# Patient Record
Sex: MALE | Race: BLACK OR AFRICAN AMERICAN | NOT HISPANIC OR LATINO
[De-identification: names, ages, dates, MRNs, and addresses within clinical notes are randomized per-mention and may not be internally consistent; named-entity substitution may affect disease eponyms.]

---

## 2018-05-25 ENCOUNTER — TRANSCRIPTION ENCOUNTER (OUTPATIENT)
Age: 68
End: 2018-05-25

## 2018-10-23 ENCOUNTER — INPATIENT (INPATIENT)
Facility: HOSPITAL | Age: 68
LOS: 5 days | Discharge: HOME | DRG: 300 | End: 2018-10-29
Attending: INTERNAL MEDICINE | Admitting: INTERNAL MEDICINE
Payer: MEDICARE

## 2018-10-23 VITALS
OXYGEN SATURATION: 100 % | RESPIRATION RATE: 20 BRPM | TEMPERATURE: 98 F | WEIGHT: 169.98 LBS | HEIGHT: 72 IN | HEART RATE: 79 BPM | DIASTOLIC BLOOD PRESSURE: 80 MMHG | SYSTOLIC BLOOD PRESSURE: 131 MMHG

## 2018-10-23 DIAGNOSIS — L97.509 NON-PRESSURE CHRONIC ULCER OF OTHER PART OF UNSPECIFIED FOOT WITH UNSPECIFIED SEVERITY: ICD-10-CM

## 2018-10-23 LAB
ALBUMIN SERPL ELPH-MCNC: 2.8 G/DL — LOW (ref 3.5–5)
ALP SERPL-CCNC: 86 U/L — SIGNIFICANT CHANGE UP (ref 40–120)
ALT FLD-CCNC: 12 U/L DA — SIGNIFICANT CHANGE UP (ref 10–60)
ANION GAP SERPL CALC-SCNC: 5 MMOL/L — SIGNIFICANT CHANGE UP (ref 5–17)
APTT BLD: 31.8 SEC — SIGNIFICANT CHANGE UP (ref 27.5–37.4)
AST SERPL-CCNC: 14 U/L — SIGNIFICANT CHANGE UP (ref 10–40)
BASOPHILS # BLD AUTO: 0 K/UL — SIGNIFICANT CHANGE UP (ref 0–0.2)
BASOPHILS NFR BLD AUTO: 0.7 % — SIGNIFICANT CHANGE UP (ref 0–2)
BILIRUB SERPL-MCNC: 0.2 MG/DL — SIGNIFICANT CHANGE UP (ref 0.2–1.2)
BUN SERPL-MCNC: 13 MG/DL — SIGNIFICANT CHANGE UP (ref 7–18)
CALCIUM SERPL-MCNC: 8.4 MG/DL — SIGNIFICANT CHANGE UP (ref 8.4–10.5)
CHLORIDE SERPL-SCNC: 106 MMOL/L — SIGNIFICANT CHANGE UP (ref 96–108)
CO2 SERPL-SCNC: 28 MMOL/L — SIGNIFICANT CHANGE UP (ref 22–31)
CREAT SERPL-MCNC: 0.84 MG/DL — SIGNIFICANT CHANGE UP (ref 0.5–1.3)
EOSINOPHIL # BLD AUTO: 0.1 K/UL — SIGNIFICANT CHANGE UP (ref 0–0.5)
EOSINOPHIL NFR BLD AUTO: 1.2 % — SIGNIFICANT CHANGE UP (ref 0–6)
ERYTHROCYTE [SEDIMENTATION RATE] IN BLOOD: 63 MM/HR — HIGH (ref 0–20)
GLUCOSE SERPL-MCNC: 86 MG/DL — SIGNIFICANT CHANGE UP (ref 70–99)
HCT VFR BLD CALC: 30.8 % — LOW (ref 39–50)
HGB BLD-MCNC: 9.8 G/DL — LOW (ref 13–17)
INR BLD: 1.32 RATIO — HIGH (ref 0.88–1.16)
LACTATE SERPL-SCNC: 0.7 MMOL/L — SIGNIFICANT CHANGE UP (ref 0.7–2)
LYMPHOCYTES # BLD AUTO: 1.4 K/UL — SIGNIFICANT CHANGE UP (ref 1–3.3)
LYMPHOCYTES # BLD AUTO: 19.9 % — SIGNIFICANT CHANGE UP (ref 13–44)
MCHC RBC-ENTMCNC: 28.1 PG — SIGNIFICANT CHANGE UP (ref 27–34)
MCHC RBC-ENTMCNC: 31.7 GM/DL — LOW (ref 32–36)
MCV RBC AUTO: 88.6 FL — SIGNIFICANT CHANGE UP (ref 80–100)
MONOCYTES # BLD AUTO: 1.1 K/UL — HIGH (ref 0–0.9)
MONOCYTES NFR BLD AUTO: 14.8 % — HIGH (ref 2–14)
NEUTROPHILS # BLD AUTO: 4.5 K/UL — SIGNIFICANT CHANGE UP (ref 1.8–7.4)
NEUTROPHILS NFR BLD AUTO: 63.5 % — SIGNIFICANT CHANGE UP (ref 43–77)
PLATELET # BLD AUTO: 280 K/UL — SIGNIFICANT CHANGE UP (ref 150–400)
POTASSIUM SERPL-MCNC: 4.1 MMOL/L — SIGNIFICANT CHANGE UP (ref 3.5–5.3)
POTASSIUM SERPL-SCNC: 4.1 MMOL/L — SIGNIFICANT CHANGE UP (ref 3.5–5.3)
PROT SERPL-MCNC: 8.2 G/DL — SIGNIFICANT CHANGE UP (ref 6–8.3)
PROTHROM AB SERPL-ACNC: 14.5 SEC — HIGH (ref 9.8–12.7)
RBC # BLD: 3.48 M/UL — LOW (ref 4.2–5.8)
RBC # FLD: 14 % — SIGNIFICANT CHANGE UP (ref 10.3–14.5)
SODIUM SERPL-SCNC: 139 MMOL/L — SIGNIFICANT CHANGE UP (ref 135–145)
WBC # BLD: 7.1 K/UL — SIGNIFICANT CHANGE UP (ref 3.8–10.5)
WBC # FLD AUTO: 7.1 K/UL — SIGNIFICANT CHANGE UP (ref 3.8–10.5)

## 2018-10-23 PROCEDURE — 73630 X-RAY EXAM OF FOOT: CPT | Mod: 26,50

## 2018-10-23 PROCEDURE — 99285 EMERGENCY DEPT VISIT HI MDM: CPT

## 2018-10-23 RX ORDER — CIPROFLOXACIN LACTATE 400MG/40ML
400 VIAL (ML) INTRAVENOUS ONCE
Qty: 0 | Refills: 0 | Status: COMPLETED | OUTPATIENT
Start: 2018-10-23 | End: 2018-10-23

## 2018-10-23 RX ADMIN — Medication 200 MILLIGRAM(S): at 23:20

## 2018-10-23 RX ADMIN — Medication 100 MILLIGRAM(S): at 21:52

## 2018-10-23 NOTE — ED PROVIDER NOTE - OBJECTIVE STATEMENT
67 y/o M with no significant PMHx or PSHx presents to the ED brought in by EMS with bilateral chronic vein ulcers. Patient states ulcers have been present for 18 years. Patient states he has not seen a doctor for some time; patient states bandages were last changed last night. NKDA. 67 y/o M with no significant PMHx or PSHx presents to the ED brought in by EMS with bilateral chronic vein ulcers. Patient states ulcers have been present for 18 years. Patient states he has not seen a doctor for some time; patient states bandages were last changed last night. Allergies: PCN.

## 2018-10-23 NOTE — ED ADULT TRIAGE NOTE - CHIEF COMPLAINT QUOTE
Bilateral foot ulcers, with odor as per EMS. [employer] of apartment building called [employer] for PT.

## 2018-10-24 DIAGNOSIS — D64.9 ANEMIA, UNSPECIFIED: ICD-10-CM

## 2018-10-24 DIAGNOSIS — L97.509 NON-PRESSURE CHRONIC ULCER OF OTHER PART OF UNSPECIFIED FOOT WITH UNSPECIFIED SEVERITY: ICD-10-CM

## 2018-10-24 DIAGNOSIS — G31.84 MILD COGNITIVE IMPAIRMENT OF UNCERTAIN OR UNKNOWN ETIOLOGY: ICD-10-CM

## 2018-10-24 DIAGNOSIS — L03.119 CELLULITIS OF UNSPECIFIED PART OF LIMB: ICD-10-CM

## 2018-10-24 DIAGNOSIS — Z29.9 ENCOUNTER FOR PROPHYLACTIC MEASURES, UNSPECIFIED: ICD-10-CM

## 2018-10-24 LAB
CHOLEST SERPL-MCNC: 121 MG/DL — SIGNIFICANT CHANGE UP (ref 10–199)
HBA1C BLD-MCNC: 5.8 % — HIGH (ref 4–5.6)
HDLC SERPL-MCNC: 47 MG/DL — SIGNIFICANT CHANGE UP
LIPID PNL WITH DIRECT LDL SERPL: 66 MG/DL — SIGNIFICANT CHANGE UP
NT-PROBNP SERPL-SCNC: 124 PG/ML — SIGNIFICANT CHANGE UP (ref 0–125)
TOTAL CHOLESTEROL/HDL RATIO MEASUREMENT: 2.6 RATIO — LOW (ref 3.4–9.6)
TRIGL SERPL-MCNC: 41 MG/DL — SIGNIFICANT CHANGE UP (ref 10–149)
TSH SERPL-MCNC: 2.26 UU/ML — SIGNIFICANT CHANGE UP (ref 0.34–4.82)

## 2018-10-24 PROCEDURE — 73630 X-RAY EXAM OF FOOT: CPT | Mod: 26,50

## 2018-10-24 PROCEDURE — 71045 X-RAY EXAM CHEST 1 VIEW: CPT | Mod: 26

## 2018-10-24 PROCEDURE — 93970 EXTREMITY STUDY: CPT | Mod: 26

## 2018-10-24 RX ORDER — MEROPENEM 1 G/30ML
1000 INJECTION INTRAVENOUS EVERY 8 HOURS
Qty: 0 | Refills: 0 | Status: DISCONTINUED | OUTPATIENT
Start: 2018-10-24 | End: 2018-10-24

## 2018-10-24 RX ORDER — HEPARIN SODIUM 5000 [USP'U]/ML
5000 INJECTION INTRAVENOUS; SUBCUTANEOUS EVERY 8 HOURS
Qty: 0 | Refills: 0 | Status: DISCONTINUED | OUTPATIENT
Start: 2018-10-24 | End: 2018-10-29

## 2018-10-24 RX ORDER — MEROPENEM 1 G/30ML
1000 INJECTION INTRAVENOUS EVERY 8 HOURS
Qty: 0 | Refills: 0 | Status: DISCONTINUED | OUTPATIENT
Start: 2018-10-24 | End: 2018-10-29

## 2018-10-24 RX ORDER — COLLAGENASE CLOSTRIDIUM HIST. 250 UNIT/G
1 OINTMENT (GRAM) TOPICAL DAILY
Qty: 0 | Refills: 0 | Status: DISCONTINUED | OUTPATIENT
Start: 2018-10-24 | End: 2018-10-29

## 2018-10-24 RX ORDER — VANCOMYCIN HCL 1 G
1000 VIAL (EA) INTRAVENOUS EVERY 12 HOURS
Qty: 0 | Refills: 0 | Status: DISCONTINUED | OUTPATIENT
Start: 2018-10-24 | End: 2018-10-26

## 2018-10-24 RX ORDER — INFLUENZA VIRUS VACCINE 15; 15; 15; 15 UG/.5ML; UG/.5ML; UG/.5ML; UG/.5ML
0.5 SUSPENSION INTRAMUSCULAR ONCE
Qty: 0 | Refills: 0 | Status: COMPLETED | OUTPATIENT
Start: 2018-10-24 | End: 2018-10-29

## 2018-10-24 RX ADMIN — HEPARIN SODIUM 5000 UNIT(S): 5000 INJECTION INTRAVENOUS; SUBCUTANEOUS at 13:17

## 2018-10-24 RX ADMIN — Medication 250 MILLIGRAM(S): at 17:19

## 2018-10-24 RX ADMIN — MEROPENEM 200 MILLIGRAM(S): 1 INJECTION INTRAVENOUS at 22:09

## 2018-10-24 RX ADMIN — Medication 100 MILLIGRAM(S): at 13:17

## 2018-10-24 RX ADMIN — HEPARIN SODIUM 5000 UNIT(S): 5000 INJECTION INTRAVENOUS; SUBCUTANEOUS at 22:09

## 2018-10-24 RX ADMIN — MEROPENEM 100 MILLIGRAM(S): 1 INJECTION INTRAVENOUS at 15:44

## 2018-10-24 NOTE — PROGRESS NOTE ADULT - SUBJECTIVE AND OBJECTIVE BOX
HPI:  Mr. Reynolds is a 69 y/o M with no significant PMHx or PSHx presents to the ED brought in by EMS with bilateral chronic vein ulcers of LE. The ulcers are located medial and lateral sides of the left ankle and medial side of the right ankle. The ulcers produce yellowish discharge that is foul smelling. Patient also reports redness, warmth, and pain of the affected areas. The symptoms have been stable in severity lately but patient decided to present to ED after his friends encouraged the patient to come to ED for treatment. Patient states ulcers have been present for the past 18 years. Patient states he has not seen a doctor for some time. Patient reports bandages around his ulcers changed last night. Patient denies motor or sensory changes, numbness, tingling, or paresthesia of LE's. Patient also denies a cut, abrasion, or break in the skin. Allergies: PCN.    In ED, patient's vitals are /46 HR 66 RR 18 T 36.4 O2 Sat 100. EKG shows sinus bradycardia, possible LAE, and RBBB. Patient is a poor historian and reports taking "a few medications" but does not remember name, dosage, frequency of the medications. Primary team to obtain medication list from pt's pharmacy. (24 Oct 2018 04:48)      PAST MEDICAL & SURGICAL HISTORY:  No pertinent past medical history  No significant past surgical history      penicillin (Other)      clindamycin IVPB 900 milliGRAM(s) IV Intermittent every 8 hours  heparin  Injectable 5000 Unit(s) SubCutaneous every 8 hours  influenza   Vaccine 0.5 milliLiter(s) IntraMuscular once      Social Hx:    FAMILY HISTORY:  No pertinent family history in first degree relatives        ROS  [  ] UNABLE TO ELICIT    General:  [  ] None  [  ] Fever  [  ] Chills  [ x ] Malaise    Skin:  [  ] None [  ] Rash  [  ] Wound  [ x ] Ulcer bot ankles.    HEENT:  [ x ] None  [  ] Sore Throat  [  ] Nasal congestion/ runny nose  [  ] Photophobia [  ] Neck pain      Chest:  [ x ] None   [  ] SOB  [  ] Cough  [  ] None    Cardiovascular:   [ x ] None  [  ] CP  [  ] Palpitation    Gastrointestinal:  [ x ] None  [  ] Abd pain   [  ] Nausea    [  ] Vomiting   [  ] Diarrhea	     Genitourinary:  [ x ] None [  ] Polyuria   [  ] Urgency  [  ] Frequency  [  ] Dysuria    [  ]  Hematuria       Musculoskeletal:  [  ] None [  ] Back Pain	[  ] Body aches  [  ] Joint pain  [ x ] Weakness    Neurological: [  ] None [  ]Dizziness  [  ]Visual Disturbance  [  ]Headaches   [ x ] Weakness      PHYSICAL EXAM:    Vital Signs Last 24 Hrs  T(C): 36.4 (24 Oct 2018 04:17), Max: 36.4 (23 Oct 2018 19:07)  T(F): 97.6 (24 Oct 2018 04:17), Max: 97.6 (23 Oct 2018 19:07)  HR: 66 (24 Oct 2018 04:17) (66 - 79)  BP: 117/46 (24 Oct 2018 04:17) (117/46 - 131/80)  BP(mean): --  RR: 18 (24 Oct 2018 04:17) (18 - 20)  SpO2: 100% (24 Oct 2018 04:17) (100% - 100%)    Constitutional:    HEENT: [ x ] Wnl  [  ] Pharyngeal congestion    Neck:  [ x ] Supple  [  ]Lymphadenopathy  [ x ] No JVD   [  ] JVD  [  ] Masses   [  ] WNL    CHEST/Respiratory:  [ x ]Clear to auscultation  [  ] Rales   [  ] Rhonchi   [  ] Wheezing     [  ] Chest Tenderness      Cardiovascular:  [ x ] Reg S1 S2   [  ] Irreg S1 S2   [ x ]No Murmur  [  ] +ve Murmurs  [  ]Systolic [  ]Diastolic      Abdomen:  [ x ] Soft  [ x ] No tendrerness  [  ] Tenderness  [  ] Organomegaly  [  ] ABD Distention  [  ] Rigidity                       [ x ] No Regidity                       [ x ] No Rebound Tenderness  [  ] No Guarding Rigidity  [  ] Rebound Tenderness[  ] Guarding Rigidity                          [ x ]  +ve Bowel Sounds  [  ] Decreased Bowel Sounds    [  ] Absent Bowel Sounds                            Extremities: [  ] No edema [ x ] Edema L>R  [  ] Clubbing   [  ] Cyanosis                         [ x ] No Tender Calf muscles  [  ] Tender Calf muscles                        [ x ] Palpable peripheral pulses    Neurological: [ x ] Awake  [ x ] Alert  [ x ] Oriented  x  3                           [  ] Confused  [  ] Drowsy  [  ] respond to painful stimuli  [  ] Unresponsive    Skin:  [  ] Intact [  ] Redness [  ] Thrombophlebitis  [  ] Rashes  [  ] Dry  [ x ] Ulcers medial right ankle, and bilateral on left ankle with yellow fowl smelling discharge.    Ortho:  [  ] Joint Swelling  [  ] Joint erythema [  ] Joint tenderness                [  ] Increased temp. to touch  [  ] DJD [ x ] WNL      LABS/DIAGNOSTIC TESTS                          9.8    7.1   )-----------( 280      ( 23 Oct 2018 20:36 )             30.8     WBC Count: 7.1 K/uL (10-23 @ 20:36)      10-23    139  |  106  |  13  ----------------------------<  86  4.1   |  28  |  0.84    Ca    8.4      23 Oct 2018 20:36    TPro  8.2  /  Alb  2.8<L>  /  TBili  0.2  /  DBili  x   /  AST  14  /  ALT  12  /  AlkPhos  86  10-23          LIVER FUNCTIONS - ( 23 Oct 2018 20:36 )  Alb: 2.8 g/dL / Pro: 8.2 g/dL / ALK PHOS: 86 U/L / ALT: 12 U/L DA / AST: 14 U/L / GGT: x             PT/INR - ( 23 Oct 2018 20:36 )   PT: 14.5 sec;   INR: 1.32 ratio         PTT - ( 23 Oct 2018 20:36 )  PTT:31.8 sec    LACTATE:Lactate, Blood: 0.7 mmol/L (10-23 @ 20:36)        CULTURES:   None yet.    RADIOLOGY    None yet

## 2018-10-24 NOTE — H&P ADULT - HISTORY OF PRESENT ILLNESS
Mr. Reynolds is a 67 y/o M with no significant PMHx or PSHx presents to the ED brought in by EMS with bilateral chronic vein ulcers of LE. The ulcers are located medial and lateral sides of the left ankle and medial side of the right ankle. The ulcers produce yellowish discharge that is foul smelling. Patient also reports redness, warmth, and pain of the affected areas. The symptoms have been stable in severity lately but patient decided to present to ED after his friends encouraged the patient to come to ED for treatment. Patient states ulcers have been present for the past 18 years. Patient states he has not seen a doctor for some time. Patient reports bandages around his ulcers changed last night. Patient denies motor or sensory changes, numbness, tingling, or paresthesia of LE's. Patient also denies a cut, abrasion, or break in the skin. Allergies: PCN.    In ED, patient's vitals are /46 HR 66 RR 18 T 36.4 O2 Sat 100. EKG shows sinus bradycardia, possible LAE, and RBBB. Mr. Reynolds is a 69 y/o M with no significant PMHx or PSHx presents to the ED brought in by EMS with bilateral chronic vein ulcers of LE. The ulcers are located medial and lateral sides of the left ankle and medial side of the right ankle. The ulcers produce yellowish discharge that is foul smelling. Patient also reports redness, warmth, and pain of the affected areas. The symptoms have been stable in severity lately but patient decided to present to ED after his friends encouraged the patient to come to ED for treatment. Patient states ulcers have been present for the past 18 years. Patient states he has not seen a doctor for some time. Patient reports bandages around his ulcers changed last night. Patient denies motor or sensory changes, numbness, tingling, or paresthesia of LE's. Patient also denies a cut, abrasion, or break in the skin. Allergies: PCN.    In ED, patient's vitals are /46 HR 66 RR 18 T 36.4 O2 Sat 100. EKG shows sinus bradycardia, possible LAE, and RBBB. Patient is a poor historian and reports taking "a few medications" but does not remember name, dosage, frequency of the medications. Primary team to obtain medication list from pt's pharmacy.

## 2018-10-24 NOTE — PROGRESS NOTE ADULT - ASSESSMENT
Mr. Reynolds is a 67 y/o M with no significant PMHx or PSHx presents to the ED brought in by EMS with bilateral chronic vein ulcers of LE. The ulcers are located medial and lateral sides of the left ankle and medial side of the right ankle. The ulcers produce yellowish discharge that is foul smelling. Patient also reports redness, warmth, and pain of the affected areas. The symptoms have been stable in severity lately but patient decided to present to ED after his friends encouraged the patient to come to ED for treatment. Patient states ulcers have been present for the past 18 years.

## 2018-10-24 NOTE — H&P ADULT - PROBLEM SELECTOR PLAN 2
IMPROVE VTE Individual Risk Assessment    RISK                                                          Points  [] Previous VTE                                           3  [] Thrombophilia                                        2  [] Lower limb paralysis                              2   [] Current Cancer                                       2   [] Immobilization > 24 hrs                        1  [] ICU/CCU stay > 24 hours                       1  [x] Age > 60                                                   1    IMPROVE VTE Score: 1, no chemical DVT prophylaxis needed IMPROVE VTE Individual Risk Assessment    RISK                                                          Points  [] Previous VTE                                           3  [] Thrombophilia                                        2  [] Lower limb paralysis                              2   [] Current Cancer                                       2   [x] Immobilization > 24 hrs                        1  [] ICU/CCU stay > 24 hours                       1  [x] Age > 60                                                   1    IMPROVE VTE Score: 2, will keep on heparin for DVT PPx.

## 2018-10-24 NOTE — H&P ADULT - NSHPPHYSICALEXAM_GEN_ALL_CORE
GENERAL: NAD  HEENT: Normocephalic;  conjunctivae and sclerae clear; moist mucous membranes;   NECK : supple  CHEST/LUNG: Clear to auscultation bilaterally with good air entry   HEART: S1 S2  regular; no murmurs, gallops or rubs  ABDOMEN: Soft, Nontender, Nondistended; Bowel sounds present  EXTREMITIES: bilateral ankle ulcers wrapped in bandages  no cyanosis; no edema; no calf tenderness  SKIN: warm and dry; no rash  NERVOUS SYSTEM:  Awake and alert; no new deficits

## 2018-10-24 NOTE — H&P ADULT - ASSESSMENT
Mr. Reynolds is a 69 y/o M with no significant PMHx or PSHx presents to the ED brought in by EMS with bilateral chronic vein ulcers of LE. The ulcers are located medial and lateral sides of the left ankle and medial side of the right ankle. The ulcers produce yellowish discharge that is foul smelling. Patient also reports redness, warmth, and pain of the affected areas. The symptoms have been stable in severity lately but patient decided to present to ED after his friends encouraged the patient to come to ED for treatment. Patient states ulcers have been present for the past 18 years. Patient states he has not seen a doctor for some time. Patient reports bandages around his ulcers changed last night. Patient denies motor or sensory changes, numbness, tingling, or paresthesia of LE's. Patient also denies a cut, abrasion, or break in the skin. Allergies: PCN.    In ED, patient's vitals are /46 HR 66 RR 18 T 36.4 O2 Sat 100. EKG shows sinus bradycardia, possible LAE, and RBBB.

## 2018-10-24 NOTE — CONSULT NOTE ADULT - ASSESSMENT
Bilateral LE venous stasis ulcers  PVD, xerosis, Onychomycosis    Examined patient, reviewed patient's chart  Discusses treatment plans with patient   Performed excisional debridement of devitalized tissues with scalpel to and including subcutaneous level bilateral LE ulcers  Cleaned wound with normal saline and applied medi-honey to all ulcers with dry compressive bandaging  Continue IV antibiotics as per ID  Continue daily dressing change   Performed aseptic debridement of all toenails without complications  Continue elevation of bilateral LE and off load bilateral heel and ulcer sites

## 2018-10-24 NOTE — BEHAVIORAL HEALTH ASSESSMENT NOTE - NSBHCONSULTFOLLOW_PSY_A_CORE
A 29 year old male pt presents to the ED c/o sore throat. 2 days ago the pt began to develop sore throat, body aches, chills, subjective fever. He has not taken any medication for his symptoms. Pt has not had recent sick contacts. denies HA or neck pain. no chest pain or sob. no abd pain. no n/v/d. no urinary f/u/d. no back pain. no motor or sensory deficits. denies illicit drug use. no recent travel. no rash. no other acute issues symptoms or concerns Signing off - call with questions…

## 2018-10-24 NOTE — CONSULT NOTE ADULT - SUBJECTIVE AND OBJECTIVE BOX
69 y/o Male with no significant PMHx or PSHx presents to the ED brought in by EMS with bilateral chronic vein ulcers of LE. The ulcers are located medial and lateral sides of the left ankle and medial side of the right ankle. The ulcers produce yellowish discharge that is foul smelling. Patient also reports redness, warmth, and pain of the affected areas. The symptoms have been stable in severity lately but patient decided to present to ED after his friends encouraged the patient to come to ED for treatment. Patient states ulcers have been present for the past 18 years. Patient states he has not seen a doctor for some time. Patient reports bandages around his ulcers changed last night. Patient denies motor or sensory changes, numbness, tingling, or paresthesia of LE's. Patient also denies a cut, abrasion, or break in the skin.   Admitted with bilateral ankle ulcers     Review of Systems:  · Additional ROS	CONSTITUTIONAL: No fever,   EYES: no acute visual disturbances  NECK: No pain or stiffness  RESPIRATORY: No cough; No shortness of breath  CARDIOVASCULAR: No chest pain, no palpitations  GASTROINTESTINAL: No pain. No nausea or vomiting; No diarrhea   NEUROLOGICAL: No headache or numbness, no tremors  MUSCULOSKELETAL: No joint pain, no muscle pain  GENITOURINARY: no dysuria, no frequency, no hesitancy      Allergies and Intolerances:        Allergies:  	penicillin: Drug, Other, unknown    Home Medications:   * Outpatient Medication Status not yet specified    Patient History:    Past Medical History:  No pertinent past medical history.     Past Surgical History:  No significant past surgical history.     Family History:  No pertinent family history in first degree relatives.     Social History:  Social History (marital status, living situation, occupation, tobacco use, alcohol and drug use, and sexual history): Patient lives in a co-op, retired. Denies smoking, alcohol use, or illicit drug use.     Tobacco Screening:  · Core Measure Site	Yes  · Has the patient used tobacco in the past 30 days?	No    Risk Assessment:    Present on Admission:  Deep Venous Thrombosis	no  Pulmonary Embolus	no      PHYSICAL EXAMS:  Patient seen at bedside in NAD for bilateral LE venous stasis ulcer, A&Ox3.   Admitted with bilateral LE venous stasis ulcer  Daily wound care WITH SANTYL and IV antibotics    Vital Signs Last 24 Hrs  T(C): 36.4 (24 Oct 2018 04:17), Max: 36.4 (23 Oct 2018 19:07)  T(F): 97.6 (24 Oct 2018 04:17), Max: 97.6 (23 Oct 2018 19:07)  HR: 66 (24 Oct 2018 04:17) (66 - 79)  BP: 117/46 (24 Oct 2018 04:17) (117/46 - 131/80)  BP(mean): --  RR: 18 (24 Oct 2018 04:17) (18 - 20)  SpO2: 100% (24 Oct 2018 04:17) (100% - 100%)      Vascular: Bilateral lower extremities with flaky dry, scaly skin bilaterally with hemosiderin staining, and hyperpigmentation bilateral lower leg   weak palpable DP/Pt pulses , with biphasic doppler sounds.  Toenails are elongated, thickened and dystrophic x 10  Right medial ankle and lower leg measures: 10.1 cm x 8.2 cm and left medial ankle and lower leg measures 5.2 cm x 6.5 cm x 0.1 cm Irregular borders, with fibrogranular base, scattered areas of denuded epidermis with mixed granular  base, minimal drainage, no purulent, no mal-odor.  Neuro: Protective sensation diminished bilateral foot  M/S Decreased medial arch bilateral, dorsally contracted digits bilateral    LABS/DIAGNOSTIC TESTS                       9.8    7.1   )-----------( 280      ( 23 Oct 2018 20:36 )             30.8     WBC Count: 7.1 K/uL (10-23 @ 20:36)      10-23    139  |  106  |  13  ----------------------------<  86  4.1   |  28  |  0.84    Ca    8.4      23 Oct 2018 20:36    TPro  8.2  /  Alb  2.8<L>  /  TBili  0.2  /  DBili  x   /  AST  14  /  ALT  12  /  AlkPhos  86  10-23      LIVER FUNCTIONS - ( 23 Oct 2018 20:36 )  Alb: 2.8 g/dL / Pro: 8.2 g/dL / ALK PHOS: 86 U/L / ALT: 12 U/L DA / AST: 14 U/L / GGT: x

## 2018-10-24 NOTE — BEHAVIORAL HEALTH ASSESSMENT NOTE - SUMMARY
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  Patient was interviewed,chart was reviewed,case was discussed with MD.  Patient stated:" I have ulcers in my legs and my friend made me to come to the hospital and now I am very thankful to him for making me to come here"  Denied psych history.Patients wife and children live in [facility],he lives alone.Has good relationship with family he said.  Denied using alcohol or illegal drugs.  Patient is a/o x3,cooperative verbal,pleasant,behaviorally controlled.  Speech is goal directed.Mood-OK.Denied suicidal thought,plan,ideation.    Denied feeling depressed or anxious.Memory and cognition-mildly impaired.I&J-fair.

## 2018-10-24 NOTE — H&P ADULT - COMMENTS
CONSTITUTIONAL: No fever,   EYES: no acute visual disturbances  NECK: No pain or stiffness  RESPIRATORY: No cough; No shortness of breath  CARDIOVASCULAR: No chest pain, no palpitations  GASTROINTESTINAL: No pain. No nausea or vomiting; No diarrhea   NEUROLOGICAL: No headache or numbness, no tremors  MUSCULOSKELETAL: No joint pain, no muscle pain  GENITOURINARY: no dysuria, no frequency, no hesitancy

## 2018-10-24 NOTE — H&P ADULT - PROBLEM SELECTOR PLAN 1
Patient presents with chronic venous ulcers of b/l ankles with pain, warmth, redness, and discharges from the affected areas.  -F/u Podiatry  -F/u Vascular surgery c/s for evaluation of the non-healing chronic ulcers Patient presents with chronic venous ulcers of b/l ankles with pain, warmth, redness, and discharges from the affected areas.  -does not meet sepsis criteria  -consider MRI if negative XR result for OM as ulcer >18 years, high suspicion   -F/u Podiatry  -F/u Vascular surgery c/s for evaluation of the non-healing chronic ulcers  -will start on vancomycin and zosyn, f/u trough level closely  -ID consult Patient presents with chronic venous ulcers of b/l ankles with pain, warmth, redness, and discharges from the affected areas.  -does not meet sepsis criteria  -consider MRI if negative XR result for OM as ulcer >18 years, high suspicion   -F/u Podiatry  -F/u Vascular surgery c/s for evaluation of the non-healing chronic ulcers  -will start on clindamycin   -ID consult, wound culture by podiatry, blood culture

## 2018-10-25 LAB
ALBUMIN SERPL ELPH-MCNC: 3 G/DL — LOW (ref 3.5–5)
ALP SERPL-CCNC: 87 U/L — SIGNIFICANT CHANGE UP (ref 40–120)
ALT FLD-CCNC: 12 U/L DA — SIGNIFICANT CHANGE UP (ref 10–60)
ANION GAP SERPL CALC-SCNC: 5 MMOL/L — SIGNIFICANT CHANGE UP (ref 5–17)
AST SERPL-CCNC: 16 U/L — SIGNIFICANT CHANGE UP (ref 10–40)
BILIRUB SERPL-MCNC: 0.3 MG/DL — SIGNIFICANT CHANGE UP (ref 0.2–1.2)
BUN SERPL-MCNC: 10 MG/DL — SIGNIFICANT CHANGE UP (ref 7–18)
CALCIUM SERPL-MCNC: 8.8 MG/DL — SIGNIFICANT CHANGE UP (ref 8.4–10.5)
CHLORIDE SERPL-SCNC: 104 MMOL/L — SIGNIFICANT CHANGE UP (ref 96–108)
CO2 SERPL-SCNC: 30 MMOL/L — SIGNIFICANT CHANGE UP (ref 22–31)
CREAT SERPL-MCNC: 0.8 MG/DL — SIGNIFICANT CHANGE UP (ref 0.5–1.3)
GLUCOSE SERPL-MCNC: 88 MG/DL — SIGNIFICANT CHANGE UP (ref 70–99)
HCT VFR BLD CALC: 32.5 % — LOW (ref 39–50)
HGB BLD-MCNC: 10.3 G/DL — LOW (ref 13–17)
MAGNESIUM SERPL-MCNC: 2.1 MG/DL — SIGNIFICANT CHANGE UP (ref 1.6–2.6)
MCHC RBC-ENTMCNC: 28.2 PG — SIGNIFICANT CHANGE UP (ref 27–34)
MCHC RBC-ENTMCNC: 31.6 GM/DL — LOW (ref 32–36)
MCV RBC AUTO: 89.2 FL — SIGNIFICANT CHANGE UP (ref 80–100)
PHOSPHATE SERPL-MCNC: 3.2 MG/DL — SIGNIFICANT CHANGE UP (ref 2.5–4.5)
PLATELET # BLD AUTO: 311 K/UL — SIGNIFICANT CHANGE UP (ref 150–400)
POTASSIUM SERPL-MCNC: 3.9 MMOL/L — SIGNIFICANT CHANGE UP (ref 3.5–5.3)
POTASSIUM SERPL-SCNC: 3.9 MMOL/L — SIGNIFICANT CHANGE UP (ref 3.5–5.3)
PROT SERPL-MCNC: 9 G/DL — HIGH (ref 6–8.3)
RBC # BLD: 3.65 M/UL — LOW (ref 4.2–5.8)
RBC # FLD: 14.5 % — SIGNIFICANT CHANGE UP (ref 10.3–14.5)
SODIUM SERPL-SCNC: 139 MMOL/L — SIGNIFICANT CHANGE UP (ref 135–145)
WBC # BLD: 4.6 K/UL — SIGNIFICANT CHANGE UP (ref 3.8–10.5)
WBC # FLD AUTO: 4.6 K/UL — SIGNIFICANT CHANGE UP (ref 3.8–10.5)

## 2018-10-25 RX ADMIN — HEPARIN SODIUM 5000 UNIT(S): 5000 INJECTION INTRAVENOUS; SUBCUTANEOUS at 21:19

## 2018-10-25 RX ADMIN — MEROPENEM 200 MILLIGRAM(S): 1 INJECTION INTRAVENOUS at 05:33

## 2018-10-25 RX ADMIN — MEROPENEM 200 MILLIGRAM(S): 1 INJECTION INTRAVENOUS at 13:00

## 2018-10-25 RX ADMIN — MEROPENEM 200 MILLIGRAM(S): 1 INJECTION INTRAVENOUS at 21:19

## 2018-10-25 RX ADMIN — Medication 250 MILLIGRAM(S): at 05:33

## 2018-10-25 RX ADMIN — HEPARIN SODIUM 5000 UNIT(S): 5000 INJECTION INTRAVENOUS; SUBCUTANEOUS at 05:32

## 2018-10-25 RX ADMIN — HEPARIN SODIUM 5000 UNIT(S): 5000 INJECTION INTRAVENOUS; SUBCUTANEOUS at 13:00

## 2018-10-25 RX ADMIN — Medication 250 MILLIGRAM(S): at 17:04

## 2018-10-25 NOTE — PROGRESS NOTE ADULT - ASSESSMENT
Mr. Reynolds is a 67 y/o M with no significant PMHx or PSHx presents to the ED brought in by EMS with bilateral chronic vein ulcers of LE.

## 2018-10-25 NOTE — PROGRESS NOTE ADULT - PROBLEM SELECTOR PLAN 2
IMPROVE VTE Individual Risk Assessment    RISK                                                          Points  [] Previous VTE                                           3  [] Thrombophilia                                        2  [] Lower limb paralysis                              2   [] Current Cancer                                       2   [x] Immobilization > 24 hrs                        1  [] ICU/CCU stay > 24 hours                       1  [x] Age > 60                                                   1    IMPROVE VTE Score: 2, will keep on heparin for DVT PPx

## 2018-10-25 NOTE — PROGRESS NOTE ADULT - SUBJECTIVE AND OBJECTIVE BOX
Meds:  meropenem  IVPB 1000 milliGRAM(s) IV Intermittent every 8 hours  vancomycin  IVPB 1000 milliGRAM(s) IV Intermittent every 12 hours    Allergies:  Allergies    penicillin (Other)    Intolerances        ROS  [  ] UNABLE TO ELICIT    General:  [  ] None  [  ] Fever  [  ] Chills  [ x ] Malaise    Skin:  [  ] None [  ] Rash  [  ] Wound  [ x ] Ulcer bot ankles.    HEENT:  [ x ] None  [  ] Sore Throat  [  ] Nasal congestion/ runny nose  [  ] Photophobia [  ] Neck pain      Chest:  [ x ] None   [  ] SOB  [  ] Cough  [  ] None    Cardiovascular:   [ x ] None  [  ] CP  [  ] Palpitation    Gastrointestinal:  [ x ] None  [  ] Abd pain   [  ] Nausea    [  ] Vomiting   [  ] Diarrhea	     Genitourinary:  [ x ] None [  ] Polyuria   [  ] Urgency  [  ] Frequency  [  ] Dysuria    [  ]  Hematuria       Musculoskeletal:  [  ] None [  ] Back Pain	[  ] Body aches  [  ] Joint pain  [ x ] Weakness    Neurological: [  ] None [  ]Dizziness  [  ]Visual Disturbance  [  ]Headaches   [ x ] Weakness      PHYSICAL EXAM:    Vital Signs Last 24 Hrs  T(C): 36.5 (25 Oct 2018 05:48), Max: 36.7 (24 Oct 2018 20:44)  T(F): 97.7 (25 Oct 2018 05:48), Max: 98.1 (24 Oct 2018 20:44)  HR: 56 (25 Oct 2018 05:48) (56 - 62)  BP: 138/60 (25 Oct 2018 05:48) (127/51 - 138/60)  BP(mean): --  RR: 18 (25 Oct 2018 05:48) (18 - 18)  SpO2: 100% (25 Oct 2018 05:48) (100% - 100%)    Constitutional:    HEENT: [ x ] Wnl  [  ] Pharyngeal congestion    Neck:  [ x ] Supple  [  ]Lymphadenopathy  [ x ] No JVD   [  ] JVD  [  ] Masses   [  ] WNL    CHEST/Respiratory:  [ x ]Clear to auscultation  [  ] Rales   [  ] Rhonchi   [  ] Wheezing     [  ] Chest Tenderness      Cardiovascular:  [ x ] Reg S1 S2   [  ] Irreg S1 S2   [ x ]No Murmur  [  ] +ve Murmurs  [  ]Systolic [  ]Diastolic      Abdomen:  [ x ] Soft  [ x ] No tendrerness  [  ] Tenderness  [  ] Organomegaly  [  ] ABD Distention  [  ] Rigidity                       [ x ] No Regidity                       [ x ] No Rebound Tenderness  [  ] No Guarding Rigidity  [  ] Rebound Tenderness[  ] Guarding Rigidity                          [ x ]  +ve Bowel Sounds  [  ] Decreased Bowel Sounds    [  ] Absent Bowel Sounds                            Extremities: [  ] No edema [ x ] Edema L>R  [  ] Clubbing   [  ] Cyanosis                         [ x ] No Tender Calf muscles  [  ] Tender Calf muscles                        [ x ] Palpable peripheral pulses    Neurological: [ x ] Awake  [ x ] Alert  [ x ] Oriented  x  3                           [  ] Confused  [  ] Drowsy  [  ] respond to painful stimuli  [  ] Unresponsive    Skin:  [  ] Intact [  ] Redness [  ] Thrombophlebitis  [  ] Rashes  [  ] Dry  [ x ] Ulcers medial right ankle, and bilateral on left ankle with yellow fowl smelling discharge.    Ortho:  [  ] Joint Swelling  [  ] Joint erythema [  ] Joint tenderness                [  ] Increased temp. to touch  [  ] DJD [ x ] WNL      LABS/DIAGNOSTIC TESTS                          9.8    7.1   )-----------( 280      ( 23 Oct 2018 20:36 )             30.8         10-23    139  |  106  |  13  ----------------------------<  86  4.1   |  28  |  0.84    Ca    8.4      23 Oct 2018 20:36    TPro  8.2  /  Alb  2.8<L>  /  TBili  0.2  /  DBili  x   /  AST  14  /  ALT  12  /  AlkPhos  86  10-23      LIVER FUNCTIONS - ( 23 Oct 2018 20:36 )  Alb: 2.8 g/dL / Pro: 8.2 g/dL / ALK PHOS: 86 U/L / ALT: 12 U/L DA / AST: 14 U/L / GGT: x               CULTURES:   None yet.    RADIOLOGY  CXR:    EXAM:  XR CHEST PORTABLE URGENT 1V                            PROCEDURE DATE:  10/24/2018          INTERPRETATION:  Views:1  Comparison: Unavailable at this time  History: CHF    The lungs are clear of infiltrates and effusions.     The cardiac and  mediastinal contours appear unremarkable.     Impression:  1. No evidence of acute pulmonary disease.          EXAM:  FOOT BILATERAL (MINIMUM 3 V)                            PROCEDURE DATE:  10/24/2018          INTERPRETATION:  CLINICAL INDICATION: Bilateral ankle ulcerations;   evaluate for osteomyelitis.    COMPARISON:  October 23, 2018    TECHNIQUE:  AP,likely lateral radiographs of the bilateral feet   performed.    FINDINGS:  Since prior evaluation no significant interval change is   identified. Hammertoe deformities of the second through fifth digits   identified bilaterally. There is no evidencefor acute fracture. No   regions of osteolysis or osteosclerosis are noted. Degenerative changes   are identified involving the interphalangeal and first MTP joint spaces   of the great toes. No soft tissue swelling is identified. No retained   metallic foreign bodies are evident. Calcifications are identified within   the soft tissues along the posterior aspect of the bilateral lower legs.    There is loss of the normal plantar arch of the right foot. Chronic   appearing changes of the bilateralcalcanei identified, which may be   related to chronic trauma. Loss of height of the bilateral talar bones   may be a sequelae of osteonecrosis. Degenerative changes are noted along   the talonavicular articulations. Bilateral plantar calcaneal spur is   noted.    IMPRESSION:  No evidence for acute fracture. No regions of osteolysis   identified.    If osteomyelitis remains of clinical concern consider MRI evaluation.      EXAM:  US DPLX LWR EXT VEINS COMPL BI                            PROCEDURE DATE:  10/24/2018          INTERPRETATION:  CLINICAL INFORMATION: Leg pain    COMPARISON: None available.    TECHNIQUE: Duplex sonography of the BILATERAL LOWER extremities with   color and spectral Doppler, with and without compression.      FINDINGS:    There is normal compressibility of the bilateral common femoral, femoral   and popliteal veins. No calf vein thrombosis is detected.    Doppler examination shows normalspontaneous and phasic flow.    IMPRESSION:     No evidence of bilateral lower extremity deep venous thrombosis.                  Assessment and Plan:   Mr. Reynolds is a 69 y/o M with no significant PMHx or PSHx presents to the ED brought in by EMS with bilateral chronic vein ulcers of LE. The ulcers are located medial and lateral sides of the left ankle and medial side of the right ankle. The ulcers produce yellowish discharge that is foul smelling. Patient also reports redness, warmth, and pain of the affected areas. The symptoms have been stable in severity lately but patient decided to present to ED after his friends encouraged the patient to come to ED for treatment. Patient states ulcers have been present for the past 18 years.    Problem/Plan - 1:  ·  Problem: Cellulitis of lower extremity, unspecified laterality.    Plan:   1- Follow wounds Cultures.  2- Follow Blood culture to final report.  3- Start and Continue Vancomycin 1 gm IVPB q 12 hours and follow trough closely.  4- Discontinue IV Clindamycin and start Meropenem 1 gm IVPB q 8 hours and observe for any allergy.  5- Fluid and electrolytes management.  6- CBC and BMP follow up.   7- MRI both ankles to rule out osteomyelitis.   8- Podiatry follow up.    Problem/Plan - 2:  ·  Problem: Anemia.    Plan:   1- Anemia profile.  2- Iron supplements.  3- Closely monitor H & H.  4- Observe for bleeding.     Problem/Plan - 3:  ·  Problem: Ulcer of foot, unspecified laterality, unspecified ulcer stage.    Plan:   1- ABX as per problem #1.  2- Podiatry management and follow up.     Discussed with medical resident.

## 2018-10-25 NOTE — PROGRESS NOTE ADULT - PROBLEM SELECTOR PLAN 1
Patient presents with chronic venous ulcers of b/l ankles with pain, warmth, redness, and discharges from the affected areas.  Podiatry following, s/p bedside debridement on 10/24  f/u MRI b/l feet  c/w estevan and yumiko  -ID consult- Dr. Rea  f/u cultures- bcx and wound cx  afebrile, no leukocytosis

## 2018-10-26 LAB
-  AMIKACIN: SIGNIFICANT CHANGE UP
-  AZTREONAM: SIGNIFICANT CHANGE UP
-  CEFEPIME: SIGNIFICANT CHANGE UP
-  CEFTAZIDIME: SIGNIFICANT CHANGE UP
-  CIPROFLOXACIN: SIGNIFICANT CHANGE UP
-  GENTAMICIN: SIGNIFICANT CHANGE UP
-  IMIPENEM: SIGNIFICANT CHANGE UP
-  LEVOFLOXACIN: SIGNIFICANT CHANGE UP
-  MEROPENEM: SIGNIFICANT CHANGE UP
-  PIPERACILLIN/TAZOBACTAM: SIGNIFICANT CHANGE UP
-  TOBRAMYCIN: SIGNIFICANT CHANGE UP
ANION GAP SERPL CALC-SCNC: 5 MMOL/L — SIGNIFICANT CHANGE UP (ref 5–17)
BUN SERPL-MCNC: 11 MG/DL — SIGNIFICANT CHANGE UP (ref 7–18)
CALCIUM SERPL-MCNC: 8.6 MG/DL — SIGNIFICANT CHANGE UP (ref 8.4–10.5)
CHLORIDE SERPL-SCNC: 108 MMOL/L — SIGNIFICANT CHANGE UP (ref 96–108)
CO2 SERPL-SCNC: 28 MMOL/L — SIGNIFICANT CHANGE UP (ref 22–31)
CREAT SERPL-MCNC: 0.69 MG/DL — SIGNIFICANT CHANGE UP (ref 0.5–1.3)
CULTURE RESULTS: SIGNIFICANT CHANGE UP
GLUCOSE SERPL-MCNC: 84 MG/DL — SIGNIFICANT CHANGE UP (ref 70–99)
HCT VFR BLD CALC: 30.3 % — LOW (ref 39–50)
HGB BLD-MCNC: 9.7 G/DL — LOW (ref 13–17)
MAGNESIUM SERPL-MCNC: 2 MG/DL — SIGNIFICANT CHANGE UP (ref 1.6–2.6)
MCHC RBC-ENTMCNC: 28.3 PG — SIGNIFICANT CHANGE UP (ref 27–34)
MCHC RBC-ENTMCNC: 31.9 GM/DL — LOW (ref 32–36)
MCV RBC AUTO: 88.9 FL — SIGNIFICANT CHANGE UP (ref 80–100)
METHOD TYPE: SIGNIFICANT CHANGE UP
ORGANISM # SPEC MICROSCOPIC CNT: SIGNIFICANT CHANGE UP
ORGANISM # SPEC MICROSCOPIC CNT: SIGNIFICANT CHANGE UP
PHOSPHATE SERPL-MCNC: 2.9 MG/DL — SIGNIFICANT CHANGE UP (ref 2.5–4.5)
PLATELET # BLD AUTO: 274 K/UL — SIGNIFICANT CHANGE UP (ref 150–400)
POTASSIUM SERPL-MCNC: 4.3 MMOL/L — SIGNIFICANT CHANGE UP (ref 3.5–5.3)
POTASSIUM SERPL-SCNC: 4.3 MMOL/L — SIGNIFICANT CHANGE UP (ref 3.5–5.3)
RBC # BLD: 3.41 M/UL — LOW (ref 4.2–5.8)
RBC # FLD: 14.3 % — SIGNIFICANT CHANGE UP (ref 10.3–14.5)
SODIUM SERPL-SCNC: 141 MMOL/L — SIGNIFICANT CHANGE UP (ref 135–145)
SPECIMEN SOURCE: SIGNIFICANT CHANGE UP
VANCOMYCIN TROUGH SERPL-MCNC: 8.9 UG/ML — LOW (ref 10–20)
WBC # BLD: 4.1 K/UL — SIGNIFICANT CHANGE UP (ref 3.8–10.5)
WBC # FLD AUTO: 4.1 K/UL — SIGNIFICANT CHANGE UP (ref 3.8–10.5)

## 2018-10-26 PROCEDURE — 73718 MRI LOWER EXTREMITY W/O DYE: CPT | Mod: 26,50

## 2018-10-26 RX ORDER — VANCOMYCIN HCL 1 G
1250 VIAL (EA) INTRAVENOUS EVERY 12 HOURS
Qty: 0 | Refills: 0 | Status: DISCONTINUED | OUTPATIENT
Start: 2018-10-26 | End: 2018-10-29

## 2018-10-26 RX ADMIN — MEROPENEM 200 MILLIGRAM(S): 1 INJECTION INTRAVENOUS at 13:18

## 2018-10-26 RX ADMIN — HEPARIN SODIUM 5000 UNIT(S): 5000 INJECTION INTRAVENOUS; SUBCUTANEOUS at 13:18

## 2018-10-26 RX ADMIN — MEROPENEM 200 MILLIGRAM(S): 1 INJECTION INTRAVENOUS at 21:37

## 2018-10-26 RX ADMIN — Medication 1 APPLICATION(S): at 13:18

## 2018-10-26 RX ADMIN — MEROPENEM 200 MILLIGRAM(S): 1 INJECTION INTRAVENOUS at 05:18

## 2018-10-26 RX ADMIN — Medication 250 MILLIGRAM(S): at 05:28

## 2018-10-26 RX ADMIN — HEPARIN SODIUM 5000 UNIT(S): 5000 INJECTION INTRAVENOUS; SUBCUTANEOUS at 21:38

## 2018-10-26 RX ADMIN — Medication 166.67 MILLIGRAM(S): at 19:08

## 2018-10-26 RX ADMIN — HEPARIN SODIUM 5000 UNIT(S): 5000 INJECTION INTRAVENOUS; SUBCUTANEOUS at 05:18

## 2018-10-26 NOTE — PROGRESS NOTE ADULT - PROBLEM SELECTOR PLAN 1
Patient presents with chronic venous ulcers of b/l ankles with pain, warmth, redness, and discharges from the affected areas.  Podiatry following, s/p bedside debridement on 10/24  f/u MRI b/l feet to r/o osteo  c/w vanc and yumiko  -ID consult- Dr. Rea  bcx neg  wound cx + pseudomonas, pending sensitivity  afebrile, no leukocytosis

## 2018-10-26 NOTE — PROGRESS NOTE ADULT - SUBJECTIVE AND OBJECTIVE BOX
Meds:  meropenem  IVPB 1000 milliGRAM(s) IV Intermittent every 8 hours  vancomycin  IVPB 1000 milliGRAM(s) IV Intermittent every 12 hours    Allergies:  Allergies    penicillin (Other)    Intolerances        ROS  [  ] UNABLE TO ELICIT    General:  [  ] None  [  ] Fever  [  ] Chills  [ x ] Malaise    Skin:  [  ] None [  ] Rash  [  ] Wound  [ x ] Ulcer bot ankles.    HEENT:  [ x ] None  [  ] Sore Throat  [  ] Nasal congestion/ runny nose  [  ] Photophobia [  ] Neck pain      Chest:  [ x ] None   [  ] SOB  [  ] Cough  [  ] None    Cardiovascular:   [ x ] None  [  ] CP  [  ] Palpitation    Gastrointestinal:  [ x ] None  [  ] Abd pain   [  ] Nausea    [  ] Vomiting   [  ] Diarrhea	     Genitourinary:  [ x ] None [  ] Polyuria   [  ] Urgency  [  ] Frequency  [  ] Dysuria    [  ]  Hematuria       Musculoskeletal:  [  ] None [  ] Back Pain	[  ] Body aches  [  ] Joint pain  [ x ] Weakness    Neurological: [  ] None [  ]Dizziness  [  ]Visual Disturbance  [  ]Headaches   [ x ] Weakness      PHYSICAL EXAM:  Vital Signs Last 24 Hrs  T(C): 36.7 (26 Oct 2018 05:18), Max: 36.7 (26 Oct 2018 05:18)  T(F): 98 (26 Oct 2018 05:18), Max: 98 (26 Oct 2018 05:18)  HR: 77 (26 Oct 2018 05:18) (53 - 77)  BP: 141/76 (26 Oct 2018 05:18) (134/66 - 149/75)  BP(mean): --  RR: 17 (26 Oct 2018 05:18) (16 - 18)  SpO2: 100% (26 Oct 2018 05:18) (100% - 100%)    Constitutional:    HEENT: [ x ] Wnl  [  ] Pharyngeal congestion    Neck:  [ x ] Supple  [  ]Lymphadenopathy  [ x ] No JVD   [  ] JVD  [  ] Masses   [  ] WNL    CHEST/Respiratory:  [ x ]Clear to auscultation  [  ] Rales   [  ] Rhonchi   [  ] Wheezing     [  ] Chest Tenderness      Cardiovascular:  [ x ] Reg S1 S2   [  ] Irreg S1 S2   [ x ]No Murmur  [  ] +ve Murmurs  [  ]Systolic [  ]Diastolic      Abdomen:  [ x ] Soft  [ x ] No tendrerness  [  ] Tenderness  [  ] Organomegaly  [  ] ABD Distention  [  ] Rigidity                       [ x ] No Regidity                       [ x ] No Rebound Tenderness  [  ] No Guarding Rigidity  [  ] Rebound Tenderness[  ] Guarding Rigidity                          [ x ]  +ve Bowel Sounds  [  ] Decreased Bowel Sounds    [  ] Absent Bowel Sounds                            Extremities: [  ] No edema [ x ] Edema L>R  [  ] Clubbing   [  ] Cyanosis                         [ x ] No Tender Calf muscles  [  ] Tender Calf muscles                        [ x ] Palpable peripheral pulses    Neurological: [ x ] Awake  [ x ] Alert  [ x ] Oriented  x  3                           [  ] Confused  [  ] Drowsy  [  ] respond to painful stimuli  [  ] Unresponsive    Skin:  [  ] Intact [  ] Redness [  ] Thrombophlebitis  [  ] Rashes  [  ] Dry  [ x ] Ulcers medial right ankle, and bilateral on left ankle with yellow fowl smelling discharge.    Ortho:  [  ] Joint Swelling  [  ] Joint erythema [  ] Joint tenderness                [  ] Increased temp. to touch  [  ] DJD [ x ] WNL      LABS/DIAGNOSTIC TESTS                        9.7    4.1   )-----------( 274      ( 26 Oct 2018 05:46 )             30.3   10-26    141  |  108  |  11  ----------------------------<  84  4.3   |  28  |  0.69    Ca    8.6      26 Oct 2018 05:46  Phos  2.9     10-26  Mg     2.0     10-26    TPro  9.0<H>  /  Alb  3.0<L>  /  TBili  0.3  /  DBili  x   /  AST  16  /  ALT  12  /  AlkPhos  87  10-25    Vancomycin Level, Trough (10.26.18 @ 05:46)    Vancomycin Level, Trough: 8.9: Vancomycin trough levels should be rapidly reached and maintained at  15-20 ug/ml for life threatening MRSA  infections such as sepsis, endocarditis, osteomyelitis and pneumonia. A  first trough level should be drawn  before the 3rd or 4th dose.  Risk of renal toxicity is increased for levels >15 ug/ml, in patients on  other nephrotoxic drugs, who are  hemodynamically unstable, have unstable renal function, or are on  Vancomycin therapy for >14 days. Renal function with  creatinine levels should be monitored for those patients. ug/mL        CULTURES:   Culture - Other (10.25.18 @ 00:30)    Specimen Source: Skin left ankle ulcer    Culture Results:   Few Pseudomonas aeruginosa  Few Enterococcus species "Susceptibilities not performed"    Culture - Blood (10.24.18 @ 09:41)    Specimen Source: .Blood Blood    Culture Results:   No growth to date.    Culture - Blood (10.24.18 @ 09:41)    Specimen Source: .Blood Blood    Culture Results:   No growth to date.        RADIOLOGY  CXR:    EXAM:  XR CHEST PORTABLE URGENT 1V                            PROCEDURE DATE:  10/24/2018          INTERPRETATION:  Views:1  Comparison: Unavailable at this time  History: CHF    The lungs are clear of infiltrates and effusions.     The cardiac and  mediastinal contours appear unremarkable.     Impression:  1. No evidence of acute pulmonary disease.          EXAM:  FOOT BILATERAL (MINIMUM 3 V)                            PROCEDURE DATE:  10/24/2018          INTERPRETATION:  CLINICAL INDICATION: Bilateral ankle ulcerations;   evaluate for osteomyelitis.    COMPARISON:  October 23, 2018    TECHNIQUE:  AP,likely lateral radiographs of the bilateral feet   performed.    FINDINGS:  Since prior evaluation no significant interval change is   identified. Hammertoe deformities of the second through fifth digits   identified bilaterally. There is no evidencefor acute fracture. No   regions of osteolysis or osteosclerosis are noted. Degenerative changes   are identified involving the interphalangeal and first MTP joint spaces   of the great toes. No soft tissue swelling is identified. No retained   metallic foreign bodies are evident. Calcifications are identified within   the soft tissues along the posterior aspect of the bilateral lower legs.    There is loss of the normal plantar arch of the right foot. Chronic   appearing changes of the bilateralcalcanei identified, which may be   related to chronic trauma. Loss of height of the bilateral talar bones   may be a sequelae of osteonecrosis. Degenerative changes are noted along   the talonavicular articulations. Bilateral plantar calcaneal spur is   noted.    IMPRESSION:  No evidence for acute fracture. No regions of osteolysis   identified.    If osteomyelitis remains of clinical concern consider MRI evaluation.      EXAM:  US DPLX LWR EXT VEINS COMPL BI                            PROCEDURE DATE:  10/24/2018          INTERPRETATION:  CLINICAL INFORMATION: Leg pain    COMPARISON: None available.    TECHNIQUE: Duplex sonography of the BILATERAL LOWER extremities with   color and spectral Doppler, with and without compression.      FINDINGS:    There is normal compressibility of the bilateral common femoral, femoral   and popliteal veins. No calf vein thrombosis is detected.    Doppler examination shows normalspontaneous and phasic flow.    IMPRESSION:     No evidence of bilateral lower extremity deep venous thrombosis.                  Assessment and Plan:   Mr. Reynolds is a 69 y/o M with no significant PMHx or PSHx presents to the ED brought in by EMS with bilateral chronic vein ulcers of LE. The ulcers are located medial and lateral sides of the left ankle and medial side of the right ankle. The ulcers produce yellowish discharge that is foul smelling. Patient also reports redness, warmth, and pain of the affected areas. The symptoms have been stable in severity lately but patient decided to present to ED after his friends encouraged the patient to come to ED for treatment. Patient states ulcers have been present for the past 18 years.  Wound culture grew Few Pseudomonas aeruginosa, and Few Enterococcus species.    Problem/Plan - 1:  ·  Problem: Cellulitis of lower extremity, unspecified laterality.    Plan:   1- Follow wounds Cultures to final report.  2- Follow Blood culture to final report.  3- Continue Vancomycin, but increase dose to 1.25 gm IVPB q 12 hours and follow trough closely.  4- Continue Meropenem 1 gm IVPB q 8 hours and observe for any allergy.  5- Fluid and electrolytes management.  6- CBC and BMP follow up.   7- MRI both ankles to rule out osteomyelitis.   8- Podiatry follow up.    Problem/Plan - 2:  ·  Problem: Anemia.    Plan:   1- Closely monitor H & H.  2- Observe for bleeding.     Problem/Plan - 3:  ·  Problem: Ulcer of foot, unspecified laterality, unspecified ulcer stage.    Plan:   1- ABX as per problem #1.  2- Podiatry management and follow up.     Discussed with medical resident.

## 2018-10-27 LAB
HIV 1 & 2 AB SERPL IA.RAPID: SIGNIFICANT CHANGE UP
VANCOMYCIN TROUGH SERPL-MCNC: 13.2 UG/ML — SIGNIFICANT CHANGE UP (ref 10–20)

## 2018-10-27 RX ADMIN — MEROPENEM 200 MILLIGRAM(S): 1 INJECTION INTRAVENOUS at 16:49

## 2018-10-27 RX ADMIN — HEPARIN SODIUM 5000 UNIT(S): 5000 INJECTION INTRAVENOUS; SUBCUTANEOUS at 21:10

## 2018-10-27 RX ADMIN — Medication 166.67 MILLIGRAM(S): at 20:02

## 2018-10-27 RX ADMIN — MEROPENEM 200 MILLIGRAM(S): 1 INJECTION INTRAVENOUS at 21:11

## 2018-10-27 RX ADMIN — Medication 166.67 MILLIGRAM(S): at 05:23

## 2018-10-27 RX ADMIN — MEROPENEM 200 MILLIGRAM(S): 1 INJECTION INTRAVENOUS at 05:23

## 2018-10-27 RX ADMIN — Medication 1 APPLICATION(S): at 04:00

## 2018-10-27 NOTE — PROGRESS NOTE ADULT - ASSESSMENT
Bilateral LE venous stasis ulcers  PVD, xerosis, Onychomycosis    Examined patient, reviewed patient's chart  Discusses treatment plans with patient   Performed excisional debridement of devitalized tissues with scalpel to and including subcutaneous level bilateral LE ulcers  Cleaned wound with normal saline and applied santyl ointment to all ulcers with dry compressive bandaging  Continue IV antibiotics as per ID  Continue daily dressing change   Continue elevation of bilateral LE and off load bilateral heel and ulcer sites Bilateral LE venous stasis ulcers  PVD, xerosis, Onychomycosis    Examined patient, reviewed patient's chart  Discusses treatment plans with patient   Performed excisional debridement of devitalized tissues with scalpel to and including subcutaneous level bilateral LE ulcers  Cleaned wound with normal saline and applied santyl ointment to all ulcers with dry compressive bandaging  Wound culture grew Few Pseudomonas aeruginosa, and Few Enterococcus species.  Continue IV antibiotics(Vanco and Meropenem IV) as per ID  Continue daily dressing change   Continue elevation of bilateral LE and off load bilateral heel and ulcer sites

## 2018-10-27 NOTE — PROGRESS NOTE ADULT - SUBJECTIVE AND OBJECTIVE BOX
Meds:  meropenem  IVPB 1000 milliGRAM(s) IV Intermittent every 8 hours  vancomycin  IVPB 1000 milliGRAM(s) IV Intermittent every 12 hours    Allergies:  Allergies    penicillin (Other)    Intolerances        ROS  [  ] UNABLE TO ELICIT    General:  [  ] None  [  ] Fever  [  ] Chills  [ x ] Malaise    Skin:  [  ] None [  ] Rash  [  ] Wound  [ x ] Ulcer bot ankles.    HEENT:  [ x ] None  [  ] Sore Throat  [  ] Nasal congestion/ runny nose  [  ] Photophobia [  ] Neck pain      Chest:  [ x ] None   [  ] SOB  [  ] Cough  [  ] None    Cardiovascular:   [ x ] None  [  ] CP  [  ] Palpitation    Gastrointestinal:  [ x ] None  [  ] Abd pain   [  ] Nausea    [  ] Vomiting   [  ] Diarrhea	     Genitourinary:  [ x ] None [  ] Polyuria   [  ] Urgency  [  ] Frequency  [  ] Dysuria    [  ]  Hematuria       Musculoskeletal:  [  ] None [  ] Back Pain	[  ] Body aches  [  ] Joint pain  [ x ] Weakness    Neurological: [  ] None [  ]Dizziness  [  ]Visual Disturbance  [  ]Headaches   [ x ] Weakness      PHYSICAL EXAM:  Vital Signs Last 24 Hrs  T(C): 36.5 (27 Oct 2018 05:50), Max: 36.5 (26 Oct 2018 21:06)  T(F): 97.7 (27 Oct 2018 05:50), Max: 97.7 (26 Oct 2018 21:06)  HR: 54 (27 Oct 2018 05:50) (54 - 61)  BP: 129/61 (27 Oct 2018 05:50) (124/66 - 130/64)  BP(mean): --  RR: 18 (27 Oct 2018 05:50) (18 - 18)  SpO2: 100% (27 Oct 2018 05:50) (100% - 100%)    Constitutional:    HEENT: [ x ] Wnl  [  ] Pharyngeal congestion    Neck:  [ x ] Supple  [  ]Lymphadenopathy  [ x ] No JVD   [  ] JVD  [  ] Masses   [  ] WNL    CHEST/Respiratory:  [ x ]Clear to auscultation  [  ] Rales   [  ] Rhonchi   [  ] Wheezing     [  ] Chest Tenderness      Cardiovascular:  [ x ] Reg S1 S2   [  ] Irreg S1 S2   [ x ]No Murmur  [  ] +ve Murmurs  [  ]Systolic [  ]Diastolic      Abdomen:  [ x ] Soft  [ x ] No tendrerness  [  ] Tenderness  [  ] Organomegaly  [  ] ABD Distention  [  ] Rigidity                       [ x ] No Regidity                       [ x ] No Rebound Tenderness  [  ] No Guarding Rigidity  [  ] Rebound Tenderness[  ] Guarding Rigidity                          [ x ]  +ve Bowel Sounds  [  ] Decreased Bowel Sounds    [  ] Absent Bowel Sounds                            Extremities: [  ] No edema [ x ] Edema L>R  [  ] Clubbing   [  ] Cyanosis                         [ x ] No Tender Calf muscles  [  ] Tender Calf muscles                        [ x ] Palpable peripheral pulses    Neurological: [ x ] Awake  [ x ] Alert  [ x ] Oriented  x  3                           [  ] Confused  [  ] Drowsy  [  ] respond to painful stimuli  [  ] Unresponsive    Skin:  [  ] Intact [  ] Redness [  ] Thrombophlebitis  [  ] Rashes  [  ] Dry  [ x ] Ulcers medial right ankle, and bilateral on left ankle with yellow fowl smelling discharge.    Ortho:  [  ] Joint Swelling  [  ] Joint erythema [  ] Joint tenderness                [  ] Increased temp. to touch  [  ] DJD [ x ] WNL      LABS/DIAGNOSTIC TESTS                                   9.7    4.1   )-----------( 274      ( 26 Oct 2018 05:46 )             30.3   10-26    141  |  108  |  11  ----------------------------<  84  4.3   |  28  |  0.69    Ca    8.6      26 Oct 2018 05:46  Phos  2.9     10-26  Mg     2.0     10-26    TPro  9.0<H>  /  Alb  3.0<L>  /  TBili  0.3  /  DBili  x   /  AST  16  /  ALT  12  /  AlkPhos  87  10-25      Vancomycin Level, Trough (10.26.18 @ 05:46)    Vancomycin Level, Trough: 8.9: Vancomycin trough levels should be rapidly reached and maintained at  15-20 ug/ml for life threatening MRSA  infections such as sepsis, endocarditis, osteomyelitis and pneumonia. A  first trough level should be drawn  before the 3rd or 4th dose.  Risk of renal toxicity is increased for levels >15 ug/ml, in patients on  other nephrotoxic drugs, who are  hemodynamically unstable, have unstable renal function, or are on  Vancomycin therapy for >14 days. Renal function with  creatinine levels should be monitored for those patients. ug/mL        CULTURES:   Culture - Other (10.25.18 @ 00:30)    -  Amikacin: S <=8    -  Aztreonam: S 8    -  Cefepime: S <=2    -  Ceftazidime: S 4    -  Ciprofloxacin: S <=0.5    -  Gentamicin: S 4    -  Imipenem: S <=1    -  Levofloxacin: S <=1    -  Meropenem: S <=1    -  Piperacillin/Tazobactam: S <=8    -  Tobramycin: S <=2    Specimen Source: Skin left ankle ulcer    Culture Results:   Few Pseudomonas aeruginosa  Few Enterococcus species "Susceptibilities not performed"  Few Helcococcus species "Susceptibilities not performed"    Organism Identification: Pseudomonas aeruginosa    Organism: Pseudomonas aeruginosa    Method Type: TORIN      Culture - Blood (10.24.18 @ 09:41)    Specimen Source: .Blood Blood    Culture Results:   No growth to date.    Culture - Blood (10.24.18 @ 09:41)    Specimen Source: .Blood Blood    Culture Results:   No growth to date.        RADIOLOGY  CXR:    EXAM:  XR CHEST PORTABLE URGENT 1V                            PROCEDURE DATE:  10/24/2018          INTERPRETATION:  Views:1  Comparison: Unavailable at this time  History: CHF    The lungs are clear of infiltrates and effusions.     The cardiac and  mediastinal contours appear unremarkable.     Impression:  1. No evidence of acute pulmonary disease.          EXAM:  FOOT BILATERAL (MINIMUM 3 V)                            PROCEDURE DATE:  10/24/2018          INTERPRETATION:  CLINICAL INDICATION: Bilateral ankle ulcerations;   evaluate for osteomyelitis.    COMPARISON:  October 23, 2018    TECHNIQUE:  AP,likely lateral radiographs of the bilateral feet   performed.    FINDINGS:  Since prior evaluation no significant interval change is   identified. Hammertoe deformities of the second through fifth digits   identified bilaterally. There is no evidencefor acute fracture. No   regions of osteolysis or osteosclerosis are noted. Degenerative changes   are identified involving the interphalangeal and first MTP joint spaces   of the great toes. No soft tissue swelling is identified. No retained   metallic foreign bodies are evident. Calcifications are identified within   the soft tissues along the posterior aspect of the bilateral lower legs.    There is loss of the normal plantar arch of the right foot. Chronic   appearing changes of the bilateralcalcanei identified, which may be   related to chronic trauma. Loss of height of the bilateral talar bones   may be a sequelae of osteonecrosis. Degenerative changes are noted along   the talonavicular articulations. Bilateral plantar calcaneal spur is   noted.    IMPRESSION:  No evidence for acute fracture. No regions of osteolysis   identified.    If osteomyelitis remains of clinical concern consider MRI evaluation.      EXAM:  US DPLX LWR EXT VEINS COMPL BI                            PROCEDURE DATE:  10/24/2018          INTERPRETATION:  CLINICAL INFORMATION: Leg pain    COMPARISON: None available.    TECHNIQUE: Duplex sonography of the BILATERAL LOWER extremities with   color and spectral Doppler, with and without compression.      FINDINGS:    There is normal compressibility of the bilateral common femoral, femoral   and popliteal veins. No calf vein thrombosis is detected.    Doppler examination shows normalspontaneous and phasic flow.    IMPRESSION:     No evidence of bilateral lower extremity deep venous thrombosis.                  Assessment and Plan:   Mr. Reynolds is a 69 y/o M with no significant PMHx or PSHx presents to the ED brought in by EMS with bilateral chronic vein ulcers of LE. The ulcers are located medial and lateral sides of the left ankle and medial side of the right ankle. The ulcers produce yellowish discharge that is foul smelling. Patient also reports redness, warmth, and pain of the affected areas. The symptoms have been stable in severity lately but patient decided to present to ED after his friends encouraged the patient to come to ED for treatment. Patient states ulcers have been present for the past 18 years.  Wound culture grew Few Pseudomonas aeruginosa, and Few Enterococcus species.    Problem/Plan - 1:  ·  Problem: Cellulitis of lower extremity, unspecified laterality.    Plan:   1- Follow wounds Cultures to final report.  2- Follow Blood culture to final report.  3- Continue Vancomycin 1.25 gm IVPB q 12 hours and follow trough closely.  4- Continue Meropenem 1 gm IVPB q 8 hours and observe for any allergy.  5- Fluid and electrolytes management.  6- CBC and BMP follow up.   7- MRI both ankles to rule out osteomyelitis.   8- Podiatry follow up.    Problem/Plan - 2:  ·  Problem: Anemia.    Plan:   1- Closely monitor H & H.  2- Observe for bleeding.     Problem/Plan - 3:  ·  Problem: Ulcer of foot, unspecified laterality, unspecified ulcer stage.    Plan:   1- ABX as per problem #1.  2- Podiatry management and follow up.     Discussed with medical resident.

## 2018-10-27 NOTE — PROGRESS NOTE ADULT - SUBJECTIVE AND OBJECTIVE BOX
INTERVAL HPI/OVERNIGHT EVENTS:  evenmts for overnight noticed  no acute dystress,afebrile    VITAL SIGNS:  T(F): 97.7 (10-27-18 @ 05:50)  HR: 54 (10-27-18 @ 05:50)  BP: 129/61 (10-27-18 @ 05:50)  RR: 18 (10-27-18 @ 05:50)  SpO2: 100% (10-27-18 @ 05:50)  Wt(kg): --    PHYSICAL EXAM:awake    Constitutional:  Eyes:  ENMT:perrla  Neck:  Respiratory:clear  Cardiovascular:s1 s2,m-none  Gastrointestinal:soft,non-tender  Extremities:both l/extrem with wounds and dressing  Vascular:  Neurological:no focal deficit  Musculoskeletal:    MEDICATIONS  (STANDING):  collagenase Ointment 1 Application(s) Topical daily  heparin  Injectable 5000 Unit(s) SubCutaneous every 8 hours  influenza   Vaccine 0.5 milliLiter(s) IntraMuscular once  meropenem  IVPB 1000 milliGRAM(s) IV Intermittent every 8 hours  vancomycin  IVPB 1250 milliGRAM(s) IV Intermittent every 12 hours    MEDICATIONS  (PRN):      Allergies    penicillin (Other)    Intolerances        LABS:                        9.7    4.1   )-----------( 274      ( 26 Oct 2018 05:46 )             30.3     10-26    141  |  108  |  11  ----------------------------<  84  4.3   |  28  |  0.69    Ca    8.6      26 Oct 2018 05:46  Phos  2.9     10-26  Mg     2.0     10-26    TPro  9.0<H>  /  Alb  3.0<L>  /  TBili  0.3  /  DBili  x   /  AST  16  /  ALT  12  /  AlkPhos  87  10-25      Assessment and Plan:   · Assessment		  Mr. Reynolds is a 67 y/o M with no significant PMHx or PSHx presents to the ED brought in by EMS with bilateral chronic vein ulcers of LE.      Problem/Plan - 1:  ·  Problem: Ulcer of foot, unspecified laterality, unspecified ulcer stage.  Plan: Patient presents with chronic venous ulcers of b/l ankles with pain, warmth, redness, and discharges from the affected areas.  -podyatrist f/up appret  -pendingf for mri to  r/o om  c/w vanc and merem  -ID consult- Dr. Rea  afebrile, no leukocytosis.      Problem/Plan - 2:  ·  Problem: Need for prophylactic measure.  Plan: IMPROVE VTE Individual Risk Assessment    RISK                                                          Points  [] Previous VTE                                           3  [] Thrombophilia                                        2  [] Lower limb paralysis                              2   [] Current Cancer                                       2   [x] Immobilization > 24 hrs                        1  [] ICU/CCU stay > 24 hours                       1  [x] Age > 60                                                   1    IMPROVE VTE Score: 2, will keep on heparin for DVT PPx.

## 2018-10-27 NOTE — PROGRESS NOTE ADULT - SUBJECTIVE AND OBJECTIVE BOX
Patient seen at bedside in [facility] for follow up bilateral LE venous stasis ulcer, A&Ox3.   Admitted with bilateral LE venous stasis ulcer  Daily wound care WITH SANTYL and IV antibiotics    Vital Signs   Temperature  Temp (F): 98.4 Degrees F  Temp (C) Temp (C): 36.9 Degrees C  Temp site Temp Site: oral    Heart Rate  Heart Rate Heart Rate (beats/min): 57 /min  Heart Rate Method: pulse oximetry    Noninvasive Blood Pressure  BP Systolic Systolic: 117 mm Hg  BP Diastolic Diastolic (mm Hg): 57 mm Hg  Blood Pressure - Site Site: right upper arm  Blood Pressure - Method Method: electronic    Respiratory/Pulse Oximetry  Respiration Rate (breaths/min) Respiration Rate (breaths/min): 17 /min  SpO2 (%) SpO2 (%): 100 %  O2 delivery Patient On: room air    Vascular: Bilateral lower extremities with flaky dry, scaly skin bilaterally with hemosiderin staining, and hyperpigmentation bilateral lower leg   weak palpable DP/Pt pulses , with biphasic doppler sounds.  Right medial ankle and lower leg measures: 10.1 cm x 8.2 cm and left medial ankle and lower leg measures 5.2 cm x 6.5 cm x 0.1 cm Irregular borders, with fibro-granular base, scattered areas of denuded epidermis with mixed granular  base, minimal drainage, no purulent, no mal-odor.  Neuro: Protective sensation diminished bilateral foot  M/S Decreased medial arch bilateral, dorsally contracted digits bilateral    LABS/DIAGNOSTIC TESTS               9.7    4.1   )-----------( 274      ( 26 Oct 2018 05:46 )             30.3     10-26    141  |  108  |  11  ----------------------------<  84  4.3   |  28  |  0.69    Ca    8.6      26 Oct 2018 05:46  Phos  2.9     10-26  Mg     2.0     10-26 Patient seen at bedside in [facility] for follow up bilateral LE venous stasis ulcer, A&Ox3.   Admitted with bilateral LE venous stasis ulcer  Daily wound care WITH SANTYL and IV antibiotics    Vital Signs   Temperature  Temp (F): 98.4 Degrees F  Temp (C) Temp (C): 36.9 Degrees C  Temp site Temp Site: oral    Heart Rate  Heart Rate Heart Rate (beats/min): 57 /min  Heart Rate Method: pulse oximetry    Noninvasive Blood Pressure  BP Systolic Systolic: 117 mm Hg  BP Diastolic Diastolic (mm Hg): 57 mm Hg  Blood Pressure - Site Site: right upper arm  Blood Pressure - Method Method: electronic    Respiratory/Pulse Oximetry  Respiration Rate (breaths/min) Respiration Rate (breaths/min): 17 /min  SpO2 (%) SpO2 (%): 100 %  O2 delivery Patient On: room air    Vascular: Bilateral lower extremities with flaky dry, scaly skin bilaterally with hemosiderin staining, and hyperpigmentation bilateral lower leg   weak palpable DP/Pt pulses , with biphasic doppler sounds.  Right medial ankle and lower leg measures: 10.1 cm x 8.2 cm and left medial ankle and lower leg measures 5.2 cm x 6.5 cm x 0.1 cm Irregular borders, with fibro-granular base, scattered areas of denuded epidermis with mixed granular  base, minimal drainage, no purulent, no mal-odor.  Neuro: Protective sensation diminished bilateral foot  M/S Decreased medial arch bilateral, dorsally contracted digits bilateral    LABS/DIAGNOSTIC TESTS               9.7    4.1   )-----------( 274      ( 26 Oct 2018 05:46 )             30.3     10-26    141  |  108  |  11  ----------------------------<  84  4.3   |  28  |  0.69    Ca    8.6      26 Oct 2018 05:46  Phos  2.9     10-26  Mg     2.0     10-26    Wound culture grew Few Pseudomonas aeruginosa, and Few Enterococcus species.

## 2018-10-28 RX ADMIN — MEROPENEM 200 MILLIGRAM(S): 1 INJECTION INTRAVENOUS at 13:54

## 2018-10-28 RX ADMIN — Medication 166.67 MILLIGRAM(S): at 18:37

## 2018-10-28 RX ADMIN — HEPARIN SODIUM 5000 UNIT(S): 5000 INJECTION INTRAVENOUS; SUBCUTANEOUS at 21:25

## 2018-10-28 RX ADMIN — HEPARIN SODIUM 5000 UNIT(S): 5000 INJECTION INTRAVENOUS; SUBCUTANEOUS at 13:54

## 2018-10-28 RX ADMIN — HEPARIN SODIUM 5000 UNIT(S): 5000 INJECTION INTRAVENOUS; SUBCUTANEOUS at 05:26

## 2018-10-28 RX ADMIN — Medication 166.67 MILLIGRAM(S): at 05:26

## 2018-10-28 RX ADMIN — MEROPENEM 200 MILLIGRAM(S): 1 INJECTION INTRAVENOUS at 21:25

## 2018-10-28 RX ADMIN — MEROPENEM 200 MILLIGRAM(S): 1 INJECTION INTRAVENOUS at 05:26

## 2018-10-28 RX ADMIN — Medication 1 APPLICATION(S): at 12:37

## 2018-10-28 NOTE — PROGRESS NOTE ADULT - SUBJECTIVE AND OBJECTIVE BOX
Meds:  meropenem  IVPB 1000 milliGRAM(s) IV Intermittent every 8 hours  vancomycin  IVPB 1250 milliGRAM(s) IV Intermittent every 12 hours    Allergies:  Allergies    penicillin (Other)    Intolerances        ROS  [  ] UNABLE TO ELICIT    General:  [  ] None  [  ] Fever  [  ] Chills  [ x ] Malaise    Skin:  [  ] None [  ] Rash  [  ] Wound  [ x ] Ulcer bot ankles.    HEENT:  [ x ] None  [  ] Sore Throat  [  ] Nasal congestion/ runny nose  [  ] Photophobia [  ] Neck pain      Chest:  [ x ] None   [  ] SOB  [  ] Cough  [  ] None    Cardiovascular:   [ x ] None  [  ] CP  [  ] Palpitation    Gastrointestinal:  [ x ] None  [  ] Abd pain   [  ] Nausea    [  ] Vomiting   [  ] Diarrhea	     Genitourinary:  [ x ] None [  ] Polyuria   [  ] Urgency  [  ] Frequency  [  ] Dysuria    [  ]  Hematuria       Musculoskeletal:  [  ] None [  ] Back Pain	[  ] Body aches  [  ] Joint pain  [ x ] Weakness    Neurological: [  ] None [  ]Dizziness  [  ]Visual Disturbance  [  ]Headaches   [ x ] Weakness      PHYSICAL EXAM:  Vital Signs Last 24 Hrs  T(C): 36.8 (28 Oct 2018 05:13), Max: 37.1 (27 Oct 2018 20:48)  T(F): 98.3 (28 Oct 2018 05:13), Max: 98.7 (27 Oct 2018 20:48)  HR: 88 (28 Oct 2018 05:13) (57 - 88)  BP: 122/72 (28 Oct 2018 05:13) (117/57 - 124/66)  BP(mean): --  RR: 17 (28 Oct 2018 05:13) (17 - 17)  SpO2: 100% (28 Oct 2018 05:13) (100% - 100%)    Constitutional:    HEENT: [ x ] Wnl  [  ] Pharyngeal congestion    Neck:  [ x ] Supple  [  ]Lymphadenopathy  [ x ] No JVD   [  ] JVD  [  ] Masses   [  ] WNL    CHEST/Respiratory:  [ x ]Clear to auscultation  [  ] Rales   [  ] Rhonchi   [  ] Wheezing     [  ] Chest Tenderness      Cardiovascular:  [ x ] Reg S1 S2   [  ] Irreg S1 S2   [ x ]No Murmur  [  ] +ve Murmurs  [  ]Systolic [  ]Diastolic      Abdomen:  [ x ] Soft  [ x ] No tendrerness  [  ] Tenderness  [  ] Organomegaly  [  ] ABD Distention  [  ] Rigidity                       [ x ] No Regidity                       [ x ] No Rebound Tenderness  [  ] No Guarding Rigidity  [  ] Rebound Tenderness[  ] Guarding Rigidity                          [ x ]  +ve Bowel Sounds  [  ] Decreased Bowel Sounds    [  ] Absent Bowel Sounds                            Extremities: [  ] No edema [ x ] Edema L>R  [  ] Clubbing   [  ] Cyanosis                         [ x ] No Tender Calf muscles  [  ] Tender Calf muscles                        [ x ] Palpable peripheral pulses    Neurological: [ x ] Awake  [ x ] Alert  [ x ] Oriented  x  3                           [  ] Confused  [  ] Drowsy  [  ] respond to painful stimuli  [  ] Unresponsive    Skin:  [  ] Intact [  ] Redness [  ] Thrombophlebitis  [  ] Rashes  [  ] Dry  [ x ] Ulcers medial right ankle, and bilateral on left ankle with yellow fowl smelling discharge.    Ortho:  [  ] Joint Swelling  [  ] Joint erythema [  ] Joint tenderness                [  ] Increased temp. to touch  [  ] DJD [ x ] WNL      LABS/DIAGNOSTIC TESTS                                   9.7    4.1   )-----------( 274      ( 26 Oct 2018 05:46 )             30.3   10-26    141  |  108  |  11  ----------------------------<  84  4.3   |  28  |  0.69    Ca    8.6      26 Oct 2018 05:46  Phos  2.9     10-26  Mg     2.0     10-26    TPro  9.0<H>  /  Alb  3.0<L>  /  TBili  0.3  /  DBili  x   /  AST  16  /  ALT  12  /  AlkPhos  87  10-25      Vancomycin Level, Trough (10.27.18 @ 19:04)    Vancomycin Level, Trough: 13.2: Vancomycin trough levels should be rapidly reached and maintained at  15-20 ug/ml for life threatening MRSA  infections such as sepsis, endocarditis, osteomyelitis and pneumonia. A  first trough level should be drawn  before the 3rd or 4th dose.  Risk of renal toxicity is increased for levels >15 ug/ml, in patients on  other nephrotoxic drugs, who are  hemodynamically unstable, have unstable renal function, or are on  Vancomycin therapy for >14 days. Renal function with  creatinine levels should be monitored for those patients. ug/mL          CULTURES:   Culture - Other (10.25.18 @ 00:30)    -  Amikacin: S <=8    -  Aztreonam: S 8    -  Cefepime: S <=2    -  Ceftazidime: S 4    -  Ciprofloxacin: S <=0.5    -  Gentamicin: S 4    -  Imipenem: S <=1    -  Levofloxacin: S <=1    -  Meropenem: S <=1    -  Piperacillin/Tazobactam: S <=8    -  Tobramycin: S <=2    Specimen Source: Skin left ankle ulcer    Culture Results:   Few Pseudomonas aeruginosa  Few Enterococcus species "Susceptibilities not performed"  Few Helcococcus species "Susceptibilities not performed"    Organism Identification: Pseudomonas aeruginosa    Organism: Pseudomonas aeruginosa    Method Type: TORIN      Culture - Blood (10.24.18 @ 09:41)    Specimen Source: .Blood Blood    Culture Results:   No growth to date.    Culture - Blood (10.24.18 @ 09:41)    Specimen Source: .Blood Blood    Culture Results:   No growth to date.        RADIOLOGY:    EXAM:  MR FOOT BI                            PROCEDURE DATE:  10/26/2018          INTERPRETATION:  BILATERAL FOOT MRI    CLINICAL INFORMATION: Bilateral foot pain and swelling. Chronic   ulcerations. Question osteomyelitis.  TECHNIQUE: Multiplanar,multisequence MRI was obtained of the bilateral   feet.  COMPARISON: Bilateral foot radiographs 24 October 2018 and 23 October 2018.    FINDINGS:    RIGHT:  PERIPHERAL SOFT TISSUES: No focal soft tissue ulceration is identified.   There is focal edema in the subcutaneous fat overlying the second through   fourth toes. No focal, drainable fluid collection or abscess is   identified.  BONE MARROW: No focal increased STIR signal or loss of T1 hyperintense   signal to suggest osteomyelitis. No fracture or osteonecrosis.  MUSCLES AND TENDONS: Edema and atrophy of the intrinsic musculature of   the foot. Tendons are intact.  LIGAMENTS AND CAPSULAR STRUCTURES: The Lisfranc ligament is intact.  CARTILAGE AND SUBCHONDRAL BONE: Chondral loss in the first MTP, first IP   and scattered interphalangeal joints of the toes. Hammertoe deformity of   the second and third toes.  SYNOVIUM/JOINT FLUID: No large joint effusion.      LEFT:  PERIPHERAL SOFT TISSUES: No focal soft tissue ulceration is identified.   There is skin thickening and subcutaneous edema along the dorsum of the   foot. No focal, drainable fluid collection is identified.  BONE MARROW: No increased STIR signal or loss of T1 hyperintense signal   to suggest osteomyelitis. No fracture.No osteonecrosis.  MUSCLES AND TENDONS: Mild muscle edema and atrophy. Tendons are intact.  LIGAMENTS AND CAPSULAR STRUCTURES: Lisfranc ligament is intact.  CARTILAGE AND SUBCHONDRAL BONE: Chondral loss involving the first MTP and   first IP joints. Hammertoe deformities of the second through fifth toes.  SYNOVIUM/JOINT FLUID: No large joint effusion.    IMPRESSION:  1.  In the right foot, there is no evidence of osteomyelitis.  2.  In the left foot, there is no evidence of osteomyelitis.  3.  Phlegmonous change in the dorsal soft tissues overlying the MTP   joints of the bilateral feet.          CXR:    EXAM:  XR CHEST PORTABLE URGENT 1V                            PROCEDURE DATE:  10/24/2018          INTERPRETATION:  Views:1  Comparison: Unavailable at this time  History: CHF    The lungs are clear of infiltrates and effusions.     The cardiac and  mediastinal contours appear unremarkable.     Impression:  1. No evidence of acute pulmonary disease.          EXAM:  FOOT BILATERAL (MINIMUM 3 V)                            PROCEDURE DATE:  10/24/2018          INTERPRETATION:  CLINICAL INDICATION: Bilateral ankle ulcerations;   evaluate for osteomyelitis.    COMPARISON:  October 23, 2018    TECHNIQUE:  AP,likely lateral radiographs of the bilateral feet   performed.    FINDINGS:  Since prior evaluation no significant interval change is   identified. Hammertoe deformities of the second through fifth digits   identified bilaterally. There is no evidencefor acute fracture. No   regions of osteolysis or osteosclerosis are noted. Degenerative changes   are identified involving the interphalangeal and first MTP joint spaces   of the great toes. No soft tissue swelling is identified. No retained   metallic foreign bodies are evident. Calcifications are identified within   the soft tissues along the posterior aspect of the bilateral lower legs.    There is loss of the normal plantar arch of the right foot. Chronic   appearing changes of the bilateralcalcanei identified, which may be   related to chronic trauma. Loss of height of the bilateral talar bones   may be a sequelae of osteonecrosis. Degenerative changes are noted along   the talonavicular articulations. Bilateral plantar calcaneal spur is   noted.    IMPRESSION:  No evidence for acute fracture. No regions of osteolysis   identified.    If osteomyelitis remains of clinical concern consider MRI evaluation.      EXAM:  US DPLX LWR EXT VEINS COMPL BI                            PROCEDURE DATE:  10/24/2018          INTERPRETATION:  CLINICAL INFORMATION: Leg pain    COMPARISON: None available.    TECHNIQUE: Duplex sonography of the BILATERAL LOWER extremities with   color and spectral Doppler, with and without compression.      FINDINGS:    There is normal compressibility of the bilateral common femoral, femoral   and popliteal veins. No calf vein thrombosis is detected.    Doppler examination shows normalspontaneous and phasic flow.    IMPRESSION:     No evidence of bilateral lower extremity deep venous thrombosis.                  Assessment and Plan:   Mr. Reynolds is a 69 y/o M with no significant PMHx or PSHx presents to the ED brought in by EMS with bilateral chronic vein ulcers of LE. The ulcers are located medial and lateral sides of the left ankle and medial side of the right ankle. The ulcers produce yellowish discharge that is foul smelling. Patient also reports redness, warmth, and pain of the affected areas. The symptoms have been stable in severity lately but patient decided to present to ED after his friends encouraged the patient to come to ED for treatment. Patient states ulcers have been present for the past 18 years.  Wound culture grew Few Pseudomonas aeruginosa, and Few Enterococcus species.  MRI was done and showed Phlegmonous change in the dorsal soft tissues overlying the MTP joints of the bilateral feet.      Problem/Plan - 1:  ·  Problem: Cellulitis of lower extremity, unspecified laterality.    Plan:   1- Follow wounds Cultures to final report.  2- Follow Blood culture to final report are still -VE.  3- Continue Vancomycin 1.25 gm IVPB q 12 hours and follow trough closely to change upon discharge to po Doxycycline 100 mg po q 12 hours till 11/13/18.  4- Continue Meropenem 1 gm IVPB q 8 hours and observe for any allergy to change upon discharge to po Cipro 500 mg po q 12 hours till 11/13/18.  5- Fluid and electrolytes management.  6- CBC and BMP follow up.   7- Podiatry follow up.  8- Wounds care.    Problem/Plan - 2:  ·  Problem: Anemia.    Plan:   1- Closely monitor H & H.  2- Observe for bleeding.     Problem/Plan - 3:  ·  Problem: Ulcer of foot, unspecified laterality, unspecified ulcer stage.    Plan:   1- ABX as per problem #1.  2- Podiatry management and follow up.     Discussed with medical resident.

## 2018-10-28 NOTE — PROGRESS NOTE ADULT - SUBJECTIVE AND OBJECTIVE BOX
· Subjective and Objective: 	  INTERVAL HPI/OVERNIGHT EVENTS:  no acute dystress,afebrile,events noticed    VITAL SIGNS:stable ,reviewed    PHYSICAL EXAM:awake  Constitutional:  ENMT:perrla  Respiratory:clear  Cardiovascular:s1 s2,m-none  Gastrointestinal:soft,non-tender  Extremities:both l/extrem with wounds and dressing  Neurological:no focal deficit      MEDICATIONS  (STANDING):  collagenase Ointment 1 Application(s) Topical daily  heparin  Injectable 5000 Unit(s) SubCutaneous every 8 hours  influenza   Vaccine 0.5 milliLiter(s) IntraMuscular once  meropenem  IVPB 1000 milliGRAM(s) IV Intermittent every 8 hours  vancomycin  IVPB 1250 milliGRAM(s) IV Intermittent every 12 hours    MEDICATIONS  (PRN):      Allergies    penicillin (Other)    Intolerances    Assessment and Plan:   · Assessment		  Mr. Reynolds is a 67 y/o M with no significant PMHx or PSHx presents to the ED brought in by EMS with bilateral chronic vein ulcers of LE.      Problem/Plan - 1:  ·  Problem: Ulcer of foot, unspecified laterality, unspecified ulcer stage.  Plan: Patient presents with chronic venous ulcers of b/l ankles with pain, warmth, redness, and discharges from the affected areas.  -podyatrist f/up appret  -mri done-neg for om  c/w vanc and merem  -ID consult- Dr. Rea  -[facility]  -will finalise abx prior to d/c      Problem/Plan - 2:  ·  Problem: Need for prophylactic measure.  Plan: IMPROVE VTE Individual Risk Assessment    RISK                                                          Points  [] Previous VTE                                           3  [] Thrombophilia                                        2  [] Lower limb paralysis                              2   [] Current Cancer                                       2   [x] Immobilization > 24 hrs                        1  [] ICU/CCU stay > 24 hours                       1  [x] Age > 60                                                   1    IMPROVE VTE Score: 2, will keep on heparin for DVT PPx.

## 2018-10-29 ENCOUNTER — TRANSCRIPTION ENCOUNTER (OUTPATIENT)
Age: 68
End: 2018-10-29

## 2018-10-29 VITALS
HEART RATE: 62 BPM | RESPIRATION RATE: 16 BRPM | OXYGEN SATURATION: 100 % | SYSTOLIC BLOOD PRESSURE: 144 MMHG | DIASTOLIC BLOOD PRESSURE: 63 MMHG | TEMPERATURE: 97 F

## 2018-10-29 LAB
CULTURE RESULTS: SIGNIFICANT CHANGE UP
CULTURE RESULTS: SIGNIFICANT CHANGE UP
SPECIMEN SOURCE: SIGNIFICANT CHANGE UP
SPECIMEN SOURCE: SIGNIFICANT CHANGE UP
VANCOMYCIN TROUGH SERPL-MCNC: 16.9 UG/ML — SIGNIFICANT CHANGE UP (ref 10–20)

## 2018-10-29 PROCEDURE — 87070 CULTURE OTHR SPECIMN AEROBIC: CPT

## 2018-10-29 PROCEDURE — 85610 PROTHROMBIN TIME: CPT

## 2018-10-29 PROCEDURE — 87040 BLOOD CULTURE FOR BACTERIA: CPT

## 2018-10-29 PROCEDURE — 83880 ASSAY OF NATRIURETIC PEPTIDE: CPT

## 2018-10-29 PROCEDURE — 80202 ASSAY OF VANCOMYCIN: CPT

## 2018-10-29 PROCEDURE — 84100 ASSAY OF PHOSPHORUS: CPT

## 2018-10-29 PROCEDURE — 73718 MRI LOWER EXTREMITY W/O DYE: CPT

## 2018-10-29 PROCEDURE — 85027 COMPLETE CBC AUTOMATED: CPT

## 2018-10-29 PROCEDURE — 80048 BASIC METABOLIC PNL TOTAL CA: CPT

## 2018-10-29 PROCEDURE — 85730 THROMBOPLASTIN TIME PARTIAL: CPT

## 2018-10-29 PROCEDURE — 71045 X-RAY EXAM CHEST 1 VIEW: CPT

## 2018-10-29 PROCEDURE — 84443 ASSAY THYROID STIM HORMONE: CPT

## 2018-10-29 PROCEDURE — 83605 ASSAY OF LACTIC ACID: CPT

## 2018-10-29 PROCEDURE — 86703 HIV-1/HIV-2 1 RESULT ANTBDY: CPT

## 2018-10-29 PROCEDURE — 93970 EXTREMITY STUDY: CPT

## 2018-10-29 PROCEDURE — 80053 COMPREHEN METABOLIC PANEL: CPT

## 2018-10-29 PROCEDURE — 96365 THER/PROPH/DIAG IV INF INIT: CPT

## 2018-10-29 PROCEDURE — 73630 X-RAY EXAM OF FOOT: CPT

## 2018-10-29 PROCEDURE — 99285 EMERGENCY DEPT VISIT HI MDM: CPT | Mod: 25

## 2018-10-29 PROCEDURE — 85652 RBC SED RATE AUTOMATED: CPT

## 2018-10-29 PROCEDURE — 83735 ASSAY OF MAGNESIUM: CPT

## 2018-10-29 PROCEDURE — 80061 LIPID PANEL: CPT

## 2018-10-29 PROCEDURE — 83036 HEMOGLOBIN GLYCOSYLATED A1C: CPT

## 2018-10-29 PROCEDURE — 87186 SC STD MICRODIL/AGAR DIL: CPT

## 2018-10-29 PROCEDURE — 93005 ELECTROCARDIOGRAM TRACING: CPT

## 2018-10-29 PROCEDURE — 90686 IIV4 VACC NO PRSV 0.5 ML IM: CPT

## 2018-10-29 RX ORDER — MOXIFLOXACIN HYDROCHLORIDE TABLETS, 400 MG 400 MG/1
1 TABLET, FILM COATED ORAL
Qty: 30 | Refills: 0
Start: 2018-10-29 | End: 2018-11-12

## 2018-10-29 RX ORDER — COLLAGENASE CLOSTRIDIUM HIST. 250 UNIT/G
1 OINTMENT (GRAM) TOPICAL
Qty: 1 | Refills: 0
Start: 2018-10-29 | End: 2018-11-04

## 2018-10-29 RX ADMIN — Medication 166.67 MILLIGRAM(S): at 05:03

## 2018-10-29 RX ADMIN — INFLUENZA VIRUS VACCINE 0.5 MILLILITER(S): 15; 15; 15; 15 SUSPENSION INTRAMUSCULAR at 12:19

## 2018-10-29 RX ADMIN — MEROPENEM 200 MILLIGRAM(S): 1 INJECTION INTRAVENOUS at 05:03

## 2018-10-29 RX ADMIN — HEPARIN SODIUM 5000 UNIT(S): 5000 INJECTION INTRAVENOUS; SUBCUTANEOUS at 05:03

## 2018-10-29 NOTE — DISCHARGE NOTE ADULT - MEDICATION SUMMARY - MEDICATIONS TO TAKE
I will START or STAY ON the medications listed below when I get home from the hospital:    doxycycline hyclate 100 mg oral capsule  -- 1 cap(s) by mouth 2 times a day   -- Avoid prolonged or excessive exposure to direct and/or artificial sunlight while taking this medication.  Do not take this drug if you are pregnant.  Finish all this medication unless otherwise directed by prescriber.  Medication should be taken with plenty of water.    -- Indication: For Cellulitis of lower extremity, unspecified laterality    collagenase 250 units/g topical ointment  -- 1 application on skin once a day  -- Indication: For Cellulitis of lower extremity, unspecified laterality    Cipro 500 mg oral tablet  -- 1 tab(s) by mouth 2 times a day   -- Avoid prolonged or excessive exposure to direct and/or artificial sunlight while taking this medication.  Check with your doctor before becoming pregnant.  Do not take dairy products, antacids, or iron preparations within one hour of this medication.  Finish all this medication unless otherwise directed by prescriber.  Medication should be taken with plenty of water.    -- Indication: For Cellulitis of lower extremity, unspecified laterality

## 2018-10-29 NOTE — DISCHARGE NOTE ADULT - PATIENT PORTAL LINK FT
You can access the [facility] Portal, offered by [facility], by registering with the following website: [email or web address]

## 2018-10-29 NOTE — DISCHARGE NOTE ADULT - CARE PROVIDER_API CALL
Ba Walters (DPM; MD), Surgery  [address] [place] [address]  Phone: [phone]  Fax: [phone]

## 2018-10-29 NOTE — PROGRESS NOTE ADULT - SUBJECTIVE AND OBJECTIVE BOX
Meds:  meropenem  IVPB 1000 milliGRAM(s) IV Intermittent every 8 hours  vancomycin  IVPB 1250 milliGRAM(s) IV Intermittent every 12 hours    Allergies:  Allergies    penicillin (Other)    Intolerances        ROS  [  ] UNABLE TO ELICIT    General:  [  ] None  [  ] Fever  [  ] Chills  [ x ] Malaise    Skin:  [  ] None [  ] Rash  [  ] Wound  [ x ] Ulcer bot ankles.    HEENT:  [ x ] None  [  ] Sore Throat  [  ] Nasal congestion/ runny nose  [  ] Photophobia [  ] Neck pain      Chest:  [ x ] None   [  ] SOB  [  ] Cough  [  ] None    Cardiovascular:   [ x ] None  [  ] CP  [  ] Palpitation    Gastrointestinal:  [ x ] None  [  ] Abd pain   [  ] Nausea    [  ] Vomiting   [  ] Diarrhea	     Genitourinary:  [ x ] None [  ] Polyuria   [  ] Urgency  [  ] Frequency  [  ] Dysuria    [  ]  Hematuria       Musculoskeletal:  [  ] None [  ] Back Pain	[  ] Body aches  [  ] Joint pain  [ x ] Weakness    Neurological: [  ] None [  ]Dizziness  [  ]Visual Disturbance  [  ]Headaches   [ x ] Weakness      PHYSICAL EXAM:  Vital Signs Last 24 Hrs  T(C): 36.2 (28 Oct 2018 20:49), Max: 36.8 (28 Oct 2018 05:13)  T(F): 97.2 (28 Oct 2018 20:49), Max: 98.3 (28 Oct 2018 05:13)  HR: 62 (28 Oct 2018 20:49) (62 - 88)  BP: 111/47 (28 Oct 2018 20:49) (111/47 - 122/72)  BP(mean): --  RR: 17 (28 Oct 2018 20:49) (17 - 18)  SpO2: 100% (28 Oct 2018 20:49) (100% - 100%)    Constitutional:    HEENT: [ x ] Wnl  [  ] Pharyngeal congestion    Neck:  [ x ] Supple  [  ]Lymphadenopathy  [ x ] No JVD   [  ] JVD  [  ] Masses   [  ] WNL    CHEST/Respiratory:  [ x ]Clear to auscultation  [  ] Rales   [  ] Rhonchi   [  ] Wheezing     [  ] Chest Tenderness      Cardiovascular:  [ x ] Reg S1 S2   [  ] Irreg S1 S2   [ x ]No Murmur  [  ] +ve Murmurs  [  ]Systolic [  ]Diastolic      Abdomen:  [ x ] Soft  [ x ] No tendrerness  [  ] Tenderness  [  ] Organomegaly  [  ] ABD Distention  [  ] Rigidity                       [ x ] No Regidity                       [ x ] No Rebound Tenderness  [  ] No Guarding Rigidity  [  ] Rebound Tenderness[  ] Guarding Rigidity                          [ x ]  +ve Bowel Sounds  [  ] Decreased Bowel Sounds    [  ] Absent Bowel Sounds                            Extremities: [  ] No edema [ x ] Edema L>R  [  ] Clubbing   [  ] Cyanosis                         [ x ] No Tender Calf muscles  [  ] Tender Calf muscles                        [ x ] Palpable peripheral pulses    Neurological: [ x ] Awake  [ x ] Alert  [ x ] Oriented  x  3                           [  ] Confused  [  ] Drowsy  [  ] respond to painful stimuli  [  ] Unresponsive    Skin:  [  ] Intact [  ] Redness [  ] Thrombophlebitis  [  ] Rashes  [  ] Dry  [ x ] Ulcers medial right ankle, and bilateral on left ankle with yellow fowl smelling discharge.    Ortho:  [  ] Joint Swelling  [  ] Joint erythema [  ] Joint tenderness                [  ] Increased temp. to touch  [  ] DJD [ x ] WNL      LABS/DIAGNOSTIC TESTS                                   9.7    4.1   )-----------( 274      ( 26 Oct 2018 05:46 )             30.3   10-26    141  |  108  |  11  ----------------------------<  84  4.3   |  28  |  0.69    Ca    8.6      26 Oct 2018 05:46  Phos  2.9     10-26  Mg     2.0     10-26    TPro  9.0<H>  /  Alb  3.0<L>  /  TBili  0.3  /  DBili  x   /  AST  16  /  ALT  12  /  AlkPhos  87  10-25      Vancomycin Level, Trough (10.27.18 @ 19:04)    Vancomycin Level, Trough: 13.2: Vancomycin trough levels should be rapidly reached and maintained at  15-20 ug/ml for life threatening MRSA  infections such as sepsis, endocarditis, osteomyelitis and pneumonia. A  first trough level should be drawn  before the 3rd or 4th dose.  Risk of renal toxicity is increased for levels >15 ug/ml, in patients on  other nephrotoxic drugs, who are  hemodynamically unstable, have unstable renal function, or are on  Vancomycin therapy for >14 days. Renal function with  creatinine levels should be monitored for those patients. ug/mL          CULTURES:   Culture - Other (10.25.18 @ 00:30)    -  Amikacin: S <=8    -  Aztreonam: S 8    -  Cefepime: S <=2    -  Ceftazidime: S 4    -  Ciprofloxacin: S <=0.5    -  Gentamicin: S 4    -  Imipenem: S <=1    -  Levofloxacin: S <=1    -  Meropenem: S <=1    -  Piperacillin/Tazobactam: S <=8    -  Tobramycin: S <=2    Specimen Source: Skin left ankle ulcer    Culture Results:   Few Pseudomonas aeruginosa  Few Enterococcus species "Susceptibilities not performed"  Few Helcococcus species "Susceptibilities not performed"    Organism Identification: Pseudomonas aeruginosa    Organism: Pseudomonas aeruginosa    Method Type: TORIN      Culture - Blood (10.24.18 @ 09:41)    Specimen Source: .Blood Blood    Culture Results:   No growth to date.    Culture - Blood (10.24.18 @ 09:41)    Specimen Source: .Blood Blood    Culture Results:   No growth to date.        RADIOLOGY:    EXAM:  MR FOOT BI                            PROCEDURE DATE:  10/26/2018          INTERPRETATION:  BILATERAL FOOT MRI    CLINICAL INFORMATION: Bilateral foot pain and swelling. Chronic   ulcerations. Question osteomyelitis.  TECHNIQUE: Multiplanar,multisequence MRI was obtained of the bilateral   feet.  COMPARISON: Bilateral foot radiographs 24 October 2018 and 23 October 2018.    FINDINGS:    RIGHT:  PERIPHERAL SOFT TISSUES: No focal soft tissue ulceration is identified.   There is focal edema in the subcutaneous fat overlying the second through   fourth toes. No focal, drainable fluid collection or abscess is   identified.  BONE MARROW: No focal increased STIR signal or loss of T1 hyperintense   signal to suggest osteomyelitis. No fracture or osteonecrosis.  MUSCLES AND TENDONS: Edema and atrophy of the intrinsic musculature of   the foot. Tendons are intact.  LIGAMENTS AND CAPSULAR STRUCTURES: The Lisfranc ligament is intact.  CARTILAGE AND SUBCHONDRAL BONE: Chondral loss in the first MTP, first IP   and scattered interphalangeal joints of the toes. Hammertoe deformity of   the second and third toes.  SYNOVIUM/JOINT FLUID: No large joint effusion.      LEFT:  PERIPHERAL SOFT TISSUES: No focal soft tissue ulceration is identified.   There is skin thickening and subcutaneous edema along the dorsum of the   foot. No focal, drainable fluid collection is identified.  BONE MARROW: No increased STIR signal or loss of T1 hyperintense signal   to suggest osteomyelitis. No fracture.No osteonecrosis.  MUSCLES AND TENDONS: Mild muscle edema and atrophy. Tendons are intact.  LIGAMENTS AND CAPSULAR STRUCTURES: Lisfranc ligament is intact.  CARTILAGE AND SUBCHONDRAL BONE: Chondral loss involving the first MTP and   first IP joints. Hammertoe deformities of the second through fifth toes.  SYNOVIUM/JOINT FLUID: No large joint effusion.    IMPRESSION:  1.  In the right foot, there is no evidence of osteomyelitis.  2.  In the left foot, there is no evidence of osteomyelitis.  3.  Phlegmonous change in the dorsal soft tissues overlying the MTP   joints of the bilateral feet.          CXR:    EXAM:  XR CHEST PORTABLE URGENT 1V                            PROCEDURE DATE:  10/24/2018          INTERPRETATION:  Views:1  Comparison: Unavailable at this time  History: CHF    The lungs are clear of infiltrates and effusions.     The cardiac and  mediastinal contours appear unremarkable.     Impression:  1. No evidence of acute pulmonary disease.          EXAM:  FOOT BILATERAL (MINIMUM 3 V)                            PROCEDURE DATE:  10/24/2018          INTERPRETATION:  CLINICAL INDICATION: Bilateral ankle ulcerations;   evaluate for osteomyelitis.    COMPARISON:  October 23, 2018    TECHNIQUE:  AP,likely lateral radiographs of the bilateral feet   performed.    FINDINGS:  Since prior evaluation no significant interval change is   identified. Hammertoe deformities of the second through fifth digits   identified bilaterally. There is no evidencefor acute fracture. No   regions of osteolysis or osteosclerosis are noted. Degenerative changes   are identified involving the interphalangeal and first MTP joint spaces   of the great toes. No soft tissue swelling is identified. No retained   metallic foreign bodies are evident. Calcifications are identified within   the soft tissues along the posterior aspect of the bilateral lower legs.    There is loss of the normal plantar arch of the right foot. Chronic   appearing changes of the bilateralcalcanei identified, which may be   related to chronic trauma. Loss of height of the bilateral talar bones   may be a sequelae of osteonecrosis. Degenerative changes are noted along   the talonavicular articulations. Bilateral plantar calcaneal spur is   noted.    IMPRESSION:  No evidence for acute fracture. No regions of osteolysis   identified.    If osteomyelitis remains of clinical concern consider MRI evaluation.      EXAM:  US DPLX LWR EXT VEINS COMPL BI                            PROCEDURE DATE:  10/24/2018          INTERPRETATION:  CLINICAL INFORMATION: Leg pain    COMPARISON: None available.    TECHNIQUE: Duplex sonography of the BILATERAL LOWER extremities with   color and spectral Doppler, with and without compression.      FINDINGS:    There is normal compressibility of the bilateral common femoral, femoral   and popliteal veins. No calf vein thrombosis is detected.    Doppler examination shows normalspontaneous and phasic flow.    IMPRESSION:     No evidence of bilateral lower extremity deep venous thrombosis.                  Assessment and Plan:   Mr. Reynolds is a 69 y/o M with no significant PMHx or PSHx presents to the ED brought in by EMS with bilateral chronic vein ulcers of LE. The ulcers are located medial and lateral sides of the left ankle and medial side of the right ankle. The ulcers produce yellowish discharge that is foul smelling. Patient also reports redness, warmth, and pain of the affected areas. The symptoms have been stable in severity lately but patient decided to present to ED after his friends encouraged the patient to come to ED for treatment. Patient states ulcers have been present for the past 18 years.  Wound culture grew Few Pseudomonas aeruginosa, and Few Enterococcus species.  MRI was done and showed Phlegmonous change in the dorsal soft tissues overlying the MTP joints of the bilateral feet.  10/29/18 Patient feels better and wants to go home.    Problem/Plan - 1:  ·  Problem: Cellulitis of lower extremity, unspecified laterality.    Plan:   1- Follow wounds Cultures to final report.  2- Follow Blood culture to final report are still -VE.  3- Continue Vancomycin 1.25 gm IVPB q 12 hours and follow trough closely to change upon discharge to po Doxycycline 100 mg po q 12 hours till 11/13/18.  4- Continue Meropenem 1 gm IVPB q 8 hours and observe for any allergy to change upon discharge to po Cipro 500 mg po q 12 hours till 11/13/18.  5- Fluid and electrolytes management.  6- CBC and BMP follow up.   7- Podiatry follow up.  8- Wounds care as per podiatry and wound care team.    Problem/Plan - 2:  ·  Problem: Anemia.    Plan:   1- Closely monitor H & H.  2- Observe for bleeding.     Problem/Plan - 3:  ·  Problem: Ulcer of foot, unspecified laterality, unspecified ulcer stage.    Plan:   1- ABX as per problem #1.  2- Podiatry management and follow up.     Discussed with medical resident.

## 2018-10-29 NOTE — PROGRESS NOTE ADULT - REASON FOR ADMISSION
bilateral Venous ulcers in ankle

## 2018-10-29 NOTE — DISCHARGE NOTE ADULT - PLAN OF CARE
to have treatment of cellulitis you have bilateral lower extremity cellulitis and had an MRI negative for osteomyelitis; use topical cream as directed, take doxycycline 100mg twice a day and ciprofloxacin 500mg twice a day until Nov. 13, 2018; follow up with podiatry and PCP within 1 week as outpatient plan as above, take antibiotics as directed you have a baseline Hb level of 9, no signs of acute bleeding, follow up with PCP within 1 week

## 2018-10-29 NOTE — DISCHARGE NOTE ADULT - HOSPITAL COURSE
Mr. Reynolds is a 69 y/o M with no significant PMHx or PSHx presents to the ED brought in by EMS with bilateral chronic vein ulcers of LE. The ulcers are located medial and lateral sides of the left ankle and medial side of the right ankle. The ulcers produce yellowish discharge that is foul smelling. Patient also reports redness, warmth, and pain of the affected areas. The symptoms have been stable in severity lately but patient decided to present to ED after his friends encouraged the patient to come to ED for treatment. Patient states ulcers have been present for the past 18 years. Patient states he has not seen a doctor for some time. Patient reports bandages around his ulcers changed last night. Patient denies motor or sensory changes, numbness, tingling, or paresthesia of LE's. Patient also denies a cut, abrasion, or break in the skin. Allergies: PCN.    In ED, patient's vitals are /46 HR 66 RR 18 T 36.4 O2 Sat 100. EKG shows sinus bradycardia, possible LAE, and RBBB. Patient is a poor historian and reports taking "a few medications" but does not remember name, dosage, frequency of the medications. Primary team to obtain medication list from pt's pharmacy. (24 Oct 2018 04:48)    For b/l LE cellulitis and foot ulcer, presented with chronic venous ulcers of b/l ankles with pain, warmth, redness, and discharges from the affected areas. MRI negative for osteomyelitis. s/p bedside debridement on 10/24. Continued vanc and merem as inpatient. ID consult- Dr. Rea. Bcx neg. Wound cx + pseudomonas. Afebrile, no leukocytosis. Cipro and doxy on discharge.

## 2018-10-29 NOTE — DISCHARGE NOTE ADULT - CARE PLAN
Principal Discharge DX:	Cellulitis of lower extremity, unspecified laterality  Goal:	to have treatment of cellulitis  Assessment and plan of treatment:	you have bilateral lower extremity cellulitis and had an MRI negative for osteomyelitis; use topical cream as directed, take doxycycline 100mg twice a day and ciprofloxacin 500mg twice a day until Nov. 13, 2018; follow up with podiatry and PCP within 1 week as outpatient  Secondary Diagnosis:	Ulcer of foot, unspecified laterality, unspecified ulcer stage  Assessment and plan of treatment:	plan as above, take antibiotics as directed  Secondary Diagnosis:	Anemia  Assessment and plan of treatment:	you have a baseline Hb level of 9, no signs of acute bleeding, follow up with PCP within 1 week

## 2019-03-12 PROBLEM — Z00.00 ENCOUNTER FOR PREVENTIVE HEALTH EXAMINATION: Status: ACTIVE | Noted: 2019-03-12

## 2019-03-13 ENCOUNTER — APPOINTMENT (OUTPATIENT)
Dept: WOUND CARE | Facility: CLINIC | Age: 69
End: 2019-03-13

## 2019-09-04 ENCOUNTER — EMERGENCY (EMERGENCY)
Facility: HOSPITAL | Age: 69
LOS: 1 days | Discharge: HOME | End: 2019-09-04
Attending: STUDENT IN AN ORGANIZED HEALTH CARE EDUCATION/TRAINING PROGRAM
Payer: MEDICARE

## 2019-09-04 VITALS
OXYGEN SATURATION: 100 % | RESPIRATION RATE: 20 BRPM | WEIGHT: 160.06 LBS | DIASTOLIC BLOOD PRESSURE: 72 MMHG | SYSTOLIC BLOOD PRESSURE: 125 MMHG | TEMPERATURE: 98 F | HEIGHT: 72 IN | HEART RATE: 73 BPM

## 2019-09-04 VITALS
OXYGEN SATURATION: 98 % | HEART RATE: 76 BPM | SYSTOLIC BLOOD PRESSURE: 128 MMHG | RESPIRATION RATE: 18 BRPM | DIASTOLIC BLOOD PRESSURE: 76 MMHG | TEMPERATURE: 98 F

## 2019-09-04 LAB
ALBUMIN SERPL ELPH-MCNC: 3.7 G/DL — SIGNIFICANT CHANGE UP (ref 3.5–5)
ALP SERPL-CCNC: 102 U/L — SIGNIFICANT CHANGE UP (ref 40–120)
ALT FLD-CCNC: 14 U/L DA — SIGNIFICANT CHANGE UP (ref 10–60)
ANION GAP SERPL CALC-SCNC: 3 MMOL/L — LOW (ref 5–17)
AST SERPL-CCNC: 14 U/L — SIGNIFICANT CHANGE UP (ref 10–40)
BASOPHILS # BLD AUTO: 0.02 K/UL — SIGNIFICANT CHANGE UP (ref 0–0.2)
BASOPHILS NFR BLD AUTO: 0.4 % — SIGNIFICANT CHANGE UP (ref 0–2)
BILIRUB SERPL-MCNC: 0.3 MG/DL — SIGNIFICANT CHANGE UP (ref 0.2–1.2)
BUN SERPL-MCNC: 16 MG/DL — SIGNIFICANT CHANGE UP (ref 7–18)
CALCIUM SERPL-MCNC: 9.2 MG/DL — SIGNIFICANT CHANGE UP (ref 8.4–10.5)
CHLORIDE SERPL-SCNC: 104 MMOL/L — SIGNIFICANT CHANGE UP (ref 96–108)
CO2 SERPL-SCNC: 32 MMOL/L — HIGH (ref 22–31)
CREAT SERPL-MCNC: 1.07 MG/DL — SIGNIFICANT CHANGE UP (ref 0.5–1.3)
EOSINOPHIL # BLD AUTO: 0.07 K/UL — SIGNIFICANT CHANGE UP (ref 0–0.5)
EOSINOPHIL NFR BLD AUTO: 1.4 % — SIGNIFICANT CHANGE UP (ref 0–6)
ERYTHROCYTE [SEDIMENTATION RATE] IN BLOOD: 36 MM/HR — HIGH (ref 0–20)
GLUCOSE SERPL-MCNC: 84 MG/DL — SIGNIFICANT CHANGE UP (ref 70–99)
HCT VFR BLD CALC: 35.7 % — LOW (ref 39–50)
HGB BLD-MCNC: 11.4 G/DL — LOW (ref 13–17)
IMM GRANULOCYTES NFR BLD AUTO: 0.2 % — SIGNIFICANT CHANGE UP (ref 0–1.5)
LACTATE SERPL-SCNC: 0.8 MMOL/L — SIGNIFICANT CHANGE UP (ref 0.7–2)
LYMPHOCYTES # BLD AUTO: 1.28 K/UL — SIGNIFICANT CHANGE UP (ref 1–3.3)
LYMPHOCYTES # BLD AUTO: 24.9 % — SIGNIFICANT CHANGE UP (ref 13–44)
MCHC RBC-ENTMCNC: 30.4 PG — SIGNIFICANT CHANGE UP (ref 27–34)
MCHC RBC-ENTMCNC: 31.9 GM/DL — LOW (ref 32–36)
MCV RBC AUTO: 95.2 FL — SIGNIFICANT CHANGE UP (ref 80–100)
MONOCYTES # BLD AUTO: 0.58 K/UL — SIGNIFICANT CHANGE UP (ref 0–0.9)
MONOCYTES NFR BLD AUTO: 11.3 % — SIGNIFICANT CHANGE UP (ref 2–14)
NEUTROPHILS # BLD AUTO: 3.18 K/UL — SIGNIFICANT CHANGE UP (ref 1.8–7.4)
NEUTROPHILS NFR BLD AUTO: 61.8 % — SIGNIFICANT CHANGE UP (ref 43–77)
NRBC # BLD: 0 /100 WBCS — SIGNIFICANT CHANGE UP (ref 0–0)
PLATELET # BLD AUTO: 215 K/UL — SIGNIFICANT CHANGE UP (ref 150–400)
POTASSIUM SERPL-MCNC: 3.9 MMOL/L — SIGNIFICANT CHANGE UP (ref 3.5–5.3)
POTASSIUM SERPL-SCNC: 3.9 MMOL/L — SIGNIFICANT CHANGE UP (ref 3.5–5.3)
PROT SERPL-MCNC: 8.7 G/DL — HIGH (ref 6–8.3)
RBC # BLD: 3.75 M/UL — LOW (ref 4.2–5.8)
RBC # FLD: 14.4 % — SIGNIFICANT CHANGE UP (ref 10.3–14.5)
SODIUM SERPL-SCNC: 139 MMOL/L — SIGNIFICANT CHANGE UP (ref 135–145)
WBC # BLD: 5.14 K/UL — SIGNIFICANT CHANGE UP (ref 3.8–10.5)
WBC # FLD AUTO: 5.14 K/UL — SIGNIFICANT CHANGE UP (ref 3.8–10.5)

## 2019-09-04 PROCEDURE — 36415 COLL VENOUS BLD VENIPUNCTURE: CPT

## 2019-09-04 PROCEDURE — 96366 THER/PROPH/DIAG IV INF ADDON: CPT

## 2019-09-04 PROCEDURE — 99284 EMERGENCY DEPT VISIT MOD MDM: CPT

## 2019-09-04 PROCEDURE — 87070 CULTURE OTHR SPECIMN AEROBIC: CPT

## 2019-09-04 PROCEDURE — 73610 X-RAY EXAM OF ANKLE: CPT

## 2019-09-04 PROCEDURE — 99284 EMERGENCY DEPT VISIT MOD MDM: CPT | Mod: 25

## 2019-09-04 PROCEDURE — 80053 COMPREHEN METABOLIC PANEL: CPT

## 2019-09-04 PROCEDURE — 85027 COMPLETE CBC AUTOMATED: CPT

## 2019-09-04 PROCEDURE — 87075 CULTR BACTERIA EXCEPT BLOOD: CPT

## 2019-09-04 PROCEDURE — 87040 BLOOD CULTURE FOR BACTERIA: CPT

## 2019-09-04 PROCEDURE — 83605 ASSAY OF LACTIC ACID: CPT

## 2019-09-04 PROCEDURE — 73610 X-RAY EXAM OF ANKLE: CPT | Mod: 26,LT

## 2019-09-04 PROCEDURE — 96365 THER/PROPH/DIAG IV INF INIT: CPT

## 2019-09-04 PROCEDURE — 86140 C-REACTIVE PROTEIN: CPT

## 2019-09-04 PROCEDURE — 87186 SC STD MICRODIL/AGAR DIL: CPT

## 2019-09-04 PROCEDURE — 85652 RBC SED RATE AUTOMATED: CPT

## 2019-09-04 RX ORDER — COLLAGENASE CLOSTRIDIUM HIST. 250 UNIT/G
1 OINTMENT (GRAM) TOPICAL ONCE
Refills: 0 | Status: COMPLETED | OUTPATIENT
Start: 2019-09-04 | End: 2019-09-04

## 2019-09-04 RX ADMIN — Medication 100 MILLIGRAM(S): at 18:18

## 2019-09-04 RX ADMIN — Medication 1 APPLICATION(S): at 20:17

## 2019-09-04 RX ADMIN — Medication 600 MILLIGRAM(S): at 20:15

## 2019-09-04 NOTE — ED PROVIDER NOTE - PROGRESS NOTE DETAILS
patient seen by podiatry resident, cleaned and dressed wound, wound does not appear infected after being cleansed. lab wnl. vital stable. given wound care f.u info.

## 2019-09-04 NOTE — ED ADULT NURSE NOTE - OBJECTIVE STATEMENT
pt is here for wound.  pt stated that "I have bad veins", wound at left heel, denied fever or chills, denied pain, didn't know when pt got wound, pt calm at this time.

## 2019-09-04 NOTE — ED PROVIDER NOTE - OBJECTIVE STATEMENT
69 y.o presenting for wound, check, endorses noting a white plague to left medial ankle over wound 2 days ago. denies fever, n, v, abd pain. patient appeared to be difficult history, states he's not sure who changed his dressing. was here in oct 2018 for ankle ulcers.

## 2019-09-04 NOTE — CONSULT NOTE ADULT - SUBJECTIVE AND OBJECTIVE BOX
Patient is a 69y old  Male who presents with a chief complaint of left leg ulcerations    HPI: This 69 year old diabetic male presents to the ED for concern with left leg ulceration.  Patient states he has had leg ulcerations to both legs for greater than 18 years. He denies pain to the ulcers.  He states he sits in a chair at home but usually has his legs moderately elevated. He states he had been seen by a podiatrist from irregularly and also sought care in emergency departments.  He states he had home nursing that changed his dressing approximately 4 months ago, but he is unsure of why it was stopped.  He does not see a wound care specialist or any provider for his wounds currently.  He has aquacell in his bag, but asks how to use it.  He is not able to say who gave it to him. He denies n/v/d/f/sob.     PMH:No pertinent past medical history  No pertinent past medical history    Allergies: penicillin (Other)    Medications:   FH:No pertinent family history in first degree relatives    PSX: No significant past surgical history  No significant past surgical history    SH:     Vital Signs Last 24 Hrs  T(C): 36.5 (04 Sep 2019 17:15), Max: 36.5 (04 Sep 2019 17:15)  T(F): 97.7 (04 Sep 2019 17:15), Max: 97.7 (04 Sep 2019 17:15)  HR: 73 (04 Sep 2019 17:15) (73 - 73)  BP: 125/72 (04 Sep 2019 17:15) (125/72 - 125/72)  BP(mean): --  RR: 20 (04 Sep 2019 17:15) (20 - 20)  SpO2: 100% (04 Sep 2019 17:15) (100% - 100%)    LABS                        11.4   5.14  )-----------( 215      ( 04 Sep 2019 18:13 )             35.7               09-04    139  |  104  |  16  ----------------------------<  84  3.9   |  32<H>  |  1.07    Ca    9.2      04 Sep 2019 18:13    TPro  8.7<H>  /  Alb  3.7  /  TBili  0.3  /  DBili  x   /  AST  14  /  ALT  14  /  AlkPhos  102  09-04        PHYSICAL EXAM  GEN: EKWUNIFE, CHRISTOPHER is a pleasant well-nourished, well developed 69y Male in no acute distress, alert awake, and oriented to person, place and time.   LE Focused:    Vasc:  DP pulses 1/4 b/l.  PT pulses unable to assess due to wounds.  CFT brisk to all digits.  No hairgrowth noted on toes.    Derm: Chronic venous stasis hyperpigmentation to b/l lower legs.  Wound #1: Left medial ankle measuring approximately 5.5cm x 4.5cm x 0.3cm with a fibrogranular base and surrounding hyperkeratosis with dry escar over the distal portion. Surround hypopigmentation. no purulence, no periwound erythema, no warmth, no pain to palpation, no probe to bone, malodor prior to debridement.  Wound #2: Left lateral ankle measuring approximately 1.0cm x 0.8cm x 0.4cm with fibrogranular base and surrounding hyperkeratosis.   No purulence, no periwound erythema, no warmth, no pain to palption, no probe to bone, no malodor.  Neuro: Decreased protective sensation b/l  MSK: Limited ankle joint range of motion.      Imaging:   xray-pending  Cultures:   Taken- pending    A: Venous Stasis ulceration, left leg  Chronic venous stasis b/l    P:  Patient evaluated, chart reviewed  Xrays- pending  Culture taken-pending  Verbal consent was obtained, excisional debridement of left leg wounds with a sterile #15 blade with removal of all non-viable skin and soft tissue down to and including the subcutaneous layer.  Procedure well tolerated by the patient.  Dressed with Adaptic, 4x4 gauze, Binu, ACE  Recommend daily outpatient dressing changes with santyl, adaptic, 4x4 gauze, Kerlix, ACE wrap  Please follow up at wound care/podiatry clinic [id number] [facility] with Dr. Lui or Dr. Jimenez.  ([phone]) or with a podiatrist he prefers  Recommend patient follow up tomorrow 9/5 in clinic to get appropriate home nursing care orders placed and initiation of outpatient care.  Return to ED if any signs of infection, fever, nausea, or any other concerns   Discussed with Attending Dr. An

## 2019-09-04 NOTE — ED PROVIDER NOTE - CLINICAL SUMMARY MEDICAL DECISION MAKING FREE TEXT BOX
patient persenting with large ankle wound, vital stable. mal odor on exam. will obtain xray, lab work, r.o infection. ed obs and reassess

## 2019-09-04 NOTE — ED PROVIDER NOTE - PATIENT PORTAL LINK FT
You can access the FollowMyHealth Patient Portal offered by [facility] by registering at the following website: [email or web address]. By joining [facility]’s FollowMyHealth portal, you will also be able to view your health information using other applications (apps) compatible with our system.

## 2019-09-04 NOTE — ED PROVIDER NOTE - CROS ED CONS ALL NEG
Subjective   Rosales Vazquez is a 72 y.o. male.   Chief Complaint   Patient presents with   • Insomnia     control on Temazepam       Insomnia   This is a chronic problem. The current episode started more than 1 year ago. The problem occurs daily. The problem has been unchanged. Associated symptoms include arthralgias and fatigue. Treatments tried: temazapam. The treatment provided significant relief.        The following portions of the patient's history were reviewed and updated as appropriate: allergies, current medications, past family history, past medical history, past social history, past surgical history and problem list.    Review of Systems   Constitutional: Positive for fatigue.   HENT: Negative.    Eyes: Negative.    Respiratory: Negative.    Cardiovascular: Negative.    Gastrointestinal: Negative.    Endocrine: Negative.    Genitourinary: Negative.    Musculoskeletal: Positive for arthralgias.   Skin: Negative.    Allergic/Immunologic: Negative.    Neurological: Negative.    Hematological: Negative.    Psychiatric/Behavioral: The patient has insomnia.    All other systems reviewed and are negative.      Objective   Physical Exam   Constitutional: He is oriented to person, place, and time. He appears well-developed and well-nourished.   HENT:   Head: Normocephalic and atraumatic.   Right Ear: External ear normal.   Left Ear: External ear normal.   Eyes: Conjunctivae and EOM are normal. Pupils are equal, round, and reactive to light.   Neck: Normal range of motion. Neck supple.   Cardiovascular: Normal rate, regular rhythm, normal heart sounds and intact distal pulses.  Exam reveals no gallop and no friction rub.    No murmur heard.  Pulmonary/Chest: Effort normal. He has no wheezes. He has rhonchi. He has no rales.   Abdominal: Soft. Bowel sounds are normal. He exhibits no mass. There is no tenderness. There is no rebound and no guarding.   Musculoskeletal:        Left elbow: He exhibits decreased  range of motion, swelling and effusion. Tenderness found.        Right foot: There is tenderness and laceration.   Neurological: He is alert and oriented to person, place, and time. He has normal reflexes. No cranial nerve deficit. He exhibits normal muscle tone.   Skin: Skin is warm and dry. No rash noted.   Psychiatric: He has a normal mood and affect. His behavior is normal. Judgment and thought content normal.   Nursing note and vitals reviewed.    roman reviewed    Assessment/Plan   Rosales was seen today for insomnia.    Diagnoses and all orders for this visit:    Primary insomnia    Essential hypertension    Other orders  -     temazepam (RESTORIL) 30 MG capsule; Take 1 capsule by mouth At Night As Needed for Sleep.                negative...

## 2019-09-04 NOTE — ED PROVIDER NOTE - MUSCULOSKELETAL, MLM
large left medial and lateral wound w/ a piece of gauze imbed and crusted over on medial wound. foul smelling dischrge present after removing gauze

## 2019-09-05 ENCOUNTER — APPOINTMENT (OUTPATIENT)
Dept: WOUND CARE | Facility: CLINIC | Age: 69
End: 2019-09-05

## 2019-09-05 ENCOUNTER — OUTPATIENT (OUTPATIENT)
Dept: OUTPATIENT SERVICES | Facility: HOSPITAL | Age: 69
LOS: 1 days | End: 2019-09-05
Payer: MEDICARE

## 2019-09-05 DIAGNOSIS — Z78.9 OTHER SPECIFIED HEALTH STATUS: ICD-10-CM

## 2019-09-05 DIAGNOSIS — Z00.00 ENCOUNTER FOR GENERAL ADULT MEDICAL EXAMINATION WITHOUT ABNORMAL FINDINGS: ICD-10-CM

## 2019-09-05 LAB — CRP SERPL-MCNC: 0.66 MG/DL — HIGH (ref 0–0.4)

## 2019-09-05 PROCEDURE — 11042 DBRDMT SUBQ TIS 1ST 20SQCM/<: CPT

## 2019-09-06 NOTE — HISTORY OF PRESENT ILLNESS
[FreeTextEntry1] : This 69 year old diabetic male presents to clinic as a referral from the Emergency department at [facility].  He states that he has had leg ulcers for greater than 18 years on and off.  He states that he has been treated at multiple Emergency departments and podiatry off and on without consistent care.  He is not sure of the last time his dressing was changed prior to yesterday's ED visit.  He states it may have been 3-4 months.  He admits to leg swelling and sitting in a chair with legs partially elevated.  He denies n/v/d/c/sob

## 2019-09-06 NOTE — PHYSICAL EXAM
[FreeTextEntry2] : 7.2 [FreeTextEntry1] : medial ankle [FreeTextEntry3] : 5.3 [FreeTextEntry4] : 0.3 [de-identified] : santyl [de-identified] : 4x4 gauze\par adaptic\par michaela/kerlix\par ACE [FreeTextEntry7] : lateral ankle [FreeTextEntry9] : 1.0 [FreeTextEntry8] : 1.0 [de-identified] : 0.5 [de-identified] : hyperkeratotic [de-identified] : santyl [de-identified] : 4x4 gauze\par  [TWNoteComboBox1] : Left [TWNoteComboBox2] : 2 [TWNoteComboBox4] : Small [TWNoteComboBox5] : No [TWNoteComboBox6] : Venous [de-identified] : Mild [de-identified] : No [de-identified] : 50% [TWNoteComboBox9] : Left [de-identified] : Yes [de-identified] : 2 [de-identified] : Small [de-identified] : Venous [de-identified] : No [de-identified] : other [de-identified] : No [de-identified] : None [de-identified] : Yes [de-identified] : <20%

## 2019-09-06 NOTE — PLAN
[FreeTextEntry1] : Objective: This 69 year old pleasant male presents to clinic unassisted but is not the greatest historian.\par Vasc: DP pulses palpable 1/4. Unable to assess PT pulses due to wound.  Mild non-pitting edema b/l.  \par Derm: See wound assessment above.  Callous on right 5th toe plantarly.  Diffuse xerosis b/l.  \par Neuro: Decreased protective sensation b/l \par Musc: No pain with palpation periwound.\par \par A: Left leg non-pressure ulceration\par Venous stasis b/l\par \par P:\par Patient examined and evaluated and all findings discussed with the patient\par Wound cleansed with chlorohexidine scrub.\par Verbal consent was given. Excision debridement with sterile #15 blade of the left leg wounds with removal of non-viable skin and soft tissue down to and including the subcutaneous layer.  Procedure will tolerated by the patient\par Wounds dressed with santyl, adaptic, 4x4 gauze, Binu, ACE\par Recommend home nursing to change dressing with santyl, adaptic, 4x4 gauze, Binu, ACE\par Return to clinic in 1 week

## 2019-09-07 LAB
-  AMIKACIN: SIGNIFICANT CHANGE UP
-  AMPICILLIN: SIGNIFICANT CHANGE UP
-  AZTREONAM: SIGNIFICANT CHANGE UP
-  CEFEPIME: SIGNIFICANT CHANGE UP
-  CEFTAZIDIME: SIGNIFICANT CHANGE UP
-  CIPROFLOXACIN: SIGNIFICANT CHANGE UP
-  GENTAMICIN: SIGNIFICANT CHANGE UP
-  IMIPENEM: SIGNIFICANT CHANGE UP
-  LEVOFLOXACIN: SIGNIFICANT CHANGE UP
-  MEROPENEM: SIGNIFICANT CHANGE UP
-  PIPERACILLIN/TAZOBACTAM: SIGNIFICANT CHANGE UP
-  TETRACYCLINE: SIGNIFICANT CHANGE UP
-  TOBRAMYCIN: SIGNIFICANT CHANGE UP
-  VANCOMYCIN: SIGNIFICANT CHANGE UP
METHOD TYPE: SIGNIFICANT CHANGE UP
METHOD TYPE: SIGNIFICANT CHANGE UP

## 2019-09-09 ENCOUNTER — OUTPATIENT (OUTPATIENT)
Dept: OUTPATIENT SERVICES | Facility: HOSPITAL | Age: 69
LOS: 1 days | End: 2019-09-09
Payer: MEDICARE

## 2019-09-09 ENCOUNTER — RESULT REVIEW (OUTPATIENT)
Age: 69
End: 2019-09-09

## 2019-09-09 DIAGNOSIS — K62.5 HEMORRHAGE OF ANUS AND RECTUM: ICD-10-CM

## 2019-09-09 DIAGNOSIS — L97.322 NON-PRESSURE CHRONIC ULCER OF LEFT ANKLE WITH FAT LAYER EXPOSED: ICD-10-CM

## 2019-09-09 DIAGNOSIS — E11.621 TYPE 2 DIABETES MELLITUS WITH FOOT ULCER: ICD-10-CM

## 2019-09-09 PROCEDURE — 88305 TISSUE EXAM BY PATHOLOGIST: CPT

## 2019-09-09 PROCEDURE — 88305 TISSUE EXAM BY PATHOLOGIST: CPT | Mod: 26

## 2019-09-09 PROCEDURE — 45385 COLONOSCOPY W/LESION REMOVAL: CPT

## 2019-09-09 PROCEDURE — 45380 COLONOSCOPY AND BIOPSY: CPT | Mod: XS

## 2019-09-10 ENCOUNTER — RX RENEWAL (OUTPATIENT)
Age: 69
End: 2019-09-10

## 2019-09-10 LAB
CULTURE RESULTS: SIGNIFICANT CHANGE UP
ORGANISM # SPEC MICROSCOPIC CNT: SIGNIFICANT CHANGE UP
SPECIMEN SOURCE: SIGNIFICANT CHANGE UP

## 2019-09-11 LAB — SURGICAL PATHOLOGY STUDY: SIGNIFICANT CHANGE UP

## 2019-09-12 ENCOUNTER — APPOINTMENT (OUTPATIENT)
Dept: WOUND CARE | Facility: CLINIC | Age: 69
End: 2019-09-12

## 2019-09-12 ENCOUNTER — INPATIENT (INPATIENT)
Facility: HOSPITAL | Age: 69
LOS: 14 days | Discharge: NURSING, REHAB OR LONG-TERM CARE | DRG: 330 | End: 2019-09-27
Attending: SPECIALIST | Admitting: SPECIALIST
Payer: MEDICARE

## 2019-09-12 VITALS
HEIGHT: 69 IN | WEIGHT: 164.91 LBS | SYSTOLIC BLOOD PRESSURE: 131 MMHG | RESPIRATION RATE: 20 BRPM | OXYGEN SATURATION: 100 % | TEMPERATURE: 98 F | HEART RATE: 58 BPM | DIASTOLIC BLOOD PRESSURE: 86 MMHG

## 2019-09-12 DIAGNOSIS — F03.90 UNSPECIFIED DEMENTIA WITHOUT BEHAVIORAL DISTURBANCE: ICD-10-CM

## 2019-09-12 DIAGNOSIS — D64.9 ANEMIA, UNSPECIFIED: ICD-10-CM

## 2019-09-12 DIAGNOSIS — L97.909 NON-PRESSURE CHRONIC ULCER OF UNSPECIFIED PART OF UNSPECIFIED LOWER LEG WITH UNSPECIFIED SEVERITY: ICD-10-CM

## 2019-09-12 DIAGNOSIS — Z29.9 ENCOUNTER FOR PROPHYLACTIC MEASURES, UNSPECIFIED: ICD-10-CM

## 2019-09-12 DIAGNOSIS — L97.509 NON-PRESSURE CHRONIC ULCER OF OTHER PART OF UNSPECIFIED FOOT WITH UNSPECIFIED SEVERITY: ICD-10-CM

## 2019-09-12 DIAGNOSIS — K63.89 OTHER SPECIFIED DISEASES OF INTESTINE: ICD-10-CM

## 2019-09-12 LAB
ACETONE SERPL-MCNC: NEGATIVE — SIGNIFICANT CHANGE UP
ALBUMIN SERPL ELPH-MCNC: 3 G/DL — LOW (ref 3.5–5)
ALP SERPL-CCNC: 80 U/L — SIGNIFICANT CHANGE UP (ref 40–120)
ALT FLD-CCNC: 10 U/L DA — SIGNIFICANT CHANGE UP (ref 10–60)
ANION GAP SERPL CALC-SCNC: 4 MMOL/L — LOW (ref 5–17)
APTT BLD: 34.9 SEC — SIGNIFICANT CHANGE UP (ref 27.5–36.3)
AST SERPL-CCNC: 14 U/L — SIGNIFICANT CHANGE UP (ref 10–40)
BASOPHILS # BLD AUTO: 0.03 K/UL — SIGNIFICANT CHANGE UP (ref 0–0.2)
BASOPHILS NFR BLD AUTO: 0.6 % — SIGNIFICANT CHANGE UP (ref 0–2)
BILIRUB SERPL-MCNC: 0.5 MG/DL — SIGNIFICANT CHANGE UP (ref 0.2–1.2)
BUN SERPL-MCNC: 9 MG/DL — SIGNIFICANT CHANGE UP (ref 7–18)
CALCIUM SERPL-MCNC: 8.4 MG/DL — SIGNIFICANT CHANGE UP (ref 8.4–10.5)
CHLORIDE SERPL-SCNC: 109 MMOL/L — HIGH (ref 96–108)
CO2 SERPL-SCNC: 28 MMOL/L — SIGNIFICANT CHANGE UP (ref 22–31)
CREAT SERPL-MCNC: 0.94 MG/DL — SIGNIFICANT CHANGE UP (ref 0.5–1.3)
EOSINOPHIL # BLD AUTO: 0.06 K/UL — SIGNIFICANT CHANGE UP (ref 0–0.5)
EOSINOPHIL NFR BLD AUTO: 1.2 % — SIGNIFICANT CHANGE UP (ref 0–6)
GLUCOSE SERPL-MCNC: 79 MG/DL — SIGNIFICANT CHANGE UP (ref 70–99)
HCT VFR BLD CALC: 31.3 % — LOW (ref 39–50)
HGB BLD-MCNC: 10 G/DL — LOW (ref 13–17)
IMM GRANULOCYTES NFR BLD AUTO: 0 % — SIGNIFICANT CHANGE UP (ref 0–1.5)
INR BLD: 1.31 RATIO — HIGH (ref 0.88–1.16)
LACTATE SERPL-SCNC: 0.7 MMOL/L — SIGNIFICANT CHANGE UP (ref 0.7–2)
LIDOCAIN IGE QN: 75 U/L — SIGNIFICANT CHANGE UP (ref 73–393)
LYMPHOCYTES # BLD AUTO: 1.18 K/UL — SIGNIFICANT CHANGE UP (ref 1–3.3)
LYMPHOCYTES # BLD AUTO: 24.3 % — SIGNIFICANT CHANGE UP (ref 13–44)
MAGNESIUM SERPL-MCNC: 2.4 MG/DL — SIGNIFICANT CHANGE UP (ref 1.6–2.6)
MCHC RBC-ENTMCNC: 30.7 PG — SIGNIFICANT CHANGE UP (ref 27–34)
MCHC RBC-ENTMCNC: 31.9 GM/DL — LOW (ref 32–36)
MCV RBC AUTO: 96 FL — SIGNIFICANT CHANGE UP (ref 80–100)
MONOCYTES # BLD AUTO: 0.63 K/UL — SIGNIFICANT CHANGE UP (ref 0–0.9)
MONOCYTES NFR BLD AUTO: 13 % — SIGNIFICANT CHANGE UP (ref 2–14)
NEUTROPHILS # BLD AUTO: 2.95 K/UL — SIGNIFICANT CHANGE UP (ref 1.8–7.4)
NEUTROPHILS NFR BLD AUTO: 60.9 % — SIGNIFICANT CHANGE UP (ref 43–77)
NRBC # BLD: 0 /100 WBCS — SIGNIFICANT CHANGE UP (ref 0–0)
NT-PROBNP SERPL-SCNC: 180 PG/ML — HIGH (ref 0–125)
PLATELET # BLD AUTO: 165 K/UL — SIGNIFICANT CHANGE UP (ref 150–400)
POTASSIUM SERPL-MCNC: 3.7 MMOL/L — SIGNIFICANT CHANGE UP (ref 3.5–5.3)
POTASSIUM SERPL-SCNC: 3.7 MMOL/L — SIGNIFICANT CHANGE UP (ref 3.5–5.3)
PROT SERPL-MCNC: 7.1 G/DL — SIGNIFICANT CHANGE UP (ref 6–8.3)
PROTHROM AB SERPL-ACNC: 14.7 SEC — HIGH (ref 10–12.9)
RBC # BLD: 3.26 M/UL — LOW (ref 4.2–5.8)
RBC # FLD: 14.6 % — HIGH (ref 10.3–14.5)
SODIUM SERPL-SCNC: 141 MMOL/L — SIGNIFICANT CHANGE UP (ref 135–145)
WBC # BLD: 4.85 K/UL — SIGNIFICANT CHANGE UP (ref 3.8–10.5)
WBC # FLD AUTO: 4.85 K/UL — SIGNIFICANT CHANGE UP (ref 3.8–10.5)

## 2019-09-12 PROCEDURE — 71045 X-RAY EXAM CHEST 1 VIEW: CPT | Mod: 26

## 2019-09-12 PROCEDURE — 99285 EMERGENCY DEPT VISIT HI MDM: CPT

## 2019-09-12 PROCEDURE — 73610 X-RAY EXAM OF ANKLE: CPT | Mod: 26,LT

## 2019-09-12 RX ORDER — ASPIRIN/CALCIUM CARB/MAGNESIUM 324 MG
81 TABLET ORAL DAILY
Refills: 0 | Status: DISCONTINUED | OUTPATIENT
Start: 2019-09-12 | End: 2019-09-18

## 2019-09-12 RX ORDER — VANCOMYCIN HCL 1 G
1000 VIAL (EA) INTRAVENOUS EVERY 12 HOURS
Refills: 0 | Status: DISCONTINUED | OUTPATIENT
Start: 2019-09-12 | End: 2019-09-18

## 2019-09-12 RX ORDER — AZTREONAM 2 G
1000 VIAL (EA) INJECTION EVERY 8 HOURS
Refills: 0 | Status: DISCONTINUED | OUTPATIENT
Start: 2019-09-12 | End: 2019-09-18

## 2019-09-12 RX ORDER — ENOXAPARIN SODIUM 100 MG/ML
40 INJECTION SUBCUTANEOUS DAILY
Refills: 0 | Status: DISCONTINUED | OUTPATIENT
Start: 2019-09-12 | End: 2019-09-18

## 2019-09-12 RX ORDER — VANCOMYCIN HCL 1 G
1000 VIAL (EA) INTRAVENOUS ONCE
Refills: 0 | Status: COMPLETED | OUTPATIENT
Start: 2019-09-12 | End: 2019-09-12

## 2019-09-12 RX ORDER — INFLUENZA VIRUS VACCINE 15; 15; 15; 15 UG/.5ML; UG/.5ML; UG/.5ML; UG/.5ML
0.5 SUSPENSION INTRAMUSCULAR ONCE
Refills: 0 | Status: DISCONTINUED | OUTPATIENT
Start: 2019-09-12 | End: 2019-09-27

## 2019-09-12 RX ORDER — AZTREONAM 2 G
1000 VIAL (EA) INJECTION ONCE
Refills: 0 | Status: COMPLETED | OUTPATIENT
Start: 2019-09-12 | End: 2019-09-12

## 2019-09-12 RX ADMIN — Medication 50 MILLIGRAM(S): at 14:25

## 2019-09-12 RX ADMIN — Medication 250 MILLIGRAM(S): at 13:00

## 2019-09-12 RX ADMIN — Medication 50 MILLIGRAM(S): at 22:40

## 2019-09-12 NOTE — H&P ADULT - HISTORY OF PRESENT ILLNESS
69 Years old male from home with dementia was sent in by dr Richie srivastava for right sided colonic mass which was recently diagnosed. Patient also have cellulitis which was treated with bactrim as outpatient but have not improved.  History source is dr Richie srivastava. Patient does not gave any history but only points to lower extremity ulcers on left lower extremity. History very limited due to dementia.  Pt denies any chest pain, shortness of breath, fever, abdominal pain, change in urinary or bowel habits.  Patient is being admitted to workup for right colon mass and possible right isded colectomy. Plan is to involve GI doctor, surgery and cardiology for pre-op surgical clearance. We will need ID consult and podiatry consult for lower extremity ulcers.

## 2019-09-12 NOTE — ED ADULT TRIAGE NOTE - CHIEF COMPLAINT QUOTE
Sent by PCP for unresponsiveness to oral antibiotic therapy.  Roam alert applied, yellow gown protocol.  H/o dementia

## 2019-09-12 NOTE — H&P ADULT - PROBLEM SELECTOR PLAN 2
patient also has ulceration of right foot with concern for cellultitis.  patient failed outpatient treatement with bactrim.  sent by PMD for IV antibiotics.  Patient is allergic to penicillins so we will start on IV aztreonam and IV vancomycin.  ID dr jacobsen consult.  podaitry consult in am.

## 2019-09-12 NOTE — ED PROVIDER NOTE - MUSCULOSKELETAL, MLM
Spine appears normal, range of motion is not limited, Lt ankle-med/lat ulcer with d/c, no fluctuant, tenderness to palp

## 2019-09-12 NOTE — ED PROVIDER NOTE - CARE PLAN
Principal Discharge DX:	Ulcer of foot  Secondary Diagnosis:	Mass of colon  Secondary Diagnosis:	Anemia

## 2019-09-12 NOTE — ED PROVIDER NOTE - OBJECTIVE STATEMENT
69 y.o. male BIBA very poor historian, pt claims went to his doctor for follow up, sent to ED for Lt ankle sore, pt c/o pain, able to ambulate

## 2019-09-12 NOTE — H&P ADULT - PROBLEM SELECTOR PLAN 1
Pt was sent in by PMD office with right sided colonic mass.  Patient denies any symptoms.  No concern for bowel obstruction.  Will workup for right sided colon mass.  will do CEA level in am.  CT abdomen with IV and oral contrast is ordered.  GI consult dr jacobs.  Surgery consult dr guillaume.  Cardiology dr marie for pre-op clearence.

## 2019-09-12 NOTE — H&P ADULT - NSHPPHYSICALEXAM_GEN_ALL_CORE
Vital Signs Last 24 Hrs  T(C): 36.3 (12 Sep 2019 15:00), Max: 36.5 (12 Sep 2019 11:29)  T(F): 97.3 (12 Sep 2019 15:00), Max: 97.7 (12 Sep 2019 11:29)  HR: 59 (12 Sep 2019 15:00) (58 - 59)  BP: 148/68 (12 Sep 2019 15:00) (131/86 - 148/68)  BP(mean): 88 (12 Sep 2019 15:00) (88 - 88)  RR: 18 (12 Sep 2019 15:00) (18 - 20)  SpO2: 100% (12 Sep 2019 15:00) (100% - 100%)

## 2019-09-12 NOTE — H&P ADULT - PROBLEM SELECTOR PLAN 4
patient has history of iron deficiency anemia.  pt is on iron tablets at home.  H and H is stable at 10 mg/dl.  monitor H and h.  Might benefit from IV iron if H and H drops

## 2019-09-12 NOTE — ED ADULT NURSE NOTE - OBJECTIVE STATEMENT
RN KRISSY SY COVERING NOTES: Patient hx of dementia sent by MD Sure for worsening cellulitis of suni lower legs. Patient noted with swelling on suni legs and noted with open wounds on LLE.

## 2019-09-13 DIAGNOSIS — Z02.9 ENCOUNTER FOR ADMINISTRATIVE EXAMINATIONS, UNSPECIFIED: ICD-10-CM

## 2019-09-13 DIAGNOSIS — Z71.89 OTHER SPECIFIED COUNSELING: ICD-10-CM

## 2019-09-13 LAB
ALBUMIN SERPL ELPH-MCNC: 2.9 G/DL — LOW (ref 3.5–5)
ALP SERPL-CCNC: 79 U/L — SIGNIFICANT CHANGE UP (ref 40–120)
ALT FLD-CCNC: 11 U/L DA — SIGNIFICANT CHANGE UP (ref 10–60)
ANION GAP SERPL CALC-SCNC: 2 MMOL/L — LOW (ref 5–17)
APPEARANCE UR: CLEAR — SIGNIFICANT CHANGE UP
AST SERPL-CCNC: 11 U/L — SIGNIFICANT CHANGE UP (ref 10–40)
BASOPHILS # BLD AUTO: 0.03 K/UL — SIGNIFICANT CHANGE UP (ref 0–0.2)
BASOPHILS NFR BLD AUTO: 0.6 % — SIGNIFICANT CHANGE UP (ref 0–2)
BILIRUB DIRECT SERPL-MCNC: 0.1 MG/DL — SIGNIFICANT CHANGE UP (ref 0–0.2)
BILIRUB INDIRECT FLD-MCNC: 0.4 MG/DL — SIGNIFICANT CHANGE UP (ref 0.2–1)
BILIRUB SERPL-MCNC: 0.5 MG/DL — SIGNIFICANT CHANGE UP (ref 0.2–1.2)
BILIRUB UR-MCNC: NEGATIVE — SIGNIFICANT CHANGE UP
BUN SERPL-MCNC: 9 MG/DL — SIGNIFICANT CHANGE UP (ref 7–18)
CALCIUM SERPL-MCNC: 8.4 MG/DL — SIGNIFICANT CHANGE UP (ref 8.4–10.5)
CEA SERPL-MCNC: 3.6 NG/ML — SIGNIFICANT CHANGE UP (ref 0–3.8)
CHLORIDE SERPL-SCNC: 111 MMOL/L — HIGH (ref 96–108)
CHOLEST SERPL-MCNC: 117 MG/DL — SIGNIFICANT CHANGE UP (ref 10–199)
CO2 SERPL-SCNC: 30 MMOL/L — SIGNIFICANT CHANGE UP (ref 22–31)
COLOR SPEC: YELLOW — SIGNIFICANT CHANGE UP
CREAT SERPL-MCNC: 0.89 MG/DL — SIGNIFICANT CHANGE UP (ref 0.5–1.3)
DIFF PNL FLD: NEGATIVE — SIGNIFICANT CHANGE UP
EOSINOPHIL # BLD AUTO: 0.11 K/UL — SIGNIFICANT CHANGE UP (ref 0–0.5)
EOSINOPHIL NFR BLD AUTO: 2.4 % — SIGNIFICANT CHANGE UP (ref 0–6)
GLUCOSE SERPL-MCNC: 79 MG/DL — SIGNIFICANT CHANGE UP (ref 70–99)
GLUCOSE UR QL: NEGATIVE — SIGNIFICANT CHANGE UP
HBA1C BLD-MCNC: 5.3 % — SIGNIFICANT CHANGE UP (ref 4–5.6)
HCT VFR BLD CALC: 31 % — LOW (ref 39–50)
HCV AB S/CO SERPL IA: 0.11 S/CO — SIGNIFICANT CHANGE UP (ref 0–0.99)
HCV AB SERPL-IMP: SIGNIFICANT CHANGE UP
HDLC SERPL-MCNC: 50 MG/DL — SIGNIFICANT CHANGE UP
HGB BLD-MCNC: 10 G/DL — LOW (ref 13–17)
IMM GRANULOCYTES NFR BLD AUTO: 0.2 % — SIGNIFICANT CHANGE UP (ref 0–1.5)
INR BLD: 1.38 RATIO — HIGH (ref 0.88–1.16)
KETONES UR-MCNC: NEGATIVE — SIGNIFICANT CHANGE UP
LEUKOCYTE ESTERASE UR-ACNC: NEGATIVE — SIGNIFICANT CHANGE UP
LIPID PNL WITH DIRECT LDL SERPL: 60 MG/DL — SIGNIFICANT CHANGE UP
LYMPHOCYTES # BLD AUTO: 1.31 K/UL — SIGNIFICANT CHANGE UP (ref 1–3.3)
LYMPHOCYTES # BLD AUTO: 28 % — SIGNIFICANT CHANGE UP (ref 13–44)
MAGNESIUM SERPL-MCNC: 2.1 MG/DL — SIGNIFICANT CHANGE UP (ref 1.6–2.6)
MCHC RBC-ENTMCNC: 30.2 PG — SIGNIFICANT CHANGE UP (ref 27–34)
MCHC RBC-ENTMCNC: 32.3 GM/DL — SIGNIFICANT CHANGE UP (ref 32–36)
MCV RBC AUTO: 93.7 FL — SIGNIFICANT CHANGE UP (ref 80–100)
MONOCYTES # BLD AUTO: 0.63 K/UL — SIGNIFICANT CHANGE UP (ref 0–0.9)
MONOCYTES NFR BLD AUTO: 13.5 % — SIGNIFICANT CHANGE UP (ref 2–14)
NEUTROPHILS # BLD AUTO: 2.59 K/UL — SIGNIFICANT CHANGE UP (ref 1.8–7.4)
NEUTROPHILS NFR BLD AUTO: 55.3 % — SIGNIFICANT CHANGE UP (ref 43–77)
NITRITE UR-MCNC: NEGATIVE — SIGNIFICANT CHANGE UP
NRBC # BLD: 0 /100 WBCS — SIGNIFICANT CHANGE UP (ref 0–0)
PH UR: 7 — SIGNIFICANT CHANGE UP (ref 5–8)
PHOSPHATE SERPL-MCNC: 3 MG/DL — SIGNIFICANT CHANGE UP (ref 2.5–4.5)
PLATELET # BLD AUTO: 169 K/UL — SIGNIFICANT CHANGE UP (ref 150–400)
POTASSIUM SERPL-MCNC: 3.7 MMOL/L — SIGNIFICANT CHANGE UP (ref 3.5–5.3)
POTASSIUM SERPL-SCNC: 3.7 MMOL/L — SIGNIFICANT CHANGE UP (ref 3.5–5.3)
PROT SERPL-MCNC: 6.9 G/DL — SIGNIFICANT CHANGE UP (ref 6–8.3)
PROT UR-MCNC: NEGATIVE — SIGNIFICANT CHANGE UP
PROTHROM AB SERPL-ACNC: 15.5 SEC — HIGH (ref 10–12.9)
RBC # BLD: 3.31 M/UL — LOW (ref 4.2–5.8)
RBC # FLD: 14.4 % — SIGNIFICANT CHANGE UP (ref 10.3–14.5)
SODIUM SERPL-SCNC: 143 MMOL/L — SIGNIFICANT CHANGE UP (ref 135–145)
SP GR SPEC: 1 — LOW (ref 1.01–1.02)
TOTAL CHOLESTEROL/HDL RATIO MEASUREMENT: 2.3 RATIO — LOW (ref 3.4–9.6)
TRIGL SERPL-MCNC: 36 MG/DL — SIGNIFICANT CHANGE UP (ref 10–149)
TSH SERPL-MCNC: 1.33 UU/ML — SIGNIFICANT CHANGE UP (ref 0.34–4.82)
UROBILINOGEN FLD QL: NEGATIVE — SIGNIFICANT CHANGE UP
VIT B12 SERPL-MCNC: 487 PG/ML — SIGNIFICANT CHANGE UP (ref 232–1245)
WBC # BLD: 4.68 K/UL — SIGNIFICANT CHANGE UP (ref 3.8–10.5)
WBC # FLD AUTO: 4.68 K/UL — SIGNIFICANT CHANGE UP (ref 3.8–10.5)

## 2019-09-13 PROCEDURE — 74176 CT ABD & PELVIS W/O CONTRAST: CPT | Mod: 26

## 2019-09-13 PROCEDURE — 93306 TTE W/DOPPLER COMPLETE: CPT | Mod: 26

## 2019-09-13 PROCEDURE — 99223 1ST HOSP IP/OBS HIGH 75: CPT

## 2019-09-13 PROCEDURE — 93010 ELECTROCARDIOGRAM REPORT: CPT

## 2019-09-13 RX ORDER — HYDRALAZINE HCL 50 MG
10 TABLET ORAL ONCE
Refills: 0 | Status: COMPLETED | OUTPATIENT
Start: 2019-09-13 | End: 2019-09-13

## 2019-09-13 RX ORDER — IOHEXOL 300 MG/ML
30 INJECTION, SOLUTION INTRAVENOUS ONCE
Refills: 0 | Status: COMPLETED | OUTPATIENT
Start: 2019-09-13 | End: 2019-09-13

## 2019-09-13 RX ORDER — HYDRALAZINE HCL 50 MG
10 TABLET ORAL EVERY 6 HOURS
Refills: 0 | Status: DISCONTINUED | OUTPATIENT
Start: 2019-09-13 | End: 2019-09-14

## 2019-09-13 RX ADMIN — Medication 10 MILLIGRAM(S): at 23:57

## 2019-09-13 RX ADMIN — Medication 250 MILLIGRAM(S): at 17:17

## 2019-09-13 RX ADMIN — Medication 2 MILLIGRAM(S): at 14:00

## 2019-09-13 RX ADMIN — Medication 81 MILLIGRAM(S): at 11:29

## 2019-09-13 RX ADMIN — Medication 50 MILLIGRAM(S): at 22:27

## 2019-09-13 RX ADMIN — Medication 10 MILLIGRAM(S): at 16:35

## 2019-09-13 RX ADMIN — Medication 50 MILLIGRAM(S): at 14:22

## 2019-09-13 RX ADMIN — IOHEXOL 30 MILLILITER(S): 300 INJECTION, SOLUTION INTRAVENOUS at 10:54

## 2019-09-13 RX ADMIN — ENOXAPARIN SODIUM 40 MILLIGRAM(S): 100 INJECTION SUBCUTANEOUS at 11:30

## 2019-09-13 RX ADMIN — Medication 10 MILLIGRAM(S): at 17:17

## 2019-09-13 NOTE — BEHAVIORAL HEALTH ASSESSMENT NOTE - RISK ASSESSMENT
Pt's risk of deliberate self-harm appears very low at the time of assessment. However, he is in need of increased supervision for safety due to cognitive impairment.

## 2019-09-13 NOTE — CONSULT NOTE ADULT - NEGATIVE GASTROINTESTINAL SYMPTOMS
no diarrhea/no hematochezia/no jaundice/no nausea/no abdominal pain/no melena/no constipation/no vomiting

## 2019-09-13 NOTE — CONSULT NOTE ADULT - SUBJECTIVE AND OBJECTIVE BOX
CHIEF COMPLAINT:Patient is a 69y old  Male who presents with a chief complaint of cellulitis and right colon mass.      HPI:  69 Years old male from home with dementia was sent in by dr Quiroz office for right sided colonic mass which was recently diagnosed. Patient also have cellulitis which was treated with bactrim as outpatient but have not improved.  History source is dr Richie srivastava. Patient does not gave any history but only points to lower extremity ulcers on left lower extremity. History very limited due to dementia.Pt denies any chest pain, shortness of breath, fever, abdominal pain, change in urinary or bowel habits.  Patient is being admitted to workup for right colon mass and possible right isded colectomy. Plan is to involve GI doctor, surgery and cardiology for pre-op surgical clearance. We will need ID consult and podiatry consult for lower extremity ulcers. (12 Sep 2019 18:59)      PAST MEDICAL & SURGICAL HISTORY:  Dementia  colon mass    MEDICATIONS  (STANDING):  aspirin enteric coated 81 milliGRAM(s) Oral daily  aztreonam  IVPB 1000 milliGRAM(s) IV Intermittent every 8 hours  enoxaparin Injectable 40 milliGRAM(s) SubCutaneous daily  influenza   Vaccine 0.5 milliLiter(s) IntraMuscular once  vancomycin  IVPB 1000 milliGRAM(s) IV Intermittent every 12 hours    MEDICATIONS  (PRN):      FAMILY HISTORY: No hx of CAD      SOCIAL HISTORY:    [x ] Non-smoker    [x ] Alcohol-denies    Allergies    penicillin (Other)    Intolerances    	    REVIEW OF SYSTEMS:  CONSTITUTIONAL: No fever, weight loss, or fatigue  EYES: No eye pain, visual disturbances, or discharge  ENT:  No difficulty hearing, tinnitus, vertigo; No sinus or throat pain  NECK: No pain or stiffness  RESPIRATORY: No cough, wheezing, chills or hemoptysis; No Shortness of Breath  CARDIOVASCULAR: No chest pain, palpitations, passing out, dizziness, or leg swelling  GASTROINTESTINAL: No abdominal or epigastric pain. No nausea, vomiting, or hematemesis; No diarrhea or constipation. No melena or hematochezia.  GENITOURINARY: No dysuria, frequency, hematuria, or incontinence  NEUROLOGICAL: No headaches, memory loss, loss of strength, numbness, or tremors  SKIN: No itching, burning, rashes, or lesions   LYMPH Nodes: No enlarged glands  ENDOCRINE: No heat or cold intolerance; No hair loss  MUSCULOSKELETAL: No joint pain or swelling; No muscle, back, or extremity pain  PSYCHIATRIC: No depression, anxiety, mood swings, or difficulty sleeping  HEME/LYMPH: No easy bruising, or bleeding gums  ALLERGY AND IMMUNOLOGIC: No hives or eczema	      PHYSICAL EXAM:  T(C): 36.4 (09-13-19 @ 05:24), Max: 36.7 (09-12-19 @ 19:37)  HR: 71 (09-13-19 @ 05:24) (49 - 71)  BP: 144/61 (09-13-19 @ 05:24) (144/61 - 177/73)  RR: 18 (09-13-19 @ 05:24) (18 - 18)  SpO2: 100% (09-13-19 @ 05:24) (100% - 100%)        Appearance: Normal	  HEENT:   Normal oral mucosa, PERRL, EOMI	  Lymphatic: No lymphadenopathy  Cardiovascular: Normal S1 S2, No JVD, No murmurs, No edema  Respiratory: Lungs clear to auscultation	  Psychiatry: A & O x 3, Mood & affect appropriate  Gastrointestinal:  Soft, Non-tender, + BS	  Skin: No rashes, No ecchymoses, No cyanosis	  Neurologic: Non-focal  Extremities: Normal range of motion, No clubbing, cyanosis or edema  Vascular: Peripheral pulses palpable 2+ bilaterally  	    ECG:  	Sinus bradycardia  Possible Left atrial enlargement  Right bundle branch block    	  LABS:	 	                       10.0   4.68  )-----------( 169      ( 13 Sep 2019 06:54 )             31.0     09-13    143  |  111<H>  |  9   ----------------------------<  79  3.7   |  30  |  0.89    Ca    8.4      13 Sep 2019 06:54  Phos  3.0     09-13  Mg     2.1     09-13    TPro  6.9  /  Alb  2.9<L>  /  TBili  0.5  /  DBili  0.1  /  AST  11  /  ALT  11  /  AlkPhos  79  09-13      Lipid Profile: Cholesterol 117  LDL 60  HDL 50  TG 36    HgA1c: Hemoglobin A1C, Whole Blood: 5.3 % (09-13 @ 10:32)    TSH: Thyroid Stimulating Hormone, Serum: 1.33 uU/mL (09-13 @ 06:54)      Surgical Pathology Report:   ACCESSION No:  [id number]    LORETA ATKINSON        Surgical Final Report          Final Diagnosis    1.  Proximal transverse colon polyp; biopsy:  - Fragments of tubular adenoma    2 and 3.  Ascending colon polyp and hepatic flexure mass; biopsy:  - Fragments of tubulovillous adenoma.a    Verified by: Renita Hernandez MD  (Electronic Signature)  Reported on: 09/11/19 14:43 EDT, [address] [place] [id number]  _________________________________________________________________    Clinical History  r/o neoplasm  Carcinoma  Colon/bx    Specimen(s) Submitted  1-Proximal transverse colon polyp  2-Ascending colon polyp  3-Hepatic flexure mass    Gross Description  1.   The specimen is received in formalin and the specimen  container is labeled: Proximal transverse colon polyp.  It  consists of two fragments of tan-gray soft tissue ranging from  0.3 cm to 0.6 cm in greatest dimension.  Entirely submitted.  One  cassette.    2.   The specimen is received in formalin and the specimen  container is labeled: Ascending colon polyp.  It consists of  three fragments of tan-gray soft tissue ranging from 0.3 cm to  0.5 cm in greatest dimension.  Entirely submitted.  One cassette.    3.   The specimen is received in formalin and the specimen  container is labeled: Hepatic flexure mass.  It consists of seven  fragments of tan-gray soft tissue ranging from 0.2 cm to 0.3 cm  in greatest dimension.  Entirely submitted.  One cassette.    In addition to other data that may appear on the specimen  containers, all labels have been inspected to confirm the  presence of the patient's name and date of birth.  Alo Byrd 09/11/2019 07:23 (09.09.19 @ 16:45)

## 2019-09-13 NOTE — BEHAVIORAL HEALTH ASSESSMENT NOTE - DETAILS
R colon mass RLE ulcers, itching scalp Referred to [facility]PS after last admission 10/2018, case still active per SW

## 2019-09-13 NOTE — BEHAVIORAL HEALTH ASSESSMENT NOTE - NSHPLANGLIMITEDENGLISH_GEN_A_CORE
No Implemented All Universal Safety Interventions:  [place] to call system. Call bell, personal items and telephone within reach. Instruct patient to call for assistance. Room bathroom lighting operational. Non-slip footwear when patient is off stretcher. Physically safe environment: no spills, clutter or unnecessary equipment. Stretcher in lowest position, wheels locked, appropriate side rails in place.

## 2019-09-13 NOTE — BEHAVIORAL HEALTH ASSESSMENT NOTE - NSBHCHARTREVIEWLAB_PSY_A_CORE FT
09-13    143  |  111<H>  |  9   ----------------------------<  79  3.7   |  30  |  0.89    Ca    8.4      13 Sep 2019 06:54  Phos  3.0     09-13  Mg     2.1     09-13    TPro  6.9  /  Alb  2.9<L>  /  TBili  0.5  /  DBili  0.1  /  AST  11  /  ALT  11  /  AlkPhos  79  09-13    Complete Blood Count + Automated Diff (09.13.19 @ 06:54)    WBC Count: 4.68 K/uL    RBC Count: 3.31 M/uL    Hemoglobin: 10.0 g/dL    Hematocrit: 31.0 %    Mean Cell Volume: 93.7 fl    Mean Cell Hemoglobin: 30.2 pg    Mean Cell Hemoglobin Conc: 32.3 gm/dL    Red Cell Distrib Width: 14.4 %    Platelet Count - Automated: 169 K/uL    Auto Neutrophil #: 2.59 K/uL    Auto Lymphocyte #: 1.31 K/uL    Auto Monocyte #: 0.63 K/uL    Auto Eosinophil #: 0.11 K/uL    Auto Basophil #: 0.03 K/uL    Auto Neutrophil %: 55.3: Differential percentages must be correlated with absolute numbers for  clinical significance. %    Auto Lymphocyte %: 28.0 %    Auto Monocyte %: 13.5 %    Auto Eosinophil %: 2.4 %    Auto Basophil %: 0.6 %    Auto Immature Granulocyte %: 0.2 %    Nucleated RBC: 0 /100 WBCs

## 2019-09-13 NOTE — BEHAVIORAL HEALTH ASSESSMENT NOTE - NSBHCHARTREVIEWVS_PSY_A_CORE FT
Vital Signs Last 24 Hrs  T(C): 36.4 (13 Sep 2019 14:27), Max: 36.7 (12 Sep 2019 19:37)  T(F): 97.6 (13 Sep 2019 14:27), Max: 98 (12 Sep 2019 19:37)  HR: 48 (13 Sep 2019 14:27) (48 - 71)  BP: 189/75 (13 Sep 2019 14:27) (144/61 - 189/75)  BP(mean): --  RR: 16 (13 Sep 2019 14:27) (16 - 18)  SpO2: 100% (13 Sep 2019 14:27) (100% - 100%)

## 2019-09-13 NOTE — CONSULT NOTE ADULT - SUBJECTIVE AND OBJECTIVE BOX
[  ] STAT REQUEST              [ X ] ROUTINE REQUEST    Patient is a 69 year old male with anemia and colonic mass. GI consulted to evaluate.        HPI:  69 year old male presented with newly diagnosed colonic mass and anemia. Patient c/o constipation and intermittent hematochezia, with bowel movement. Patient also c/o weight loss other wise denies abdominal pain, nausea, vomiting, hematemesis, melena, fever, chills, chest pain, SOB, cough, hematuria, dysuria or diarrhea.  Patient had colonoscopy last week with poor prep found him to have multiple large polyps and hepatic flexure mass.      PAIN MANAGEMENT:  Pain Scale:                0 /10  Pain Location:      Prior Colonoscopy:  One week ago    PAST MEDICAL HISTORY  Dementia  LE cellulitis            PAST SURGICAL HISTORY  No significant past surgical history         Allergies    penicillin (Other)          MEDICATIONS  (STANDING):  aspirin enteric coated 81 milliGRAM(s) Oral daily  aztreonam  IVPB 1000 milliGRAM(s) IV Intermittent every 8 hours  enoxaparin Injectable 40 milliGRAM(s) SubCutaneous daily  influenza   Vaccine 0.5 milliLiter(s) IntraMuscular once  vancomycin  IVPB 1000 milliGRAM(s) IV Intermittent every 12 hours           SOCIAL HISTORY  Advanced Directives:       [ X ] Full Code       [  ] DNR  Marital Status:         [  ] M      [ X ] S      [  ] D       [  ] W  Children:       [  ] Yes      [ X ] No  Occupation:        [  ] Employed       [ X ] Unemployed       [  ] Retired  Diet:       [ X ] Regular       [  ] PEG feeding          [  ] NG tube feeding  Drug Use:           [ X ] Patient denied          [  ] Yes  Alcohol:           [ X ] No             [  ] Yes (socially)         [  ] Yes (chronic)  Tobacco:           [  ] Yes           [ X ] No    FAMILY HISTORY  [ X ] Heart Disease            [  ] Diabetes             [  ] HTN             [  ] Colon Cancer             [  ] Stomach Cancer              [  ] Pancreatic Cancer        VITAL SIGNS   Vital Signs Last 24 Hrs  T(C): 36.4 (13 Sep 2019 15:59), Max: 36.7 (12 Sep 2019 19:37)  T(F): 97.6 (13 Sep 2019 15:59), Max: 98 (12 Sep 2019 19:37)  HR: 80 (13 Sep 2019 15:59) (48 - 80)  BP: 184/71 (13 Sep 2019 15:59) (144/61 - 189/75)   RR: 17 (13 Sep 2019 15:59) (16 - 18)  SpO2: 98% (13 Sep 2019 15:59) (98% - 100%)         CBC Full  -  ( 13 Sep 2019 06:54 )  WBC Count : 4.68 K/uL  RBC Count : 3.31 M/uL  Hemoglobin : 10.0 g/dL  Hematocrit : 31.0 %  Platelet Count - Automated : 169 K/uL  Mean Cell Volume : 93.7 fl  Mean Cell Hemoglobin : 30.2 pg  Mean Cell Hemoglobin Concentration : 32.3 gm/dL  Auto Neutrophil # : 2.59 K/uL  Auto Lymphocyte # : 1.31 K/uL  Auto Monocyte # : 0.63 K/uL  Auto Eosinophil # : 0.11 K/uL  Auto Basophil # : 0.03 K/uL  Auto Neutrophil % : 55.3 %  Auto Lymphocyte % : 28.0 %  Auto Monocyte % : 13.5 %  Auto Eosinophil % : 2.4 %  Auto Basophil % : 0.6 %      09-13    143  |  111<H>  |  9   ----------------------------<  79  3.7   |  30  |  0.89    Ca    8.4      13 Sep 2019 06:54  Phos  3.0     09-13  Mg     2.1     09-13    TPro  6.9  /  Alb  2.9<L>  /  TBili  0.5  /  DBili  0.1  /  AST  11  /  ALT  11  /  AlkPhos  79  09-13     PT/INR - ( 13 Sep 2019 06:54 )   PT: 15.5 sec;   INR: 1.38 ratio     PTT - ( 12 Sep 2019 11:59 )  PTT:34.9 sec    ECG  Ventricular Rate 58 BPM    Atrial Rate 58 BPM    P-R Interval 148 ms    QRS Duration 136 ms    Q-T Interval 432 ms    QTC Calculation(Bezet) 424 ms    P Axis 58 degrees    R Axis 69 degrees    T Axis 59 degrees    Diagnosis Line Sinus bradycardia  Possible Left atrial enlargement  Right bundle branch block  Abnormal ECG      RADIOLOGY/IMAGING                  EXAM:  CT ABDOMEN AND PELVIS OC                            PROCEDURE DATE:  09/13/2019          INTERPRETATION:  CLINICAL HISTORY: Right colon mass. History dementia.   Presenting to the hospital with a left foot ulcer. Colonic mass workup.    TECHNIQUE: CT abdomen and pelvis without IV contrast.  Transaxial images were acquired from the lung bases through to the pubic   symphysis the administration of intravenous contrast. Oral contrast was   administered.    COMPARISON: There is no prior study for comparison.    FINDINGS:  There is suboptimal evaluation for malignancy in the absence of IV   contrast. Limited assessment of the solid organs and vascular structures   without IV contrast. Patient positioning with arms at the side of each   streak artifact an there is motion artifact as well which degrades image   quality.    There is a 2 mm calcified granuloma in the right lung base. The lung   bases are otherwise clear. The heart is mildly enlarged. There is no   pericardial effusion.    The small and large bowel are normal in caliber without evidence of   obstruction. Oral contrast has not yet reached the large bowel which   limits assessment for a mass. No gross masslike wall thickening is seen   within the colon. There is no pericolonic inflammatory change. There is   moderate fecal burden. The appendix is not discretely visualize, however,   there are no secondary signs of acute appendicitis. There is no free air,   abdominal collection or ascites.    The unenhanced appearance of the liver, spleen, pancreas and adrenal   glands is unremarkable. There is no hydronephrosis or significant   perinephric stranding. There are small bilateral renal cysts.    The abdominal aorta is normal in caliber. There is no retroperitoneal,   pelvic or upper abdominal adenopathy. There are a few mildly prominent   nonspecific inguinal lymph nodes, measuring up to 3.2 cm on the left and   2.6 cm on the right.    There is circumferential bladder wall thickening which appears out of   proportion to the degree of underdistention. The prostate gland is   enlarged measuring up to 6.2 cm in the transaxial dimension    There are no acute osseous abnormalities. There are degenerative changes   in the lumbar spine. No suspicious osteoblastic or lytic lesion is   identified. Minimal generalized anasarca is suggested.    IMPRESSION:   Limited study for the assessment of a colonic mass given the absence of   IV contrast and oral contrast within the large bowel. Correlation with   colonoscopy is recommended.    Circumferential bladder wall thickening. Please correlate clinically with   urinalysis and urine culture as cystitis can be considered.    Nonspecific bilateral inguinal lymph nodes.

## 2019-09-13 NOTE — ADVANCED PRACTICE NURSE CONSULT - ASSESSMENT
This is a 69yr old male patient admitted for Chronic Leg Ulcer, presenting with Bilateral Lower Extremity ulcerations to which the patient is being followed by Podiatry with a treatment plan in place to address this issue. There is currently no further need for wound care specialist consultation at this time.

## 2019-09-13 NOTE — BEHAVIORAL HEALTH ASSESSMENT NOTE - SUMMARY
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 Quiroz after recent appt for w/u of colon mass. Psych consult was called for capacity assessment, given h/o dementia. On exam, pt is pleasant but clearly very confused with impaired naming, attention and recall, c/w moderate to severe dementia. Given the severity of his cognitive impairment, pt does NOT appear to have capacity to consent to surgery or to participate in DC planning, and he is likely unable to care for himself safely at home without a much higher level of services. However, pt does not manifest s/s of primary psychiatric disorder, does not appear to present an acute risk of harm to self or others at the time of assessment, and does not appear to be in need of admission to IP psych at the time of assessment.

## 2019-09-13 NOTE — PROGRESS NOTE ADULT - SUBJECTIVE AND OBJECTIVE BOX
Patient was seen and examined  Patient is a 69y old  Male who presents with a chief complaint of cellulitis and right colon mass (12 Sep 2019 18:59)      INTERVAL HPI/OVERNIGHT EVENTS:  T(C): 36.4 (09-13-19 @ 05:24), Max: 36.7 (09-12-19 @ 19:37)  HR: 71 (09-13-19 @ 05:24) (49 - 71)  BP: 144/61 (09-13-19 @ 05:24) (131/86 - 177/73)  RR: 18 (09-13-19 @ 05:24) (18 - 20)  SpO2: 100% (09-13-19 @ 05:24) (100% - 100%)  Wt(kg): --  I&O's Summary      LABS:                        10.0   4.68  )-----------( 169      ( 13 Sep 2019 06:54 )             31.0     09-13    143  |  111<H>  |  9   ----------------------------<  79  3.7   |  30  |  0.89    Ca    8.4      13 Sep 2019 06:54  Phos  3.0     09-13  Mg     2.1     09-13    TPro  6.9  /  Alb  2.9<L>  /  TBili  0.5  /  DBili  0.1  /  AST  11  /  ALT  11  /  AlkPhos  79  09-13    PT/INR - ( 13 Sep 2019 06:54 )   PT: 15.5 sec;   INR: 1.38 ratio         PTT - ( 12 Sep 2019 11:59 )  PTT:34.9 sec    CAPILLARY BLOOD GLUCOSE        LIPID PANEL  Cholesterol 117  LDL 60  HDL 50  RATIO HDL/Total Cholesterol --  Triglyceride 36            MEDICATIONS  (STANDING):  aspirin enteric coated 81 milliGRAM(s) Oral daily  aztreonam  IVPB 1000 milliGRAM(s) IV Intermittent every 8 hours  enoxaparin Injectable 40 milliGRAM(s) SubCutaneous daily  influenza   Vaccine 0.5 milliLiter(s) IntraMuscular once  vancomycin  IVPB 1000 milliGRAM(s) IV Intermittent every 12 hours    MEDICATIONS  (PRN):      RADIOLOGY & ADDITIONAL TESTS:    Imaging Personally Reviewed:  [ ] YES  [ ] NO    REVIEW OF SYSTEMS:  CONSTITUTIONAL: No fever, weight loss, or fatigue  EYES: No eye pain, visual disturbances, or discharge  ENMT:  No difficulty hearing, tinnitus, vertigo; No sinus or throat pain  NECK: No pain or stiffness  BREASTS: No pain, masses, or nipple discharge  RESPIRATORY: No cough, wheezing, chills or hemoptysis; No shortness of breath  CARDIOVASCULAR: No chest pain, palpitations, dizziness, or leg swelling  GASTROINTESTINAL: No abdominal or epigastric pain. No nausea, vomiting, or hematemesis; No diarrhea or constipation. No melena or hematochezia.  GENITOURINARY: No dysuria, frequency, hematuria, or incontinence  NEUROLOGICAL: No headaches, memory loss, loss of strength, numbness, or tremors  SKIN: No itching, burning, rashes, or lesions   LYMPH NODES: No enlarged glands  ENDOCRINE: No heat or cold intolerance; No hair loss  MUSCULOSKELETAL: No joint pain or swelling; No muscle, back, or extremity pain  PSYCHIATRIC: No depression, anxiety, mood swings, or difficulty sleeping  HEME/LYMPH: No easy bruising, or bleeding gums  ALLERY AND IMMUNOLOGIC: No hives or eczema      Consultant(s) Notes Reviewed:  [ x ] YES  [ ] NO    PHYSICAL EXAM:  GENERAL: NAD, well-groomed, well-developed  HEAD:  Atraumatic, Normocephalic  EYES: EOMI, PERRLA, conjunctiva and sclera clear  ENMT: No tonsillar erythema, exudates, or enlargement; Moist mucous membranes, Good dentition, No lesions  NECK: Supple, No JVD, Normal thyroid  NERVOUS SYSTEM:  Alert & Oriented X3, Good concentration; Motor Strength 5/5 B/L upper and lower extremities; DTRs 2+ intact and symmetric  CHEST/LUNG: Clear to percussion bilaterally; No rales, rhonchi, wheezing, or rubs  HEART: Regular rate and rhythm; No murmurs, rubs, or gallops  ABDOMEN: Soft, Nontender, Nondistended; Bowel sounds present  EXTREMITIES:  2+ Peripheral Pulses, No clubbing, cyanosis, or edema  LYMPH: No lymphadenopathy noted  SKIN: No rashes or lesions    Care Discussed with Consultants/Other Providers [ x] YES  [ ] NO

## 2019-09-13 NOTE — CHART NOTE - NSCHARTNOTEFT_GEN_A_CORE
Pt went to CT and had to give him ativan prior to CT due to agitation  Once pt came back from CT V/S checked and found to have elevated BP and bradycardia   so EKG was done and showed marked sinus deloris cardia, 38 bpm pt is not in distress, lowest BP is 170/67mmHg  Spoke with Dr. Cheng - hydralazine 10mg IVP stat and Q 6hours and asked transfer to tele.   Dr Quiroz aware

## 2019-09-13 NOTE — CONSULT NOTE ADULT - SUBJECTIVE AND OBJECTIVE BOX
HPI:  69 Years old male from home with dementia was sent in by dr Richie srivastava for right sided colonic mass which was recently diagnosed. Patient also have cellulitis which was treated with bactrim as outpatient but have not improved.  History source is dr Richie srivastava. Patient does not gave any history but only points to lower extremity ulcers on left lower extremity. History very limited due to dementia.  Pt denies any chest pain, shortness of breath, fever, abdominal pain, change in urinary or bowel habits.  Patient is being admitted to workup for right colon mass and possible right sided colectomy. Plan is to involve GI doctor, surgery and cardiology for pre-op surgical clearance. We will need ID consult and podiatry consult for lower extremity ulcers. (12 Sep 2019 18:59)    PAST MEDICAL & SURGICAL HISTORY:  Dementia  No pertinent past medical history  No significant past surgical history    Review of Systems: Unable to obtain     MEDICATIONS  (STANDING):  aspirin enteric coated 81 milliGRAM(s) Oral daily  aztreonam  IVPB 1000 milliGRAM(s) IV Intermittent every 8 hours  enoxaparin Injectable 40 milliGRAM(s) SubCutaneous daily  influenza   Vaccine 0.5 milliLiter(s) IntraMuscular once  vancomycin  IVPB 1000 milliGRAM(s) IV Intermittent every 12 hours    Allergies: penicillin (Other)    FAMILY HISTORY:  No pertinent family history in first degree relatives    Vital Signs Last 24 Hrs  T(C): 36.4 (13 Sep 2019 14:27), Max: 36.7 (12 Sep 2019 19:37)  T(F): 97.6 (13 Sep 2019 14:27), Max: 98 (12 Sep 2019 19:37)  HR: 48 (13 Sep 2019 14:27) (48 - 71)  BP: 189/75 (13 Sep 2019 14:27) (144/61 - 189/75)  RR: 16 (13 Sep 2019 14:27) (16 - 18)  SpO2: 100% (13 Sep 2019 14:27) (100% - 100%)    Physical Exam:        LABS:                        10.0   4.68  )-----------( 169      ( 13 Sep 2019 06:54 )             31.0     09-13    143  |  111<H>  |  9   ----------------------------<  79  3.7   |  30  |  0.89    Ca    8.4      13 Sep 2019 06:54  Phos  3.0     09-13  Mg     2.1     09-13    TPro  6.9  /  Alb  2.9<L>  /  TBili  0.5  /  DBili  0.1  /  AST  11  /  ALT  11  /  AlkPhos  79  09-13    PT/INR - ( 13 Sep 2019 06:54 )   PT: 15.5 sec;   INR: 1.38 ratio       PTT - ( 12 Sep 2019 11:59 )  PTT:34.9 sec    Surgical Pathology Report (09.09.19 @ 16:45)    Surgical Pathology Report:   ACCESSION No:  [id number]    LORETA ATKINSON        Surgical Final Report          Final Diagnosis    1.  Proximal transverse colon polyp; biopsy:  - Fragments of tubular adenoma    2 and 3.  Ascending colon polyp and hepatic flexure mass; biopsy:  - Fragments of tubulovillous adenoma.a    Verified by: Renita Hernandez MD  (Electronic Signature)  Reported on: 09/11/19 14:43 EDT, [address] [place] [id number]  _________________________________________________________________    Clinical History  r/o neoplasm  Carcinoma  Colon/bx    Specimen(s) Submitted  1-Proximal transverse colon polyp  2-Ascending colon polyp  3-Hepatic flexure mass    Gross Description  1.   The specimen is received in formalin and the specimen  container is labeled: Proximal transverse colon polyp.  It  consists of two fragments of tan-gray soft tissue ranging from  0.3 cm to 0.6 cm in greatest dimension.  Entirely submitted.  One  cassette.    2.   The specimen is received in formalin and the specimen  container is labeled: Ascending colon polyp.  It consists of  three fragments of tan-gray soft tissue ranging from 0.3 cm to  0.5 cm in greatest dimension.  Entirely submitted.  One cassette.    3.   The specimen is received in formalin and the specimen  container is labeled: Hepatic flexure mass.  It consists of seven  fragments of tan-gray soft tissue ranging from 0.2 cm to 0.3 cm  in greatest dimension.  Entirely submitted.  One cassette.    In addition to other data that may appear on the specimen  containers, all labels have been inspected to confirm the  presence of the patient's name and date of birth.  Alo Byrd 09/11/2019 07:23    RADIOLOGY & ADDITIONAL STUDIES:  CT abdomen and pelvis pending HPI:  69 Years old male from home with dementia was sent in by dr Richie srivastava for right sided colonic mass which was recently diagnosed. Patient also have cellulitis which was treated with bactrim as outpatient but have not improved.  History source is dr Richie srivastava. Patient does not gave any history but only points to lower extremity ulcers on left lower extremity. History very limited due to dementia.  Pt denies any chest pain, shortness of breath, fever, abdominal pain, change in urinary or bowel habits.  Patient is being admitted to workup for right colon mass and possible right sided colectomy. Plan is to involve GI doctor, surgery and cardiology for pre-op surgical clearance. We will need ID consult and podiatry consult for lower extremity ulcers. (12 Sep 2019 18:59)    Patient seen and examined at bedside for surgical consult. At time of exam patient is s/p 2mg Ativan for agitation and is unable to provide any history or answer questions. Per nursing team at bedside patient is combative so exam is limited.     PAST MEDICAL & SURGICAL HISTORY:  Dementia  No pertinent past medical history  No significant past surgical history    Review of Systems: Unable to obtain     MEDICATIONS  (STANDING):  aspirin enteric coated 81 milliGRAM(s) Oral daily  aztreonam  IVPB 1000 milliGRAM(s) IV Intermittent every 8 hours  enoxaparin Injectable 40 milliGRAM(s) SubCutaneous daily  influenza   Vaccine 0.5 milliLiter(s) IntraMuscular once  vancomycin  IVPB 1000 milliGRAM(s) IV Intermittent every 12 hours    Allergies: penicillin (Other)    FAMILY HISTORY:  No pertinent family history in first degree relatives    Vital Signs Last 24 Hrs  T(C): 36.4 (13 Sep 2019 14:27), Max: 36.7 (12 Sep 2019 19:37)  T(F): 97.6 (13 Sep 2019 14:27), Max: 98 (12 Sep 2019 19:37)  HR: 48 (13 Sep 2019 14:27) (48 - 71)  BP: 189/75 (13 Sep 2019 14:27) (144/61 - 189/75)  RR: 16 (13 Sep 2019 14:27) (16 - 18)  SpO2: 100% (13 Sep 2019 14:27) (100% - 100%)    Physical Exam:    GEN: NAD  HEENT: NC/AT  Chest: respirations nonlabored, good inspiratory effort  Abdomen: soft, NT/ND  Extremities: ACE wrap in place on LLE  Neuro: Alert and oriented x1    LABS:                        10.0   4.68  )-----------( 169      ( 13 Sep 2019 06:54 )             31.0     09-13    143  |  111<H>  |  9   ----------------------------<  79  3.7   |  30  |  0.89    Ca    8.4      13 Sep 2019 06:54  Phos  3.0     09-13  Mg     2.1     09-13    TPro  6.9  /  Alb  2.9<L>  /  TBili  0.5  /  DBili  0.1  /  AST  11  /  ALT  11  /  AlkPhos  79  09-13    PT/INR - ( 13 Sep 2019 06:54 )   PT: 15.5 sec;   INR: 1.38 ratio       PTT - ( 12 Sep 2019 11:59 )  PTT:34.9 sec    Surgical Pathology Report (09.09.19 @ 16:45)    Surgical Pathology Report:   ACCESSION No:  [id number]    LORETA ATKINSON                 1    Surgical Final Report    Final Diagnosis    1.  Proximal transverse colon polyp; biopsy:  - Fragments of tubular adenoma    2 and 3.  Ascending colon polyp and hepatic flexure mass; biopsy:  - Fragments of tubulovillous adenoma.a    Verified by: Renita Hernandez MD  (Electronic Signature)  Reported on: 09/11/19 14:43 EDT, [address] [place] [id number]  _________________________________________________________________    Clinical History  r/o neoplasm  Carcinoma  Colon/bx    Specimen(s) Submitted  1-Proximal transverse colon polyp  2-Ascending colon polyp  3-Hepatic flexure mass    Gross Description  1.   The specimen is received in formalin and the specimen  container is labeled: Proximal transverse colon polyp.  It  consists of two fragments of tan-gray soft tissue ranging from  0.3 cm to 0.6 cm in greatest dimension.  Entirely submitted.  One  cassette.    2.   The specimen is received in formalin and the specimen  container is labeled: Ascending colon polyp.  It consists of  three fragments of tan-gray soft tissue ranging from 0.3 cm to  0.5 cm in greatest dimension.  Entirely submitted.  One cassette.    3.   The specimen is received in formalin and the specimen  container is labeled: Hepatic flexure mass.  It consists of seven  fragments of tan-gray soft tissue ranging from 0.2 cm to 0.3 cm  in greatest dimension.  Entirely submitted.  One cassette.    In addition to other data that may appear on the specimen  containers, all labels have been inspected to confirm the  presence of the patient's name and date of birth.  Alo Byrd 09/11/2019 07:23    RADIOLOGY & ADDITIONAL STUDIES:  < from: CT Abdomen and Pelvis w/ Oral Cont (09.13.19 @ 15:12) >  EXAM:  CT ABDOMEN AND PELVIS OC                            PROCEDURE DATE:  09/13/2019          INTERPRETATION:  CLINICAL HISTORY: Right colon mass. History dementia.   Presenting to the hospital with a left foot ulcer. Colonic mass workup.    TECHNIQUE: CT abdomen and pelvis without IV contrast.  Transaxial images were acquired from the lung bases through to the pubic   symphysis the administration of intravenous contrast. Oral contrast was   administered.    COMPARISON: There is no prior study for comparison.    FINDINGS:  There is suboptimal evaluation for malignancy in the absence of IV   contrast. Limited assessment of the solid organs and vascular structures   without IV contrast. Patient positioning with arms at the side of each   streak artifact an there is motion artifact as well which degrades image   quality.    There is a 2 mm calcified granuloma in the right lung base. The lung   bases are otherwise clear. The heart is mildly enlarged. There is no   pericardial effusion.    The small and large bowel are normal in caliber without evidence of   obstruction. Oral contrast has not yet reached the large bowel which   limits assessment for a mass. No gross masslike wall thickening is seen   within the colon. There is no pericolonic inflammatory change. There is   moderate fecal burden. The appendix is not discretely visualize, however,   there are no secondary signs of acute appendicitis. There is no free air,   abdominal collection or ascites.    The unenhanced appearance of the liver, spleen, pancreas and adrenal   glands is unremarkable. There is no hydronephrosis or significant   perinephric stranding. There are small bilateral renal cysts.    The abdominal aorta is normal in caliber. There is no retroperitoneal,   pelvic or upper abdominal adenopathy. There are a few mildly prominent   nonspecific inguinal lymph nodes, measuring up to 3.2 cm on the left and   2.6 cm on the right.    There is circumferential bladder wall thickening which appears out of   proportion to the degree of underdistention. The prostate gland is   enlarged measuring up to 6.2 cm in the transaxial dimension    There are no acute osseous abnormalities. There are degenerative changes   in the lumbar spine. No suspicious osteoblastic or lytic lesion is   identified. Minimal generalized anasarca is suggested.    IMPRESSION:   Limited study for the assessment of a colonic mass given the absence of   IV contrast and oral contrast within the large bowel. Correlation with   colonoscopy is recommended.    Circumferential bladder wall thickening. Please correlate clinically with   urinalysis and urine culture as cystitis can be considered.    Nonspecific bilateral inguinal lymph nodes.    RAJ QUINTERO M.D., RADIOLOGIST  This document has been electronically signed. Sep 13 2019  3:35PM    < end of copied text >

## 2019-09-13 NOTE — PROGRESS NOTE ADULT - SUBJECTIVE AND OBJECTIVE BOX
NP Note discussed with  Primary Attending    Patient is a 69y old  Male who presents with a chief complaint of cellulitis and right colon mass (13 Sep 2019 12:06)    HPI : 69 Years old male from home with dementia was sent in by dr Richie srivastava for right sided colonic mass which was recently diagnosed. Patient also have cellulitis which was treated with bactrim as outpatient but have not improved.  History source is dr Richie srivastava. Patient does not gave any history but only points to lower extremity ulcers on left lower extremity. History very limited due to dementia.  Pt denies any chest pain, shortness of breath, fever, abdominal pain, change in urinary or bowel habits.  Patient is being admitted to workup for right colon mass and possible right sided colectomy. Plan is to involve GI doctor, surgery and cardiology for pre-op surgical clearance. We will need ID consult and podiatry consult for lower extremity ulcers.  Admitted to medicine for LLE cellulitis and rt sided colon mass w/u.  Pt seen this morning, Alert and oriented x 1 himself, confused. CT scan of oral and IV contrast ordered but then there is no source available about contrast use in the past, pt went to CT A/P with oral contrast today, refused, ATivan 2mg was given prior to study. Pt was seen by psych, no capacity to make health care decisions, pt has 3 children in [place], he couldn't provide number, his friend was called and he provided 2 contacts for brother, I called mr. Willis and left ms. He called me back and updated pt is here, he met pt last Friday, he knows that pt had colonoscopy but does not no the result, he also knows LLE cellulitis. He will come here before 6pm and will discuss further     INTERVAL HPI/OVERNIGHT EVENTS: no new complaints    MEDICATIONS  (STANDING):  aspirin enteric coated 81 milliGRAM(s) Oral daily  aztreonam  IVPB 1000 milliGRAM(s) IV Intermittent every 8 hours  enoxaparin Injectable 40 milliGRAM(s) SubCutaneous daily  influenza   Vaccine 0.5 milliLiter(s) IntraMuscular once  vancomycin  IVPB 1000 milliGRAM(s) IV Intermittent every 12 hours    MEDICATIONS  (PRN):      __________________________________________________  REVIEW OF SYSTEMS:    unable to obtain due to dementia       Vital Signs Last 24 Hrs  T(C): 36.4 (13 Sep 2019 14:27), Max: 36.7 (12 Sep 2019 19:37)  T(F): 97.6 (13 Sep 2019 14:27), Max: 98 (12 Sep 2019 19:37)  HR: 48 (13 Sep 2019 14:27) (48 - 71)  BP: 189/75 (13 Sep 2019 14:27) (144/61 - 189/75)  BP(mean): 88 (12 Sep 2019 15:00) (88 - 88)  RR: 16 (13 Sep 2019 14:27) (16 - 18)  SpO2: 100% (13 Sep 2019 14:27) (100% - 100%)    ________________________________________________  PHYSICAL EXAM:  GENERAL: NAD  HEENT: Normocephalic;  conjunctivae and sclerae clear; moist mucous membranes;   NECK : supple  CHEST/LUNG: Clear to auscultation bilaterally with good air entry   HEART: S1 S2  regular; no murmurs, gallops or rubs  ABDOMEN: Soft, Nontender, Nondistended; Bowel sounds present  EXTREMITIES: no cyanosis; no edema; no calf tenderness  SKIN: LLE - ace bandage applied, refused to unwrap.   NERVOUS SYSTEM:  Awake and alert; Oriented  to person     _________________________________________________  LABS:                        10.0   4.68  )-----------( 169      ( 13 Sep 2019 06:54 )             31.0     09-13    143  |  111<H>  |  9   ----------------------------<  79  3.7   |  30  |  0.89    Ca    8.4      13 Sep 2019 06:54  Phos  3.0     09-13  Mg     2.1     09-13    TPro  6.9  /  Alb  2.9<L>  /  TBili  0.5  /  DBili  0.1  /  AST  11  /  ALT  11  /  AlkPhos  79  09-13    PT/INR - ( 13 Sep 2019 06:54 )   PT: 15.5 sec;   INR: 1.38 ratio         PTT - ( 12 Sep 2019 11:59 )  PTT:34.9 sec    CAPILLARY BLOOD GLUCOSE        ACCESSION No:  [id number]    LORETA ATKINSON        Surgical Final Report          Final Diagnosis    1.  Proximal transverse colon polyp; biopsy:  - Fragments of tubular adenoma    2 and 3.  Ascending colon polyp and hepatic flexure mass; biopsy:  - Fragments of tubulovillous adenoma.a    Verified by: Renita Hernandez MD  (Electronic Signature)  Reported on: 09/11/19 14:43 EDT, [address] [place] [id number]  _________________________________________________________________    Clinical History  r/o neoplasm  Carcinoma  Colon/bx    Specimen(s) Submitted  1-Proximal transverse colon polyp  2-Ascending colon polyp  3-Hepatic flexure mass    Gross Description  1.   The specimen is received in formalin and the specimen  container is labeled: Proximal transverse colon polyp.  It  consists of two fragments of tan-gray soft tissue ranging from  0.3 cm to 0.6 cm in greatest dimension.  Entirely submitted.  One  cassette.    2.   The specimen is received in formalin and the specimen  container is labeled: Ascending colon polyp.  It consists of  three fragments of tan-gray soft tissue ranging from 0.3 cm to  0.5 cm in greatest dimension.  Entirely submitted.  One cassette.    3.   The specimen is received in formalin and the specimen  container is labeled: Hepatic flexure mass.  It consists of seven  fragments of tan-gray soft tissue ranging from 0.2 cm to 0.3 cm  in greatest dimension.  Entirely submitted.  One cassette.    In addition to other data that may appear on the specimen  containers, all labels have been inspected to confirm the  presence of the patient's name and date of birth.  Alo Byrd 09/11/2019 07:23      RADIOLOGY & ADDITIONAL TESTS: CT is in progress now       Imaging Personally Reviewed:  YES/NO    Consultant(s) Notes Reviewed:   YES/ No    Care Discussed with Consultants :     Plan of care was discussed with patient and /or primary care giver; all questions and concerns were addressed and care was aligned with patient's wishes.

## 2019-09-13 NOTE — BEHAVIORAL HEALTH ASSESSMENT NOTE - NSBHCONSULTRECOMMENDOTHER_PSY_A_CORE FT
1. Recommend enhanced observation for safety i/s/o dementia  2. No indication for standing or PRN psychotropic medications at this time  3. Pt does NOT have capacity to consent to surgery or to participate in DC planning  --per SW, pt has 2 nephews in the area who should be asked to consent on his behalf  4. Pt cannot be DC'd home safely without higher level of services, or alternatively may need placement in LTC setting  --HCP will have to consent to placement, as pt does not have capacity due to dementia  5. Psychiatry is signing off. Reconsult if additional issues arise as inpatient  6. Case d/w Santi Mahmood NP of primary team    Asha Correa MD  Director, Consultation-Liaison Psychiatry Service  [phone]

## 2019-09-13 NOTE — BEHAVIORAL HEALTH ASSESSMENT NOTE - HPI (INCLUDE ILLNESS QUALITY, SEVERITY, DURATION, TIMING, CONTEXT, MODIFYING FACTORS, ASSOCIATED SIGNS AND SYMPTOMS)
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 Quiroz after recent appt for w/u of colon mass. Psych consult was called for capacity assessment, given h/o dementia. On exam, pt is pleasant but very confused. When asked why he is in the hospital, pt says, "I thought I had a lot of ants on my head," explaining that he had severe itching of his scalp, but was told he does not in fact have any ants. Pt is unable to describe his cellulitis or colon mass, even after prompting by MD. Pt is oriented to year and month, but not date or day of week (he mumbles in a confused fashion when asked for these 2 items). Pt is also unable to draw a correct clock, recognizes only 2 of 3 animals, and registers only 2/5 words (unable to recall any). Pt confirms that he is having increasing difficulties with his memory, but denies SI/HI/AVH. Pt relates that he is from [place] [facility] but came [place] the [place] around [place] without his family (his wife and 2 children still live in [place]). 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 Pt reports that he does all his own cooking, cleaning and personal care; when MD asks about APS involvement, pt denies receiving any services, saying, "Those people keep calling and saying I'm denied" [per staff reports, pt was receiving home services including meals on wheels, but stopped them several weeks ago]. Pt denies having any problems at home and states that he feels able to manage on his own.

## 2019-09-14 LAB — VANCOMYCIN TROUGH SERPL-MCNC: 10.7 UG/ML — SIGNIFICANT CHANGE UP (ref 10–20)

## 2019-09-14 PROCEDURE — 93010 ELECTROCARDIOGRAM REPORT: CPT

## 2019-09-14 RX ORDER — HYDRALAZINE HCL 50 MG
10 TABLET ORAL EVERY 6 HOURS
Refills: 0 | Status: DISCONTINUED | OUTPATIENT
Start: 2019-09-14 | End: 2019-09-18

## 2019-09-14 RX ADMIN — ENOXAPARIN SODIUM 40 MILLIGRAM(S): 100 INJECTION SUBCUTANEOUS at 11:08

## 2019-09-14 RX ADMIN — Medication 10 MILLIGRAM(S): at 17:11

## 2019-09-14 RX ADMIN — Medication 50 MILLIGRAM(S): at 13:00

## 2019-09-14 RX ADMIN — Medication 250 MILLIGRAM(S): at 06:35

## 2019-09-14 RX ADMIN — Medication 10 MILLIGRAM(S): at 05:36

## 2019-09-14 RX ADMIN — Medication 81 MILLIGRAM(S): at 11:08

## 2019-09-14 RX ADMIN — Medication 50 MILLIGRAM(S): at 05:36

## 2019-09-14 RX ADMIN — Medication 50 MILLIGRAM(S): at 21:34

## 2019-09-14 RX ADMIN — Medication 10 MILLIGRAM(S): at 11:09

## 2019-09-14 RX ADMIN — Medication 250 MILLIGRAM(S): at 18:03

## 2019-09-14 NOTE — PROGRESS NOTE ADULT - ASSESSMENT
69 Years old male from home with dementia was sent in by dr Quiroz office for right sided colonic mass which was recently diagnosed and cellulitis.  1.Asymptomatic Bradycardia-tele monitoring.  2.Surgical eval noted.  3.GI eval noted.  4.HTN-IV hydralazine.  5.Cellulitis-ABX.  6.Stress test prior to OR clearance.  7.GI and DVT prophylaxis.

## 2019-09-14 NOTE — PROGRESS NOTE ADULT - SUBJECTIVE AND OBJECTIVE BOX
CHIEF COMPLAINT:Patient is a 69y old  Male who presents with a chief complaint of cellulitis and right colon mass .Pt appears comfortable,transfered to [facility] for bradycardia.    	  REVIEW OF SYSTEMS:  CONSTITUTIONAL: No fever, weight loss, or fatigue  EYES: No eye pain, visual disturbances, or discharge  ENT:  No difficulty hearing, tinnitus, vertigo; No sinus or throat pain  NECK: No pain or stiffness  RESPIRATORY: No cough, wheezing, chills or hemoptysis; No Shortness of Breath  CARDIOVASCULAR: No chest pain, palpitations, passing out, dizziness, or leg swelling  GASTROINTESTINAL: No abdominal or epigastric pain. No nausea, vomiting, or hematemesis; No diarrhea or constipation. No melena or hematochezia.  GENITOURINARY: No dysuria, frequency, hematuria, or incontinence  NEUROLOGICAL: No headaches, memory loss, loss of strength, numbness, or tremors  SKIN: No itching, burning, rashes, or lesions   LYMPH Nodes: No enlarged glands  ENDOCRINE: No heat or cold intolerance; No hair loss  MUSCULOSKELETAL: No joint pain or swelling; No muscle, back, or extremity pain  PSYCHIATRIC: No depression, anxiety, mood swings, or difficulty sleeping  HEME/LYMPH: No easy bruising, or bleeding gums  ALLERGY AND IMMUNOLOGIC: No hives or eczema	      PHYSICAL EXAM:  T(C): 36.9 (09-14-19 @ 07:55), Max: 36.9 (09-14-19 @ 07:55)  HR: 69 (09-14-19 @ 07:55) (44 - 80)  BP: 107/61 (09-14-19 @ 07:55) (107/61 - 189/75)  RR: 17 (09-14-19 @ 07:55) (16 - 18)  SpO2: 100% (09-14-19 @ 07:55) (98% - 100%)  Wt(kg): --  I&O's Summary    14 Sep 2019 07:01  -  14 Sep 2019 10:44  --------------------------------------------------------  IN: 118 mL / OUT: 0 mL / NET: 118 mL        Appearance: Normal	  HEENT:   Normal oral mucosa, PERRL, EOMI	  Lymphatic: No lymphadenopathy  Cardiovascular: Normal S1 S2, No JVD, No murmurs, No edema  Respiratory: Lungs clear to auscultation	  Gastrointestinal:  Soft, Non-tender, + BS	  Skin: No rashes, No ecchymoses, No cyanosis	  Neurologic: Non-focal  Extremities: Normal range of motion, No clubbing, cyanosis or edema  Vascular: Peripheral pulses palpable 2+ bilaterally    MEDICATIONS  (STANDING):  aspirin enteric coated 81 milliGRAM(s) Oral daily  aztreonam  IVPB 1000 milliGRAM(s) IV Intermittent every 8 hours  enoxaparin Injectable 40 milliGRAM(s) SubCutaneous daily  hydrALAZINE 10 milliGRAM(s) Oral every 6 hours  influenza   Vaccine 0.5 milliLiter(s) IntraMuscular once  vancomycin  IVPB 1000 milliGRAM(s) IV Intermittent every 12 hours      TELEMETRY: sr 40-50's     	  LABS:	 	                       10.0   4.68  )-----------( 169      ( 13 Sep 2019 06:54 )             31.0     09-13    143  |  111<H>  |  9   ----------------------------<  79  3.7   |  30  |  0.89    Ca    8.4      13 Sep 2019 06:54  Phos  3.0     09-13  Mg     2.1     09-13    TPro  6.9  /  Alb  2.9<L>  /  TBili  0.5  /  DBili  0.1  /  AST  11  /  ALT  11  /  AlkPhos  79  09-13    proBNP: Serum Pro-Brain Natriuretic Peptide: 180 pg/mL (09-12 @ 11:59)    Lipid Profile: Cholesterol 117  LDL 60  HDL 50  TG 36    HgA1c: Hemoglobin A1C, Whole Blood: 5.3 % (09-13 @ 10:32)    TSH: Thyroid Stimulating Hormone, Serum: 1.33 uU/mL (09-13 @ 06:54)      EXAM:  CT ABDOMEN AND PELVIS OC                            PROCEDURE DATE:  09/13/2019          INTERPRETATION:  CLINICAL HISTORY: Right colon mass. History dementia.   Presenting to the hospital with a left foot ulcer. Colonic mass workup.    TECHNIQUE: CT abdomen and pelvis without IV contrast.  Transaxial images were acquired from the lung bases through to the pubic   symphysis the administration of intravenous contrast. Oral contrast was   administered.    COMPARISON: There is no prior study for comparison.    FINDINGS:  There is suboptimal evaluation for malignancy in the absence of IV   contrast. Limited assessment of the solid organs and vascular structures   without IV contrast. Patient positioning with arms at the side of each   streak artifact an there is motion artifact as well which degrades image   quality.    There is a 2 mm calcified granuloma in the right lung base. The lung   bases are otherwise clear. The heart is mildly enlarged. There is no   pericardial effusion.    The small and large bowel are normal in caliber without evidence of   obstruction. Oral contrast has not yet reached the large bowel which   limits assessment for a mass. No gross masslike wall thickening is seen   within the colon. There is no pericolonic inflammatory change. There is   moderate fecal burden. The appendix is not discretely visualize, however,   there are no secondary signs of acute appendicitis. There is no free air,   abdominal collection or ascites.    The unenhanced appearance of the liver, spleen, pancreas and adrenal   glands is unremarkable. There is no hydronephrosis or significant   perinephric stranding. There are small bilateral renal cysts.    The abdominal aorta is normal in caliber. There is no retroperitoneal,   pelvic or upper abdominal adenopathy. There are a few mildly prominent   nonspecific inguinal lymph nodes, measuring up to 3.2 cm on the left and   2.6 cm on the right.    There is circumferential bladder wall thickening which appears out of   proportion to the degree of underdistention. The prostate gland is   enlarged measuring up to 6.2 cm in the transaxial dimension    There are no acute osseous abnormalities. There are degenerative changes   in the lumbar spine. No suspicious osteoblastic or lytic lesion is   identified. Minimal generalized anasarca is suggested.    IMPRESSION:   Limited study for the assessment of a colonic mass given the absence of   IV contrast and oral contrast within the large bowel. Correlation with   colonoscopy is recommended.    Circumferential bladder wall thickening. Please correlate clinically with   urinalysis and urine culture as cystitis can be considered.    Nonspecific bilateral inguinal lymph nodes.        OBSERVATIONS:  Mitral Valve: Normal mitral valve. Trace mitral  regurgitation.  Aortic Root: Normal aortic root.  Aortic Valve: Normal trileaflet aortic valve. No aortic  stenosis. Moderate aortic regurgitation ( msec).  Left Atrium: Normal left atrium.  LA volume index = 32  cc/m2.  Left Ventricle: Normal Left Ventricular Systolic Function,  (EF = 62% by biplane) No regional wall motion  abnormalities. Normal left ventricular internal dimensions  and wall thicknesses. Normal diastolic function.  Right Heart: Normal right atrium. Normal right ventricular  size and systolic function (TAPSE 2.6 cm). Normal tricuspid  valve. Mild tricuspid regurgitation. Pulmonic valve not  well seen. Trace pulmonic insufficiency is noted.  Pericardium/PleuraTrivial pericardial effusion is seen.  Hemodynamic: RA Pressure is 8 mm Hg. RV systolic pressure  is mildly increased at  34 mm Hg.

## 2019-09-14 NOTE — PROGRESS NOTE ADULT - SUBJECTIVE AND OBJECTIVE BOX
Patient was seen and examined  Patient is a 69y old  Male who presents with a chief complaint of cellulitis and right colon mass (13 Sep 2019 16:00)      INTERVAL HPI/OVERNIGHT EVENTS:  T(C): 36.9 (19 @ 07:55), Max: 36.9 (19 @ 07:55)  HR: 69 (19 @ 07:55) (44 - 80)  BP: 107/61 (19 @ 07:55) (107/61 - 189/75)  RR: 17 (19 @ 07:55) (16 - 18)  SpO2: 100% (19 @ 07:55) (98% - 100%)  Wt(kg): --  I&O's Summary      LABS:                        10.0   4.68  )-----------( 169      ( 13 Sep 2019 06:54 )             31.0     -    143  |  111<H>  |  9   ----------------------------<  79  3.7   |  30  |  0.89    Ca    8.4      13 Sep 2019 06:54  Phos  3.0     -  Mg     2.1         TPro  6.9  /  Alb  2.9<L>  /  TBili  0.5  /  DBili  0.1  /  AST  11  /  ALT  11  /  AlkPhos  79  -    PT/INR - ( 13 Sep 2019 06:54 )   PT: 15.5 sec;   INR: 1.38 ratio         PTT - ( 12 Sep 2019 11:59 )  PTT:34.9 sec  Urinalysis Basic - ( 13 Sep 2019 20:17 )    Color: Yellow / Appearance: Clear / S.005 / pH: x  Gluc: x / Ketone: Negative  / Bili: Negative / Urobili: Negative   Blood: x / Protein: Negative / Nitrite: Negative   Leuk Esterase: Negative / RBC: x / WBC x   Sq Epi: x / Non Sq Epi: x / Bacteria: x      CAPILLARY BLOOD GLUCOSE        LIPID PANEL  Cholesterol 117  LDL 60  HDL 50  RATIO HDL/Total Cholesterol --  Triglyceride 36        Urinalysis Basic - ( 13 Sep 2019 20:17 )    Color: Yellow / Appearance: Clear / S.005 / pH: x  Gluc: x / Ketone: Negative  / Bili: Negative / Urobili: Negative   Blood: x / Protein: Negative / Nitrite: Negative   Leuk Esterase: Negative / RBC: x / WBC x   Sq Epi: x / Non Sq Epi: x / Bacteria: x        MEDICATIONS  (STANDING):  aspirin enteric coated 81 milliGRAM(s) Oral daily  aztreonam  IVPB 1000 milliGRAM(s) IV Intermittent every 8 hours  enoxaparin Injectable 40 milliGRAM(s) SubCutaneous daily  hydrALAZINE Injectable 10 milliGRAM(s) IV Push every 6 hours  influenza   Vaccine 0.5 milliLiter(s) IntraMuscular once  vancomycin  IVPB 1000 milliGRAM(s) IV Intermittent every 12 hours    MEDICATIONS  (PRN):      RADIOLOGY & ADDITIONAL TESTS:    Imaging Personally Reviewed:  [ ] YES  [ ] NO    REVIEW OF SYSTEMS:  CONSTITUTIONAL: No fever, weight loss, or fatigue  EYES: No eye pain, visual disturbances, or discharge  ENMT:  No difficulty hearing, tinnitus, vertigo; No sinus or throat pain  NECK: No pain or stiffness  BREASTS: No pain, masses, or nipple discharge  RESPIRATORY: No cough, wheezing, chills or hemoptysis; No shortness of breath  CARDIOVASCULAR: No chest pain, palpitations, dizziness, or leg swelling  GASTROINTESTINAL: No abdominal or epigastric pain. No nausea, vomiting, or hematemesis; No diarrhea or constipation. No melena or hematochezia.  GENITOURINARY: No dysuria, frequency, hematuria, or incontinence  NEUROLOGICAL: No headaches, memory loss, loss of strength, numbness, or tremors  SKIN: No itching, burning, rashes, or lesions   LYMPH NODES: No enlarged glands  ENDOCRINE: No heat or cold intolerance; No hair loss  MUSCULOSKELETAL: No joint pain or swelling; No muscle, back, or extremity pain  PSYCHIATRIC: No depression, anxiety, mood swings, or difficulty sleeping  HEME/LYMPH: No easy bruising, or bleeding gums  ALLERY AND IMMUNOLOGIC: No hives or eczema      Consultant(s) Notes Reviewed:  [ ] YES  [ ] NO    PHYSICAL EXAM:  GENERAL: NAD, well-groomed, well-developed  HEAD:  Atraumatic, Normocephalic  EYES: EOMI, PERRLA, conjunctiva and sclera clear  ENMT: No tonsillar erythema, exudates, or enlargement; Moist mucous membranes, Good dentition, No lesions  NECK: Supple, No JVD, Normal thyroid  NERVOUS SYSTEM:  Alert & Oriented X3, Good concentration; Motor Strength 5/5 B/L upper and lower extremities; DTRs 2+ intact and symmetric  CHEST/LUNG: Clear to percussion bilaterally; No rales, rhonchi, wheezing, or rubs  HEART: Regular rate and rhythm; No murmurs, rubs, or gallops  ABDOMEN: Soft, Nontender, Nondistended; Bowel sounds present  EXTREMITIES:  2+ Peripheral Pulses, No clubbing, cyanosis, or edema  LYMPH: No lymphadenopathy noted  SKIN: No rashes or lesions    Care Discussed with Consultants/Other Providers [ x] YES  [ ] NO

## 2019-09-15 DIAGNOSIS — R00.1 BRADYCARDIA, UNSPECIFIED: ICD-10-CM

## 2019-09-15 LAB
ALBUMIN SERPL ELPH-MCNC: 3.2 G/DL — LOW (ref 3.5–5)
ALP SERPL-CCNC: 88 U/L — SIGNIFICANT CHANGE UP (ref 40–120)
ALT FLD-CCNC: 13 U/L DA — SIGNIFICANT CHANGE UP (ref 10–60)
ANION GAP SERPL CALC-SCNC: 5 MMOL/L — SIGNIFICANT CHANGE UP (ref 5–17)
AST SERPL-CCNC: 17 U/L — SIGNIFICANT CHANGE UP (ref 10–40)
BILIRUB SERPL-MCNC: 0.5 MG/DL — SIGNIFICANT CHANGE UP (ref 0.2–1.2)
BUN SERPL-MCNC: 9 MG/DL — SIGNIFICANT CHANGE UP (ref 7–18)
CALCIUM SERPL-MCNC: 8.9 MG/DL — SIGNIFICANT CHANGE UP (ref 8.4–10.5)
CHLORIDE SERPL-SCNC: 105 MMOL/L — SIGNIFICANT CHANGE UP (ref 96–108)
CO2 SERPL-SCNC: 29 MMOL/L — SIGNIFICANT CHANGE UP (ref 22–31)
CREAT SERPL-MCNC: 0.93 MG/DL — SIGNIFICANT CHANGE UP (ref 0.5–1.3)
CULTURE RESULTS: NO GROWTH — SIGNIFICANT CHANGE UP
GLUCOSE SERPL-MCNC: 105 MG/DL — HIGH (ref 70–99)
HCT VFR BLD CALC: 37.5 % — LOW (ref 39–50)
HGB BLD-MCNC: 12 G/DL — LOW (ref 13–17)
MCHC RBC-ENTMCNC: 30 PG — SIGNIFICANT CHANGE UP (ref 27–34)
MCHC RBC-ENTMCNC: 32 GM/DL — SIGNIFICANT CHANGE UP (ref 32–36)
MCV RBC AUTO: 93.8 FL — SIGNIFICANT CHANGE UP (ref 80–100)
NRBC # BLD: 0 /100 WBCS — SIGNIFICANT CHANGE UP (ref 0–0)
PLATELET # BLD AUTO: 222 K/UL — SIGNIFICANT CHANGE UP (ref 150–400)
POTASSIUM SERPL-MCNC: 3.7 MMOL/L — SIGNIFICANT CHANGE UP (ref 3.5–5.3)
POTASSIUM SERPL-SCNC: 3.7 MMOL/L — SIGNIFICANT CHANGE UP (ref 3.5–5.3)
PROT SERPL-MCNC: 7.9 G/DL — SIGNIFICANT CHANGE UP (ref 6–8.3)
RBC # BLD: 4 M/UL — LOW (ref 4.2–5.8)
RBC # FLD: 14.6 % — HIGH (ref 10.3–14.5)
SODIUM SERPL-SCNC: 139 MMOL/L — SIGNIFICANT CHANGE UP (ref 135–145)
SPECIMEN SOURCE: SIGNIFICANT CHANGE UP
WBC # BLD: 4.97 K/UL — SIGNIFICANT CHANGE UP (ref 3.8–10.5)
WBC # FLD AUTO: 4.97 K/UL — SIGNIFICANT CHANGE UP (ref 3.8–10.5)

## 2019-09-15 RX ORDER — SOD SULF/SODIUM/NAHCO3/KCL/PEG
4000 SOLUTION, RECONSTITUTED, ORAL ORAL ONCE
Refills: 0 | Status: COMPLETED | OUTPATIENT
Start: 2019-09-15 | End: 2019-09-15

## 2019-09-15 RX ORDER — CHLORHEXIDINE GLUCONATE 213 G/1000ML
1 SOLUTION TOPICAL ONCE
Refills: 0 | Status: DISCONTINUED | OUTPATIENT
Start: 2019-09-15 | End: 2019-09-18

## 2019-09-15 RX ORDER — NEOMYCIN SULFATE 500 MG/1
500 TABLET ORAL
Refills: 0 | Status: COMPLETED | OUTPATIENT
Start: 2019-09-15 | End: 2019-09-15

## 2019-09-15 RX ORDER — ERYTHROMYCIN ETHYLSUCCINATE 400 MG
500 TABLET ORAL
Refills: 0 | Status: COMPLETED | OUTPATIENT
Start: 2019-09-15 | End: 2019-09-15

## 2019-09-15 RX ADMIN — Medication 10 MILLIGRAM(S): at 05:58

## 2019-09-15 RX ADMIN — NEOMYCIN SULFATE 500 MILLIGRAM(S): 500 TABLET ORAL at 17:25

## 2019-09-15 RX ADMIN — Medication 10 MILLIGRAM(S): at 17:24

## 2019-09-15 RX ADMIN — ENOXAPARIN SODIUM 40 MILLIGRAM(S): 100 INJECTION SUBCUTANEOUS at 11:21

## 2019-09-15 RX ADMIN — Medication 81 MILLIGRAM(S): at 11:20

## 2019-09-15 RX ADMIN — NEOMYCIN SULFATE 500 MILLIGRAM(S): 500 TABLET ORAL at 18:27

## 2019-09-15 RX ADMIN — Medication 10 MILLIGRAM(S): at 11:20

## 2019-09-15 RX ADMIN — Medication 50 MILLIGRAM(S): at 22:24

## 2019-09-15 RX ADMIN — Medication 4000 MILLILITER(S): at 16:25

## 2019-09-15 RX ADMIN — Medication 50 MILLIGRAM(S): at 05:47

## 2019-09-15 RX ADMIN — Medication 10 MILLIGRAM(S): at 02:44

## 2019-09-15 RX ADMIN — Medication 250 MILLIGRAM(S): at 05:47

## 2019-09-15 RX ADMIN — Medication 250 MILLIGRAM(S): at 17:24

## 2019-09-15 RX ADMIN — Medication 50 MILLIGRAM(S): at 13:22

## 2019-09-15 RX ADMIN — Medication 500 MILLIGRAM(S): at 17:25

## 2019-09-15 RX ADMIN — Medication 500 MILLIGRAM(S): at 18:27

## 2019-09-15 NOTE — PROGRESS NOTE ADULT - SUBJECTIVE AND OBJECTIVE BOX
PGY 1 Note discussed with supervising resident and primary attending    Patient is a 69y old  Male who presents with a chief complaint of cellulitis and right colon mass (14 Sep 2019 10:44)      INTERVAL HPI/OVERNIGHT EVENTS: Patient seen and examined at the bedside.     MEDICATIONS  (STANDING):  aspirin enteric coated 81 milliGRAM(s) Oral daily  aztreonam  IVPB 1000 milliGRAM(s) IV Intermittent every 8 hours  enoxaparin Injectable 40 milliGRAM(s) SubCutaneous daily  hydrALAZINE 10 milliGRAM(s) Oral every 6 hours  influenza   Vaccine 0.5 milliLiter(s) IntraMuscular once  vancomycin  IVPB 1000 milliGRAM(s) IV Intermittent every 12 hours    MEDICATIONS  (PRN):      __________________________________________________  REVIEW OF SYSTEMS:    CONSTITUTIONAL: No fever,   EYES: no acute visual disturbances  NECK: No pain or stiffness  RESPIRATORY: No cough; No shortness of breath  CARDIOVASCULAR: No chest pain, no palpitations  GASTROINTESTINAL: No pain. No nausea or vomiting; No diarrhea   NEUROLOGICAL: No headache or numbness, no tremors  MUSCULOSKELETAL: No joint pain, no muscle pain  GENITOURINARY: no dysuria, no frequency, no hesitancy  PSYCHIATRY: no depression , no anxiety  ALL OTHER  ROS negative        Vital Signs Last 24 Hrs  T(C): 36.7 (15 Sep 2019 07:29), Max: 36.9 (14 Sep 2019 07:55)  T(F): 98 (15 Sep 2019 07:29), Max: 98.4 (14 Sep 2019 07:55)  HR: 78 (15 Sep 2019 07:29) (60 - 78)  BP: 122/58 (15 Sep 2019 07:29) (107/61 - 154/61)  BP(mean): --  RR: 18 (15 Sep 2019 07:29) (17 - 18)  SpO2: 100% (15 Sep 2019 07:29) (100% - 100%)    ________________________________________________  PHYSICAL EXAM:  GENERAL: NAD  HEENT: Normocephalic;  conjunctivae and sclerae clear; moist mucous membranes;   NECK : supple  CHEST/LUNG: Clear to auscultation bilaterally with good air entry   HEART: S1 S2  regular; no murmurs, gallops or rubs  ABDOMEN: Soft, Nontender, Nondistended; Bowel sounds present  EXTREMITIES: no cyanosis; no edema; no calf tenderness  SKIN: warm and dry; no rash  NERVOUS SYSTEM:  Awake and alert; Oriented  to place, person and time ; no new deficits    _________________________________________________  LABS:            Urinalysis Basic - ( 13 Sep 2019 20:17 )    Color: Yellow / Appearance: Clear / S.005 / pH: x  Gluc: x / Ketone: Negative  / Bili: Negative / Urobili: Negative   Blood: x / Protein: Negative / Nitrite: Negative   Leuk Esterase: Negative / RBC: x / WBC x   Sq Epi: x / Non Sq Epi: x / Bacteria: x      CAPILLARY BLOOD GLUCOSE            RADIOLOGY & ADDITIONAL TESTS:    Imaging Personally Reviewed:  YES/NO    Consultant(s) Notes Reviewed:   YES/ No    Care Discussed with Consultants :     Plan of care was discussed with patient and /or primary care giver; all questions and concerns were addressed and care was aligned with patient's wishes. PGY 1 Note discussed with supervising resident and primary attending    Patient is a 69y old  Male who presents with a chief complaint of cellulitis and right colon mass (14 Sep 2019 10:44)      INTERVAL HPI/OVERNIGHT EVENTS: Patient seen and examined at the bedside. Patient is a bit confused, complains of some constipation. Pt explained about stress test and surgery tomorrow.    MEDICATIONS  (STANDING):  aspirin enteric coated 81 milliGRAM(s) Oral daily  aztreonam  IVPB 1000 milliGRAM(s) IV Intermittent every 8 hours  enoxaparin Injectable 40 milliGRAM(s) SubCutaneous daily  hydrALAZINE 10 milliGRAM(s) Oral every 6 hours  influenza   Vaccine 0.5 milliLiter(s) IntraMuscular once  vancomycin  IVPB 1000 milliGRAM(s) IV Intermittent every 12 hours    MEDICATIONS  (PRN):      __________________________________________________  REVIEW OF SYSTEMS:    CONSTITUTIONAL: No fever,   EYES: no acute visual disturbances  NECK: No pain or stiffness  RESPIRATORY: No cough; No shortness of breath  CARDIOVASCULAR: No chest pain, no palpitations  GASTROINTESTINAL: No pain. No nausea or vomiting; No diarrhea   NEUROLOGICAL: No headache or numbness, no tremors  MUSCULOSKELETAL: No joint pain, no muscle pain  GENITOURINARY: no dysuria, no frequency, no hesitancy  PSYCHIATRY: no depression , no anxiety  ALL OTHER  ROS negative        Vital Signs Last 24 Hrs  T(C): 36.7 (15 Sep 2019 07:29), Max: 36.9 (14 Sep 2019 07:55)  T(F): 98 (15 Sep 2019 07:29), Max: 98.4 (14 Sep 2019 07:55)  HR: 78 (15 Sep 2019 07:29) (60 - 78)  BP: 122/58 (15 Sep 2019 07:29) (107/61 - 154/61)  BP(mean): --  RR: 18 (15 Sep 2019 07:29) (17 - 18)  SpO2: 100% (15 Sep 2019 07:29) (100% - 100%)    ________________________________________________  PHYSICAL EXAM:  GENERAL: NAD  HEENT: Normocephalic;  conjunctivae and sclerae clear; moist mucous membranes;   NECK : supple  CHEST/LUNG: Clear to auscultation bilaterally with good air entry   HEART: S1 S2  regular; no murmurs, gallops or rubs  ABDOMEN: Soft, Nontender, Nondistended; Bowel sounds present  EXTREMITIES: no cyanosis; no edema; no calf tenderness  SKIN: warm and dry; no rash  NERVOUS SYSTEM:  Awake and alert; Oriented  to place, person and time ; no new deficits    _________________________________________________  LABS:            Urinalysis Basic - ( 13 Sep 2019 20:17 )    Color: Yellow / Appearance: Clear / S.005 / pH: x  Gluc: x / Ketone: Negative  / Bili: Negative / Urobili: Negative   Blood: x / Protein: Negative / Nitrite: Negative   Leuk Esterase: Negative / RBC: x / WBC x   Sq Epi: x / Non Sq Epi: x / Bacteria: x      CAPILLARY BLOOD GLUCOSE            RADIOLOGY & ADDITIONAL TESTS:    < from: Transthoracic Echocardiogram (19 @ 07:01) >  PROCEDURE: Transthoracic echocardiogram with 2-D, M-Mode  and complete spectral and color flow Doppler.  INDICATION: Pre-op exam  (Z01.818)  HISTORY:  ------------------------------------------------------------------------  DIMENSIONS:  Dimensions:     Normal Values:  LA:     4.1 cm    2.0 - 4.0 cm  Ao:     3.2 cm    2.0 - 3.8 cm  SEPTUM: 1.0 cm    0.6 - 1.2 cm  PWT:  1.0 cm    0.6 - 1.1 cm  LVIDd:  5.2 cm    3.0 - 5.6 cm  LVIDs:  3.6 cm    1.8 - 4.0 cm      Derived Variables:  LVMI: 103 g/m2  RWT: 0.38  Ejection Fraction Narayanan: 62 %    ------------------------------------------------------------------------  OBSERVATIONS:  Mitral Valve: Normal mitral valve. Trace mitral  regurgitation.  Aortic Root: Normal aortic root.  Aortic Valve: Normal trileaflet aortic valve. No aortic  stenosis. Moderate aortic regurgitation ( msec).  Left Atrium: Normal left atrium.  LA volume index = 32  cc/m2.  Left Ventricle: Normal Left Ventricular Systolic Function,  (EF = 62% by biplane) No regional wall motion  abnormalities. Normal left ventricular internal dimensions  and wall thicknesses. Normal diastolic function.  Right Heart: Normal right atrium. Normal right ventricular  size and systolic function (TAPSE 2.6 cm). Normal tricuspid  valve. Mild tricuspid regurgitation. Pulmonic valve not  well seen. Trace pulmonic insufficiency is noted.  Pericardium/PleuraTrivial pericardial effusion is seen.  Hemodynamic: RA Pressure is 8 mm Hg. RV systolic pressure  is mildly increased at  34 mm Hg.  ------------------------------------------------------------------------  CONCLUSIONS:  1. Normal mitral valve. Trace mitral regurgitation.  2. Normal trileaflet aortic valve. No aortic stenosis.  Moderate aortic regurgitation ( msec).  3. Normal aortic root.  4. Normal left atrium.  5. Normal left ventricular internal dimensions and wall  thicknesses.  6. Normal Left Ventricular Systolic Function,  (EF = 62% by  biplane)  7. Normal diastolic function.  8. Normal right atrium.  9. Normal right ventricular size and systolic function  (TAPSE 2.6 cm).  10. RV systolic pressure is mildly increased at  34 mmHg.  11. Normal tricuspid valve. Mild tricuspid regurgitation.  12. Pulmonic valve not well seen. Trace pulmonic  insufficiency is noted.  13. Trivial pericardial effusion is seen.    *** No previous Echo exam.    < end of copied text >      Imaging Personally Reviewed:  YES/NO    Consultant(s) Notes Reviewed:   YES/ No    Care Discussed with Consultants :     Plan of care was discussed with patient and /or primary care giver; all questions and concerns were addressed and care was aligned with patient's wishes.

## 2019-09-15 NOTE — PROGRESS NOTE ADULT - SUBJECTIVE AND OBJECTIVE BOX
[   ] ICU                                          [   ] CCU                                      [ X  ] Medical Floor    Patient is comfortable. No new complaints reported, No abdominal pain, N/V, hematemesis, hematochezia, melena, fever, chills, chest pain, SOB, cough or diarrhea reported.    VITALS  ICU Vital Signs Last 24 Hrs  T(C): 36.8 (15 Sep 2019 11:24), Max: 36.8 (15 Sep 2019 11:24)  T(F): 98.2 (15 Sep 2019 11:24), Max: 98.2 (15 Sep 2019 11:24)  HR: 68 (15 Sep 2019 11:24) (60 - 78)  BP: 121/65 (15 Sep 2019 11:24) (112/57 - 154/61)   RR: 18 (15 Sep 2019 11:24) (17 - 18)  SpO2: 100% (15 Sep 2019 11:24) (100% - 100%)         MEDICATIONS  (STANDING):  aspirin enteric coated 81 milliGRAM(s) Oral daily  aztreonam  IVPB 1000 milliGRAM(s) IV Intermittent every 8 hours  chlorhexidine 4% Liquid 1 Application(s) Topical once  enoxaparin Injectable 40 milliGRAM(s) SubCutaneous daily  erythromycin     base Tablet 500 milliGRAM(s) Oral <User Schedule>  hydrALAZINE 10 milliGRAM(s) Oral every 6 hours  influenza   Vaccine 0.5 milliLiter(s) IntraMuscular once  neomycin 500 milliGRAM(s) Oral <User Schedule>  polyethylene glycol/electrolyte Solution. 4000 milliLiter(s) Oral once  vancomycin  IVPB 1000 milliGRAM(s) IV Intermittent every 12 hours    MEDICATIONS  (PRN):                            12.0   4.97  )-----------( 222      ( 15 Sep 2019 10:27 )             37.5       09-15    139  |  105  |  9   ----------------------------<  105<H>  3.7   |  29  |  0.93    Ca    8.9      15 Sep 2019 10:27    TPro  7.9  /  Alb  3.2<L>  /  TBili  0.5  /  DBili  x   /  AST  17  /  ALT  13  /  AlkPhos  88  09-15

## 2019-09-15 NOTE — PROGRESS NOTE ADULT - ASSESSMENT
1. Colonic mass  2. Aemia    Suggestions:    1. Monitor H/H  2. Transfuse PRBC as needed  3. Repeat colonoscopy  4. Avoid NSAID  5. Check CEA  6. Surgical evaluation  7. Urology evaluation  8. DVT prophylaxis

## 2019-09-15 NOTE — PROGRESS NOTE ADULT - ASSESSMENT
69 Years old male from home with dementia was sent in by dr Quiroz office for right sided colonic mass and cellulitis of LLE.

## 2019-09-15 NOTE — PROGRESS NOTE ADULT - SUBJECTIVE AND OBJECTIVE BOX
Surgery Pre-op Note    Preoperative Diagnosis: Right colon mass     Planned Procedure: Right hemicolectomy                          12.0   4.97  )-----------( 222      ( 15 Sep 2019 10:27 )             37.5       09-15    139  |  105  |  9   ----------------------------<  105<H>  3.7   |  29  |  0.93    Ca    8.9      15 Sep 2019 10:27    TPro  7.9  /  Alb  3.2<L>  /  TBili  0.5  /  DBili  x   /  AST  17  /  ALT  13  /  AlkPhos  88  09-15      LIVER FUNCTIONS - ( 15 Sep 2019 10:27 )  Alb: 3.2 g/dL / Pro: 7.9 g/dL / ALK PHOS: 88 U/L / ALT: 13 U/L DA / AST: 17 U/L / GGT: x                 Type & Screen: pending    EKG: < from: 12 Lead ECG (09.13.19 @ 16:11) >    Diagnosis Line Marked sinus bradycardia  Right bundle branch block  Abnormal ECG    < end of copied text >      Echo:  < from: Transthoracic Echocardiogram (09.13.19 @ 07:01) >  CONCLUSIONS:  1. Normal mitral valve. Trace mitral regurgitation.  2. Normal trileaflet aortic valve. No aortic stenosis.  Moderate aortic regurgitation ( msec).  3. Normal aortic root.  4. Normal left atrium.  5. Normal left ventricular internal dimensions and wall  thicknesses.  6. Normal Left Ventricular Systolic Function,  (EF = 62% by  biplane)  7. Normal diastolic function.  8. Normal right atrium.  9. Normal right ventricular size and systolic function  (TAPSE 2.6 cm).  10. RV systolic pressure is mildly increased at  34 mmHg.  11. Normal tricuspid valve. Mild tricuspid regurgitation.  12. Pulmonic valve not well seen. Trace pulmonic  insufficiency is noted.  13. Trivial pericardial effusion is seen.    < end of copied text >      Nuclear stress Test: pending to be performed on 9/16    CXR:  < from: Xray Chest 1 View AP/PA (09.12.19 @ 14:15) >  EXAM:  XR CHEST AP OR PA 1V                            PROCEDURE DATE:  09/12/2019          INTERPRETATION:  CLINICAL INFORMATION: Left ankle also. Anemia.    TECHNIQUE: Single frontal view of the chest.    COMPARISON: Chest x-ray of 10/24/2018.    FINDINGS:     No parenchymal consolidation, pleural effusion, or pneumothorax. The   heart size is normal. Multilevel degenerative changes of the spine. Left   AC joint arthrosis.    IMPRESSION:     Clear lungs.     < end of copied text >

## 2019-09-15 NOTE — PROGRESS NOTE ADULT - SUBJECTIVE AND OBJECTIVE BOX
CHIEF COMPLAINT:Patient is a 69y old  Male who presents with a chief complaint of cellulitis and right colon mass.Pt appears comfortable.    	  REVIEW OF SYSTEMS:  CONSTITUTIONAL: No fever, weight loss, or fatigue  EYES: No eye pain, visual disturbances, or discharge  ENT:  No difficulty hearing, tinnitus, vertigo; No sinus or throat pain  NECK: No pain or stiffness  RESPIRATORY: No cough, wheezing, chills or hemoptysis; No Shortness of Breath  CARDIOVASCULAR: No chest pain, palpitations, passing out, dizziness, or leg swelling  GASTROINTESTINAL: No abdominal or epigastric pain. No nausea, vomiting, or hematemesis; No diarrhea or constipation. No melena or hematochezia.  GENITOURINARY: No dysuria, frequency, hematuria, or incontinence  NEUROLOGICAL: No headaches, memory loss, loss of strength, numbness, or tremors  SKIN: No itching, burning, rashes, or lesions   LYMPH Nodes: No enlarged glands  ENDOCRINE: No heat or cold intolerance; No hair loss  MUSCULOSKELETAL: No joint pain or swelling; No muscle, back, or extremity pain  PSYCHIATRIC: No depression, anxiety, mood swings, or difficulty sleeping  HEME/LYMPH: No easy bruising, or bleeding gums  ALLERGY AND IMMUNOLOGIC: No hives or eczema	    Tele:sr 50-70    PHYSICAL EXAM:  T(C): 36.7 (09-15-19 @ 07:29), Max: 36.7 (09-15-19 @ 07:29)  HR: 78 (09-15-19 @ 07:29) (60 - 78)  BP: 122/58 (09-15-19 @ 07:29) (112/57 - 154/61)  RR: 18 (09-15-19 @ 07:29) (17 - 18)  SpO2: 100% (09-15-19 @ 07:29) (100% - 100%)  Wt(kg): --  I&O's Summary    14 Sep 2019 07:01  -  15 Sep 2019 07:00  --------------------------------------------------------  IN: 236 mL / OUT: 0 mL / NET: 236 mL        Appearance: Normal	  HEENT:   Normal oral mucosa, PERRL, EOMI	  Lymphatic: No lymphadenopathy  Cardiovascular: Normal S1 S2, No JVD, No murmurs, No edema  Respiratory: Lungs clear to auscultation	  Psychiatry: A & O x 3, Mood & affect appropriate  Gastrointestinal:  Soft, Non-tender, + BS	  Skin: No rashes, No ecchymoses, No cyanosis	  Neurologic: Non-focal  Extremities: Normal range of motion, No clubbing, cyanosis or edema  Vascular: Peripheral pulses palpable 2+ bilaterally    MEDICATIONS  (STANDING):  aspirin enteric coated 81 milliGRAM(s) Oral daily  aztreonam  IVPB 1000 milliGRAM(s) IV Intermittent every 8 hours  enoxaparin Injectable 40 milliGRAM(s) SubCutaneous daily  hydrALAZINE 10 milliGRAM(s) Oral every 6 hours  influenza   Vaccine 0.5 milliLiter(s) IntraMuscular once  vancomycin  IVPB 1000 milliGRAM(s) IV Intermittent every 12 hours      	  LABS:	 	    proBNP: Serum Pro-Brain Natriuretic Peptide: 180 pg/mL (09-12 @ 11:59)    Lipid Profile: Cholesterol 117  LDL 60  HDL 50  TG 36    HgA1c: Hemoglobin A1C, Whole Blood: 5.3 % (09-13 @ 10:32)    TSH: Thyroid Stimulating Hormone, Serum: 1.33 uU/mL (09-13 @ 06:54)

## 2019-09-15 NOTE — CONSULT NOTE ADULT - SUBJECTIVE AND OBJECTIVE BOX
Patient is a 69y old  Male who presents with a chief complaint of cellulitis and right colon mass (15 Sep 2019 13:14)      HPI:  69 Years old male from home with dementia was sent in by dr Quiroz office for right sided colonic mass which was recently diagnosed. Patient also have cellulitis which was treated with bactrim as outpatient but have not improved.  History source is dr Quiroz office. Patient does not gave any history but only points to lower extremity ulcers on left lower extremity. History very limited due to dementia.  Pt denies any chest pain, shortness of breath, fever, abdominal pain, change in urinary or bowel habits.  Patient is being admitted to workup for right colon mass and possible right isded colectomy. Plan is to involve GI doctor, surgery and cardiology for pre-op surgical clearance. We will need ID consult and podiatry consult for lower extremity ulcers. (12 Sep 2019 18:59)    Patient seen at bedside this PM for consult on possible left leg cellulitis.  Patient was seen in the ED recently for left leg wounds and was referred to wound care clinic in which he followed up.  Home nursing was provided for every other day dressing changes.  Pt was supposed to follow up again with wound care clinic on thursday but was admitted to the hospital.  He states the dressing on his left leg has been changed regularly.  He admits to wounds in his lower legs of greater than 18 years off and on.  He states he had seen podiatrist and ED providers off and on for treatment and had home nursing for a period of time before they stopped.  He denies n/v/f at this time.      PMH:Dementia  No pertinent past medical history  No pertinent past medical history    Allergies: penicillin (Other)    Medications: aspirin enteric coated 81 milliGRAM(s) Oral daily  aztreonam  IVPB 1000 milliGRAM(s) IV Intermittent every 8 hours  chlorhexidine 4% Liquid 1 Application(s) Topical once  enoxaparin Injectable 40 milliGRAM(s) SubCutaneous daily  erythromycin     base Tablet 500 milliGRAM(s) Oral <User Schedule>  hydrALAZINE 10 milliGRAM(s) Oral every 6 hours  influenza   Vaccine 0.5 milliLiter(s) IntraMuscular once  neomycin 500 milliGRAM(s) Oral <User Schedule>  polyethylene glycol/electrolyte Solution. 4000 milliLiter(s) Oral once  vancomycin  IVPB 1000 milliGRAM(s) IV Intermittent every 12 hours    FH:No pertinent family history in first degree relatives    PSX: No significant past surgical history  No significant past surgical history    SH: aspirin enteric coated 81 milliGRAM(s) Oral daily  aztreonam  IVPB 1000 milliGRAM(s) IV Intermittent every 8 hours  chlorhexidine 4% Liquid 1 Application(s) Topical once  enoxaparin Injectable 40 milliGRAM(s) SubCutaneous daily  erythromycin     base Tablet 500 milliGRAM(s) Oral <User Schedule>  hydrALAZINE 10 milliGRAM(s) Oral every 6 hours  influenza   Vaccine 0.5 milliLiter(s) IntraMuscular once  neomycin 500 milliGRAM(s) Oral <User Schedule>  polyethylene glycol/electrolyte Solution. 4000 milliLiter(s) Oral once  vancomycin  IVPB 1000 milliGRAM(s) IV Intermittent every 12 hours      Vital Signs Last 24 Hrs  T(C): 36.8 (15 Sep 2019 11:24), Max: 36.8 (15 Sep 2019 11:24)  T(F): 98.2 (15 Sep 2019 11:24), Max: 98.2 (15 Sep 2019 11:24)  HR: 68 (15 Sep 2019 11:24) (60 - 78)  BP: 121/65 (15 Sep 2019 11:24) (112/57 - 154/61)  BP(mean): --  RR: 18 (15 Sep 2019 11:24) (17 - 18)  SpO2: 100% (15 Sep 2019 11:24) (100% - 100%)    LABS                        12.0   4.97  )-----------( 222      ( 15 Sep 2019 10:27 )             37.5               09-15    139  |  105  |  9   ----------------------------<  105<H>  3.7   |  29  |  0.93    Ca    8.9      15 Sep 2019 10:27    TPro  7.9  /  Alb  3.2<L>  /  TBili  0.5  /  DBili  x   /  AST  17  /  ALT  13  /  AlkPhos  88  09-15      PHYSICAL EXAM  GEN: LORETA ATKINSON is a pleasant well-nourished, well developed 69y Male in no acute distress, alert awake, and oriented to person, place and time.   LE Focused:    Vasc:  DP 2/4 b/l.  PT pulses non-palpable due to wound on the left and thickened skin on the right.  CFT 5sec to all digits.  Mild edema to the left lower leg relative to the right.  Derm: Hyperpigmentation changes from chronic venous stasis b/l.  No open lesion on right leg.  Wound #1: Left medial ankle measuring 4.5cm x 5.0cm x 0.3cm with a fibrogranular base and surrounding hyperkeratotic tissue with patches of hypopigmenation.  No malodor, minimal serous drainage, no probe to bone, no increased in warmth, no surrounding erythema.  Wound #2: Left lateral ankle measuring 1.2cm x 1.2cm x 0.2cm with well defined margins and fibrogranular base with patches of hypopigmentation. no malodor, minimal serous drainage, no probe to bone, no increased in warmth, no surrounding erythema.  Neuro: Diminished protective sensation b/l  MSK: Decreased ankle joint range of motion b/l.  No pain with palpation of ulcers.    Imaging:   < from: Xray Ankle Complete 3 Views, Left (09.12.19 @ 14:15) >    EXAM:  ANKLE LEFT (MINIMUM 3 V)                            PROCEDURE DATE:  09/12/2019          INTERPRETATION:  CLINICAL STATEMENT: Ulcer    TECHNIQUE: AP, lateral and oblique views of the left ankle.    COMPARISON: 9/4/2019    FINDINGS:    There is no acute fracture or dislocation.  Joint spaces are intact.   There is no lytic or blastic lesion. Again seen is periosteal reaction   along the shaft of the fibula and between the tibia and fibula distally.   There are surgical clips in the soft tissues. There is soft tissue   swelling. There are degenerative changes in the midfoot. There is a   plantar calcaneal spur. There is a large amount of soft tissue   ossification posteriorly at the level of the distal leg.    IMPRESSION:    No plain film evidence of osteomyelitis    HELEN LEARY M.D.,ATTENDING RADIOLOGIST  This document has been electronically signed. Sep 12 2019  3:18PM      < end of copied text >    Cultures: Culture Results:   No growth (09-14 @ 10:20)  Culture Results:   No growth to date. (09-12 @ 19:31)  Culture Results:   No growth to date. (09-12 @ 19:30)      A: Venous stasis ulcerations, left ankle  Chronic non-pressure ulcers, left ankle    P:  Patient evaluated, chart reviewed  Xrays reviewed as above  Wound flushed with copious amounts of sterile saline  Dressing applied with betadine, 4x4 gazue, Binu, ACE wrap  CALI ordered  ESR/CRP ordered  Possible bedside biopsy following surgical team's procedures.    Podiatry will following while in house  Discussed with attending Dr. Johnston

## 2019-09-15 NOTE — PROGRESS NOTE ADULT - ASSESSMENT
Right colon mass  1. Plan for R hemicolectomy on 9/16  2. Awaiting stress test in AM for cardiology clearance  3. Mechanical bowel prep w/ golytely  4. Chemical bowel prep with neomcyin, erythromycin  5. AM labs including type and screen ordered  6. NPO after midnight, clear liquids for now  7. Consent to be obtained

## 2019-09-16 ENCOUNTER — RESULT REVIEW (OUTPATIENT)
Age: 69
End: 2019-09-16

## 2019-09-16 PROCEDURE — 88305 TISSUE EXAM BY PATHOLOGIST: CPT | Mod: 26

## 2019-09-16 PROCEDURE — 99232 SBSQ HOSP IP/OBS MODERATE 35: CPT

## 2019-09-16 RX ORDER — CHLORHEXIDINE GLUCONATE 213 G/1000ML
1 SOLUTION TOPICAL EVERY 12 HOURS
Refills: 0 | Status: COMPLETED | OUTPATIENT
Start: 2019-09-16 | End: 2019-09-16

## 2019-09-16 RX ORDER — HALOPERIDOL DECANOATE 100 MG/ML
2 INJECTION INTRAMUSCULAR ONCE
Refills: 0 | Status: COMPLETED | OUTPATIENT
Start: 2019-09-16 | End: 2019-09-16

## 2019-09-16 RX ORDER — COLLAGENASE CLOSTRIDIUM HIST. 250 UNIT/G
1 OINTMENT (GRAM) TOPICAL DAILY
Refills: 0 | Status: COMPLETED | OUTPATIENT
Start: 2019-09-16 | End: 2019-09-16

## 2019-09-16 RX ADMIN — Medication 50 MILLIGRAM(S): at 06:33

## 2019-09-16 RX ADMIN — Medication 10 MILLIGRAM(S): at 06:33

## 2019-09-16 RX ADMIN — Medication 10 MILLIGRAM(S): at 12:29

## 2019-09-16 RX ADMIN — HALOPERIDOL DECANOATE 2 MILLIGRAM(S): 100 INJECTION INTRAMUSCULAR at 09:30

## 2019-09-16 RX ADMIN — ENOXAPARIN SODIUM 40 MILLIGRAM(S): 100 INJECTION SUBCUTANEOUS at 12:29

## 2019-09-16 RX ADMIN — Medication 81 MILLIGRAM(S): at 12:29

## 2019-09-16 RX ADMIN — Medication 50 MILLIGRAM(S): at 22:41

## 2019-09-16 RX ADMIN — Medication 1 APPLICATION(S): at 12:29

## 2019-09-16 NOTE — PROGRESS NOTE ADULT - SUBJECTIVE AND OBJECTIVE BOX
CHIEF COMPLAINT:Patient is a 69y old  Male who presents with a chief complaint of cellulitis and right colon mass. Pt appears comfortable.    	  REVIEW OF SYSTEMS:  CONSTITUTIONAL: No fever, weight loss, or fatigue  EYES: No eye pain, visual disturbances, or discharge  ENT:  No difficulty hearing, tinnitus, vertigo; No sinus or throat pain  NECK: No pain or stiffness  RESPIRATORY: No cough, wheezing, chills or hemoptysis; No Shortness of Breath  CARDIOVASCULAR: No chest pain, palpitations, passing out, dizziness, or leg swelling  GASTROINTESTINAL: No abdominal or epigastric pain. No nausea, vomiting, or hematemesis; No diarrhea or constipation. No melena or hematochezia.  GENITOURINARY: No dysuria, frequency, hematuria, or incontinence  NEUROLOGICAL: No headaches, memory loss, loss of strength, numbness, or tremors  SKIN: No itching, burning, rashes, or lesions   LYMPH Nodes: No enlarged glands  ENDOCRINE: No heat or cold intolerance; No hair loss  MUSCULOSKELETAL: No joint pain or swelling; No muscle, back, or extremity pain  PSYCHIATRIC: No depression, anxiety, mood swings, or difficulty sleeping  HEME/LYMPH: No easy bruising, or bleeding gums  ALLERGY AND IMMUNOLOGIC: No hives or eczema	    PHYSICAL EXAM:  T(C): 36.8 (09-16-19 @ 07:05), Max: 36.8 (09-15-19 @ 11:24)  HR: 62 (09-16-19 @ 07:05) (60 - 77)  BP: 144/59 (09-16-19 @ 07:05) (121/65 - 172/95)  RR: 18 (09-16-19 @ 07:05) (17 - 18)  SpO2: 100% (09-16-19 @ 07:05) (100% - 100%)      Appearance: Normal	  HEENT:   Normal oral mucosa, PERRL, EOMI	  Lymphatic: No lymphadenopathy  Cardiovascular: Normal S1 S2, No JVD, No murmurs, No edema  Respiratory: Lungs clear to auscultation	  Psychiatry: A & O x 3, Mood & affect appropriate  Gastrointestinal:  Soft, Non-tender, + BS	  Skin: No rashes, No ecchymoses, No cyanosis	  Neurologic: Non-focal  Extremities: Normal range of motion, No clubbing, cyanosis or edema  Vascular: Peripheral pulses palpable 2+ bilaterally    MEDICATIONS  (STANDING):  aspirin enteric coated 81 milliGRAM(s) Oral daily  aztreonam  IVPB 1000 milliGRAM(s) IV Intermittent every 8 hours  chlorhexidine 2% Cloths 1 Application(s) Topical every 12 hours  chlorhexidine 4% Liquid 1 Application(s) Topical once  collagenase Ointment 1 Application(s) Topical daily  enoxaparin Injectable 40 milliGRAM(s) SubCutaneous daily  hydrALAZINE 10 milliGRAM(s) Oral every 6 hours  influenza   Vaccine 0.5 milliLiter(s) IntraMuscular once  vancomycin  IVPB 1000 milliGRAM(s) IV Intermittent every 12 hours        	  LABS:	 	                     12.0   4.97  )-----------( 222      ( 15 Sep 2019 10:27 )             37.5     09-15    139  |  105  |  9   ----------------------------<  105<H>  3.7   |  29  |  0.93    Ca    8.9      15 Sep 2019 10:27    TPro  7.9  /  Alb  3.2<L>  /  TBili  0.5  /  DBili  x   /  AST  17  /  ALT  13  /  AlkPhos  88  09-15    proBNP: Serum Pro-Brain Natriuretic Peptide: 180 pg/mL (09-12 @ 11:59)    Lipid Profile: Cholesterol 117  LDL 60  HDL 50  TG 36    HgA1c: Hemoglobin A1C, Whole Blood: 5.3 % (09-13 @ 10:32)    TSH: Thyroid Stimulating Hormone, Serum: 1.33 uU/mL (09-13 @ 06:54)

## 2019-09-16 NOTE — PROGRESS NOTE ADULT - SUBJECTIVE AND OBJECTIVE BOX
PGY 1 Note discussed with supervising resident and primary attending    Patient is a 69y old  Male who presents with a chief complaint of cellulitis and right colon mass (14 Sep 2019 10:44)      INTERVAL HPI/OVERNIGHT EVENTS: Patient seen and examined at the bedside.     MEDICATIONS  (STANDING):  aspirin enteric coated 81 milliGRAM(s) Oral daily  aztreonam  IVPB 1000 milliGRAM(s) IV Intermittent every 8 hours  enoxaparin Injectable 40 milliGRAM(s) SubCutaneous daily  hydrALAZINE 10 milliGRAM(s) Oral every 6 hours  influenza   Vaccine 0.5 milliLiter(s) IntraMuscular once  vancomycin  IVPB 1000 milliGRAM(s) IV Intermittent every 12 hours    MEDICATIONS  (PRN):      __________________________________________________  REVIEW OF SYSTEMS:    CONSTITUTIONAL: No fever,   EYES: no acute visual disturbances  NECK: No pain or stiffness  RESPIRATORY: No cough; No shortness of breath  CARDIOVASCULAR: No chest pain, no palpitations  GASTROINTESTINAL: No pain. No nausea or vomiting; No diarrhea   NEUROLOGICAL: No headache or numbness, no tremors  MUSCULOSKELETAL: No joint pain, no muscle pain  GENITOURINARY: no dysuria, no frequency, no hesitancy  PSYCHIATRY: no depression , no anxiety  ALL OTHER  ROS negative        Vital Signs Last 24 Hrs  T(C): 36.7 (15 Sep 2019 07:29), Max: 36.9 (14 Sep 2019 07:55)  T(F): 98 (15 Sep 2019 07:29), Max: 98.4 (14 Sep 2019 07:55)  HR: 78 (15 Sep 2019 07:29) (60 - 78)  BP: 122/58 (15 Sep 2019 07:29) (107/61 - 154/61)  BP(mean): --  RR: 18 (15 Sep 2019 07:29) (17 - 18)  SpO2: 100% (15 Sep 2019 07:29) (100% - 100%)    ________________________________________________  PHYSICAL EXAM:  GENERAL: NAD  HEENT: Normocephalic;  conjunctivae and sclerae clear; moist mucous membranes;   NECK : supple  CHEST/LUNG: Clear to auscultation bilaterally with good air entry   HEART: S1 S2  regular; no murmurs, gallops or rubs  ABDOMEN: Soft, Nontender, Nondistended; Bowel sounds present  EXTREMITIES: no cyanosis; no edema; no calf tenderness  SKIN: warm and dry; no rash  NERVOUS SYSTEM:  Awake and alert; Oriented  to place, person and time ; no new deficits    _________________________________________________  LABS:            Urinalysis Basic - ( 13 Sep 2019 20:17 )    Color: Yellow / Appearance: Clear / S.005 / pH: x  Gluc: x / Ketone: Negative  / Bili: Negative / Urobili: Negative   Blood: x / Protein: Negative / Nitrite: Negative   Leuk Esterase: Negative / RBC: x / WBC x   Sq Epi: x / Non Sq Epi: x / Bacteria: x      CAPILLARY BLOOD GLUCOSE            RADIOLOGY & ADDITIONAL TESTS:    < from: Transthoracic Echocardiogram (19 @ 07:01) >  PROCEDURE: Transthoracic echocardiogram with 2-D, M-Mode  and complete spectral and color flow Doppler.  INDICATION: Pre-op exam  (Z01.818)  HISTORY:  ------------------------------------------------------------------------  DIMENSIONS:  Dimensions:     Normal Values:  LA:     4.1 cm    2.0 - 4.0 cm  Ao:     3.2 cm    2.0 - 3.8 cm  SEPTUM: 1.0 cm    0.6 - 1.2 cm  PWT:  1.0 cm    0.6 - 1.1 cm  LVIDd:  5.2 cm    3.0 - 5.6 cm  LVIDs:  3.6 cm    1.8 - 4.0 cm      Derived Variables:  LVMI: 103 g/m2  RWT: 0.38  Ejection Fraction Narayanan: 62 %    ------------------------------------------------------------------------  OBSERVATIONS:  Mitral Valve: Normal mitral valve. Trace mitral  regurgitation.  Aortic Root: Normal aortic root.  Aortic Valve: Normal trileaflet aortic valve. No aortic  stenosis. Moderate aortic regurgitation ( msec).  Left Atrium: Normal left atrium.  LA volume index = 32  cc/m2.  Left Ventricle: Normal Left Ventricular Systolic Function,  (EF = 62% by biplane) No regional wall motion  abnormalities. Normal left ventricular internal dimensions  and wall thicknesses. Normal diastolic function.  Right Heart: Normal right atrium. Normal right ventricular  size and systolic function (TAPSE 2.6 cm). Normal tricuspid  valve. Mild tricuspid regurgitation. Pulmonic valve not  well seen. Trace pulmonic insufficiency is noted.  Pericardium/PleuraTrivial pericardial effusion is seen.  Hemodynamic: RA Pressure is 8 mm Hg. RV systolic pressure  is mildly increased at  34 mm Hg.  ------------------------------------------------------------------------  CONCLUSIONS:  1. Normal mitral valve. Trace mitral regurgitation.  2. Normal trileaflet aortic valve. No aortic stenosis.  Moderate aortic regurgitation ( msec).  3. Normal aortic root.  4. Normal left atrium.  5. Normal left ventricular internal dimensions and wall  thicknesses.  6. Normal Left Ventricular Systolic Function,  (EF = 62% by  biplane)  7. Normal diastolic function.  8. Normal right atrium.  9. Normal right ventricular size and systolic function  (TAPSE 2.6 cm).  10. RV systolic pressure is mildly increased at  34 mmHg.  11. Normal tricuspid valve. Mild tricuspid regurgitation.  12. Pulmonic valve not well seen. Trace pulmonic  insufficiency is noted.  13. Trivial pericardial effusion is seen.    *** No previous Echo exam.    < end of copied text >    < from: Nuclear Stress Test-Pharmacologic (19 @ 04:54) >    IMPRESSIONS:Normal Study  * Negative ECG evidence of ischemia after IV of Lexiscan.  * Review of raw data shows: The study is of good technical  quality.  * The left ventricle was normal in size. Normal myocardial  perfusion scan, with no evidence of infarction or  inducible ischemia.  * Gated wall motion analysis is performed, and shows  normal wall motion with post stress LVEF of 57%.      < end of copied text >      Imaging Personally Reviewed:  YES/NO    Consultant(s) Notes Reviewed:   YES/ No    Care Discussed with Consultants :     Plan of care was discussed with patient and /or primary care giver; all questions and concerns were addressed and care was aligned with patient's wishes.

## 2019-09-16 NOTE — PROGRESS NOTE ADULT - SUBJECTIVE AND OBJECTIVE BOX
Patient was seen and examined  Patient is a 69y old  Male who presents with a chief complaint of cellulitis and right colon mass (16 Sep 2019 11:10)      INTERVAL HPI/OVERNIGHT EVENTS:  T(C): 36.8 (09-16-19 @ 07:05), Max: 36.8 (09-15-19 @ 16:13)  HR: 62 (09-16-19 @ 07:05) (60 - 77)  BP: 144/59 (09-16-19 @ 07:05) (126/68 - 172/95)  RR: 18 (09-16-19 @ 07:05) (17 - 18)  SpO2: 100% (09-16-19 @ 07:05) (100% - 100%)  Wt(kg): --  I&O's Summary      LABS:                        12.0   4.97  )-----------( 222      ( 15 Sep 2019 10:27 )             37.5     09-15    139  |  105  |  9   ----------------------------<  105<H>  3.7   |  29  |  0.93    Ca    8.9      15 Sep 2019 10:27    TPro  7.9  /  Alb  3.2<L>  /  TBili  0.5  /  DBili  x   /  AST  17  /  ALT  13  /  AlkPhos  88  09-15        CAPILLARY BLOOD GLUCOSE                  MEDICATIONS  (STANDING):  aspirin enteric coated 81 milliGRAM(s) Oral daily  aztreonam  IVPB 1000 milliGRAM(s) IV Intermittent every 8 hours  chlorhexidine 2% Cloths 1 Application(s) Topical every 12 hours  chlorhexidine 4% Liquid 1 Application(s) Topical once  collagenase Ointment 1 Application(s) Topical daily  enoxaparin Injectable 40 milliGRAM(s) SubCutaneous daily  haloperidol    Injectable 2 milliGRAM(s) IV Push once  hydrALAZINE 10 milliGRAM(s) Oral every 6 hours  influenza   Vaccine 0.5 milliLiter(s) IntraMuscular once  vancomycin  IVPB 1000 milliGRAM(s) IV Intermittent every 12 hours    MEDICATIONS  (PRN):      RADIOLOGY & ADDITIONAL TESTS:    Imaging Personally Reviewed:  [ ] YES  [ ] NO    REVIEW OF SYSTEMS:  CONSTITUTIONAL: No fever, weight loss, or fatigue  EYES: No eye pain, visual disturbances, or discharge  ENMT:  No difficulty hearing, tinnitus, vertigo; No sinus or throat pain  NECK: No pain or stiffness  BREASTS: No pain, masses, or nipple discharge  RESPIRATORY: No cough, wheezing, chills or hemoptysis; No shortness of breath  CARDIOVASCULAR: No chest pain, palpitations, dizziness, or leg swelling  GASTROINTESTINAL: No abdominal or epigastric pain. No nausea, vomiting, or hematemesis; No diarrhea or constipation. No melena or hematochezia.  GENITOURINARY: No dysuria, frequency, hematuria, or incontinence  NEUROLOGICAL: No headaches, memory loss, loss of strength, numbness, or tremors  SKIN: No itching, burning, rashes, or lesions   LYMPH NODES: No enlarged glands  ENDOCRINE: No heat or cold intolerance; No hair loss  MUSCULOSKELETAL: No joint pain or swelling; No muscle, back, or extremity pain  PSYCHIATRIC: No depression, anxiety, mood swings, or difficulty sleeping  HEME/LYMPH: No easy bruising, or bleeding gums  ALLERY AND IMMUNOLOGIC: No hives or eczema      Consultant(s) Notes Reviewed:  [x] YES  [ ] NO    PHYSICAL EXAM:  GENERAL: NAD, well-groomed, well-developed  HEAD:  Atraumatic, Normocephalic  EYES: EOMI, PERRLA, conjunctiva and sclera clear  ENMT: No tonsillar erythema, exudates, or enlargement; Moist mucous membranes, Good dentition, No lesions  NECK: Supple, No JVD, Normal thyroid  NERVOUS SYSTEM:  Alert & Oriented X3, Good concentration; Motor Strength 5/5 B/L upper and lower extremities; DTRs 2+ intact and symmetric  CHEST/LUNG: Clear to percussion bilaterally; No rales, rhonchi, wheezing, or rubs  HEART: Regular rate and rhythm; No murmurs, rubs, or gallops  ABDOMEN: Soft, Nontender, Nondistended; Bowel sounds present  EXTREMITIES:  2+ Peripheral Pulses, No clubbing, cyanosis, or edema  LYMPH: No lymphadenopathy noted  SKIN: No rashes or lesions    Care Discussed with Consultants/Other Providers [ x] YES  [ ] NO

## 2019-09-16 NOTE — PROGRESS NOTE ADULT - ASSESSMENT
69 Years old male from home with dementia was sent in by dr Quiroz office for right sided colonic mass which was recently diagnosed and cellulitis.  1.D/C Tele monitoring.  2.Surgical f/u.  3.GI eval noted.  4.HTN-IV hydralazine.  5.Cellulitis-ABX.  6.Stress test prior to OR clearance.  7.GI and DVT prophylaxis.

## 2019-09-17 ENCOUNTER — TRANSCRIPTION ENCOUNTER (OUTPATIENT)
Age: 69
End: 2019-09-17

## 2019-09-17 LAB
ALBUMIN SERPL ELPH-MCNC: 2.7 G/DL — LOW (ref 3.5–5)
ALP SERPL-CCNC: 71 U/L — SIGNIFICANT CHANGE UP (ref 40–120)
ALT FLD-CCNC: 19 U/L DA — SIGNIFICANT CHANGE UP (ref 10–60)
ANION GAP SERPL CALC-SCNC: 3 MMOL/L — LOW (ref 5–17)
AST SERPL-CCNC: 24 U/L — SIGNIFICANT CHANGE UP (ref 10–40)
BILIRUB SERPL-MCNC: 0.6 MG/DL — SIGNIFICANT CHANGE UP (ref 0.2–1.2)
BUN SERPL-MCNC: 7 MG/DL — SIGNIFICANT CHANGE UP (ref 7–18)
CALCIUM SERPL-MCNC: 8.4 MG/DL — SIGNIFICANT CHANGE UP (ref 8.4–10.5)
CHLORIDE SERPL-SCNC: 109 MMOL/L — HIGH (ref 96–108)
CO2 SERPL-SCNC: 29 MMOL/L — SIGNIFICANT CHANGE UP (ref 22–31)
CREAT SERPL-MCNC: 0.76 MG/DL — SIGNIFICANT CHANGE UP (ref 0.5–1.3)
CULTURE RESULTS: SIGNIFICANT CHANGE UP
CULTURE RESULTS: SIGNIFICANT CHANGE UP
GLUCOSE SERPL-MCNC: 79 MG/DL — SIGNIFICANT CHANGE UP (ref 70–99)
HCT VFR BLD CALC: 30.5 % — LOW (ref 39–50)
HGB BLD-MCNC: 9.9 G/DL — LOW (ref 13–17)
MAGNESIUM SERPL-MCNC: 2.1 MG/DL — SIGNIFICANT CHANGE UP (ref 1.6–2.6)
MCHC RBC-ENTMCNC: 30.4 PG — SIGNIFICANT CHANGE UP (ref 27–34)
MCHC RBC-ENTMCNC: 32.5 GM/DL — SIGNIFICANT CHANGE UP (ref 32–36)
MCV RBC AUTO: 93.6 FL — SIGNIFICANT CHANGE UP (ref 80–100)
NRBC # BLD: 0 /100 WBCS — SIGNIFICANT CHANGE UP (ref 0–0)
PHOSPHATE SERPL-MCNC: 2.5 MG/DL — SIGNIFICANT CHANGE UP (ref 2.5–4.5)
PLATELET # BLD AUTO: 184 K/UL — SIGNIFICANT CHANGE UP (ref 150–400)
POTASSIUM SERPL-MCNC: 3.8 MMOL/L — SIGNIFICANT CHANGE UP (ref 3.5–5.3)
POTASSIUM SERPL-SCNC: 3.8 MMOL/L — SIGNIFICANT CHANGE UP (ref 3.5–5.3)
PROT SERPL-MCNC: 6.7 G/DL — SIGNIFICANT CHANGE UP (ref 6–8.3)
RBC # BLD: 3.26 M/UL — LOW (ref 4.2–5.8)
RBC # FLD: 14.7 % — HIGH (ref 10.3–14.5)
SODIUM SERPL-SCNC: 141 MMOL/L — SIGNIFICANT CHANGE UP (ref 135–145)
SPECIMEN SOURCE: SIGNIFICANT CHANGE UP
SPECIMEN SOURCE: SIGNIFICANT CHANGE UP
WBC # BLD: 3.81 K/UL — SIGNIFICANT CHANGE UP (ref 3.8–10.5)
WBC # FLD AUTO: 3.81 K/UL — SIGNIFICANT CHANGE UP (ref 3.8–10.5)

## 2019-09-17 PROCEDURE — 99232 SBSQ HOSP IP/OBS MODERATE 35: CPT | Mod: 57

## 2019-09-17 RX ORDER — CHLORHEXIDINE GLUCONATE 213 G/1000ML
1 SOLUTION TOPICAL DAILY
Refills: 0 | Status: DISCONTINUED | OUTPATIENT
Start: 2019-09-17 | End: 2019-09-18

## 2019-09-17 RX ORDER — NEOMYCIN SULFATE 500 MG/1
500 TABLET ORAL
Refills: 0 | Status: COMPLETED | OUTPATIENT
Start: 2019-09-17 | End: 2019-09-17

## 2019-09-17 RX ORDER — ERYTHROMYCIN ETHYLSUCCINATE 400 MG
500 TABLET ORAL
Refills: 0 | Status: COMPLETED | OUTPATIENT
Start: 2019-09-17 | End: 2019-09-17

## 2019-09-17 RX ORDER — SOD SULF/SODIUM/NAHCO3/KCL/PEG
4000 SOLUTION, RECONSTITUTED, ORAL ORAL ONCE
Refills: 0 | Status: COMPLETED | OUTPATIENT
Start: 2019-09-17 | End: 2019-09-17

## 2019-09-17 RX ORDER — QUETIAPINE FUMARATE 200 MG/1
25 TABLET, FILM COATED ORAL AT BEDTIME
Refills: 0 | Status: DISCONTINUED | OUTPATIENT
Start: 2019-09-17 | End: 2019-09-17

## 2019-09-17 RX ORDER — LANOLIN ALCOHOL/MO/W.PET/CERES
3 CREAM (GRAM) TOPICAL AT BEDTIME
Refills: 0 | Status: DISCONTINUED | OUTPATIENT
Start: 2019-09-17 | End: 2019-09-17

## 2019-09-17 RX ADMIN — Medication 50 MILLIGRAM(S): at 06:43

## 2019-09-17 RX ADMIN — Medication 10 MILLIGRAM(S): at 23:29

## 2019-09-17 RX ADMIN — NEOMYCIN SULFATE 500 MILLIGRAM(S): 500 TABLET ORAL at 23:29

## 2019-09-17 RX ADMIN — Medication 500 MILLIGRAM(S): at 23:30

## 2019-09-17 RX ADMIN — Medication 500 MILLIGRAM(S): at 18:47

## 2019-09-17 RX ADMIN — ENOXAPARIN SODIUM 40 MILLIGRAM(S): 100 INJECTION SUBCUTANEOUS at 12:31

## 2019-09-17 RX ADMIN — Medication 50 MILLIGRAM(S): at 23:29

## 2019-09-17 RX ADMIN — Medication 250 MILLIGRAM(S): at 17:29

## 2019-09-17 RX ADMIN — NEOMYCIN SULFATE 500 MILLIGRAM(S): 500 TABLET ORAL at 17:29

## 2019-09-17 RX ADMIN — Medication 4000 MILLILITER(S): at 17:38

## 2019-09-17 RX ADMIN — Medication 500 MILLIGRAM(S): at 17:43

## 2019-09-17 RX ADMIN — Medication 50 MILLIGRAM(S): at 14:20

## 2019-09-17 RX ADMIN — Medication 10 MILLIGRAM(S): at 17:29

## 2019-09-17 RX ADMIN — Medication 10 MILLIGRAM(S): at 12:31

## 2019-09-17 RX ADMIN — Medication 81 MILLIGRAM(S): at 12:31

## 2019-09-17 RX ADMIN — NEOMYCIN SULFATE 500 MILLIGRAM(S): 500 TABLET ORAL at 18:46

## 2019-09-17 RX ADMIN — Medication 250 MILLIGRAM(S): at 06:43

## 2019-09-17 NOTE — PROGRESS NOTE ADULT - SUBJECTIVE AND OBJECTIVE BOX
Patient was seen and examined  Patient is a 69y old  Male who presents with a chief complaint of cellulitis and right colon mass (17 Sep 2019 08:46)      INTERVAL HPI/OVERNIGHT EVENTS:  T(C): 36.7 (09-17-19 @ 05:40), Max: 37 (09-17-19 @ 02:58)  HR: 70 (09-17-19 @ 05:40) (64 - 87)  BP: 161/74 (09-17-19 @ 05:40) (132/80 - 171/75)  RR: 17 (09-17-19 @ 05:40) (17 - 19)  SpO2: 99% (09-17-19 @ 05:40) (97% - 100%)  Wt(kg): --  I&O's Summary    16 Sep 2019 07:01  -  17 Sep 2019 07:00  --------------------------------------------------------  IN: 300 mL / OUT: 0 mL / NET: 300 mL        LABS:                        9.9    3.81  )-----------( 184      ( 17 Sep 2019 08:48 )             30.5     09-15    139  |  105  |  9   ----------------------------<  105<H>  3.7   |  29  |  0.93    Ca    8.9      15 Sep 2019 10:27    TPro  7.9  /  Alb  3.2<L>  /  TBili  0.5  /  DBili  x   /  AST  17  /  ALT  13  /  AlkPhos  88  09-15        CAPILLARY BLOOD GLUCOSE                  MEDICATIONS  (STANDING):  aspirin enteric coated 81 milliGRAM(s) Oral daily  aztreonam  IVPB 1000 milliGRAM(s) IV Intermittent every 8 hours  chlorhexidine 4% Liquid 1 Application(s) Topical once  enoxaparin Injectable 40 milliGRAM(s) SubCutaneous daily  hydrALAZINE 10 milliGRAM(s) Oral every 6 hours  influenza   Vaccine 0.5 milliLiter(s) IntraMuscular once  vancomycin  IVPB 1000 milliGRAM(s) IV Intermittent every 12 hours    MEDICATIONS  (PRN):      RADIOLOGY & ADDITIONAL TESTS:    Imaging Personally Reviewed:  [ ] YES  [ ] NO    REVIEW OF SYSTEMS:  CONSTITUTIONAL: No fever, weight loss, or fatigue  EYES: No eye pain, visual disturbances, or discharge  ENMT:  No difficulty hearing, tinnitus, vertigo; No sinus or throat pain  NECK: No pain or stiffness  BREASTS: No pain, masses, or nipple discharge  RESPIRATORY: No cough, wheezing, chills or hemoptysis; No shortness of breath  CARDIOVASCULAR: No chest pain, palpitations, dizziness, or leg swelling  GASTROINTESTINAL: No abdominal or epigastric pain. No nausea, vomiting, or hematemesis; No diarrhea or constipation. No melena or hematochezia.  GENITOURINARY: No dysuria, frequency, hematuria, or incontinence  NEUROLOGICAL: No headaches, memory loss, loss of strength, numbness, or tremors  SKIN: No itching, burning, rashes, or lesions   LYMPH NODES: No enlarged glands  ENDOCRINE: No heat or cold intolerance; No hair loss  MUSCULOSKELETAL: No joint pain or swelling; No muscle, back, or extremity pain  PSYCHIATRIC: No depression, anxiety, mood swings, or difficulty sleeping  HEME/LYMPH: No easy bruising, or bleeding gums  ALLERY AND IMMUNOLOGIC: No hives or eczema      Consultant(s) Notes Reviewed:  [ ] YES  [ ] NO    PHYSICAL EXAM:  GENERAL: NAD, well-groomed, well-developed  HEAD:  Atraumatic, Normocephalic  EYES: EOMI, PERRLA, conjunctiva and sclera clear  ENMT: No tonsillar erythema, exudates, or enlargement; Moist mucous membranes, Good dentition, No lesions  NECK: Supple, No JVD, Normal thyroid  NERVOUS SYSTEM:  Alert & Oriented X1, Good concentration; Motor Strength 5/5 B/L upper and lower extremities; DTRs 2+ intact and symmetric  CHEST/LUNG: Clear to percussion bilaterally; No rales, rhonchi, wheezing, or rubs  HEART: Regular rate and rhythm; No murmurs, rubs, or gallops  ABDOMEN: Soft, Nontender, Nondistended; Bowel sounds present  EXTREMITIES:  2+ Peripheral Pulses, No clubbing, cyanosis, or edema  LYMPH: No lymphadenopathy noted  SKIN: No rashes or lesions    Care Discussed with Consultants/Other Providers [ x] YES  [ ] NO

## 2019-09-17 NOTE — PROGRESS NOTE ADULT - ASSESSMENT
69 Years old male from home with dementia was sent in by dr Quiroz office for right sided colonic mass which was recently diagnosed and cellulitis.  1.Await repeat colonoscopy.  2.Surgical f/u.  3.GI f/u.  4.HTN-IV hydralazine.  5.Cellulitis-ABX.  6.GI and DVT prophylaxis.

## 2019-09-17 NOTE — PROGRESS NOTE ADULT - SUBJECTIVE AND OBJECTIVE BOX
Patient is a 69y old  Male who presents with a chief complaint of cellulitis and right colon mass (15 Sep 2019 13:14)      HPI:  69 Years old male from home with dementia was sent in by dr Quiroz office for right sided colonic mass which was recently diagnosed. Patient also have cellulitis which was treated with bactrim as outpatient but have not improved.  History source is dr Quiroz office. Patient does not gave any history but only points to lower extremity ulcers on left lower extremity. History very limited due to dementia.  Pt denies any chest pain, shortness of breath, fever, abdominal pain, change in urinary or bowel habits.  Patient is being admitted to workup for right colon mass and possible right isded colectomy. Plan is to involve GI doctor, surgery and cardiology for pre-op surgical clearance. We will need ID consult and podiatry consult for lower extremity ulcers. (12 Sep 2019 18:59)    Patient seen at bedside this PM for consult on possible left leg cellulitis.  Patient was seen in the ED recently for left leg wounds and was referred to wound care clinic in which he followed up.  Home nursing was provided for every other day dressing changes.  Pt was supposed to follow up again with wound care clinic on thursday but was admitted to the hospital.  He states the dressing on his left leg has been changed regularly.  He admits to wounds in his lower legs of greater than 18 years off and on.  He states he had seen podiatrist and ED providers off and on for treatment and had home nursing for a period of time before they stopped.  He denies n/v/f at this time.     Patient seen bedside this AM resting comfortably.  He states he happy to see us and has been resting.  He denies n/v/f/ at this time.  Denies any other foot or leg complaints.     PMH:Dementia  No pertinent past medical history  No pertinent past medical history    Allergies: penicillin (Other)    Medications: aspirin enteric coated 81 milliGRAM(s) Oral daily  aztreonam  IVPB 1000 milliGRAM(s) IV Intermittent every 8 hours  chlorhexidine 4% Liquid 1 Application(s) Topical once  enoxaparin Injectable 40 milliGRAM(s) SubCutaneous daily  erythromycin     base Tablet 500 milliGRAM(s) Oral <User Schedule>  hydrALAZINE 10 milliGRAM(s) Oral every 6 hours  influenza   Vaccine 0.5 milliLiter(s) IntraMuscular once  neomycin 500 milliGRAM(s) Oral <User Schedule>  polyethylene glycol/electrolyte Solution. 4000 milliLiter(s) Oral once  vancomycin  IVPB 1000 milliGRAM(s) IV Intermittent every 12 hours    FH:No pertinent family history in first degree relatives    PSX: No significant past surgical history  No significant past surgical history    SH: aspirin enteric coated 81 milliGRAM(s) Oral daily  aztreonam  IVPB 1000 milliGRAM(s) IV Intermittent every 8 hours  chlorhexidine 4% Liquid 1 Application(s) Topical once  enoxaparin Injectable 40 milliGRAM(s) SubCutaneous daily  erythromycin     base Tablet 500 milliGRAM(s) Oral <User Schedule>  hydrALAZINE 10 milliGRAM(s) Oral every 6 hours  influenza   Vaccine 0.5 milliLiter(s) IntraMuscular once  neomycin 500 milliGRAM(s) Oral <User Schedule>  polyethylene glycol/electrolyte Solution. 4000 milliLiter(s) Oral once  vancomycin  IVPB 1000 milliGRAM(s) IV Intermittent every 12 hours    Vital Signs Last 24 Hrs  T(C): 36.7 (17 Sep 2019 05:40), Max: 37 (17 Sep 2019 02:58)  T(F): 98.1 (17 Sep 2019 05:40), Max: 98.6 (17 Sep 2019 02:58)  HR: 70 (17 Sep 2019 05:40) (64 - 87)  BP: 161/74 (17 Sep 2019 05:40) (132/80 - 171/75)  BP(mean): --  RR: 17 (17 Sep 2019 05:40) (17 - 19)  SpO2: 99% (17 Sep 2019 05:40) (97% - 100%)                          12.0   4.97  )-----------( 222      ( 15 Sep 2019 10:27 )             37.5     09-15    139  |  105  |  9   ----------------------------<  105<H>  3.7   |  29  |  0.93    Ca    8.9      15 Sep 2019 10:27    TPro  7.9  /  Alb  3.2<L>  /  TBili  0.5  /  DBili  x   /  AST  17  /  ALT  13  /  AlkPhos  88  09-15    WBC Count: 4.97 K/uL (09-15 @ 10:27)  WBC Count: 4.68 K/uL (09-13 @ 06:54)  WBC Count: 4.85 K/uL (09-12 @ 11:59)      PHYSICAL EXAM  GEN: LORETA ATKINSON is a pleasant well-nourished, well developed 69y Male in no acute distress, alert awake, and oriented to person, place and time.   LE Focused:    Vasc:  DP 2/4 b/l.  PT pulses non-palpable due to wound on the left and thickened skin on the right.  CFT 5sec to all digits.  Mild edema to the left lower leg relative to the right.  Derm: Hyperpigmentation changes from chronic venous stasis b/l.  No open lesion on right leg.  Wound #1: Left medial ankle measuring 4.5cm x 5.0cm x 0.3cm with a fibrogranular base and surrounding hyperkeratotic tissue with patches of hypopigmenation.  No malodor, minimal serous drainage, no probe to bone, no increased in warmth, no surrounding erythema.  Wound #2: Left lateral ankle measuring 1.2cm x 1.2cm x 0.2cm with well defined margins and fibrogranular base with patches of hypopigmentation. no malodor, minimal serous drainage, no probe to bone, no increased in warmth, no surrounding erythema.  Neuro: Diminished protective sensation b/l  MSK: Decreased ankle joint range of motion b/l.  No pain with palpation of ulcers.    Imaging:   < from: Xray Ankle Complete 3 Views, Left (09.12.19 @ 14:15) >    EXAM:  ANKLE LEFT (MINIMUM 3 V)                            PROCEDURE DATE:  09/12/2019          INTERPRETATION:  CLINICAL STATEMENT: Ulcer    TECHNIQUE: AP, lateral and oblique views of the left ankle.    COMPARISON: 9/4/2019    FINDINGS:    There is no acute fracture or dislocation.  Joint spaces are intact.   There is no lytic or blastic lesion. Again seen is periosteal reaction   along the shaft of the fibula and between the tibia and fibula distally.   There are surgical clips in the soft tissues. There is soft tissue   swelling. There are degenerative changes in the midfoot. There is a   plantar calcaneal spur. There is a large amount of soft tissue   ossification posteriorly at the level of the distal leg.    IMPRESSION:    No plain film evidence of osteomyelitis    HELEN LEARY M.D.,ATTENDING RADIOLOGIST  This document has been electronically signed. Sep 12 2019  3:18PM      < end of copied text >    Cultures: Culture Results:   No growth (09-14 @ 10:20)  Culture Results:   No growth to date. (09-12 @ 19:31)  Culture Results:   No growth to date. (09-12 @ 19:30)      A: Venous stasis ulcerations, left ankle  Chronic non-pressure ulcers, left ankle    P:  Patient evaluated, chart reviewed  Xrays reviewed as above- no OM  Dressing applied with santyl, 4x4 gauze, Binu, ACE wrap  CALI ordered-pending  ESR/CRP ordered- reviewed  Possible bedside biopsy following surgical team's procedures.    Podiatry will following while in house  Discussed with attending Dr. Johnston Patient is a 69y old  Male who presents with a chief complaint of cellulitis and right colon mass (15 Sep 2019 13:14)      HPI:  69 Years old male from home with dementia was sent in by dr Quiroz office for right sided colonic mass which was recently diagnosed. Patient also have cellulitis which was treated with bactrim as outpatient but have not improved.  History source is dr Quiroz office. Patient does not gave any history but only points to lower extremity ulcers on left lower extremity. History very limited due to dementia.  Pt denies any chest pain, shortness of breath, fever, abdominal pain, change in urinary or bowel habits.  Patient is being admitted to workup for right colon mass and possible right isded colectomy. Plan is to involve GI doctor, surgery and cardiology for pre-op surgical clearance. We will need ID consult and podiatry consult for lower extremity ulcers. (12 Sep 2019 18:59)    Patient seen at bedside this PM for consult on possible left leg cellulitis.  Patient was seen in the ED recently for left leg wounds and was referred to wound care clinic in which he followed up.  Home nursing was provided for every other day dressing changes.  Pt was supposed to follow up again with wound care clinic on thursday but was admitted to the hospital.  He states the dressing on his left leg has been changed regularly.  He admits to wounds in his lower legs of greater than 18 years off and on.  He states he had seen podiatrist and ED providers off and on for treatment and had home nursing for a period of time before they stopped.  He denies n/v/f at this time.     Patient seen bedside this AM resting comfortably.  He states he happy to see us and has been resting.  He denies n/v/f/ at this time.  Denies any other foot or leg complaints.     PMH:Dementia  No pertinent past medical history  No pertinent past medical history    Allergies: penicillin (Other)    Medications: aspirin enteric coated 81 milliGRAM(s) Oral daily  aztreonam  IVPB 1000 milliGRAM(s) IV Intermittent every 8 hours  chlorhexidine 4% Liquid 1 Application(s) Topical once  enoxaparin Injectable 40 milliGRAM(s) SubCutaneous daily  erythromycin     base Tablet 500 milliGRAM(s) Oral <User Schedule>  hydrALAZINE 10 milliGRAM(s) Oral every 6 hours  influenza   Vaccine 0.5 milliLiter(s) IntraMuscular once  neomycin 500 milliGRAM(s) Oral <User Schedule>  polyethylene glycol/electrolyte Solution. 4000 milliLiter(s) Oral once  vancomycin  IVPB 1000 milliGRAM(s) IV Intermittent every 12 hours    FH:No pertinent family history in first degree relatives    PSX: No significant past surgical history  No significant past surgical history    SH: aspirin enteric coated 81 milliGRAM(s) Oral daily  aztreonam  IVPB 1000 milliGRAM(s) IV Intermittent every 8 hours  chlorhexidine 4% Liquid 1 Application(s) Topical once  enoxaparin Injectable 40 milliGRAM(s) SubCutaneous daily  erythromycin     base Tablet 500 milliGRAM(s) Oral <User Schedule>  hydrALAZINE 10 milliGRAM(s) Oral every 6 hours  influenza   Vaccine 0.5 milliLiter(s) IntraMuscular once  neomycin 500 milliGRAM(s) Oral <User Schedule>  polyethylene glycol/electrolyte Solution. 4000 milliLiter(s) Oral once  vancomycin  IVPB 1000 milliGRAM(s) IV Intermittent every 12 hours    Vital Signs Last 24 Hrs  T(C): 36.7 (17 Sep 2019 05:40), Max: 37 (17 Sep 2019 02:58)  T(F): 98.1 (17 Sep 2019 05:40), Max: 98.6 (17 Sep 2019 02:58)  HR: 70 (17 Sep 2019 05:40) (64 - 87)  BP: 161/74 (17 Sep 2019 05:40) (132/80 - 171/75)  BP(mean): --  RR: 17 (17 Sep 2019 05:40) (17 - 19)  SpO2: 99% (17 Sep 2019 05:40) (97% - 100%)                          12.0   4.97  )-----------( 222      ( 15 Sep 2019 10:27 )             37.5     09-15    139  |  105  |  9   ----------------------------<  105<H>  3.7   |  29  |  0.93    Ca    8.9      15 Sep 2019 10:27    TPro  7.9  /  Alb  3.2<L>  /  TBili  0.5  /  DBili  x   /  AST  17  /  ALT  13  /  AlkPhos  88  09-15    WBC Count: 4.97 K/uL (09-15 @ 10:27)  WBC Count: 4.68 K/uL (09-13 @ 06:54)  WBC Count: 4.85 K/uL (09-12 @ 11:59)      PHYSICAL EXAM  GEN: LORETA ATKINSON is a pleasant well-nourished, well developed 69y Male in no acute distress, alert awake, and oriented to person, place and time.   LE Focused:    Vasc:  DP 2/4 b/l.  PT pulses non-palpable due to wound on the left and thickened skin on the right.  CFT 5sec to all digits.  Mild edema to the left lower leg relative to the right.  Derm: Hyperpigmentation changes from chronic venous stasis b/l.  No open lesion on right leg.  Wound #1: Left medial ankle measuring 4.5cm x 5.0cm x 0.3cm with a fibrogranular base and surrounding hyperkeratotic tissue with patches of hypopigmenation.  No malodor, minimal serous drainage, no probe to bone, no increased in warmth, no surrounding erythema.  Wound #2: Left lateral ankle measuring 1.2cm x 1.2cm x 0.2cm with well defined margins and fibrogranular base with patches of hypopigmentation. no malodor, minimal serous drainage, no probe to bone, no increased in warmth, no surrounding erythema.  Neuro: Diminished protective sensation b/l  MSK: Decreased ankle joint range of motion b/l.  No pain with palpation of ulcers.    Imaging:   < from: Xray Ankle Complete 3 Views, Left (09.12.19 @ 14:15) >    EXAM:  ANKLE LEFT (MINIMUM 3 V)                            PROCEDURE DATE:  09/12/2019          INTERPRETATION:  CLINICAL STATEMENT: Ulcer    TECHNIQUE: AP, lateral and oblique views of the left ankle.    COMPARISON: 9/4/2019    FINDINGS:    There is no acute fracture or dislocation.  Joint spaces are intact.   There is no lytic or blastic lesion. Again seen is periosteal reaction   along the shaft of the fibula and between the tibia and fibula distally.   There are surgical clips in the soft tissues. There is soft tissue   swelling. There are degenerative changes in the midfoot. There is a   plantar calcaneal spur. There is a large amount of soft tissue   ossification posteriorly at the level of the distal leg.    IMPRESSION:    No plain film evidence of osteomyelitis    HELEN LEARY M.D.,ATTENDING RADIOLOGIST  This document has been electronically signed. Sep 12 2019  3:18PM      < end of copied text >    Cultures: Culture Results:   No growth (09-14 @ 10:20)  Culture Results:   No growth to date. (09-12 @ 19:31)  Culture Results:   No growth to date. (09-12 @ 19:30)      A: Venous stasis ulcerations, left ankle  Chronic non-pressure ulcers, left ankle    P:  Patient evaluated, chart reviewed  Xrays reviewed as above- no OM  Dressing applied with santyl, 4x4 gauze, Binu, ACE wrap  CALI ordered-pending  ESR/CRP ordered- reviewed  Possible bedside biopsy following surgical team's procedures.    Podiatry will following while in house  Seen with attending Dr. Johnston

## 2019-09-17 NOTE — PROGRESS NOTE ADULT - SUBJECTIVE AND OBJECTIVE BOX
Surgery pre-op    Diagnosis: right colon mass  Procedure: lap assisted right hemicolectomy    Vital Signs Last 24 Hrs  T(C): 36.7 (17 Sep 2019 14:23), Max: 37 (17 Sep 2019 02:58)  T(F): 98.1 (17 Sep 2019 14:23), Max: 98.6 (17 Sep 2019 02:58)  HR: 54 (17 Sep 2019 14:23) (54 - 87)  BP: 141/46 (17 Sep 2019 14:23) (140/66 - 171/75)  BP(mean): --  RR: 17 (17 Sep 2019 14:23) (17 - 19)  SpO2: 100% (17 Sep 2019 14:23) (97% - 100%)                          9.9    3.81  )-----------( 184      ( 17 Sep 2019 08:48 )             30.5   09-17    141  |  109<H>  |  7   ----------------------------<  79  3.8   |  29  |  0.76    Ca    8.4      17 Sep 2019 08:48  Phos  2.5     09-17  Mg     2.1     09-17    TPro  6.7  /  Alb  2.7<L>  /  TBili  0.6  /  DBili  x   /  AST  24  /  ALT  19  /  AlkPhos  71  09-17

## 2019-09-17 NOTE — PROGRESS NOTE ADULT - SUBJECTIVE AND OBJECTIVE BOX
PGY 1 Note discussed with supervising resident and primary attending    Patient is a 69y old  Male who presents with a chief complaint of cellulitis and right colon mass (17 Sep 2019 10:45)      INTERVAL HPI/OVERNIGHT EVENTS: offers no new complaints; current symptoms resolving    MEDICATIONS  (STANDING):  aspirin enteric coated 81 milliGRAM(s) Oral daily  aztreonam  IVPB 1000 milliGRAM(s) IV Intermittent every 8 hours  chlorhexidine 4% Liquid 1 Application(s) Topical once  enoxaparin Injectable 40 milliGRAM(s) SubCutaneous daily  hydrALAZINE 10 milliGRAM(s) Oral every 6 hours  influenza   Vaccine 0.5 milliLiter(s) IntraMuscular once  vancomycin  IVPB 1000 milliGRAM(s) IV Intermittent every 12 hours    MEDICATIONS  (PRN):      __________________________________________________  REVIEW OF SYSTEMS:    CONSTITUTIONAL: No fever,   EYES: no acute visual disturbances  NECK: No pain or stiffness  RESPIRATORY: No cough; No shortness of breath  CARDIOVASCULAR: No chest pain, no palpitations  GASTROINTESTINAL: No pain. No nausea or vomiting; No diarrhea   NEUROLOGICAL: No headache or numbness, no tremors  MUSCULOSKELETAL: No joint pain, no muscle pain  GENITOURINARY: no dysuria, no frequency, no hesitancy  PSYCHIATRY: no depression , no anxiety  ALL OTHER  ROS negative        Vital Signs Last 24 Hrs  T(C): 36.7 (17 Sep 2019 05:40), Max: 37 (17 Sep 2019 02:58)  T(F): 98.1 (17 Sep 2019 05:40), Max: 98.6 (17 Sep 2019 02:58)  HR: 70 (17 Sep 2019 05:40) (64 - 87)  BP: 161/74 (17 Sep 2019 05:40) (132/80 - 171/75)  BP(mean): --  RR: 17 (17 Sep 2019 05:40) (17 - 19)  SpO2: 99% (17 Sep 2019 05:40) (97% - 100%)    ________________________________________________  PHYSICAL EXAM:  GENERAL: NAD  HEENT: Normocephalic;  conjunctivae and sclerae clear; moist mucous membranes;   NECK : supple  CHEST/LUNG: Clear to auscultation bilaterally with good air entry   HEART: S1 S2  regular; no murmurs, gallops or rubs  ABDOMEN: Soft, Nontender, Nondistended; Bowel sounds present  EXTREMITIES: no cyanosis; no edema; no calf tenderness  SKIN: warm and dry; no rash  NERVOUS SYSTEM:  Awake and alert; Oriented  to place, person and time ; no new deficits    _________________________________________________  LABS:                        9.9    3.81  )-----------( 184      ( 17 Sep 2019 08:48 )             30.5     09-17    141  |  109<H>  |  7   ----------------------------<  79  3.8   |  29  |  0.76    Ca    8.4      17 Sep 2019 08:48  Phos  2.5     09-17  Mg     2.1     09-17    TPro  6.7  /  Alb  2.7<L>  /  TBili  0.6  /  DBili  x   /  AST  24  /  ALT  19  /  AlkPhos  71  09-17        CAPILLARY BLOOD GLUCOSE            RADIOLOGY & ADDITIONAL TESTS:  < from: CT Abdomen and Pelvis w/ Oral Cont (09.13.19 @ 15:12) >  IMPRESSION:   Limited study for the assessment of a colonic mass given the absence of   IV contrast and oral contrast within the large bowel. Correlation with   colonoscopy is recommended.    Circumferential bladder wall thickening. Please correlate clinically with   urinalysis and urine culture as cystitis can be considered.    Nonspecific bilateral inguinal lymph nodes.    < end of copied text >    Imaging Personally Reviewed:  YES/NO    Consultant(s) Notes Reviewed:   YES/ No    Care Discussed with Consultants :     Plan of care was discussed with patient and /or primary care giver; all questions and concerns were addressed and care was aligned with patient's wishes.

## 2019-09-17 NOTE — PROGRESS NOTE ADULT - SUBJECTIVE AND OBJECTIVE BOX
[   ] ICU                                          [   ] CCU                                      [  X ] Medical Floor      Patient is comfortable. No new complaints reported, No abdominal pain, N/V, hematemesis, hematochezia, melena, fever, chills, chest pain, SOB, cough or diarrhea reported.    VITALS  Vital Signs Last 24 Hrs  T(C): 36.7 (17 Sep 2019 14:23), Max: 37 (17 Sep 2019 02:58)  T(F): 98.1 (17 Sep 2019 14:23), Max: 98.6 (17 Sep 2019 02:58)  HR: 54 (17 Sep 2019 14:23) (54 - 78)  BP: 141/46 (17 Sep 2019 14:23) (140/66 - 171/75)   RR: 17 (17 Sep 2019 14:23) (17 - 19)  SpO2: 100% (17 Sep 2019 14:23) (97% - 100%)         MEDICATIONS  (STANDING):  aspirin enteric coated 81 milliGRAM(s) Oral daily  aztreonam  IVPB 1000 milliGRAM(s) IV Intermittent every 8 hours  chlorhexidine 2% Cloths 1 Application(s) Topical daily  chlorhexidine 4% Liquid 1 Application(s) Topical once  enoxaparin Injectable 40 milliGRAM(s) SubCutaneous daily  erythromycin     base Tablet 500 milliGRAM(s) Oral <User Schedule>  hydrALAZINE 10 milliGRAM(s) Oral every 6 hours  influenza   Vaccine 0.5 milliLiter(s) IntraMuscular once  neomycin 500 milliGRAM(s) Oral <User Schedule>  polyethylene glycol/electrolyte Solution. 4000 milliLiter(s) Oral once  vancomycin  IVPB 1000 milliGRAM(s) IV Intermittent every 12 hours    MEDICATIONS  (PRN):                            9.9    3.81  )-----------( 184      ( 17 Sep 2019 08:48 )             30.5       09-17    141  |  109<H>  |  7   ----------------------------<  79  3.8   |  29  |  0.76    Ca    8.4      17 Sep 2019 08:48  Phos  2.5     09-17  Mg     2.1     09-17    TPro  6.7  /  Alb  2.7<L>  /  TBili  0.6  /  DBili  x   /  AST  24  /  ALT  19  /  AlkPhos  71  09-17

## 2019-09-17 NOTE — PROGRESS NOTE ADULT - ASSESSMENT
69 Years old male from home with dementia was sent in by dr Quiroz office for right sided colonic mass and cellulitis of LLE.  REPEAT GI BEDOLLA

## 2019-09-17 NOTE — PROGRESS NOTE ADULT - ASSESSMENT
68yo male with right colon mass undergoing lap assisted right colon mass    1) npo after midnight  2) pre-op labs  3) bowel prep ordered to start today  4) consent to be obtained from family  5) please optimize pt medically for surgery, note clearance in chart

## 2019-09-17 NOTE — PROGRESS NOTE ADULT - SUBJECTIVE AND OBJECTIVE BOX
CHIEF COMPLAINT:Patient is a 69y old  Male who presents with a chief complaint of cellulitis and right colon mass.Pt appears comfortable.    	  REVIEW OF SYSTEMS:  CONSTITUTIONAL: No fever, weight loss, or fatigue  EYES: No eye pain, visual disturbances, or discharge  ENT:  No difficulty hearing, tinnitus, vertigo; No sinus or throat pain  NECK: No pain or stiffness  RESPIRATORY: No cough, wheezing, chills or hemoptysis; No Shortness of Breath  CARDIOVASCULAR: No chest pain, palpitations, passing out, dizziness, or leg swelling  GASTROINTESTINAL: No abdominal or epigastric pain. No nausea, vomiting, or hematemesis; No diarrhea or constipation. No melena or hematochezia.  GENITOURINARY: No dysuria, frequency, hematuria, or incontinence  NEUROLOGICAL: No headaches, memory loss, loss of strength, numbness, or tremors  SKIN: No itching, burning, rashes, or lesions   LYMPH Nodes: No enlarged glands  ENDOCRINE: No heat or cold intolerance; No hair loss  MUSCULOSKELETAL: No joint pain or swelling; No muscle, back, or extremity pain  PSYCHIATRIC: No depression, anxiety, mood swings, or difficulty sleeping  HEME/LYMPH: No easy bruising, or bleeding gums  ALLERGY AND IMMUNOLOGIC: No hives or eczema	    PHYSICAL EXAM:  T(C): 36.7 (09-17-19 @ 05:40), Max: 37 (09-17-19 @ 02:58)  HR: 70 (09-17-19 @ 05:40) (64 - 87)  BP: 161/74 (09-17-19 @ 05:40) (132/80 - 171/75)  RR: 17 (09-17-19 @ 05:40) (17 - 19)  SpO2: 99% (09-17-19 @ 05:40) (97% - 100%)  Wt(kg): --  I&O's Summary    16 Sep 2019 07:01  -  17 Sep 2019 07:00  --------------------------------------------------------  IN: 300 mL / OUT: 0 mL / NET: 300 mL        Appearance: Normal	  HEENT:   Normal oral mucosa, PERRL, EOMI	  Lymphatic: No lymphadenopathy  Cardiovascular: Normal S1 S2, No JVD, No murmurs, No edema  Respiratory: Lungs clear to auscultation	  Psychiatry: A & O x 3, Mood & affect appropriate  Gastrointestinal:  Soft, Non-tender, + BS	  Skin: No rashes, No ecchymoses, No cyanosis	  Neurologic: Non-focal  Extremities: Normal range of motion, No clubbing, cyanosis or edema  Vascular: Peripheral pulses palpable 2+ bilaterally    MEDICATIONS  (STANDING):  aspirin enteric coated 81 milliGRAM(s) Oral daily  aztreonam  IVPB 1000 milliGRAM(s) IV Intermittent every 8 hours  chlorhexidine 4% Liquid 1 Application(s) Topical once  enoxaparin Injectable 40 milliGRAM(s) SubCutaneous daily  hydrALAZINE 10 milliGRAM(s) Oral every 6 hours  influenza   Vaccine 0.5 milliLiter(s) IntraMuscular once  vancomycin  IVPB 1000 milliGRAM(s) IV Intermittent every 12 hours        	  LABS:	 	                        9.9    3.81  )-----------( 184      ( 17 Sep 2019 08:48 )             30.5     09-17    141  |  109<H>  |  7   ----------------------------<  79  3.8   |  29  |  0.76    Ca    8.4      17 Sep 2019 08:48  Phos  2.5     09-17  Mg     2.1     09-17    TPro  6.7  /  Alb  2.7<L>  /  TBili  0.6  /  DBili  x   /  AST  24  /  ALT  19  /  AlkPhos  71  09-17    proBNP: Serum Pro-Brain Natriuretic Peptide: 180 pg/mL (09-12 @ 11:59)    Lipid Profile: Cholesterol 117  LDL 60  HDL 50  TG 36    HgA1c: Hemoglobin A1C, Whole Blood: 5.3 % (09-13 @ 10:32)    TSH: Thyroid Stimulating Hormone, Serum: 1.33 uU/mL (09-13 @ 06:54)      IMPRESSIONS:Normal Study  * Negative ECG evidence of ischemia after IV of Lexiscan.  * Review of raw data shows: The study is of good technical  quality.  * The left ventricle was normal in size. Normal myocardial  perfusion scan, with no evidence of infarction or  inducible ischemia.  * Gated wall motion analysis is performed, and shows  normal wall motion with post stress LVEF of 57%.

## 2019-09-17 NOTE — PROGRESS NOTE ADULT - ASSESSMENT
69 Years old male from home with dementia was sent in by dr Quiroz office for right sided colonic mass and cellulitis of LLE. s/p colonoscopy which shows mass at the hepatic flexor- surgery consulted for fu and will try to get consent from family for surgery.  REPEAT GI BEDOLLA

## 2019-09-17 NOTE — PROGRESS NOTE ADULT - ASSESSMENT
1. Colonic mass  2. Anemia    Suggestions:    1. Monitor H/H  2. Transfuse PRBC as needed  3. Surgical follow up  4. Avoid NSAID  5. Check CEA  6. DVT prophylaxis

## 2019-09-17 NOTE — PROGRESS NOTE ADULT - SUBJECTIVE AND OBJECTIVE BOX
INTERVAL HPI/OVERNIGHT EVENTS:  Pt stable.   Colonoscopy findings noted      Vital Signs Last 24 Hrs  T(C): 36.7 (17 Sep 2019 14:23), Max: 37 (17 Sep 2019 02:58)  T(F): 98.1 (17 Sep 2019 14:23), Max: 98.6 (17 Sep 2019 02:58)  HR: 54 (17 Sep 2019 14:23) (54 - 87)  BP: 141/46 (17 Sep 2019 14:23) (140/66 - 171/75)  BP(mean): --  RR: 17 (17 Sep 2019 14:23) (17 - 19)  SpO2: 100% (17 Sep 2019 14:23) (97% - 100%)    Physical:  Abdomen: Soft nondistended, nontender.  No masses    I&O's Summary    16 Sep 2019 07:01  -  17 Sep 2019 07:00  --------------------------------------------------------  IN: 300 mL / OUT: 0 mL / NET: 300 mL        LABS:                        9.9    3.81  )-----------( 184      ( 17 Sep 2019 08:48 )             30.5             09-17    141  |  109<H>  |  7   ----------------------------<  79  3.8   |  29  |  0.76    Ca    8.4      17 Sep 2019 08:48  Phos  2.5     09-17  Mg     2.1     09-17    TPro  6.7  /  Alb  2.7<L>  /  TBili  0.6  /  DBili  x   /  AST  24  /  ALT  19  /  AlkPhos  71  09-17

## 2019-09-18 ENCOUNTER — RESULT REVIEW (OUTPATIENT)
Age: 69
End: 2019-09-18

## 2019-09-18 LAB
ABO RH CONFIRMATION: SIGNIFICANT CHANGE UP
ALBUMIN SERPL ELPH-MCNC: 2.9 G/DL — LOW (ref 3.5–5)
ALP SERPL-CCNC: 67 U/L — SIGNIFICANT CHANGE UP (ref 40–120)
ALT FLD-CCNC: 32 U/L DA — SIGNIFICANT CHANGE UP (ref 10–60)
ANION GAP SERPL CALC-SCNC: 4 MMOL/L — LOW (ref 5–17)
APTT BLD: 35.3 SEC — SIGNIFICANT CHANGE UP (ref 27.5–36.3)
AST SERPL-CCNC: 37 U/L — SIGNIFICANT CHANGE UP (ref 10–40)
BILIRUB SERPL-MCNC: 0.6 MG/DL — SIGNIFICANT CHANGE UP (ref 0.2–1.2)
BUN SERPL-MCNC: 6 MG/DL — LOW (ref 7–18)
CALCIUM SERPL-MCNC: 8.8 MG/DL — SIGNIFICANT CHANGE UP (ref 8.4–10.5)
CHLORIDE SERPL-SCNC: 108 MMOL/L — SIGNIFICANT CHANGE UP (ref 96–108)
CO2 SERPL-SCNC: 28 MMOL/L — SIGNIFICANT CHANGE UP (ref 22–31)
CREAT SERPL-MCNC: 0.78 MG/DL — SIGNIFICANT CHANGE UP (ref 0.5–1.3)
GLUCOSE BLDC GLUCOMTR-MCNC: 138 MG/DL — HIGH (ref 70–99)
GLUCOSE SERPL-MCNC: 78 MG/DL — SIGNIFICANT CHANGE UP (ref 70–99)
HCT VFR BLD CALC: 30.6 % — LOW (ref 39–50)
HCT VFR BLD CALC: 32.1 % — LOW (ref 39–50)
HGB BLD-MCNC: 10.4 G/DL — LOW (ref 13–17)
HGB BLD-MCNC: 9.8 G/DL — LOW (ref 13–17)
INR BLD: 1.43 RATIO — HIGH (ref 0.88–1.16)
MCHC RBC-ENTMCNC: 29.8 PG — SIGNIFICANT CHANGE UP (ref 27–34)
MCHC RBC-ENTMCNC: 30.3 PG — SIGNIFICANT CHANGE UP (ref 27–34)
MCHC RBC-ENTMCNC: 32 GM/DL — SIGNIFICANT CHANGE UP (ref 32–36)
MCHC RBC-ENTMCNC: 32.4 GM/DL — SIGNIFICANT CHANGE UP (ref 32–36)
MCV RBC AUTO: 93 FL — SIGNIFICANT CHANGE UP (ref 80–100)
MCV RBC AUTO: 93.6 FL — SIGNIFICANT CHANGE UP (ref 80–100)
NRBC # BLD: 0 /100 WBCS — SIGNIFICANT CHANGE UP (ref 0–0)
NRBC # BLD: 0 /100 WBCS — SIGNIFICANT CHANGE UP (ref 0–0)
PLATELET # BLD AUTO: 178 K/UL — SIGNIFICANT CHANGE UP (ref 150–400)
PLATELET # BLD AUTO: 193 K/UL — SIGNIFICANT CHANGE UP (ref 150–400)
POTASSIUM SERPL-MCNC: 3.4 MMOL/L — LOW (ref 3.5–5.3)
POTASSIUM SERPL-SCNC: 3.4 MMOL/L — LOW (ref 3.5–5.3)
PROT SERPL-MCNC: 6.8 G/DL — SIGNIFICANT CHANGE UP (ref 6–8.3)
PROTHROM AB SERPL-ACNC: 16.1 SEC — HIGH (ref 10–12.9)
RBC # BLD: 3.29 M/UL — LOW (ref 4.2–5.8)
RBC # BLD: 3.43 M/UL — LOW (ref 4.2–5.8)
RBC # FLD: 14.5 % — SIGNIFICANT CHANGE UP (ref 10.3–14.5)
RBC # FLD: 14.6 % — HIGH (ref 10.3–14.5)
SODIUM SERPL-SCNC: 140 MMOL/L — SIGNIFICANT CHANGE UP (ref 135–145)
SURGICAL PATHOLOGY STUDY: SIGNIFICANT CHANGE UP
WBC # BLD: 12.04 K/UL — HIGH (ref 3.8–10.5)
WBC # BLD: 4.1 K/UL — SIGNIFICANT CHANGE UP (ref 3.8–10.5)
WBC # FLD AUTO: 12.04 K/UL — HIGH (ref 3.8–10.5)
WBC # FLD AUTO: 4.1 K/UL — SIGNIFICANT CHANGE UP (ref 3.8–10.5)

## 2019-09-18 PROCEDURE — 93923 UPR/LXTR ART STDY 3+ LVLS: CPT | Mod: 26

## 2019-09-18 PROCEDURE — 88309 TISSUE EXAM BY PATHOLOGIST: CPT | Mod: 26

## 2019-09-18 PROCEDURE — 51702 INSERT TEMP BLADDER CATH: CPT | Mod: 59

## 2019-09-18 PROCEDURE — 44205 LAP COLECTOMY PART W/ILEUM: CPT | Mod: AS,RT

## 2019-09-18 PROCEDURE — 44205 LAP COLECTOMY PART W/ILEUM: CPT

## 2019-09-18 PROCEDURE — 88304 TISSUE EXAM BY PATHOLOGIST: CPT | Mod: 26

## 2019-09-18 RX ORDER — HYDROMORPHONE HYDROCHLORIDE 2 MG/ML
0.5 INJECTION INTRAMUSCULAR; INTRAVENOUS; SUBCUTANEOUS EVERY 4 HOURS
Refills: 0 | Status: DISCONTINUED | OUTPATIENT
Start: 2019-09-18 | End: 2019-09-23

## 2019-09-18 RX ORDER — HYDRALAZINE HCL 50 MG
10 TABLET ORAL EVERY 6 HOURS
Refills: 0 | Status: DISCONTINUED | OUTPATIENT
Start: 2019-09-18 | End: 2019-09-21

## 2019-09-18 RX ORDER — ACETAMINOPHEN 500 MG
1000 TABLET ORAL ONCE
Refills: 0 | Status: DISCONTINUED | OUTPATIENT
Start: 2019-09-18 | End: 2019-09-18

## 2019-09-18 RX ORDER — SODIUM CHLORIDE 9 MG/ML
1000 INJECTION, SOLUTION INTRAVENOUS
Refills: 0 | Status: DISCONTINUED | OUTPATIENT
Start: 2019-09-18 | End: 2019-09-18

## 2019-09-18 RX ORDER — POTASSIUM CHLORIDE 20 MEQ
40 PACKET (EA) ORAL ONCE
Refills: 0 | Status: COMPLETED | OUTPATIENT
Start: 2019-09-18 | End: 2019-09-18

## 2019-09-18 RX ORDER — SODIUM CHLORIDE 9 MG/ML
1000 INJECTION INTRAMUSCULAR; INTRAVENOUS; SUBCUTANEOUS
Refills: 0 | Status: DISCONTINUED | OUTPATIENT
Start: 2019-09-18 | End: 2019-09-18

## 2019-09-18 RX ORDER — DEXTROSE MONOHYDRATE, SODIUM CHLORIDE, AND POTASSIUM CHLORIDE 50; .745; 4.5 G/1000ML; G/1000ML; G/1000ML
1000 INJECTION, SOLUTION INTRAVENOUS
Refills: 0 | Status: DISCONTINUED | OUTPATIENT
Start: 2019-09-18 | End: 2019-09-20

## 2019-09-18 RX ORDER — AZTREONAM 2 G
1000 VIAL (EA) INJECTION EVERY 8 HOURS
Refills: 0 | Status: DISCONTINUED | OUTPATIENT
Start: 2019-09-18 | End: 2019-09-27

## 2019-09-18 RX ORDER — ENOXAPARIN SODIUM 100 MG/ML
40 INJECTION SUBCUTANEOUS DAILY
Refills: 0 | Status: DISCONTINUED | OUTPATIENT
Start: 2019-09-18 | End: 2019-09-27

## 2019-09-18 RX ORDER — POTASSIUM CHLORIDE 20 MEQ
10 PACKET (EA) ORAL
Refills: 0 | Status: DISCONTINUED | OUTPATIENT
Start: 2019-09-18 | End: 2019-09-18

## 2019-09-18 RX ADMIN — Medication 50 MILLIGRAM(S): at 22:44

## 2019-09-18 RX ADMIN — Medication 10 MILLIGRAM(S): at 12:15

## 2019-09-18 RX ADMIN — Medication 40 MILLIEQUIVALENT(S): at 08:16

## 2019-09-18 RX ADMIN — SODIUM CHLORIDE 75 MILLILITER(S): 9 INJECTION, SOLUTION INTRAVENOUS at 21:15

## 2019-09-18 RX ADMIN — Medication 50 MILLIGRAM(S): at 06:16

## 2019-09-18 RX ADMIN — DEXTROSE MONOHYDRATE, SODIUM CHLORIDE, AND POTASSIUM CHLORIDE 150 MILLILITER(S): 50; .745; 4.5 INJECTION, SOLUTION INTRAVENOUS at 22:44

## 2019-09-18 RX ADMIN — Medication 250 MILLIGRAM(S): at 06:16

## 2019-09-18 RX ADMIN — CHLORHEXIDINE GLUCONATE 1 APPLICATION(S): 213 SOLUTION TOPICAL at 12:17

## 2019-09-18 NOTE — PROGRESS NOTE ADULT - ASSESSMENT
69 Years old male from home with dementia was sent in by dr Quiroz office for right sided colonic mass which was recently diagnosed and cellulitis.  1.Pt at moderate risk for yasmin-operative cardiac complication.  2.Surgical f/u.  3.GI f/u.  4.HTN-IV hydralazine.  5.Cellulitis-ABX.  6.Replace k+.  7.GI and DVT prophylaxis.

## 2019-09-18 NOTE — PROGRESS NOTE ADULT - PROBLEM SELECTOR PROBLEM 3
Dementia
Dementia
Sinus bradycardia
Dementia
Sinus bradycardia

## 2019-09-18 NOTE — PROGRESS NOTE ADULT - PROBLEM SELECTOR PLAN 8
pt lives alone, but  worsening of dementia, he was on APS case 6 months ago, he cancelled per friend who used to live in same building.  SW is following  pt might need LTC for safety and for taking care of colostomy bag.

## 2019-09-18 NOTE — PROGRESS NOTE ADULT - PROBLEM SELECTOR PLAN 3
AO x 1  s/p psych - pt has no capacity to make decisions - called brother Mr. Willis, he will come to hospital [facility] before 6pm  social work consult   fall/ safety precaution
Pt was found to have sinus bradycardia after coming back from CT scan  ECG showed sinus deloris and RBBB  Echo shows 62% EF  Tele monitoring  stress test tomorrow for clearance for surgery
Pt was found to have sinus bradycardia after coming back from CT scan  ECG showed sinus deloris and RBBB  Echo shows 62% EF  Tele monitoring  Pt had stress test today which was normal
patient has history of dementia.  not on any medication.  social work consult to arrange help at home
Pt was found to have sinus bradycardia after coming back from CT scan  ECG showed sinus deloris and RBBB  Echo shows 62% EF  Tele monitoring  stress test tomorrow for clearance for surgery
AO x 1  s/p psych - pt has no capacity to make decisions - called brother Mr. Willis, he will come to hospital [facility] before 6pm  social work consult   fall/ safety precaution
Pt was found to have sinus bradycardia after coming back from CT scan  ECG showed sinus deloris and RBBB  Echo shows 62% EF  Tele monitoring  Pt had stress test today which was normal

## 2019-09-18 NOTE — PROGRESS NOTE ADULT - SUBJECTIVE AND OBJECTIVE BOX
[   ] ICU                                          [   ] CCU                                      [ X  ]     (Medical Floor  (Patient seen and examined earlier in the morning)    Patient is comfortable. No new complaints reported, No abdominal pain, N/V, hematemesis, hematochezia, melena, fever, chills, chest pain, SOB, cough or diarrhea reported.    VITALS  Vital Signs Last 24 Hrs  T(C): 36.1 (18 Sep 2019 13:15), Max: 37 (18 Sep 2019 00:29)  T(F): 97 (18 Sep 2019 13:15), Max: 98.6 (18 Sep 2019 00:29)  HR: 69 (18 Sep 2019 13:15) (61 - 72)  BP: 144/80 (18 Sep 2019 13:15) (128/58 - 150/70)   RR: 17 (18 Sep 2019 13:15) (17 - 18)  SpO2: 97% (18 Sep 2019 13:15) (97% - 100%)       MEDICATIONS  (STANDING):  influenza   Vaccine 0.5 milliLiter(s) IntraMuscular once                             9.8    4.10  )-----------( 178      ( 18 Sep 2019 05:56 )             30.6       09-18    140  |  108  |  6<L>  ----------------------------<  78  3.4<L>   |  28  |  0.78    Ca    8.8      18 Sep 2019 05:56  Phos  2.5     09-17  Mg     2.1     09-17    TPro  6.8  /  Alb  2.9<L>  /  TBili  0.6  /  DBili  x   /  AST  37  /  ALT  32  /  AlkPhos  67  09-18      PT/INR - ( 18 Sep 2019 05:56 )   PT: 16.1 sec;   INR: 1.43 ratio         PTT - ( 18 Sep 2019 05:56 )  PTT:35.3 sec

## 2019-09-18 NOTE — PROGRESS NOTE ADULT - SUBJECTIVE AND OBJECTIVE BOX
CHIEF COMPLAINT:Patient is a 69y old  Male who presents with a chief complaint of cellulitis and right colon mass .Pt appears comfortable.    	  REVIEW OF SYSTEMS:  CONSTITUTIONAL: No fever, weight loss, or fatigue  EYES: No eye pain, visual disturbances, or discharge  ENT:  No difficulty hearing, tinnitus, vertigo; No sinus or throat pain  NECK: No pain or stiffness  RESPIRATORY: No cough, wheezing, chills or hemoptysis; No Shortness of Breath  CARDIOVASCULAR: No chest pain, palpitations, passing out, dizziness, or leg swelling  GASTROINTESTINAL: No abdominal or epigastric pain. No nausea, vomiting, or hematemesis; No diarrhea or constipation. No melena or hematochezia.  GENITOURINARY: No dysuria, frequency, hematuria, or incontinence  NEUROLOGICAL: No headaches, memory loss, loss of strength, numbness, or tremors  SKIN: No itching, burning, rashes, or lesions   LYMPH Nodes: No enlarged glands  ENDOCRINE: No heat or cold intolerance; No hair loss  MUSCULOSKELETAL: No joint pain or swelling; No muscle, back, or extremity pain  PSYCHIATRIC: No depression, anxiety, mood swings, or difficulty sleeping  HEME/LYMPH: No easy bruising, or bleeding gums  ALLERGY AND IMMUNOLOGIC: No hives or eczema	    PHYSICAL EXAM:  T(C): 36.7 (09-18-19 @ 05:40), Max: 37 (09-18-19 @ 00:29)  HR: 61 (09-18-19 @ 05:40) (54 - 72)  BP: 128/58 (09-18-19 @ 05:40) (128/58 - 143/82)  RR: 17 (09-18-19 @ 05:40) (17 - 18)  SpO2: 98% (09-18-19 @ 05:40) (98% - 100%)  Wt(kg): --  I&O's Summary    17 Sep 2019 07:01  -  18 Sep 2019 07:00  --------------------------------------------------------  IN: 0 mL / OUT: 600 mL / NET: -600 mL        Appearance: Normal	  HEENT:   Normal oral mucosa, PERRL, EOMI	  Lymphatic: No lymphadenopathy  Cardiovascular: Normal S1 S2, No JVD, No murmurs, No edema  Respiratory: Lungs clear to auscultation	  Gastrointestinal:  Soft, Non-tender, + BS	  Skin: No rashes, No ecchymoses, No cyanosis	  Neurologic: Non-focal  Extremities: Normal range of motion, No clubbing, cyanosis or edema  Vascular: Peripheral pulses palpable 2+ bilaterally    MEDICATIONS  (STANDING):  aspirin enteric coated 81 milliGRAM(s) Oral daily  aztreonam  IVPB 1000 milliGRAM(s) IV Intermittent every 8 hours  chlorhexidine 2% Cloths 1 Application(s) Topical daily  chlorhexidine 4% Liquid 1 Application(s) Topical once  enoxaparin Injectable 40 milliGRAM(s) SubCutaneous daily  hydrALAZINE 10 milliGRAM(s) Oral every 6 hours  influenza   Vaccine 0.5 milliLiter(s) IntraMuscular once  vancomycin  IVPB 1000 milliGRAM(s) IV Intermittent every 12 hours      LABS:	 	                        9.8    4.10  )-----------( 178      ( 18 Sep 2019 05:56 )             30.6     09-18    140  |  108  |  6<L>  ----------------------------<  78  3.4<L>   |  28  |  0.78    Ca    8.8      18 Sep 2019 05:56  Phos  2.5     09-17  Mg     2.1     09-17    TPro  6.8  /  Alb  2.9<L>  /  TBili  0.6  /  DBili  x   /  AST  37  /  ALT  32  /  AlkPhos  67  09-18    proBNP: Serum Pro-Brain Natriuretic Peptide: 180 pg/mL (09-12 @ 11:59)    Lipid Profile: Cholesterol 117  LDL 60  HDL 50  TG 36    HgA1c: Hemoglobin A1C, Whole Blood: 5.3 % (09-13 @ 10:32)    TSH: Thyroid Stimulating Hormone, Serum: 1.33 uU/mL (09-13 @ 06:54)      OBSERVATIONS:  Mitral Valve: Normal mitral valve. Trace mitral  regurgitation.  Aortic Root: Normal aortic root.  Aortic Valve: Normal trileaflet aortic valve. No aortic  stenosis. Moderate aortic regurgitation ( msec).  Left Atrium: Normal left atrium.  LA volume index = 32  cc/m2.  Left Ventricle: Normal Left Ventricular Systolic Function,  (EF = 62% by biplane) No regional wall motion  abnormalities. Normal left ventricular internal dimensions  and wall thicknesses. Normal diastolic function.  Right Heart: Normal right atrium. Normal right ventricular  size and systolic function (TAPSE 2.6 cm). Normal tricuspid  valve. Mild tricuspid regurgitation. Pulmonic valve not  well seen. Trace pulmonic insufficiency is noted.  Pericardium/PleuraTrivial pericardial effusion is seen.  Hemodynamic: RA Pressure is 8 mm Hg. RV systolic pressure  is mildly increased at  34 mm Hg.    	  IMPRESSIONS:Normal Study  * Negative ECG evidence of ischemia after IV of Lexiscan.  * Review of raw data shows: The study is of good technical  quality.  * The left ventricle was normal in size. Normal myocardial  perfusion scan, with no evidence of infarction or  inducible ischemia.  * Gated wall motion analysis is performed, and shows  normal wall motion with post stress LVEF of 57%.        Colonoscopy-mass at hepatic flexure, s/p polypectomy

## 2019-09-18 NOTE — PROGRESS NOTE ADULT - PROBLEM SELECTOR PLAN 2
failed outpatient treatement with bactrim.  on IV aztreonam and IV vancomycin- ID dr jacobsen consult.  podiatry consult pending
failed outpatient treatement with bactrim.  on IV aztreonam and IV vancomycin- ID dr jacobsen consult.  podiatry consulted
failed outpatient treatement with bactrim.  on IV aztreonam and IV vancomycin- ID dr Savage consult.  podiatry consulted
failed outpatient treatement with bactrim.  on IV aztreonam and IV vancomycin- ID dr jacobsen consult.  podiatry consulted
failed outpatient treatement with bactrim.  on IV aztreonam and IV vancomycin- ID dr jacobsen consult.  podiatry consulted
patient also has ulceration of right foot with concern for cellultitis.  patient failed outpatient treatement with bactrim.  sent by PMD for IV antibiotics.  Patient is allergic to penicillins so we will start on IV aztreonam and IV vancomycin.  ID dr jacobsen consult.  podaitry consult in am.
failed outpatient treatement with bactrim.  on IV aztreonam and IV vancomycin- ID dr jacobsen consult.  podiatry consulted
failed outpatient treatement with bactrim.  on IV aztreonam and IV vancomycin- ID dr jacobsen consult.  podiatry consult pending
failed outpatient treatement with bactrim.  on IV aztreonam and IV vancomycin- ID dr jacobsen consult.  podiatry consulted

## 2019-09-18 NOTE — PROGRESS NOTE ADULT - PROBLEM SELECTOR PROBLEM 1
Mass of colon

## 2019-09-18 NOTE — PROGRESS NOTE ADULT - PROBLEM SELECTOR PLAN 7
full code
pt lives alone, but  worsening of dementia, he was on APS case 6 months ago, he cancelled per friend who used to live in same building.  SW is following  pt might need LTC for safety and for taking care of colostomy bag.
full code
pt lives alone, but  worsening of dementia, he was on APS case 6 months ago, he cancelled per friend who used to live in same building.  SW is following  pt might need LTC for safety and for taking care of colostomy bag.
full code

## 2019-09-18 NOTE — PROGRESS NOTE ADULT - PROBLEM SELECTOR PROBLEM 2
Ulcer of foot

## 2019-09-18 NOTE — PROGRESS NOTE ADULT - PROBLEM SELECTOR PLAN 4
AO x 1  s/p psych - pt has no capacity to make decisions - brother Mr. Willis was called  social work consult   fall/ safety precaution
AO x 1  s/p psych - pt has no capacity to make decisions - brother Mr. Willis was called  social work consult   fall/ safety precaution
H and H is stable at 10 mg/dl.  monitor H and h.  Might benefit from IV iron if H and H drops.
AO x 1  s/p psych - pt has no capacity to make decisions - brother Mr. Willis was called  social work consult   fall/ safety precaution
patient has history of iron deficiency anemia.  pt is on iron tablets at home.  H and H is stable at 10 mg/dl.  monitor H and h.  Might benefit from IV iron if H and H drops
AO x 1  s/p psych - pt has no capacity to make decisions - brother Mr. Willis was called  social work consult   fall/ safety precaution
H and H is stable at 10 mg/dl.  monitor H and h.  Might benefit from IV iron if H and H drops.

## 2019-09-18 NOTE — PROGRESS NOTE ADULT - SUBJECTIVE AND OBJECTIVE BOX
Patient is a 69y old  Male who presents with a chief complaint of cellulitis and right colon mass (15 Sep 2019 13:14)      HPI:  69 Years old male from home with dementia was sent in by dr Quiroz office for right sided colonic mass which was recently diagnosed. Patient also have cellulitis which was treated with bactrim as outpatient but have not improved.  History source is dr Quiroz office. Patient does not gave any history but only points to lower extremity ulcers on left lower extremity. History very limited due to dementia.  Pt denies any chest pain, shortness of breath, fever, abdominal pain, change in urinary or bowel habits.  Patient is being admitted to workup for right colon mass and possible right isded colectomy. Plan is to involve GI doctor, surgery and cardiology for pre-op surgical clearance. We will need ID consult and podiatry consult for lower extremity ulcers. (12 Sep 2019 18:59)    Patient seen at bedside this PM for consult on possible left leg cellulitis.  Patient was seen in the ED recently for left leg wounds and was referred to wound care clinic in which he followed up.  Home nursing was provided for every other day dressing changes.  Pt was supposed to follow up again with wound care clinic on thursday but was admitted to the hospital.  He states the dressing on his left leg has been changed regularly.  He admits to wounds in his lower legs of greater than 18 years off and on.  He states he had seen podiatrist and ED providers off and on for treatment and had home nursing for a period of time before they stopped.  He denies n/v/f at this time.     Patient seen bedside this AM resting comfortably. Pt denies pain. Pt denies F/N/V/C/SOB.      PMH:Dementia  No pertinent past medical history  No pertinent past medical history    Allergies: penicillin (Other)    Medications: aspirin enteric coated 81 milliGRAM(s) Oral daily  aztreonam  IVPB 1000 milliGRAM(s) IV Intermittent every 8 hours  chlorhexidine 4% Liquid 1 Application(s) Topical once  enoxaparin Injectable 40 milliGRAM(s) SubCutaneous daily  erythromycin     base Tablet 500 milliGRAM(s) Oral <User Schedule>  hydrALAZINE 10 milliGRAM(s) Oral every 6 hours  influenza   Vaccine 0.5 milliLiter(s) IntraMuscular once  neomycin 500 milliGRAM(s) Oral <User Schedule>  polyethylene glycol/electrolyte Solution. 4000 milliLiter(s) Oral once  vancomycin  IVPB 1000 milliGRAM(s) IV Intermittent every 12 hours    FH:No pertinent family history in first degree relatives    PSX: No significant past surgical history  No significant past surgical history    SH: aspirin enteric coated 81 milliGRAM(s) Oral daily  aztreonam  IVPB 1000 milliGRAM(s) IV Intermittent every 8 hours  chlorhexidine 4% Liquid 1 Application(s) Topical once  enoxaparin Injectable 40 milliGRAM(s) SubCutaneous daily  erythromycin     base Tablet 500 milliGRAM(s) Oral <User Schedule>  hydrALAZINE 10 milliGRAM(s) Oral every 6 hours  influenza   Vaccine 0.5 milliLiter(s) IntraMuscular once  neomycin 500 milliGRAM(s) Oral <User Schedule>  polyethylene glycol/electrolyte Solution. 4000 milliLiter(s) Oral once  vancomycin  IVPB 1000 milliGRAM(s) IV Intermittent every 12 hours    Vital Signs Last 24 Hrs  T(C): 36.7 (18 Sep 2019 05:40), Max: 37 (18 Sep 2019 00:29)  T(F): 98.1 (18 Sep 2019 05:40), Max: 98.6 (18 Sep 2019 00:29)  HR: 61 (18 Sep 2019 05:40) (54 - 72)  BP: 128/58 (18 Sep 2019 05:40) (128/58 - 143/82)  BP(mean): --  RR: 17 (18 Sep 2019 05:40) (17 - 18)  SpO2: 98% (18 Sep 2019 05:40) (98% - 100%)                                     9.8    4.10  )-----------( 178      ( 18 Sep 2019 05:56 )             30.6     09-18    140  |  108  |  6<L>  ----------------------------<  78  3.4<L>   |  28  |  0.78    Ca    8.8      18 Sep 2019 05:56  Phos  2.5     09-17  Mg     2.1     09-17    TPro  6.8  /  Alb  2.9<L>  /  TBili  0.6  /  DBili  x   /  AST  37  /  ALT  32  /  AlkPhos  67  09-18    WBC Count: 4.10 K/uL (09.18.19 @ 05:56)  WBC Count: 4.97 K/uL (09-15 @ 10:27)  WBC Count: 4.68 K/uL (09-13 @ 06:54)  WBC Count: 4.85 K/uL (09-12 @ 11:59)      PHYSICAL EXAM  GEN: LORETA ATKINSON is a pleasant well-nourished, well developed 69y Male in no acute distress, alert awake, and oriented to person, place and time.   LE Focused:    Vasc:  DP 2/4 b/l.  PT pulses non-palpable due to wound on the left and thickened skin on the right.  CFT 5sec to all digits.  Mild edema to the left lower leg relative to the right.  Derm: Hyperpigmentation changes from chronic venous stasis b/l.  No open lesion on right leg.  Wound #1: Left medial ankle measuring 4.5cm x 5.0cm x 0.3cm with a fibrogranular base and surrounding hyperkeratotic tissue with patches of hypopigmenation.  No malodor, minimal serous drainage, no probe to bone, no increased in warmth, no surrounding erythema.  Wound #2: Left lateral ankle measuring 1.2cm x 1.2cm x 0.2cm with well defined margins and fibrogranular base with patches of hypopigmentation. no malodor, minimal serous drainage, no probe to bone, no increased in warmth, no surrounding erythema.  Neuro: Diminished protective sensation b/l  MSK: Decreased ankle joint range of motion b/l.  No pain with palpation of ulcers.    Imaging:   < from: Xray Ankle Complete 3 Views, Left (09.12.19 @ 14:15) >    EXAM:  ANKLE LEFT (MINIMUM 3 V)                            PROCEDURE DATE:  09/12/2019          INTERPRETATION:  CLINICAL STATEMENT: Ulcer    TECHNIQUE: AP, lateral and oblique views of the left ankle.    COMPARISON: 9/4/2019    FINDINGS:    There is no acute fracture or dislocation.  Joint spaces are intact.   There is no lytic or blastic lesion. Again seen is periosteal reaction   along the shaft of the fibula and between the tibia and fibula distally.   There are surgical clips in the soft tissues. There is soft tissue   swelling. There are degenerative changes in the midfoot. There is a   plantar calcaneal spur. There is a large amount of soft tissue   ossification posteriorly at the level of the distal leg.    IMPRESSION:    No plain film evidence of osteomyelitis    HELEN LEARY M.D.,ATTENDING RADIOLOGIST  This document has been electronically signed. Sep 12 2019  3:18PM      < end of copied text >    Cultures: Culture Results:   No growth (09-14 @ 10:20)  Culture Results:   No growth to date. (09-12 @ 19:31)  Culture Results:   No growth to date. (09-12 @ 19:30)      A: Venous stasis ulcerations, left ankle  Chronic non-pressure ulcers, left ankle    P:  Patient evaluated, chart reviewed  Xrays reviewed as above- no OM  Dressing applied with santyl, 4x4 gauze, Binu, ACE wrap  CALI ordered-pending  ESR/CRP ordered- reviewed  Possible bedside biopsy following surgical team's procedures.    Podiatry will following while in house  Seen with attending Dr. Jimenez

## 2019-09-18 NOTE — PROGRESS NOTE ADULT - PROBLEM SELECTOR PROBLEM 7
Goals of care, counseling/discussion
Discharge planning issues
Discharge planning issues
Goals of care, counseling/discussion

## 2019-09-18 NOTE — PROGRESS NOTE ADULT - PROBLEM SELECTOR PLAN 5
H and H is stable at 10 mg/dl.  monitor H and h.
H and H is stable at 10 mg/dl.  monitor H and h.
lovenox 40 mg for DVT chemoprophylaxis.
H and H is stable at 10 mg/dl.  monitor H and h.
IMPROVE VTE Individual Risk Assessment  RISK                                                          Points  [] Previous VTE                                           3  [] Thrombophilia                                        2  [] Lower limb paralysis                              2   [] Current Cancer                                       2   [] Immobilization > 24 hrs                        1  [] ICU/CCU stay > 24 hours                       1  [] Age > 60                                                   1  IMPROVE VTE Score = 2  lovenox 40 mg for DVT chemoprophylaxis
lovenox 40 mg for DVT chemoprophylaxis.
H and H is stable at 10 mg/dl.  monitor H and h.

## 2019-09-18 NOTE — PROGRESS NOTE ADULT - PROBLEM SELECTOR PLAN 6
lovenox 40 mg for DVT chemoprophylaxis.
full code
full code
lovenox 40 mg for DVT chemoprophylaxis.

## 2019-09-18 NOTE — PROGRESS NOTE ADULT - PROBLEM SELECTOR PROBLEM 5
Anemia
Prophylactic measure
Anemia
Prophylactic measure
Anemia
Prophylactic measure
Anemia

## 2019-09-18 NOTE — PROGRESS NOTE ADULT - SUBJECTIVE AND OBJECTIVE BOX
PGY 1 Note discussed with supervising resident and primary attending    Patient is a 69y old  Male who presents with a chief complaint of cellulitis and right colon mass (17 Sep 2019 16:24)      INTERVAL HPI/OVERNIGHT EVENTS: offers no new complaints; current symptoms resolving    MEDICATIONS  (STANDING):  aspirin enteric coated 81 milliGRAM(s) Oral daily  aztreonam  IVPB 1000 milliGRAM(s) IV Intermittent every 8 hours  chlorhexidine 2% Cloths 1 Application(s) Topical daily  chlorhexidine 4% Liquid 1 Application(s) Topical once  enoxaparin Injectable 40 milliGRAM(s) SubCutaneous daily  hydrALAZINE 10 milliGRAM(s) Oral every 6 hours  influenza   Vaccine 0.5 milliLiter(s) IntraMuscular once  potassium chloride  10 mEq/100 mL IVPB 10 milliEquivalent(s) IV Intermittent every 1 hour  vancomycin  IVPB 1000 milliGRAM(s) IV Intermittent every 12 hours    MEDICATIONS  (PRN):      __________________________________________________  REVIEW OF SYSTEMS:    CONSTITUTIONAL: No fever,   EYES: no acute visual disturbances  NECK: No pain or stiffness  RESPIRATORY: No cough; No shortness of breath  CARDIOVASCULAR: No chest pain, no palpitations  GASTROINTESTINAL: No pain. No nausea or vomiting; No diarrhea   NEUROLOGICAL: No headache or numbness, no tremors  MUSCULOSKELETAL: No joint pain, no muscle pain  GENITOURINARY: no dysuria, no frequency, no hesitancy  PSYCHIATRY: no depression , no anxiety  ALL OTHER  ROS negative        Vital Signs Last 24 Hrs  T(C): 36.7 (18 Sep 2019 05:40), Max: 37 (18 Sep 2019 00:29)  T(F): 98.1 (18 Sep 2019 05:40), Max: 98.6 (18 Sep 2019 00:29)  HR: 61 (18 Sep 2019 05:40) (54 - 72)  BP: 128/58 (18 Sep 2019 05:40) (128/58 - 143/82)  BP(mean): --  RR: 17 (18 Sep 2019 05:40) (17 - 18)  SpO2: 98% (18 Sep 2019 05:40) (98% - 100%)    ________________________________________________  PHYSICAL EXAM:  GENERAL: NAD  HEENT: Normocephalic;  conjunctivae and sclerae clear; moist mucous membranes;   NECK : supple  CHEST/LUNG: Clear to auscultation bilaterally with good air entry   HEART: S1 S2  regular; no murmurs, gallops or rubs  ABDOMEN: Soft, Nontender, Nondistended; Bowel sounds present  EXTREMITIES: no cyanosis; no edema; no calf tenderness  SKIN: warm and dry; no rash  NERVOUS SYSTEM:  Awake and alert; Oriented  to place, person and time ; no new deficits    _________________________________________________  LABS:                        9.8    4.10  )-----------( 178      ( 18 Sep 2019 05:56 )             30.6     09-18    140  |  108  |  6<L>  ----------------------------<  78  3.4<L>   |  28  |  0.78    Ca    8.8      18 Sep 2019 05:56  Phos  2.5     09-17  Mg     2.1     09-17    TPro  6.8  /  Alb  2.9<L>  /  TBili  0.6  /  DBili  x   /  AST  37  /  ALT  32  /  AlkPhos  67  09-18    PT/INR - ( 18 Sep 2019 05:56 )   PT: 16.1 sec;   INR: 1.43 ratio         PTT - ( 18 Sep 2019 05:56 )  PTT:35.3 sec    CAPILLARY BLOOD GLUCOSE            RADIOLOGY & ADDITIONAL TESTS:    Imaging Personally Reviewed:  YES/NO    Consultant(s) Notes Reviewed:   YES/ No    Care Discussed with Consultants :     Plan of care was discussed with patient and /or primary care giver; all questions and concerns were addressed and care was aligned with patient's wishes.

## 2019-09-18 NOTE — PROGRESS NOTE ADULT - PROBLEM SELECTOR PROBLEM 6
Prophylactic measure
Goals of care, counseling/discussion
Goals of care, counseling/discussion
Prophylactic measure

## 2019-09-18 NOTE — PROGRESS NOTE ADULT - PROBLEM SELECTOR PLAN 1
asymptomatic.  CEA normal  CT showed limited study for the assessment of a colonic mass due to lack of contrast  GI consult - Dr. Murray.  Surgery consult - Dr guillaume.  Pt to undergo right josephine-colectomy pending cardiac clearance by Dr Cheng after stress test  NPO after midnight  prep for surgery
asymptomatic.  workup for right sided colon mass - f/u CEA level / CT A/P with oral contrast .  GI consult - Dr. Murray.  Surgery consult - Dr washington guillaume.  Cardiology dr marie for pre-op clearence.  NEEDS REPEAT COLONOSCOPY AS PER GI
Pt was sent in by PMD office with right sided colonic mass.  Patient denies any symptoms.  No concern for bowel obstruction.  Will workup for right sided colon mass.  will do CEA level in am.  CT abdomen with IV and oral contrast is ordered.  GI consult dr jacobs.  Surgery consult dr guillaume.  Cardiology dr marie for pre-op clearence.
asymptomatic.  CEA normal  CT showed limited study for the assessment of a colonic mass due to lack of contrast  GI consult - Dr. Murray.  Surgery consult - Dr guillaume.  Pt had stress test today which was normal  Surgery postponed, pt will have colonoscopy first today
asymptomatic.  CEA normal  CT showed limited study for the assessment of a colonic mass due to lack of contrast  GI consult - Dr. Murray.  Surgery consult - Dr guillaume.  Pt had stress test today which was normal  Surgery postponed, pt will have colonoscopy first today
asymptomatic.  CEA normal  CT showed limited study for the assessment of a colonic mass due to lack of contrast  GI consult - Dr. Murray.  Surgery consult - Dr guillaume.  Pt had stress test today which was normal  s/p colonoscopy which shows mass at the hepatic flexor- surgery consulted for fu and will try to get consent from family for surgery.
asymptomatic.  CEA normal  CT showed limited study for the assessment of a colonic mass due to lack of contrast  GI consult - Dr. Murray.  Surgery consult - Dr guillaume.  Pt to undergo right josephine-colectomy pending cardiac clearance by Dr Cheng after stress test  NPO after midnight  prep for surgery
asymptomatic.  CEA normal  CT showed limited study for the assessment of a colonic mass due to lack of contrast  GI consult - Dr. Murray.  Surgery consult - Dr guillaume.  Pt had stress test today which was normal  s/p colonoscopy which shows mass at the hepatic flexor- surgery consulted for fu and will try to get consent from family for surgery.
asymptomatic.  workup for right sided colon mass - f/u CEA level / CT A/P with oral contrast .  GI consult - Dr. Murray.  Surgery consult - Dr washington guillaume.  Cardiology dr marie for pre-op clearence.

## 2019-09-19 LAB
ANION GAP SERPL CALC-SCNC: 5 MMOL/L — SIGNIFICANT CHANGE UP (ref 5–17)
BUN SERPL-MCNC: 9 MG/DL — SIGNIFICANT CHANGE UP (ref 7–18)
CALCIUM SERPL-MCNC: 7.7 MG/DL — LOW (ref 8.4–10.5)
CHLORIDE SERPL-SCNC: 107 MMOL/L — SIGNIFICANT CHANGE UP (ref 96–108)
CO2 SERPL-SCNC: 27 MMOL/L — SIGNIFICANT CHANGE UP (ref 22–31)
CREAT SERPL-MCNC: 0.9 MG/DL — SIGNIFICANT CHANGE UP (ref 0.5–1.3)
GLUCOSE SERPL-MCNC: 176 MG/DL — HIGH (ref 70–99)
HCT VFR BLD CALC: 22.4 % — LOW (ref 39–50)
HCT VFR BLD CALC: 25.8 % — LOW (ref 39–50)
HGB BLD-MCNC: 7.3 G/DL — LOW (ref 13–17)
HGB BLD-MCNC: 8.3 G/DL — LOW (ref 13–17)
MAGNESIUM SERPL-MCNC: 1.5 MG/DL — LOW (ref 1.6–2.6)
MCHC RBC-ENTMCNC: 30 PG — SIGNIFICANT CHANGE UP (ref 27–34)
MCHC RBC-ENTMCNC: 30.7 PG — SIGNIFICANT CHANGE UP (ref 27–34)
MCHC RBC-ENTMCNC: 32.2 GM/DL — SIGNIFICANT CHANGE UP (ref 32–36)
MCHC RBC-ENTMCNC: 32.6 GM/DL — SIGNIFICANT CHANGE UP (ref 32–36)
MCV RBC AUTO: 93.1 FL — SIGNIFICANT CHANGE UP (ref 80–100)
MCV RBC AUTO: 94.1 FL — SIGNIFICANT CHANGE UP (ref 80–100)
NRBC # BLD: 0 /100 WBCS — SIGNIFICANT CHANGE UP (ref 0–0)
NRBC # BLD: 0 /100 WBCS — SIGNIFICANT CHANGE UP (ref 0–0)
PHOSPHATE SERPL-MCNC: 2.1 MG/DL — LOW (ref 2.5–4.5)
PLATELET # BLD AUTO: 162 K/UL — SIGNIFICANT CHANGE UP (ref 150–400)
PLATELET # BLD AUTO: 194 K/UL — SIGNIFICANT CHANGE UP (ref 150–400)
POTASSIUM SERPL-MCNC: 4.1 MMOL/L — SIGNIFICANT CHANGE UP (ref 3.5–5.3)
POTASSIUM SERPL-SCNC: 4.1 MMOL/L — SIGNIFICANT CHANGE UP (ref 3.5–5.3)
RBC # BLD: 2.38 M/UL — LOW (ref 4.2–5.8)
RBC # BLD: 2.77 M/UL — LOW (ref 4.2–5.8)
RBC # FLD: 14.3 % — SIGNIFICANT CHANGE UP (ref 10.3–14.5)
RBC # FLD: 14.4 % — SIGNIFICANT CHANGE UP (ref 10.3–14.5)
SODIUM SERPL-SCNC: 139 MMOL/L — SIGNIFICANT CHANGE UP (ref 135–145)
WBC # BLD: 11.21 K/UL — HIGH (ref 3.8–10.5)
WBC # BLD: 9.56 K/UL — SIGNIFICANT CHANGE UP (ref 3.8–10.5)
WBC # FLD AUTO: 11.21 K/UL — HIGH (ref 3.8–10.5)
WBC # FLD AUTO: 9.56 K/UL — SIGNIFICANT CHANGE UP (ref 3.8–10.5)

## 2019-09-19 RX ORDER — COLLAGENASE CLOSTRIDIUM HIST. 250 UNIT/G
1 OINTMENT (GRAM) TOPICAL DAILY
Refills: 0 | Status: DISCONTINUED | OUTPATIENT
Start: 2019-09-19 | End: 2019-09-27

## 2019-09-19 RX ORDER — POTASSIUM PHOSPHATE, MONOBASIC POTASSIUM PHOSPHATE, DIBASIC 236; 224 MG/ML; MG/ML
15 INJECTION, SOLUTION INTRAVENOUS ONCE
Refills: 0 | Status: COMPLETED | OUTPATIENT
Start: 2019-09-19 | End: 2019-09-19

## 2019-09-19 RX ORDER — MAGNESIUM SULFATE 500 MG/ML
1 VIAL (ML) INJECTION ONCE
Refills: 0 | Status: COMPLETED | OUTPATIENT
Start: 2019-09-19 | End: 2019-09-19

## 2019-09-19 RX ORDER — FERROUS SULFATE 325(65) MG
325 TABLET ORAL DAILY
Refills: 0 | Status: DISCONTINUED | OUTPATIENT
Start: 2019-09-19 | End: 2019-09-27

## 2019-09-19 RX ORDER — ACETAMINOPHEN 500 MG
650 TABLET ORAL EVERY 6 HOURS
Refills: 0 | Status: DISCONTINUED | OUTPATIENT
Start: 2019-09-19 | End: 2019-09-27

## 2019-09-19 RX ADMIN — Medication 1 APPLICATION(S): at 12:26

## 2019-09-19 RX ADMIN — HYDROMORPHONE HYDROCHLORIDE 0.5 MILLIGRAM(S): 2 INJECTION INTRAMUSCULAR; INTRAVENOUS; SUBCUTANEOUS at 21:49

## 2019-09-19 RX ADMIN — Medication 50 MILLIGRAM(S): at 14:59

## 2019-09-19 RX ADMIN — Medication 10 MILLIGRAM(S): at 12:27

## 2019-09-19 RX ADMIN — POTASSIUM PHOSPHATE, MONOBASIC POTASSIUM PHOSPHATE, DIBASIC 62.5 MILLIMOLE(S): 236; 224 INJECTION, SOLUTION INTRAVENOUS at 12:25

## 2019-09-19 RX ADMIN — Medication 10 MILLIGRAM(S): at 00:19

## 2019-09-19 RX ADMIN — HYDROMORPHONE HYDROCHLORIDE 0.5 MILLIGRAM(S): 2 INJECTION INTRAMUSCULAR; INTRAVENOUS; SUBCUTANEOUS at 06:01

## 2019-09-19 RX ADMIN — Medication 100 GRAM(S): at 12:25

## 2019-09-19 RX ADMIN — HYDROMORPHONE HYDROCHLORIDE 0.5 MILLIGRAM(S): 2 INJECTION INTRAMUSCULAR; INTRAVENOUS; SUBCUTANEOUS at 06:59

## 2019-09-19 RX ADMIN — ENOXAPARIN SODIUM 40 MILLIGRAM(S): 100 INJECTION SUBCUTANEOUS at 12:26

## 2019-09-19 RX ADMIN — Medication 325 MILLIGRAM(S): at 12:28

## 2019-09-19 RX ADMIN — HYDROMORPHONE HYDROCHLORIDE 0.5 MILLIGRAM(S): 2 INJECTION INTRAMUSCULAR; INTRAVENOUS; SUBCUTANEOUS at 13:00

## 2019-09-19 RX ADMIN — Medication 50 MILLIGRAM(S): at 21:48

## 2019-09-19 RX ADMIN — Medication 10 MILLIGRAM(S): at 05:38

## 2019-09-19 RX ADMIN — Medication 10 MILLIGRAM(S): at 17:11

## 2019-09-19 RX ADMIN — Medication 50 MILLIGRAM(S): at 05:38

## 2019-09-19 RX ADMIN — HYDROMORPHONE HYDROCHLORIDE 0.5 MILLIGRAM(S): 2 INJECTION INTRAMUSCULAR; INTRAVENOUS; SUBCUTANEOUS at 22:10

## 2019-09-19 RX ADMIN — HYDROMORPHONE HYDROCHLORIDE 0.5 MILLIGRAM(S): 2 INJECTION INTRAMUSCULAR; INTRAVENOUS; SUBCUTANEOUS at 12:44

## 2019-09-19 NOTE — PROGRESS NOTE ADULT - SUBJECTIVE AND OBJECTIVE BOX
[   ] ICU                                          [   ] CCU                                      [   ] Medical Floor      Patient is comfortable. No new complaints reported, No abdominal pain, N/V, hematemesis, hematochezia, melena, fever, chills, chest pain, SOB, cough or diarrhea reported.    VITALS  Vital Signs Last 24 Hrs  T(C): 37.1 (19 Sep 2019 14:13), Max: 98.5 (18 Sep 2019 19:14)  T(F): 98.8 (19 Sep 2019 14:13), Max: 209.3 (18 Sep 2019 19:14)  HR: 94 (19 Sep 2019 14:13) (84 - 99)  BP: 166/60 (19 Sep 2019 14:13) (142/79 - 166/60)  BP(mean): 81 (18 Sep 2019 20:15) (79 - 93)  RR: 17 (19 Sep 2019 14:13) (12 - 19)  SpO2: 100% (19 Sep 2019 14:13) (95% - 100%)       MEDICATIONS  (STANDING):  aztreonam  IVPB 1000 milliGRAM(s) IV Intermittent every 8 hours  collagenase Ointment 1 Application(s) Topical daily  enoxaparin Injectable 40 milliGRAM(s) SubCutaneous daily  ferrous    sulfate 325 milliGRAM(s) Oral daily  hydrALAZINE 10 milliGRAM(s) Oral every 6 hours  influenza   Vaccine 0.5 milliLiter(s) IntraMuscular once  sodium chloride 0.9% with potassium chloride 20 mEq/L 1000 milliLiter(s) (150 mL/Hr) IV Continuous <Continuous>    MEDICATIONS  (PRN):  acetaminophen   Tablet .. 650 milliGRAM(s) Oral every 6 hours PRN Temp greater or equal to 38C (100.4F), Moderate Pain (4 - 6)  HYDROmorphone  Injectable 0.5 milliGRAM(s) IV Push every 4 hours PRN Moderate Pain (4 - 6)                            8.3    9.56  )-----------( 194      ( 19 Sep 2019 06:27 )             25.8       09-19    139  |  107  |  9   ----------------------------<  176<H>  4.1   |  27  |  0.90    Ca    7.7<L>      19 Sep 2019 06:27  Phos  2.1     09-19  Mg     1.5     09-19    TPro  6.8  /  Alb  2.9<L>  /  TBili  0.6  /  DBili  x   /  AST  37  /  ALT  32  /  AlkPhos  67  09-18      PT/INR - ( 18 Sep 2019 05:56 )   PT: 16.1 sec;   INR: 1.43 ratio         PTT - ( 18 Sep 2019 05:56 )  PTT:35.3 sec

## 2019-09-19 NOTE — DIETITIAN INITIAL EVALUATION ADULT. - ADD RECOMMEND
PCM (moderate). Add Ensure clears TID to clears. Advance diet as medically feasible MD to monitor. RD available.

## 2019-09-19 NOTE — CHART NOTE - NSCHARTNOTEFT_GEN_A_CORE
Upon Nutritional Assessment by the Registered Dietitian your patient was determined to meet criteria / has evidence of the following diagnosis/diagnoses:          [ ]  Mild Protein Calorie Malnutrition        [x ]  Moderate Protein Calorie Malnutrition        [ ] Severe Protein Calorie Malnutrition        [ ] Unspecified Protein Calorie Malnutrition        [ ] Underweight / BMI <19        [ ] Morbid Obesity / BMI > 40      Findings as based on:  •  Comprehensive nutrition assessment and consultation  •  Calorie counts (nutrient intake analysis)  •  Food acceptance and intake status from observations by staff  •  Follow up  •  Patient education  •  Intervention secondary to interdisciplinary rounds  •   concerns      Treatment:    The following diet has been recommended:  add Ensure clears TID to clears.  PROVIDER Section:     By signing this assessment you are acknowledging and agree with the diagnosis/diagnoses assigned by the Registered Dietitian    Comments:

## 2019-09-19 NOTE — PROGRESS NOTE ADULT - SUBJECTIVE AND OBJECTIVE BOX
CHIEF COMPLAINT:Patient is a 69y old  Male who presents with a chief complaint of cellulitis and right colon mass.Pt appears comfortable.    	  REVIEW OF SYSTEMS:  CONSTITUTIONAL: No fever, weight loss, or fatigue  EYES: No eye pain, visual disturbances, or discharge  ENT:  No difficulty hearing, tinnitus, vertigo; No sinus or throat pain  NECK: No pain or stiffness  RESPIRATORY: No cough, wheezing, chills or hemoptysis; No Shortness of Breath  CARDIOVASCULAR: No chest pain, palpitations, passing out, dizziness, or leg swelling  GASTROINTESTINAL: No abdominal or epigastric pain. No nausea, vomiting, or hematemesis; No diarrhea or constipation. No melena or hematochezia.  GENITOURINARY: No dysuria, frequency, hematuria, or incontinence  NEUROLOGICAL: No headaches, memory loss, loss of strength, numbness, or tremors  SKIN: No itching, burning, rashes, or lesions   LYMPH Nodes: No enlarged glands  ENDOCRINE: No heat or cold intolerance; No hair loss  MUSCULOSKELETAL: No joint pain or swelling; No muscle, back, or extremity pain  PSYCHIATRIC: No depression, anxiety, mood swings, or difficulty sleeping  HEME/LYMPH: No easy bruising, or bleeding gums  ALLERGY AND IMMUNOLOGIC: No hives or eczema	      PHYSICAL EXAM:  T(C): 38.2 (09-19-19 @ 06:00), Max: 98.5 (09-18-19 @ 19:14)  HR: 92 (09-19-19 @ 06:00) (69 - 99)  BP: 152/50 (09-19-19 @ 06:00) (142/79 - 165/60)  RR: 18 (09-19-19 @ 06:00) (12 - 19)  SpO2: 96% (09-19-19 @ 06:00) (95% - 100%)  Wt(kg): --  I&O's Summary    18 Sep 2019 07:01  -  19 Sep 2019 07:00  --------------------------------------------------------  IN: 4425 mL / OUT: 2400 mL / NET: 2025 mL        Appearance: Normal	  HEENT:   Normal oral mucosa, PERRL, EOMI	  Lymphatic: No lymphadenopathy  Cardiovascular: Normal S1 S2, No JVD, No murmurs, No edema  Respiratory: Lungs clear to auscultation	  Psychiatry: A & O x 3, Mood & affect appropriate  Gastrointestinal:  Soft, Non-tender, + BS	  Skin: No rashes, No ecchymoses, No cyanosis	  Neurologic: Non-focal  Extremities: Normal range of motion, No clubbing, cyanosis or edema      MEDICATIONS  (STANDING):  aztreonam  IVPB 1000 milliGRAM(s) IV Intermittent every 8 hours  collagenase Ointment 1 Application(s) Topical daily  enoxaparin Injectable 40 milliGRAM(s) SubCutaneous daily  hydrALAZINE 10 milliGRAM(s) Oral every 6 hours  influenza   Vaccine 0.5 milliLiter(s) IntraMuscular once  magnesium sulfate  IVPB 1 Gram(s) IV Intermittent once  potassium phosphate IVPB 15 milliMole(s) IV Intermittent once  sodium chloride 0.9% with potassium chloride 20 mEq/L 1000 milliLiter(s) (150 mL/Hr) IV Continuous <Continuous>        	  LABS:	 	                      8.3    9.56  )-----------( 194      ( 19 Sep 2019 06:27 )             25.8     09-19    139  |  107  |  9   ----------------------------<  176<H>  4.1   |  27  |  0.90    Ca    7.7<L>      19 Sep 2019 06:27  Phos  2.1     09-19  Mg     1.5     09-19    TPro  6.8  /  Alb  2.9<L>  /  TBili  0.6  /  DBili  x   /  AST  37  /  ALT  32  /  AlkPhos  67  09-18    proBNP: Serum Pro-Brain Natriuretic Peptide: 180 pg/mL (09-12 @ 11:59)    Lipid Profile: Cholesterol 117  LDL 60  HDL 50  TG 36    HgA1c: Hemoglobin A1C, Whole Blood: 5.3 % (09-13 @ 10:32)    TSH: Thyroid Stimulating Hormone, Serum: 1.33 uU/mL (09-13 @ 06:54)

## 2019-09-19 NOTE — DIETITIAN INITIAL EVALUATION ADULT. - PROBLEM SELECTOR PLAN 3
patient has history of dementia.  not on any medication.  social work consult to arrange help at home

## 2019-09-19 NOTE — CONSULT NOTE ADULT - RS GEN PE MLT RESP DETAILS PC
no rales/good air movement/clear to auscultation bilaterally/no rhonchi
no wheezes/good air movement/airway patent/breath sounds equal

## 2019-09-19 NOTE — CONSULT NOTE ADULT - CONSULT REASON
Anemia with colonic mass
Left ankle /foot ulcers, ?cellulitis
colon mass
Cellulitis left leg
Colon mass

## 2019-09-19 NOTE — PROGRESS NOTE ADULT - ASSESSMENT
1. Colonic mass  2. S/p right hemicolectomy  3. Anemia  4. No evidence of acute GI bleeding    Suggestions:    1. NPO  2. IVF hydration  3. Surgical follow up  4. Avoid NSAID  5. Check CEA  6. DVT prophylaxis

## 2019-09-19 NOTE — PROGRESS NOTE ADULT - ASSESSMENT
69y.o. Male s/p R hemicolectomy POD#1    -d/c johnson  -sip of clears as tolerated  -OOB as tolerated  -pain control prn  -Tylenol prn fever  -incentive spirometry  -BLAINE dressing for 5-7 days

## 2019-09-19 NOTE — CONSULT NOTE ADULT - GASTROINTESTINAL DETAILS
nontender/no distention/bowel sounds normal/soft/no guarding/no organomegaly/no rigidity
soft/nontender/bowel sounds normal/no rebound tenderness/no rigidity/no distention/no guarding

## 2019-09-19 NOTE — PROGRESS NOTE ADULT - SUBJECTIVE AND OBJECTIVE BOX
INTERVAL HPI/OVERNIGHT EVENTS:  Pt resting comfortably. No acute complaints.   NPO.  -flatus/BM.   Denies N/V  Tmax 100.7 this morning.    MEDICATIONS  (STANDING):  aztreonam  IVPB 1000 milliGRAM(s) IV Intermittent every 8 hours  collagenase Ointment 1 Application(s) Topical daily  enoxaparin Injectable 40 milliGRAM(s) SubCutaneous daily  hydrALAZINE 10 milliGRAM(s) Oral every 6 hours  influenza   Vaccine 0.5 milliLiter(s) IntraMuscular once  magnesium sulfate  IVPB 1 Gram(s) IV Intermittent once  potassium phosphate IVPB 15 milliMole(s) IV Intermittent once  sodium chloride 0.9% with potassium chloride 20 mEq/L 1000 milliLiter(s) (150 mL/Hr) IV Continuous <Continuous>    MEDICATIONS  (PRN):  acetaminophen   Tablet .. 650 milliGRAM(s) Oral every 6 hours PRN Temp greater or equal to 38C (100.4F), Moderate Pain (4 - 6)  HYDROmorphone  Injectable 0.5 milliGRAM(s) IV Push every 4 hours PRN Moderate Pain (4 - 6)      Vital Signs Last 24 Hrs  T(C): 38.2 (19 Sep 2019 06:00), Max: 98.5 (18 Sep 2019 19:14)  T(F): 100.7 (19 Sep 2019 06:00), Max: 209.3 (18 Sep 2019 19:14)  HR: 92 (19 Sep 2019 06:00) (69 - 99)  BP: 152/50 (19 Sep 2019 06:00) (142/79 - 165/60)  BP(mean): 81 (18 Sep 2019 20:15) (79 - 93)  RR: 18 (19 Sep 2019 06:00) (12 - 19)  SpO2: 96% (19 Sep 2019 06:00) (95% - 100%)    Physical:  General: NAD.  Abdomen: Soft nondistended, BLAINE dressing in place with good self suction.    I&O's Detail    18 Sep 2019 07:01  -  19 Sep 2019 07:00  --------------------------------------------------------  IN:    Lactated Ringers IV Bolus: 3000 mL    lactated ringers.: 375 mL    sodium chloride 0.9% with potassium chloride 20 mEq/L: 1050 mL  Total IN: 4425 mL    OUT:    Estimated Blood Loss: 100 mL    Indwelling Catheter - Urethral: 2300 mL dark  Total OUT: 2400 mL    Total NET: 2025 mL    LABS:                        8.3    9.56  )-----------( 194      ( 19 Sep 2019 06:27 )             25.8             09-19    139  |  107  |  9   ----------------------------<  176<H>  4.1   |  27  |  0.90    Ca    7.7<L>      19 Sep 2019 06:27  Phos  2.1     09-19  Mg     1.5     09-19    TPro  6.8  /  Alb  2.9<L>  /  TBili  0.6  /  DBili  x   /  AST  37  /  ALT  32  /  AlkPhos  67  09-18

## 2019-09-19 NOTE — PROGRESS NOTE ADULT - ASSESSMENT
69 Years old male from home with dementia was sent in by dr Quiroz office for right sided colonic mass, cellulitis, s/p rt hemicolectomy.  1.IVF.  2.Surgical f/u.  3.GI f/u.  4.HTN-IV hydralazine.  5.Cellulitis-ABX.  6.Replace Mg+.  7.GI and DVT prophylaxis.

## 2019-09-19 NOTE — DIETITIAN INITIAL EVALUATION ADULT. - PERTINENT MEDS FT
MEDICATIONS  (STANDING):  aztreonam  IVPB 1000 milliGRAM(s) IV Intermittent every 8 hours  collagenase Ointment 1 Application(s) Topical daily  enoxaparin Injectable 40 milliGRAM(s) SubCutaneous daily  ferrous    sulfate 325 milliGRAM(s) Oral daily  hydrALAZINE 10 milliGRAM(s) Oral every 6 hours  influenza   Vaccine 0.5 milliLiter(s) IntraMuscular once  magnesium sulfate  IVPB 1 Gram(s) IV Intermittent once  potassium phosphate IVPB 15 milliMole(s) IV Intermittent once  sodium chloride 0.9% with potassium chloride 20 mEq/L 1000 milliLiter(s) (150 mL/Hr) IV Continuous <Continuous>    MEDICATIONS  (PRN):  acetaminophen   Tablet .. 650 milliGRAM(s) Oral every 6 hours PRN Temp greater or equal to 38C (100.4F), Moderate Pain (4 - 6)  HYDROmorphone  Injectable 0.5 milliGRAM(s) IV Push every 4 hours PRN Moderate Pain (4 - 6)

## 2019-09-19 NOTE — CONSULT NOTE ADULT - ASSESSMENT
69 Years old male from home with dementia was sent in by dr Quiroz office for right sided colonic mass which was recently diagnosed and cellulitis.  1.ABX.  2.Surgical eval.  3.GI eval.  4.CT abd and pelvis ordered.  5.Echocardiogram.  6.Stress test prior to OR clearance.  7.GI and DVT prophylaxis.
69 year old male with ascending right colon mass    - plan for right hemicolectomy on 9/16  - will need preop medical and cardiac clearance  - ID/ podiatry follow up for ankle lesion  - IV antibiotics per ID  - continue medical management  - discussed with Dr. Lewis
Left foot ulcers - not infected    Plan - no abxs at this time  reconsult prn.
1. Colonic mass  2. Aemia    Suggestions:    1. Monitor H/H  2. Transfuse PRBC as needed  3. Repeat colonoscopy  4. Avoid NSAID  5. Check CEA  6. Surgical evaluation  7. Urology evaluation  8. DVT prophylaxis

## 2019-09-19 NOTE — DIETITIAN INITIAL EVALUATION ADULT. - PERTINENT LABORATORY DATA
09-19 Na139 mmol/L Glu 176 mg/dL<H> K+ 4.1 mmol/L Cr  0.90 mg/dL BUN 9 mg/dL 09-19 Phos 2.1 mg/dL<L> 09-18 Alb 2.9 g/dL<L> 09-13 [id number] 5.3 % 09-13 Chol 117 mg/dL LDL 60 mg/dL HDL 50 mg/dL Trig 36 mg/dL

## 2019-09-19 NOTE — CONSULT NOTE ADULT - SKIN COMMENTS
2 superficial ulcers over left ankle which are not infected, no cellulitis or signs of infection at this time

## 2019-09-19 NOTE — CONSULT NOTE ADULT - REASON FOR ADMISSION
cellulitis and right colon mass

## 2019-09-20 LAB
ANION GAP SERPL CALC-SCNC: 4 MMOL/L — LOW (ref 5–17)
BUN SERPL-MCNC: 10 MG/DL — SIGNIFICANT CHANGE UP (ref 7–18)
CALCIUM SERPL-MCNC: 8.3 MG/DL — LOW (ref 8.4–10.5)
CHLORIDE SERPL-SCNC: 106 MMOL/L — SIGNIFICANT CHANGE UP (ref 96–108)
CO2 SERPL-SCNC: 28 MMOL/L — SIGNIFICANT CHANGE UP (ref 22–31)
CREAT SERPL-MCNC: 0.82 MG/DL — SIGNIFICANT CHANGE UP (ref 0.5–1.3)
GLUCOSE SERPL-MCNC: 93 MG/DL — SIGNIFICANT CHANGE UP (ref 70–99)
HCT VFR BLD CALC: 22.2 % — LOW (ref 39–50)
HCT VFR BLD CALC: 22.7 % — LOW (ref 39–50)
HGB BLD-MCNC: 7.2 G/DL — LOW (ref 13–17)
HGB BLD-MCNC: 7.4 G/DL — LOW (ref 13–17)
MAGNESIUM SERPL-MCNC: 2 MG/DL — SIGNIFICANT CHANGE UP (ref 1.6–2.6)
MCHC RBC-ENTMCNC: 29.9 PG — SIGNIFICANT CHANGE UP (ref 27–34)
MCHC RBC-ENTMCNC: 30.7 PG — SIGNIFICANT CHANGE UP (ref 27–34)
MCHC RBC-ENTMCNC: 31.7 GM/DL — LOW (ref 32–36)
MCHC RBC-ENTMCNC: 33.3 GM/DL — SIGNIFICANT CHANGE UP (ref 32–36)
MCV RBC AUTO: 92.1 FL — SIGNIFICANT CHANGE UP (ref 80–100)
MCV RBC AUTO: 94.2 FL — SIGNIFICANT CHANGE UP (ref 80–100)
NRBC # BLD: 0 /100 WBCS — SIGNIFICANT CHANGE UP (ref 0–0)
NRBC # BLD: 0 /100 WBCS — SIGNIFICANT CHANGE UP (ref 0–0)
PHOSPHATE SERPL-MCNC: 1.6 MG/DL — LOW (ref 2.5–4.5)
PLATELET # BLD AUTO: 154 K/UL — SIGNIFICANT CHANGE UP (ref 150–400)
PLATELET # BLD AUTO: 184 K/UL — SIGNIFICANT CHANGE UP (ref 150–400)
POTASSIUM SERPL-MCNC: 3.8 MMOL/L — SIGNIFICANT CHANGE UP (ref 3.5–5.3)
POTASSIUM SERPL-SCNC: 3.8 MMOL/L — SIGNIFICANT CHANGE UP (ref 3.5–5.3)
RBC # BLD: 2.41 M/UL — LOW (ref 4.2–5.8)
RBC # BLD: 2.41 M/UL — LOW (ref 4.2–5.8)
RBC # FLD: 14.6 % — HIGH (ref 10.3–14.5)
RBC # FLD: 15.2 % — HIGH (ref 10.3–14.5)
SODIUM SERPL-SCNC: 138 MMOL/L — SIGNIFICANT CHANGE UP (ref 135–145)
WBC # BLD: 10.24 K/UL — SIGNIFICANT CHANGE UP (ref 3.8–10.5)
WBC # BLD: 8.4 K/UL — SIGNIFICANT CHANGE UP (ref 3.8–10.5)
WBC # FLD AUTO: 10.24 K/UL — SIGNIFICANT CHANGE UP (ref 3.8–10.5)
WBC # FLD AUTO: 8.4 K/UL — SIGNIFICANT CHANGE UP (ref 3.8–10.5)

## 2019-09-20 RX ORDER — SODIUM CHLORIDE 9 MG/ML
1000 INJECTION, SOLUTION INTRAVENOUS
Refills: 0 | Status: DISCONTINUED | OUTPATIENT
Start: 2019-09-20 | End: 2019-09-27

## 2019-09-20 RX ORDER — HALOPERIDOL DECANOATE 100 MG/ML
1 INJECTION INTRAMUSCULAR ONCE
Refills: 0 | Status: COMPLETED | OUTPATIENT
Start: 2019-09-20 | End: 2019-09-20

## 2019-09-20 RX ORDER — POTASSIUM PHOSPHATE, MONOBASIC POTASSIUM PHOSPHATE, DIBASIC 236; 224 MG/ML; MG/ML
15 INJECTION, SOLUTION INTRAVENOUS ONCE
Refills: 0 | Status: COMPLETED | OUTPATIENT
Start: 2019-09-20 | End: 2019-09-20

## 2019-09-20 RX ADMIN — Medication 10 MILLIGRAM(S): at 13:21

## 2019-09-20 RX ADMIN — Medication 50 MILLIGRAM(S): at 22:06

## 2019-09-20 RX ADMIN — SODIUM CHLORIDE 100 MILLILITER(S): 9 INJECTION, SOLUTION INTRAVENOUS at 15:13

## 2019-09-20 RX ADMIN — Medication 50 MILLIGRAM(S): at 15:13

## 2019-09-20 RX ADMIN — POTASSIUM PHOSPHATE, MONOBASIC POTASSIUM PHOSPHATE, DIBASIC 62.5 MILLIMOLE(S): 236; 224 INJECTION, SOLUTION INTRAVENOUS at 15:13

## 2019-09-20 RX ADMIN — Medication 325 MILLIGRAM(S): at 13:21

## 2019-09-20 RX ADMIN — Medication 50 MILLIGRAM(S): at 05:18

## 2019-09-20 RX ADMIN — HALOPERIDOL DECANOATE 1 MILLIGRAM(S): 100 INJECTION INTRAMUSCULAR at 08:48

## 2019-09-20 RX ADMIN — Medication 10 MILLIGRAM(S): at 18:25

## 2019-09-20 RX ADMIN — SODIUM CHLORIDE 100 MILLILITER(S): 9 INJECTION, SOLUTION INTRAVENOUS at 22:05

## 2019-09-20 RX ADMIN — Medication 1 APPLICATION(S): at 13:21

## 2019-09-20 RX ADMIN — Medication 10 MILLIGRAM(S): at 05:18

## 2019-09-20 RX ADMIN — Medication 10 MILLIGRAM(S): at 00:31

## 2019-09-20 RX ADMIN — ENOXAPARIN SODIUM 40 MILLIGRAM(S): 100 INJECTION SUBCUTANEOUS at 13:21

## 2019-09-20 NOTE — PROGRESS NOTE ADULT - SUBJECTIVE AND OBJECTIVE BOX
INTERVAL HPI/OVERNIGHT EVENTS:  Patient seen and examined, confused, resting in hallway  NPO.  offers no complaints   + bloody bm this am     MEDICATIONS  (STANDING):  aztreonam  IVPB 1000 milliGRAM(s) IV Intermittent every 8 hours  collagenase Ointment 1 Application(s) Topical daily  enoxaparin Injectable 40 milliGRAM(s) SubCutaneous daily  hydrALAZINE 10 milliGRAM(s) Oral every 6 hours  influenza   Vaccine 0.5 milliLiter(s) IntraMuscular once  magnesium sulfate  IVPB 1 Gram(s) IV Intermittent once  potassium phosphate IVPB 15 milliMole(s) IV Intermittent once  sodium chloride 0.9% with potassium chloride 20 mEq/L 1000 milliLiter(s) (150 mL/Hr) IV Continuous <Continuous>    MEDICATIONS  (PRN):  acetaminophen   Tablet .. 650 milliGRAM(s) Oral every 6 hours PRN Temp greater or equal to 38C (100.4F), Moderate Pain (4 - 6)  HYDROmorphone  Injectable 0.5 milliGRAM(s) IV Push every 4 hours PRN Moderate Pain (4 - 6)      Vital Signs Last 24 Hrs  T(C): 37.5 (20 Sep 2019 05:14), Max: 37.5 (19 Sep 2019 22:22)  T(F): 99.5 (20 Sep 2019 05:14), Max: 99.5 (19 Sep 2019 22:22)  HR: 107 (20 Sep 2019 05:14) (94 - 107)  BP: 161/72 (20 Sep 2019 05:14) (142/48 - 166/60)  BP(mean): --  RR: 17 (20 Sep 2019 05:14) (17 - 18)  SpO2: 100% (20 Sep 2019 05:14) (100% - 100%)      Physical:  General: NAD.  Abdomen: Soft nondistended, BLAINE dressing in place with good self suction.                          7.2    10.24 )-----------( 184      ( 20 Sep 2019 08:41 )             22.7   09-20    138  |  106  |  10  ----------------------------<  93  3.8   |  28  |  0.82    Ca    8.3<L>      20 Sep 2019 08:41  Phos  1.6     09-20  Mg     2.0     09-20    I&O's Detail    19 Sep 2019 07:01  -  20 Sep 2019 07:00  --------------------------------------------------------  IN:    sodium chloride 0.9% with potassium chloride 20 mEq/L: 1430 mL  Total IN: 1430 mL    OUT:    Indwelling Catheter - Urethral: 500 mL  Total OUT: 500 mL    Total NET: 930 mL

## 2019-09-20 NOTE — PHYSICAL THERAPY INITIAL EVALUATION ADULT - IMPAIRMENTS FOUND, PT EVAL
muscle strength/sensory integrity/cognitive impairment/ROM/posture/aerobic capacity/endurance/gait, locomotion, and balance

## 2019-09-20 NOTE — PHYSICAL THERAPY INITIAL EVALUATION ADULT - GENERAL OBSERVATIONS, REHAB EVAL
Consult received, chart reviewed. Patient received in bathroom with PCA, NAD, +Rt. abd. BLAINE. Lt. ankle ace wrap c/di. Patient agreed to EVALUATION from Physical Therapist.

## 2019-09-20 NOTE — PHYSICAL THERAPY INITIAL EVALUATION ADULT - IMPAIRMENTS CONTRIBUTING TO GAIT DEVIATIONS, PT EVAL
pain/impaired balance/cognition/narrow base of support/decreased strength/impaired postural control/decreased ROM

## 2019-09-20 NOTE — PROGRESS NOTE ADULT - ASSESSMENT
1. Colonic mass  2. S/p right hemicolectomy  3. Anemia  4. No evidence of acute GI bleeding    Suggestions:    1. Diet as per surgery  2. Follow up path  3. Surgical follow up  4. Avoid NSAID  5. Monitor H/H  6. DVT prophylaxis

## 2019-09-20 NOTE — PHYSICAL THERAPY INITIAL EVALUATION ADULT - GROSSLY INTACT, SENSORY
BLE: Some level of impaired, limited assessment as pt report light touch when not being touched. However, also denies touch when there is light contact

## 2019-09-20 NOTE — PROGRESS NOTE ADULT - SUBJECTIVE AND OBJECTIVE BOX
Patient is a 69y old  Male who presents with a chief complaint of cellulitis and right colon mass (15 Sep 2019 13:14)      HPI:  69 Years old male from home with dementia was sent in by dr Quiroz office for right sided colonic mass which was recently diagnosed. Patient also have cellulitis which was treated with bactrim as outpatient but have not improved.  History source is dr Quiroz office. Patient does not gave any history but only points to lower extremity ulcers on left lower extremity. History very limited due to dementia.  Pt denies any chest pain, shortness of breath, fever, abdominal pain, change in urinary or bowel habits.  Patient is being admitted to workup for right colon mass and possible right isded colectomy. Plan is to involve GI doctor, surgery and cardiology for pre-op surgical clearance. We will need ID consult and podiatry consult for lower extremity ulcers. (12 Sep 2019 18:59)    Patient seen at bedside this PM for consult on possible left leg cellulitis.  Patient was seen in the ED recently for left leg wounds and was referred to wound care clinic in which he followed up.  Home nursing was provided for every other day dressing changes. Pt was supposed to follow up again with wound care clinic on thursday but was admitted to the hospital.  He states the dressing on his left leg has been changed regularly.  He admits to wounds in his lower legs of greater than 18 years off and on.  He states he had seen podiatrist and ED providers off and on for treatment and had home nursing for a period of time before they stopped.  He denies n/v/f at this time.     Patient seen bedside this AM resting. Pt denies pain in his LE. Pt denies F/N/V/C/SOB.      PMH:Dementia  No pertinent past medical history  No pertinent past medical history    Allergies: penicillin (Other)    Medications: aspirin enteric coated 81 milliGRAM(s) Oral daily  aztreonam  IVPB 1000 milliGRAM(s) IV Intermittent every 8 hours  chlorhexidine 4% Liquid 1 Application(s) Topical once  enoxaparin Injectable 40 milliGRAM(s) SubCutaneous daily  erythromycin     base Tablet 500 milliGRAM(s) Oral <User Schedule>  hydrALAZINE 10 milliGRAM(s) Oral every 6 hours  influenza   Vaccine 0.5 milliLiter(s) IntraMuscular once  neomycin 500 milliGRAM(s) Oral <User Schedule>  polyethylene glycol/electrolyte Solution. 4000 milliLiter(s) Oral once  vancomycin  IVPB 1000 milliGRAM(s) IV Intermittent every 12 hours    FH:No pertinent family history in first degree relatives    PSX: No significant past surgical history  No significant past surgical history    SH: aspirin enteric coated 81 milliGRAM(s) Oral daily  aztreonam  IVPB 1000 milliGRAM(s) IV Intermittent every 8 hours  chlorhexidine 4% Liquid 1 Application(s) Topical once  enoxaparin Injectable 40 milliGRAM(s) SubCutaneous daily  erythromycin     base Tablet 500 milliGRAM(s) Oral <User Schedule>  hydrALAZINE 10 milliGRAM(s) Oral every 6 hours  influenza   Vaccine 0.5 milliLiter(s) IntraMuscular once  neomycin 500 milliGRAM(s) Oral <User Schedule>  polyethylene glycol/electrolyte Solution. 4000 milliLiter(s) Oral once  vancomycin  IVPB 1000 milliGRAM(s) IV Intermittent every 12 hours    Vital Signs Last 24 Hrs  T(C): 36.7 (18 Sep 2019 05:40), Max: 37 (18 Sep 2019 00:29)  T(F): 98.1 (18 Sep 2019 05:40), Max: 98.6 (18 Sep 2019 00:29)  HR: 61 (18 Sep 2019 05:40) (54 - 72)  BP: 128/58 (18 Sep 2019 05:40) (128/58 - 143/82)  BP(mean): --  RR: 17 (18 Sep 2019 05:40) (17 - 18)  SpO2: 98% (18 Sep 2019 05:40) (98% - 100%)                                         7.3    11.21 )-----------( 162      ( 19 Sep 2019 16:40 )             22.4       09-19    139  |  107  |  9   ----------------------------<  176<H>  4.1   |  27  |  0.90    Ca    7.7<L>      19 Sep 2019 06:27  Phos  2.1     09-19  Mg     1.5     09-19    WBC Count: 11.21 K/uL (09.19.19 @ 16:40)  WBC Count: 4.10 K/uL (09.18.19 @ 05:56)  WBC Count: 4.97 K/uL (09-15 @ 10:27)  WBC Count: 4.68 K/uL (09-13 @ 06:54)  WBC Count: 4.85 K/uL (09-12 @ 11:59)      PHYSICAL EXAM  GEN: LORETA ATKINSON is a pleasant well-nourished, well developed 69y Male in no acute distress, alert awake, and oriented to person, place and time.   LE Focused:    Vasc:  DP 2/4 b/l.  PT pulses non-palpable due to wound on the left and thickened skin on the right.  CFT 5sec to all digits.  Mild edema to the left lower leg relative to the right.  Derm: Hyperpigmentation changes from chronic venous stasis b/l.  No open lesion on right leg.  Wound #1: Left medial ankle measuring 4.5cm x 5.0cm x 0.3cm with a fibrogranular base and surrounding hyperkeratotic tissue with patches of hypopigmenation.  No malodor, minimal serous drainage, no probe to bone, no increased in warmth, no surrounding erythema.  Wound #2: Left lateral ankle measuring 1.2cm x 1.2cm x 0.2cm with well defined margins and fibrogranular base with patches of hypopigmentation. no malodor, minimal serous drainage, no probe to bone, no increased in warmth, no surrounding erythema.  Neuro: Diminished protective sensation b/l  MSK: Decreased ankle joint range of motion b/l.  No pain with palpation of ulcers.    Imaging:   < from: Xray Ankle Complete 3 Views, Left (09.12.19 @ 14:15) >    EXAM:  ANKLE LEFT (MINIMUM 3 V)                            PROCEDURE DATE:  09/12/2019          INTERPRETATION:  CLINICAL STATEMENT: Ulcer    TECHNIQUE: AP, lateral and oblique views of the left ankle.    COMPARISON: 9/4/2019    FINDINGS:    There is no acute fracture or dislocation.  Joint spaces are intact.   There is no lytic or blastic lesion. Again seen is periosteal reaction   along the shaft of the fibula and between the tibia and fibula distally.   There are surgical clips in the soft tissues. There is soft tissue   swelling. There are degenerative changes in the midfoot. There is a   plantar calcaneal spur. There is a large amount of soft tissue   ossification posteriorly at the level of the distal leg.    IMPRESSION:    No plain film evidence of osteomyelitis    HELEN LEARY M.D.,ATTENDING RADIOLOGIST  This document has been electronically signed. Sep 12 2019  3:18PM      < end of copied text >    Cultures: Culture Results:   No growth (09-14 @ 10:20)  Culture Results:   No growth to date. (09-12 @ 19:31)  Culture Results:   No growth to date. (09-12 @ 19:30)    < from: [facility] Physiol Extremity Lower 3+ Level, BI (09.18.19 @ 11:51) >    EXAM:  US PHYSIOL LWR EXT 3+ LEV BI                            PROCEDURE DATE:  09/18/2019          INTERPRETATION:  Clinical Information: Peripheral vascular disease.    Technique: Bilateral lower extremity ABIs/PVR    Comparison: None    Findings/  Impression:  Right lower extremity: The ankle brachial index is 0.93. The pulse   waveforms are intact.    Left lower extremity: The ankle brachial index is 0.94. The pulse   waveforms are intact.          SG LOCKETT M.D., ATTENDINGRADIOLOGIST  This document has been electronically signed. Sep 18 2019 12:07PM    < end of copied text >      A: Venous stasis ulcerations, left ankle  Chronic non-pressure ulcers, left ankle    P:  Patient evaluated, chart reviewed  Xrays reviewed as above- no OM  Dressing applied with santyl, 4x4 gauze, Binu, ACE wrap  CALI reviewed  ESR/CRP reviewed  Possible bedside biopsy following surgical team's procedures.    Podiatry will following while in house  Seen with attending Dr. An

## 2019-09-20 NOTE — PROGRESS NOTE ADULT - SUBJECTIVE AND OBJECTIVE BOX
INTERVAL HPI/OVERNIGHT EVENTS:  Pt stable.   flatus/BM, bloody  OOB    Vital Signs Last 24 Hrs  T(C): 37.3 (20 Sep 2019 18:10), Max: 37.5 (19 Sep 2019 22:22)  T(F): 99.1 (20 Sep 2019 18:10), Max: 99.5 (19 Sep 2019 22:22)  HR: 96 (20 Sep 2019 18:10) (85 - 107)  BP: 128/72 (20 Sep 2019 18:10) (126/71 - 161/72)  BP(mean): --  RR: 17 (20 Sep 2019 18:10) (17 - 18)  SpO2: 100% (20 Sep 2019 18:10) (100% - 100%)    Physical:  Abdomen: Soft nondistended, nontender.  BLAINE dressing in place    I&O's Summary    19 Sep 2019 07:01  -  20 Sep 2019 07:00  --------------------------------------------------------  IN: 1430 mL / OUT: 500 mL / NET: 930 mL        LABS:                        7.2    10.24 )-----------( 184      ( 20 Sep 2019 08:41 )             22.7             09-20    138  |  106  |  10  ----------------------------<  93  3.8   |  28  |  0.82    Ca    8.3<L>      20 Sep 2019 08:41  Phos  1.6     09-20  Mg     2.0     09-20

## 2019-09-20 NOTE — PHYSICAL THERAPY INITIAL EVALUATION ADULT - IMPAIRED TRANSFERS: SIT/STAND, REHAB EVAL
impaired postural control/narrow base of support/pain/decreased strength/impaired balance/decreased ROM/cognition

## 2019-09-20 NOTE — PHYSICAL THERAPY INITIAL EVALUATION ADULT - ACTIVE RANGE OF MOTION EXAMINATION, REHAB EVAL
except b/l hips ~1/2 range/bilateral upper extremity Active ROM was WFL (within functional limits)/bilateral  lower extremity Active ROM was WFL (within functional limits)

## 2019-09-20 NOTE — PROGRESS NOTE ADULT - SUBJECTIVE AND OBJECTIVE BOX
[   ] ICU                                          [   ] CCU                                      [ X  ] Medical Floor      Patient is comfortable. No new complaints reported, No abdominal pain, N/V, hematemesis, hematochezia, melena, fever, chills, chest pain, SOB, cough or diarrhea reported.    VITALS  Vital Signs Last 24 Hrs  T(C): 36.7 (20 Sep 2019 13:45), Max: 37.5 (19 Sep 2019 22:22)  T(F): 98.1 (20 Sep 2019 13:45), Max: 99.5 (19 Sep 2019 22:22)  HR: 85 (20 Sep 2019 14:45) (85 - 107)  BP: 141/57 (20 Sep 2019 14:45) (126/71 - 161/72)   RR: 17 (20 Sep 2019 13:45) (17 - 18)  SpO2: 100% (20 Sep 2019 14:45) (100% - 100%)       MEDICATIONS  (STANDING):  aztreonam  IVPB 1000 milliGRAM(s) IV Intermittent every 8 hours  collagenase Ointment 1 Application(s) Topical daily  dextrose 5% + sodium chloride 0.45% 1000 milliLiter(s) (100 mL/Hr) IV Continuous <Continuous>  enoxaparin Injectable 40 milliGRAM(s) SubCutaneous daily  ferrous    sulfate 325 milliGRAM(s) Oral daily  hydrALAZINE 10 milliGRAM(s) Oral every 6 hours  influenza   Vaccine 0.5 milliLiter(s) IntraMuscular once    MEDICATIONS  (PRN):  acetaminophen   Tablet .. 650 milliGRAM(s) Oral every 6 hours PRN Temp greater or equal to 38C (100.4F), Moderate Pain (4 - 6)  HYDROmorphone  Injectable 0.5 milliGRAM(s) IV Push every 4 hours PRN Moderate Pain (4 - 6)                            7.2    10.24 )-----------( 184      ( 20 Sep 2019 08:41 )             22.7       09-20    138  |  106  |  10  ----------------------------<  93  3.8   |  28  |  0.82    Ca    8.3<L>      20 Sep 2019 08:41  Phos  1.6     09-20  Mg     2.0     09-20

## 2019-09-20 NOTE — PROGRESS NOTE ADULT - ASSESSMENT
69 Years old male from home with dementia was sent in by dr Quiroz office for right sided colonic mass, cellulitis, s/p rt hemicolectomy,acute anemia.  1.Plan for po diet.  2.Surgical f/u.  3.GI f/u.  4.HTN-IV hydralazine.  5.Cellulitis-ABX.  6.Anemia-transfuse 1 prbc.  7.GI and DVT prophylaxis.

## 2019-09-20 NOTE — PROGRESS NOTE ADULT - ASSESSMENT
69y.o. Male s/p R hemicolectomy POD#2 with hypophosphatemia, hypomagnesemia, acute blood loss anemia       -sip of clears as tolerated  -OOB as tolerated  -pain control prn  -incentive spirometry  -BLAINE dressing for 5-7 days  - transfuse 1 unit

## 2019-09-20 NOTE — PHYSICAL THERAPY INITIAL EVALUATION ADULT - MANUAL MUSCLE TESTING RESULTS, REHAB EVAL
BUE grossly 4/5, b/l hips 3-/5, knees as least 3+/5. limited assessment secondary to impaired ability to follow commands

## 2019-09-20 NOTE — PHYSICAL THERAPY INITIAL EVALUATION ADULT - CRITERIA FOR SKILLED THERAPEUTIC INTERVENTIONS
functional limitations in following categories/risk reduction/prevention/rehab potential/predicted duration of therapy intervention/anticipated discharge recommendation/impairments found/therapy frequency

## 2019-09-21 LAB
ACANTHOCYTES BLD QL SMEAR: SLIGHT — SIGNIFICANT CHANGE UP
ANION GAP SERPL CALC-SCNC: 2 MMOL/L — LOW (ref 5–17)
ANISOCYTOSIS BLD QL: SLIGHT — SIGNIFICANT CHANGE UP
BUN SERPL-MCNC: 6 MG/DL — LOW (ref 7–18)
BURR CELLS BLD QL SMEAR: PRESENT — SIGNIFICANT CHANGE UP
BURR CELLS BLD QL SMEAR: SLIGHT — SIGNIFICANT CHANGE UP
CALCIUM SERPL-MCNC: 8.3 MG/DL — LOW (ref 8.4–10.5)
CHLORIDE SERPL-SCNC: 109 MMOL/L — HIGH (ref 96–108)
CO2 SERPL-SCNC: 29 MMOL/L — SIGNIFICANT CHANGE UP (ref 22–31)
CREAT SERPL-MCNC: 0.68 MG/DL — SIGNIFICANT CHANGE UP (ref 0.5–1.3)
ELLIPTOCYTES BLD QL SMEAR: SLIGHT — SIGNIFICANT CHANGE UP
GLUCOSE SERPL-MCNC: 96 MG/DL — SIGNIFICANT CHANGE UP (ref 70–99)
HCT VFR BLD CALC: 21.6 % — LOW (ref 39–50)
HCT VFR BLD CALC: 32.4 % — LOW (ref 39–50)
HGB BLD-MCNC: 10.8 G/DL — LOW (ref 13–17)
HGB BLD-MCNC: 7 G/DL — CRITICAL LOW (ref 13–17)
MANUAL SMEAR VERIFICATION: SIGNIFICANT CHANGE UP
MCHC RBC-ENTMCNC: 30 PG — SIGNIFICANT CHANGE UP (ref 27–34)
MCHC RBC-ENTMCNC: 30.2 PG — SIGNIFICANT CHANGE UP (ref 27–34)
MCHC RBC-ENTMCNC: 32.4 GM/DL — SIGNIFICANT CHANGE UP (ref 32–36)
MCHC RBC-ENTMCNC: 33.3 GM/DL — SIGNIFICANT CHANGE UP (ref 32–36)
MCV RBC AUTO: 90.5 FL — SIGNIFICANT CHANGE UP (ref 80–100)
MCV RBC AUTO: 92.7 FL — SIGNIFICANT CHANGE UP (ref 80–100)
NRBC # BLD: 0 /100 WBCS — SIGNIFICANT CHANGE UP (ref 0–0)
NRBC # BLD: 0 /100 WBCS — SIGNIFICANT CHANGE UP (ref 0–0)
OVALOCYTES BLD QL SMEAR: SLIGHT — SIGNIFICANT CHANGE UP
PLAT MORPH BLD: NORMAL — SIGNIFICANT CHANGE UP
PLATELET # BLD AUTO: 158 K/UL — SIGNIFICANT CHANGE UP (ref 150–400)
PLATELET # BLD AUTO: 210 K/UL — SIGNIFICANT CHANGE UP (ref 150–400)
PLATELET COUNT - ESTIMATE: NORMAL — SIGNIFICANT CHANGE UP
POIKILOCYTOSIS BLD QL AUTO: SLIGHT — SIGNIFICANT CHANGE UP
POLYCHROMASIA BLD QL SMEAR: SLIGHT — SIGNIFICANT CHANGE UP
POTASSIUM SERPL-MCNC: 3.6 MMOL/L — SIGNIFICANT CHANGE UP (ref 3.5–5.3)
POTASSIUM SERPL-SCNC: 3.6 MMOL/L — SIGNIFICANT CHANGE UP (ref 3.5–5.3)
RBC # BLD: 2.33 M/UL — LOW (ref 4.2–5.8)
RBC # BLD: 3.58 M/UL — LOW (ref 4.2–5.8)
RBC # FLD: 15 % — HIGH (ref 10.3–14.5)
RBC # FLD: 15.4 % — HIGH (ref 10.3–14.5)
RBC BLD AUTO: ABNORMAL
SCHISTOCYTES BLD QL AUTO: SLIGHT — SIGNIFICANT CHANGE UP
SODIUM SERPL-SCNC: 140 MMOL/L — SIGNIFICANT CHANGE UP (ref 135–145)
WBC # BLD: 7.34 K/UL — SIGNIFICANT CHANGE UP (ref 3.8–10.5)
WBC # BLD: 7.49 K/UL — SIGNIFICANT CHANGE UP (ref 3.8–10.5)
WBC # FLD AUTO: 7.34 K/UL — SIGNIFICANT CHANGE UP (ref 3.8–10.5)
WBC # FLD AUTO: 7.49 K/UL — SIGNIFICANT CHANGE UP (ref 3.8–10.5)

## 2019-09-21 RX ADMIN — SODIUM CHLORIDE 100 MILLILITER(S): 9 INJECTION, SOLUTION INTRAVENOUS at 22:23

## 2019-09-21 RX ADMIN — Medication 50 MILLIGRAM(S): at 05:43

## 2019-09-21 RX ADMIN — ENOXAPARIN SODIUM 40 MILLIGRAM(S): 100 INJECTION SUBCUTANEOUS at 11:38

## 2019-09-21 RX ADMIN — Medication 325 MILLIGRAM(S): at 11:38

## 2019-09-21 RX ADMIN — Medication 10 MILLIGRAM(S): at 00:07

## 2019-09-21 RX ADMIN — Medication 50 MILLIGRAM(S): at 15:40

## 2019-09-21 RX ADMIN — Medication 50 MILLIGRAM(S): at 22:23

## 2019-09-21 NOTE — PROGRESS NOTE ADULT - SUBJECTIVE AND OBJECTIVE BOX
INTERVAL HPI/OVERNIGHT EVENTS:  Pt stable. .   flatus/BM, no further bloody BM    Vital Signs Last 24 Hrs  T(C): 36.6 (21 Sep 2019 11:55), Max: 37.3 (20 Sep 2019 18:10)  T(F): 97.8 (21 Sep 2019 11:55), Max: 99.1 (20 Sep 2019 18:10)  HR: 70 (21 Sep 2019 11:55) (69 - 96)  BP: 118/52 (21 Sep 2019 11:55) (107/49 - 141/57)  BP(mean): --  RR: 16 (21 Sep 2019 11:55) (16 - 17)  SpO2: 100% (21 Sep 2019 11:55) (100% - 100%)    Physical:  Abdomen: Soft nondistended, nontender.  H & H low    I&O's Summary    20 Sep 2019 07:01  -  21 Sep 2019 07:00  --------------------------------------------------------  IN: 1300 mL / OUT: 0 mL / NET: 1300 mL    21 Sep 2019 07:01  -  21 Sep 2019 12:30  --------------------------------------------------------  IN: 400 mL / OUT: 850 mL / NET: -450 mL        LABS:                        7.0    7.34  )-----------( 158      ( 21 Sep 2019 07:36 )             21.6             09-21    140  |  109<H>  |  6<L>  ----------------------------<  96  3.6   |  29  |  0.68    Ca    8.3<L>      21 Sep 2019 07:36  Phos  1.6     09-20  Mg     2.0     09-20

## 2019-09-21 NOTE — PROGRESS NOTE ADULT - ASSESSMENT
69 Years old male from home with dementia was sent in by dr Quiroz office for right sided colonic mass, cellulitis, s/p rt hemicolectomy,acute anemia.  1.Plan for po diet.  2.Surgical f/u.  3.GI f/u.  4.HTN-D/c hydralazine.  5.Cellulitis-ABX.  6.Anemia-transfuse 2 prbc and f/u h/h post transfusion..  7.GI and DVT prophylaxis.

## 2019-09-21 NOTE — PROGRESS NOTE ADULT - SUBJECTIVE AND OBJECTIVE BOX
CHIEF COMPLAINT:Patient is a 69y old  Male who presents with a chief complaint of cellulitis and right colon mass.Pt appears comfortable.    	  REVIEW OF SYSTEMS:  CONSTITUTIONAL: No fever, weight loss, or fatigue  EYES: No eye pain, visual disturbances, or discharge  ENT:  No difficulty hearing, tinnitus, vertigo; No sinus or throat pain  NECK: No pain or stiffness  RESPIRATORY: No cough, wheezing, chills or hemoptysis; No Shortness of Breath  CARDIOVASCULAR: No chest pain, palpitations, passing out, dizziness, or leg swelling  GASTROINTESTINAL: No abdominal or epigastric pain. No nausea, vomiting, or hematemesis; No diarrhea or constipation. No melena or hematochezia.  GENITOURINARY: No dysuria, frequency, hematuria, or incontinence  NEUROLOGICAL: No headaches, memory loss, loss of strength, numbness, or tremors  SKIN: No itching, burning, rashes, or lesions   LYMPH Nodes: No enlarged glands  ENDOCRINE: No heat or cold intolerance; No hair loss  MUSCULOSKELETAL: No joint pain or swelling; No muscle, back, or extremity pain  PSYCHIATRIC: No depression, anxiety, mood swings, or difficulty sleeping  HEME/LYMPH: No easy bruising, or bleeding gums  ALLERGY AND IMMUNOLOGIC: No hives or eczema	      PHYSICAL EXAM:  T(C): 36.8 (09-21-19 @ 05:17), Max: 37.3 (09-20-19 @ 18:10)  HR: 69 (09-21-19 @ 05:17) (69 - 96)  BP: 107/49 (09-21-19 @ 05:17) (107/49 - 141/57)  RR: 17 (09-21-19 @ 05:17) (17 - 17)  SpO2: 100% (09-21-19 @ 05:17) (100% - 100%)  Wt(kg): --  I&O's Summary    20 Sep 2019 07:01  -  21 Sep 2019 07:00  --------------------------------------------------------  IN: 1300 mL / OUT: 0 mL / NET: 1300 mL    21 Sep 2019 07:01  -  21 Sep 2019 10:52  --------------------------------------------------------  IN: 300 mL / OUT: 400 mL / NET: -100 mL        Appearance: Normal	  HEENT:   Normal oral mucosa, PERRL, EOMI	  Lymphatic: No lymphadenopathy  Cardiovascular: Normal S1 S2, No JVD, No murmurs, No edema  Respiratory: Lungs clear to auscultation	  Psychiatry: A & O x 3, Mood & affect appropriate  Gastrointestinal:  Soft, Non-tender, + BS	  Skin: No rashes, No ecchymoses, No cyanosis	  Neurologic: Non-focal  Extremities: Normal range of motion, No clubbing, cyanosis or edema      MEDICATIONS  (STANDING):  aztreonam  IVPB 1000 milliGRAM(s) IV Intermittent every 8 hours  collagenase Ointment 1 Application(s) Topical daily  dextrose 5% + sodium chloride 0.45% 1000 milliLiter(s) (100 mL/Hr) IV Continuous <Continuous>  enoxaparin Injectable 40 milliGRAM(s) SubCutaneous daily  ferrous    sulfate 325 milliGRAM(s) Oral daily  hydrALAZINE 10 milliGRAM(s) Oral every 6 hours  influenza   Vaccine 0.5 milliLiter(s) IntraMuscular once      	  LABS:	 	                       7.0    7.34  )-----------( 158      ( 21 Sep 2019 07:36 )             21.6     09-21    140  |  109<H>  |  6<L>  ----------------------------<  96  3.6   |  29  |  0.68    Ca    8.3<L>      21 Sep 2019 07:36  Phos  1.6     09-20  Mg     2.0     09-20      proBNP: Serum Pro-Brain Natriuretic Peptide: 180 pg/mL (09-12 @ 11:59)    Lipid Profile: Cholesterol 117  LDL 60  HDL 50  TG 36    HgA1c: Hemoglobin A1C, Whole Blood: 5.3 % (09-13 @ 10:32)    TSH: Thyroid Stimulating Hormone, Serum: 1.33 uU/mL (09-13 @ 06:54)      Surgical Pathology Report (09.16.19 @ 10:53)    Surgical Pathology Report:   ACCESSION No:  [id number]    LORETA ATKINSON E               1        Surgical Final Report          Final Diagnosis  Ascending colon polyp; biopsy:  - Fragments of tubulovillous adenoma.    Verified by: Renita Hernandez MD  (Electronic Signature)  Reported on: 09/18/19 10:48 EDT, [address] [place], [place] [id number]  _________________________________________________________________    Clinical History  r/o carcinoma  Colon/polyps    Specimen(s) Submitted  Ascending colon polyp    Gross Description  The specimen is received in formalin and the specimen container  is labeled: Ascending polyp.  It consists of a 1.5 x 1.3 x 1 cm  in greatest dimension fragment of soft, tan-pink polyp.  The base  of the polyp is inked in black.  Thepolyp is trisected.  Entirely submitted.  One cassette.    In addition to other data that may appear on the specimen  container, the label has been inspected to confirm the presence  of the patient's name and date of birth.  Alo Byrd 09/18/2019 07:16

## 2019-09-21 NOTE — PROGRESS NOTE ADULT - SUBJECTIVE AND OBJECTIVE BOX
INTERVAL HPI/OVERNIGHT EVENTS:    Patient seen and examined, confused, no acute events overnight;   NPO  No BM reported as per nursing staff     Vital Signs Last 24 Hrs  T(C): 36.8 (21 Sep 2019 05:17), Max: 37.3 (20 Sep 2019 18:10)  T(F): 98.2 (21 Sep 2019 05:17), Max: 99.1 (20 Sep 2019 18:10)  HR: 69 (21 Sep 2019 05:17) (69 - 96)  BP: 107/49 (21 Sep 2019 05:17) (107/49 - 141/57)  BP(mean): --  RR: 17 (21 Sep 2019 05:17) (17 - 17)  SpO2: 100% (21 Sep 2019 05:17) (100% - 100%)  I&O's Detail    20 Sep 2019 07:01  -  21 Sep 2019 07:00  --------------------------------------------------------  IN:    dextrose 5% + sodium chloride 0.45%: 1200 mL    IV PiggyBack: 100 mL  Total IN: 1300 mL    OUT:  Total OUT: 0 mL    Total NET: 1300 mL      21 Sep 2019 07:01  -  21 Sep 2019 10:44  --------------------------------------------------------  IN:    dextrose 5% + sodium chloride 0.45%: 300 mL  Total IN: 300 mL    OUT:    Voided: 400 mL  Total OUT: 400 mL    Total NET: -100 mL        aztreonam  IVPB 1000 milliGRAM(s) IV Intermittent every 8 hours      Physical Exam  General: NAD.  Abdomen: Soft nondistended, BLAINE dressing in place with good self suction.      Labs:                        7.0    7.34  )-----------( 158      ( 21 Sep 2019 07:36 )             21.6     09-21    140  |  109<H>  |  6<L>  ----------------------------<  96  3.6   |  29  |  0.68    Ca    8.3<L>      21 Sep 2019 07:36  Phos  1.6     09-20  Mg     2.0     09-20

## 2019-09-21 NOTE — PROGRESS NOTE ADULT - SUBJECTIVE AND OBJECTIVE BOX
Patient is a 69y old  Male who presents with a chief complaint of cellulitis and right colon mass (15 Sep 2019 13:14)      HPI:  69 Years old male from home with dementia was sent in by dr Quiroz office for right sided colonic mass which was recently diagnosed. Patient also have cellulitis which was treated with bactrim as outpatient but have not improved.  History source is dr Quiroz office. Patient does not gave any history but only points to lower extremity ulcers on left lower extremity. History very limited due to dementia.  Pt denies any chest pain, shortness of breath, fever, abdominal pain, change in urinary or bowel habits.  Patient is being admitted to workup for right colon mass and possible right isded colectomy. Plan is to involve GI doctor, surgery and cardiology for pre-op surgical clearance. We will need ID consult and podiatry consult for lower extremity ulcers. (12 Sep 2019 18:59)    Patient seen at bedside this PM for consult on possible left leg cellulitis.  Patient was seen in the ED recently for left leg wounds and was referred to wound care clinic in which he followed up.  Home nursing was provided for every other day dressing changes. Pt was supposed to follow up again with wound care clinic on thursday but was admitted to the hospital.  He states the dressing on his left leg has been changed regularly.  He admits to wounds in his lower legs of greater than 18 years off and on.  He states he had seen podiatrist and ED providers off and on for treatment and had home nursing for a period of time before they stopped.  He denies n/v/f at this time.     Patient seen bedside this AM with his eyes closed.  He appeared tired and did not want to talk much.  He would raise his leg during the dressing change to assist.  Unable to get a history since the last dressing change.    PMH:Dementia  No pertinent past medical history  No pertinent past medical history    Allergies: penicillin (Other)    Medications: aspirin enteric coated 81 milliGRAM(s) Oral daily  aztreonam  IVPB 1000 milliGRAM(s) IV Intermittent every 8 hours  chlorhexidine 4% Liquid 1 Application(s) Topical once  enoxaparin Injectable 40 milliGRAM(s) SubCutaneous daily  erythromycin     base Tablet 500 milliGRAM(s) Oral <User Schedule>  hydrALAZINE 10 milliGRAM(s) Oral every 6 hours  influenza   Vaccine 0.5 milliLiter(s) IntraMuscular once  neomycin 500 milliGRAM(s) Oral <User Schedule>  polyethylene glycol/electrolyte Solution. 4000 milliLiter(s) Oral once  vancomycin  IVPB 1000 milliGRAM(s) IV Intermittent every 12 hours    FH:No pertinent family history in first degree relatives    PSX: No significant past surgical history  No significant past surgical history    SH: aspirin enteric coated 81 milliGRAM(s) Oral daily  aztreonam  IVPB 1000 milliGRAM(s) IV Intermittent every 8 hours  chlorhexidine 4% Liquid 1 Application(s) Topical once  enoxaparin Injectable 40 milliGRAM(s) SubCutaneous daily  erythromycin     base Tablet 500 milliGRAM(s) Oral <User Schedule>  hydrALAZINE 10 milliGRAM(s) Oral every 6 hours  influenza   Vaccine 0.5 milliLiter(s) IntraMuscular once  neomycin 500 milliGRAM(s) Oral <User Schedule>  polyethylene glycol/electrolyte Solution. 4000 milliLiter(s) Oral once  vancomycin  IVPB 1000 milliGRAM(s) IV Intermittent every 12 hours    Vital Signs Last 24 Hrs  T(C): 36.8 (21 Sep 2019 05:17), Max: 37.3 (20 Sep 2019 18:10)  T(F): 98.2 (21 Sep 2019 05:17), Max: 99.1 (20 Sep 2019 18:10)  HR: 69 (21 Sep 2019 05:17) (69 - 96)  BP: 107/49 (21 Sep 2019 05:17) (107/49 - 141/57)  BP(mean): --  RR: 17 (21 Sep 2019 05:17) (17 - 17)  SpO2: 100% (21 Sep 2019 05:17) (100% - 100%)                          7.0    7.34  )-----------( 158      ( 21 Sep 2019 07:36 )             21.6   09-21    140  |  109<H>  |  6<L>  ----------------------------<  96  3.6   |  29  |  0.68    Ca    8.3<L>      21 Sep 2019 07:36  Phos  1.6     09-20  Mg     2.0     09-20               WBC Count: 7.34 K/uL (09-21 @ 07:36)  WBC Count: 8.40 K/uL (09-20 @ 20:00)  WBC Count: 10.24 K/uL (09-20 @ 08:41)  WBC Count: 11.21 K/uL (09-19 @ 16:40)  WBC Count: 9.56 K/uL (09-19 @ 06:27)        PHYSICAL EXAM  GEN: LORETA ATKINSON is a pleasant well-nourished, well developed 69y Male in no acute distress, alert awake, and oriented to person, place and time.   LE Focused:    Vasc:  DP 2/4 b/l.  PT pulses non-palpable due to wound on the left and thickened skin on the right.  CFT 5sec to all digits.  Mild edema to the left lower leg relative to the right.  Derm: Hyperpigmentation changes from chronic venous stasis b/l.  No open lesion on right leg.  Wound #1: Left medial ankle measuring 4.5cm x 5.0cm x 0.3cm with a fibrogranular base and surrounding hyperkeratotic tissue with patches of hypopigmenation.  No malodor, minimal serous drainage, no probe to bone, no increased in warmth, no surrounding erythema.  Wound #2: Left lateral ankle measuring 1.2cm x 1.2cm x 0.2cm with well defined margins and fibrogranular base with patches of hypopigmentation. no malodor, minimal serous drainage, no probe to bone, no increased in warmth, no surrounding erythema.  Neuro: Diminished protective sensation b/l  MSK: Decreased ankle joint range of motion b/l.  No pain with palpation of ulcers.    Imaging:   < from: Xray Ankle Complete 3 Views, Left (09.12.19 @ 14:15) >    EXAM:  ANKLE LEFT (MINIMUM 3 V)                            PROCEDURE DATE:  09/12/2019          INTERPRETATION:  CLINICAL STATEMENT: Ulcer    TECHNIQUE: AP, lateral and oblique views of the left ankle.    COMPARISON: 9/4/2019    FINDINGS:    There is no acute fracture or dislocation.  Joint spaces are intact.   There is no lytic or blastic lesion. Again seen is periosteal reaction   along the shaft of the fibula and between the tibia and fibula distally.   There are surgical clips in the soft tissues. There is soft tissue   swelling. There are degenerative changes in the midfoot. There is a   plantar calcaneal spur. There is a large amount of soft tissue   ossification posteriorly at the level of the distal leg.    IMPRESSION:    No plain film evidence of osteomyelitis    HELEN LEARY M.D.,ATTENDING RADIOLOGIST  This document has been electronically signed. Sep 12 2019  3:18PM      < end of copied text >    Cultures: Culture Results:   No growth (09-14 @ 10:20)  Culture Results:   No growth to date. (09-12 @ 19:31)  Culture Results:   No growth to date. (09-12 @ 19:30)    < from: [facility] Physiol Extremity Lower 3+ Level, BI (09.18.19 @ 11:51) >    EXAM:  US PHYSIOL LWR EXT 3+ LEV BI                            PROCEDURE DATE:  09/18/2019          INTERPRETATION:  Clinical Information: Peripheral vascular disease.    Technique: Bilateral lower extremity ABIs/PVR    Comparison: None    Findings/  Impression:  Right lower extremity: The ankle brachial index is 0.93. The pulse   waveforms are intact.    Left lower extremity: The ankle brachial index is 0.94. The pulse   waveforms are intact.          SG LOCKETT M.D., ATTENDINGRADIOLOGIST  This document has been electronically signed. Sep 18 2019 12:07PM    < end of copied text >      A: Venous stasis ulcerations, left ankle  Chronic non-pressure ulcers, left ankle    P:  Patient evaluated, chart reviewed  X-rays reviewed as above- no OM  Dressing applied with santyl, 4x4 gauze, Binu, ACE wrap to left leg  CALI reviewed  ESR/CRP reviewed  Possible bedside biopsy following surgical team's procedures.    Podiatry will following while in house  Discussed with attending Dr. Johnston

## 2019-09-21 NOTE — PROGRESS NOTE ADULT - ASSESSMENT
69y.o. Male s/p R hemicolectomy POD#2 with hypophosphatemia, hypomagnesemia, acute blood loss anemia     -sip of clears as tolerated  -OOB as tolerated  -pain control prn  -incentive spirometry  -BLAINE dressing for 5-7 days  -transfuse 2 unit    -f/u post transfusion cbc

## 2019-09-22 LAB
ALBUMIN SERPL ELPH-MCNC: 2.7 G/DL — LOW (ref 3.5–5)
ALP SERPL-CCNC: 51 U/L — SIGNIFICANT CHANGE UP (ref 40–120)
ALT FLD-CCNC: 27 U/L DA — SIGNIFICANT CHANGE UP (ref 10–60)
ANION GAP SERPL CALC-SCNC: 5 MMOL/L — SIGNIFICANT CHANGE UP (ref 5–17)
AST SERPL-CCNC: 24 U/L — SIGNIFICANT CHANGE UP (ref 10–40)
BILIRUB SERPL-MCNC: 1.1 MG/DL — SIGNIFICANT CHANGE UP (ref 0.2–1.2)
BUN SERPL-MCNC: 5 MG/DL — LOW (ref 7–18)
CALCIUM SERPL-MCNC: 8.3 MG/DL — LOW (ref 8.4–10.5)
CHLORIDE SERPL-SCNC: 107 MMOL/L — SIGNIFICANT CHANGE UP (ref 96–108)
CO2 SERPL-SCNC: 27 MMOL/L — SIGNIFICANT CHANGE UP (ref 22–31)
CREAT SERPL-MCNC: 0.7 MG/DL — SIGNIFICANT CHANGE UP (ref 0.5–1.3)
GLUCOSE SERPL-MCNC: 105 MG/DL — HIGH (ref 70–99)
HCT VFR BLD CALC: 28.3 % — LOW (ref 39–50)
HGB BLD-MCNC: 9.4 G/DL — LOW (ref 13–17)
MCHC RBC-ENTMCNC: 29.9 PG — SIGNIFICANT CHANGE UP (ref 27–34)
MCHC RBC-ENTMCNC: 33.2 GM/DL — SIGNIFICANT CHANGE UP (ref 32–36)
MCV RBC AUTO: 90.1 FL — SIGNIFICANT CHANGE UP (ref 80–100)
NRBC # BLD: 0 /100 WBCS — SIGNIFICANT CHANGE UP (ref 0–0)
PLATELET # BLD AUTO: 185 K/UL — SIGNIFICANT CHANGE UP (ref 150–400)
POTASSIUM SERPL-MCNC: 3.5 MMOL/L — SIGNIFICANT CHANGE UP (ref 3.5–5.3)
POTASSIUM SERPL-SCNC: 3.5 MMOL/L — SIGNIFICANT CHANGE UP (ref 3.5–5.3)
PROT SERPL-MCNC: 6.6 G/DL — SIGNIFICANT CHANGE UP (ref 6–8.3)
RBC # BLD: 3.14 M/UL — LOW (ref 4.2–5.8)
RBC # FLD: 15.1 % — HIGH (ref 10.3–14.5)
SODIUM SERPL-SCNC: 139 MMOL/L — SIGNIFICANT CHANGE UP (ref 135–145)
WBC # BLD: 7.48 K/UL — SIGNIFICANT CHANGE UP (ref 3.8–10.5)
WBC # FLD AUTO: 7.48 K/UL — SIGNIFICANT CHANGE UP (ref 3.8–10.5)

## 2019-09-22 RX ADMIN — Medication 50 MILLIGRAM(S): at 05:56

## 2019-09-22 RX ADMIN — SODIUM CHLORIDE 100 MILLILITER(S): 9 INJECTION, SOLUTION INTRAVENOUS at 11:43

## 2019-09-22 RX ADMIN — ENOXAPARIN SODIUM 40 MILLIGRAM(S): 100 INJECTION SUBCUTANEOUS at 11:43

## 2019-09-22 RX ADMIN — Medication 50 MILLIGRAM(S): at 23:56

## 2019-09-22 RX ADMIN — Medication 1 APPLICATION(S): at 11:42

## 2019-09-22 RX ADMIN — Medication 50 MILLIGRAM(S): at 13:10

## 2019-09-22 RX ADMIN — Medication 325 MILLIGRAM(S): at 11:43

## 2019-09-22 NOTE — PROGRESS NOTE ADULT - SUBJECTIVE AND OBJECTIVE BOX
[   ] ICU                                          [   ] CCU                                      [ X  ] Medical Floor      Patient is comfortable. No new complaints reported, No abdominal pain, N/V, hematemesis, hematochezia, melena, fever, chills, chest pain, SOB, cough or diarrhea reported.    VITALS  Vital Signs Last 24 Hrs  T(C): 36.9 (22 Sep 2019 05:05), Max: 37.1 (21 Sep 2019 22:47)  T(F): 98.5 (22 Sep 2019 05:05), Max: 98.8 (21 Sep 2019 22:47)  HR: 62 (22 Sep 2019 05:05) (62 - 81)  BP: 120/67 (22 Sep 2019 05:05) (118/52 - 154/73)   RR: 17 (22 Sep 2019 05:05) (16 - 17)  SpO2: 97% (22 Sep 2019 05:05) (97% - 100%)           MEDICATIONS  (STANDING):  aztreonam  IVPB 1000 milliGRAM(s) IV Intermittent every 8 hours  collagenase Ointment 1 Application(s) Topical daily  dextrose 5% + sodium chloride 0.45% 1000 milliLiter(s) (100 mL/Hr) IV Continuous <Continuous>  enoxaparin Injectable 40 milliGRAM(s) SubCutaneous daily  ferrous    sulfate 325 milliGRAM(s) Oral daily  influenza   Vaccine 0.5 milliLiter(s) IntraMuscular once    MEDICATIONS  (PRN):  acetaminophen   Tablet .. 650 milliGRAM(s) Oral every 6 hours PRN Temp greater or equal to 38C (100.4F), Moderate Pain (4 - 6)  HYDROmorphone  Injectable 0.5 milliGRAM(s) IV Push every 4 hours PRN Moderate Pain (4 - 6)                            10.8   7.49  )-----------( 210      ( 21 Sep 2019 22:56 )             32.4       09-21    140  |  109<H>  |  6<L>  ----------------------------<  96  3.6   |  29  |  0.68    Ca    8.3<L>      21 Sep 2019 07:36  Phos  1.6     09-20  Mg     2.0     09-20

## 2019-09-22 NOTE — PROGRESS NOTE ADULT - SUBJECTIVE AND OBJECTIVE BOX
CHIEF COMPLAINT:Patient is a 69y old  Male who presents with a chief complaint of cellulitis and right colon mass.Pt appears comfortable.    	  REVIEW OF SYSTEMS:  CONSTITUTIONAL: No fever, weight loss, or fatigue  EYES: No eye pain, visual disturbances, or discharge  ENT:  No difficulty hearing, tinnitus, vertigo; No sinus or throat pain  NECK: No pain or stiffness  RESPIRATORY: No cough, wheezing, chills or hemoptysis; No Shortness of Breath  CARDIOVASCULAR: No chest pain, palpitations, passing out, dizziness, or leg swelling  GASTROINTESTINAL: No abdominal or epigastric pain. No nausea, vomiting, or hematemesis; No diarrhea or constipation. No melena or hematochezia.  GENITOURINARY: No dysuria, frequency, hematuria, or incontinence  NEUROLOGICAL: No headaches, memory loss, loss of strength, numbness, or tremors  SKIN: No itching, burning, rashes, or lesions   LYMPH Nodes: No enlarged glands  ENDOCRINE: No heat or cold intolerance; No hair loss  MUSCULOSKELETAL: No joint pain or swelling; No muscle, back, or extremity pain  PSYCHIATRIC: No depression, anxiety, mood swings, or difficulty sleeping  HEME/LYMPH: No easy bruising, or bleeding gums  ALLERGY AND IMMUNOLOGIC: No hives or eczema	      PHYSICAL EXAM:  T(C): 36.9 (09-22-19 @ 05:05), Max: 37.1 (09-21-19 @ 22:47)  HR: 62 (09-22-19 @ 05:05) (62 - 81)  BP: 120/67 (09-22-19 @ 05:05) (118/52 - 154/73)  RR: 17 (09-22-19 @ 05:05) (16 - 17)  SpO2: 97% (09-22-19 @ 05:05) (97% - 100%)  Wt(kg): --  I&O's Summary    21 Sep 2019 07:01  -  22 Sep 2019 07:00  --------------------------------------------------------  IN: 2400 mL / OUT: 1350 mL / NET: 1050 mL        Appearance: Normal	  HEENT:   Normal oral mucosa, PERRL, EOMI	  Lymphatic: No lymphadenopathy  Cardiovascular: Normal S1 S2, No JVD, No murmurs, No edema  Respiratory: Lungs clear to auscultation	  Psychiatry: A & O x 3, Mood & affect appropriate  Gastrointestinal:  Soft, Non-tender, + BS	  Skin: No rashes, No ecchymoses, No cyanosis	  Neurologic: Non-focal  Extremities: Normal range of motion, No clubbing, cyanosis or edema  Vascular: Peripheral pulses palpable 2+ bilaterally    MEDICATIONS  (STANDING):  aztreonam  IVPB 1000 milliGRAM(s) IV Intermittent every 8 hours  collagenase Ointment 1 Application(s) Topical daily  dextrose 5% + sodium chloride 0.45% 1000 milliLiter(s) (100 mL/Hr) IV Continuous <Continuous>  enoxaparin Injectable 40 milliGRAM(s) SubCutaneous daily  ferrous    sulfate 325 milliGRAM(s) Oral daily  influenza   Vaccine 0.5 milliLiter(s) IntraMuscular once      LABS:	 	                       9.4    7.48  )-----------( 185      ( 22 Sep 2019 07:45 )             28.3     09-22    139  |  107  |  5<L>  ----------------------------<  105<H>  3.5   |  27  |  0.70    Ca    8.3<L>      22 Sep 2019 07:45    TPro  6.6  /  Alb  2.7<L>  /  TBili  1.1  /  DBili  x   /  AST  24  /  ALT  27  /  AlkPhos  51  09-22    proBNP: Serum Pro-Brain Natriuretic Peptide: 180 pg/mL (09-12 @ 11:59)    Lipid Profile: Cholesterol 117  LDL 60  HDL 50  TG 36    HgA1c: Hemoglobin A1C, Whole Blood: 5.3 % (09-13 @ 10:32)    TSH: Thyroid Stimulating Hormone, Serum: 1.33 uU/mL (09-13 @ 06:54)

## 2019-09-22 NOTE — PROGRESS NOTE ADULT - ASSESSMENT
69y.o. Male s/p R hemicolectomy 9/18; blood loss anemia, s/p total 3 U PRBCs      -Sip of clears as tolerated  -OOB as tolerated  -pain control prn  -incentive spirometry  -BLAINE dressing for 5-7 days  -Monitor h/h levels   -GI f/u

## 2019-09-22 NOTE — PROGRESS NOTE ADULT - ASSESSMENT
69 Years old male from home with dementia was sent in by dr Quiroz office for right sided colonic mass, cellulitis, s/p rt hemicolectomy,acute anemia.  1.Po diet.  2.Surgical f/u.  3.GI f/u.  4.HTN-stable off  hydralazine.  5.Cellulitis-ABX.  6.Anemia-h/h stable post transfusion..  7.GI and DVT prophylaxis.

## 2019-09-22 NOTE — PROGRESS NOTE ADULT - SUBJECTIVE AND OBJECTIVE BOX
INTERVAL HPI/OVERNIGHT EVENTS:    Patient seen and examined, no acute events overnight;   NPO  Bloody BM reported as per nursing staff in PM     Vital Signs Last 24 Hrs  T(C): 36.9 (22 Sep 2019 05:05), Max: 37.1 (21 Sep 2019 22:47)  T(F): 98.5 (22 Sep 2019 05:05), Max: 98.8 (21 Sep 2019 22:47)  HR: 62 (22 Sep 2019 05:05) (62 - 81)  BP: 120/67 (22 Sep 2019 05:05) (118/52 - 154/73)  BP(mean): --  RR: 17 (22 Sep 2019 05:05) (16 - 17)  SpO2: 97% (22 Sep 2019 05:05) (97% - 100%)  I&O's Detail    21 Sep 2019 07:01  -  22 Sep 2019 07:00  --------------------------------------------------------  IN:    dextrose 5% + sodium chloride 0.45%: 1600 mL    IV PiggyBack: 50 mL    Lactated Ringers IV Bolus: 50 mL    Packed Red Blood Cells: 700 mL  Total IN: 2400 mL    OUT:    Voided: 1350 mL  Total OUT: 1350 mL    Total NET: 1050 mL        aztreonam  IVPB 1000 milliGRAM(s) IV Intermittent every 8 hours      Physical Exam  General: NAD  Abdomen: Soft nondistended, BLAINE dressing in place with good self suction.      Labs:                        9.4    7.48  )-----------( 185      ( 22 Sep 2019 07:45 )             28.3     09-22    139  |  107  |  5<L>  ----------------------------<  105<H>  3.5   |  27  |  0.70    Ca    8.3<L>      22 Sep 2019 07:45    TPro  6.6  /  Alb  2.7<L>  /  TBili  1.1  /  DBili  x   /  AST  24  /  ALT  27  /  AlkPhos  51  09-22

## 2019-09-22 NOTE — PROGRESS NOTE ADULT - SUBJECTIVE AND OBJECTIVE BOX
Patient is a 69y old  Male who presents with a chief complaint of cellulitis and right colon mass (15 Sep 2019 13:14)      HPI:  69 Years old male from home with dementia was sent in by dr Quiroz office for right sided colonic mass which was recently diagnosed. Patient also have cellulitis which was treated with bactrim as outpatient but have not improved.  History source is dr Quiroz office. Patient does not gave any history but only points to lower extremity ulcers on left lower extremity. History very limited due to dementia.  Pt denies any chest pain, shortness of breath, fever, abdominal pain, change in urinary or bowel habits.  Patient is being admitted to workup for right colon mass and possible right isded colectomy. Plan is to involve GI doctor, surgery and cardiology for pre-op surgical clearance. We will need ID consult and podiatry consult for lower extremity ulcers. (12 Sep 2019 18:59)    Patient seen at bedside this PM for consult on possible left leg cellulitis.  Patient was seen in the ED recently for left leg wounds and was referred to wound care clinic in which he followed up.  Home nursing was provided for every other day dressing changes. Pt was supposed to follow up again with wound care clinic on thursday but was admitted to the hospital.  He states the dressing on his left leg has been changed regularly.  He admits to wounds in his lower legs of greater than 18 years off and on.  He states he had seen podiatrist and ED providers off and on for treatment and had home nursing for a period of time before they stopped.  He denies n/v/f at this time.     Patient seen bedside this AM alert and oriented.  He is happy to be seen this AM.  He is asking how long he has been in the hospital and states he is feeling well this AM. He is asking about when he can eat solid foods and we advised him to discuss with his general surgeon and primary team.    PMH:Dementia  No pertinent past medical history  No pertinent past medical history    Allergies: penicillin (Other)    Medications: aspirin enteric coated 81 milliGRAM(s) Oral daily  aztreonam  IVPB 1000 milliGRAM(s) IV Intermittent every 8 hours  chlorhexidine 4% Liquid 1 Application(s) Topical once  enoxaparin Injectable 40 milliGRAM(s) SubCutaneous daily  erythromycin     base Tablet 500 milliGRAM(s) Oral <User Schedule>  hydrALAZINE 10 milliGRAM(s) Oral every 6 hours  influenza   Vaccine 0.5 milliLiter(s) IntraMuscular once  neomycin 500 milliGRAM(s) Oral <User Schedule>  polyethylene glycol/electrolyte Solution. 4000 milliLiter(s) Oral once  vancomycin  IVPB 1000 milliGRAM(s) IV Intermittent every 12 hours    FH:No pertinent family history in first degree relatives    PSX: No significant past surgical history  No significant past surgical history    SH: aspirin enteric coated 81 milliGRAM(s) Oral daily  aztreonam  IVPB 1000 milliGRAM(s) IV Intermittent every 8 hours  chlorhexidine 4% Liquid 1 Application(s) Topical once  enoxaparin Injectable 40 milliGRAM(s) SubCutaneous daily  erythromycin     base Tablet 500 milliGRAM(s) Oral <User Schedule>  hydrALAZINE 10 milliGRAM(s) Oral every 6 hours  influenza   Vaccine 0.5 milliLiter(s) IntraMuscular once  neomycin 500 milliGRAM(s) Oral <User Schedule>  polyethylene glycol/electrolyte Solution. 4000 milliLiter(s) Oral once  vancomycin  IVPB 1000 milliGRAM(s) IV Intermittent every 12 hours      Vital Signs Last 24 Hrs  T(C): 36.9 (22 Sep 2019 05:05), Max: 37.1 (21 Sep 2019 22:47)  T(F): 98.5 (22 Sep 2019 05:05), Max: 98.8 (21 Sep 2019 22:47)  HR: 62 (22 Sep 2019 05:05) (62 - 81)  BP: 120/67 (22 Sep 2019 05:05) (118/52 - 154/73)  BP(mean): --  RR: 17 (22 Sep 2019 05:05) (16 - 17)  SpO2: 97% (22 Sep 2019 05:05) (97% - 100%)    09-22    139  |  107  |  5<L>  ----------------------------<  105<H>  3.5   |  27  |  0.70    Ca    8.3<L>      22 Sep 2019 07:45    TPro  6.6  /  Alb  2.7<L>  /  TBili  1.1  /  DBili  x   /  AST  24  /  ALT  27  /  AlkPhos  51  09-22                          9.4    7.48  )-----------( 185      ( 22 Sep 2019 07:45 )             28.3   WBC Count: 7.48 K/uL (09-22 @ 07:45)  WBC Count: 7.49 K/uL (09-21 @ 22:56)  WBC Count: 7.34 K/uL (09-21 @ 07:36)  WBC Count: 8.40 K/uL (09-20 @ 20:00)  WBC Count: 10.24 K/uL (09-20 @ 08:41)          PHYSICAL EXAM  GEN: EKLORETA MATHEW is a pleasant well-nourished, well developed 69y Male in no acute distress, alert awake, and oriented to person, place and time.   LE Focused:    Vasc:  DP 2/4 b/l.  PT pulses non-palpable due to wound on the left and thickened skin on the right.  CFT 5sec to all digits.  Mild edema to the left lower leg relative to the right.  Derm: Hyperpigmentation changes from chronic venous stasis b/l.  No open lesion on right leg.  Wound #1: Left medial ankle measuring 4.4cm x 4.9cm x 0.3cm with a fibrogranular base and surrounding hyperkeratotic tissue with patches of hypopigmenation.  No malodor, minimal serous drainage, no probe to bone, no increased in warmth, no surrounding erythema.  Wound #2: Left lateral ankle measuring 1.2cm x 1.2cm x 0.2cm with well defined margins and fibrogranular base with patches of hypopigmentation. no malodor, minimal serous drainage, no probe to bone, no increased in warmth, no surrounding erythema.  Neuro: Diminished protective sensation b/l  MSK: Decreased ankle joint range of motion b/l.  No pain with palpation of ulcers.    Imaging:   < from: Xray Ankle Complete 3 Views, Left (09.12.19 @ 14:15) >    EXAM:  ANKLE LEFT (MINIMUM 3 V)                            PROCEDURE DATE:  09/12/2019          INTERPRETATION:  CLINICAL STATEMENT: Ulcer    TECHNIQUE: AP, lateral and oblique views of the left ankle.    COMPARISON: 9/4/2019    FINDINGS:    There is no acute fracture or dislocation.  Joint spaces are intact.   There is no lytic or blastic lesion. Again seen is periosteal reaction   along the shaft of the fibula and between the tibia and fibula distally.   There are surgical clips in the soft tissues. There is soft tissue   swelling. There are degenerative changes in the midfoot. There is a   plantar calcaneal spur. There is a large amount of soft tissue   ossification posteriorly at the level of the distal leg.    IMPRESSION:    No plain film evidence of osteomyelitis    HELEN LEARY M.D.,ATTENDING RADIOLOGIST  This document has been electronically signed. Sep 12 2019  3:18PM      < end of copied text >    Cultures: Culture Results:   No growth (09-14 @ 10:20)  Culture Results:   No growth to date. (09-12 @ 19:31)  Culture Results:   No growth to date. (09-12 @ 19:30)    < from: [facility] Physiol Extremity Lower 3+ Level, BI (09.18.19 @ 11:51) >    EXAM:  US PHYSIOL LWR EXT 3+ LEV BI                            PROCEDURE DATE:  09/18/2019          INTERPRETATION:  Clinical Information: Peripheral vascular disease.    Technique: Bilateral lower extremity ABIs/PVR    Comparison: None    Findings/  Impression:  Right lower extremity: The ankle brachial index is 0.93. The pulse   waveforms are intact.    Left lower extremity: The ankle brachial index is 0.94. The pulse   waveforms are intact.      SG LOCKETT M.D., ATTENDINGRADIOLOGIST  This document has been electronically signed. Sep 18 2019 12:07PM    < end of copied text >      A: Venous stasis ulcerations, left ankle  Chronic non-pressure ulcers, left ankle    P:  Patient evaluated, chart reviewed  X-rays reviewed as above- no OM  Dressing applied with santyl, 4x4 gauze, Binu, ACE wrap to left leg  CALI reviewed  ESR/CRP reviewed  Possible bedside biopsy of left leg ulcer  Continue with local wound care  Podiatry will following while in house  Seen with attending Dr. An

## 2019-09-23 RX ADMIN — ENOXAPARIN SODIUM 40 MILLIGRAM(S): 100 INJECTION SUBCUTANEOUS at 12:01

## 2019-09-23 RX ADMIN — Medication 1 APPLICATION(S): at 11:59

## 2019-09-23 RX ADMIN — Medication 50 MILLIGRAM(S): at 05:37

## 2019-09-23 RX ADMIN — Medication 50 MILLIGRAM(S): at 22:03

## 2019-09-23 RX ADMIN — Medication 50 MILLIGRAM(S): at 14:21

## 2019-09-23 RX ADMIN — Medication 325 MILLIGRAM(S): at 12:01

## 2019-09-23 NOTE — PROGRESS NOTE ADULT - SUBJECTIVE AND OBJECTIVE BOX
INTERVAL HPI/OVERNIGHT EVENTS:  Pt stable.   Tolerating diet.   No nausea or vomiting  No further rectal bleeding    Vital Signs Last 24 Hrs  T(C): 36.4 (23 Sep 2019 05:21), Max: 36.8 (22 Sep 2019 14:38)  T(F): 97.6 (23 Sep 2019 05:21), Max: 98.2 (22 Sep 2019 14:38)  HR: 60 (23 Sep 2019 05:21) (59 - 81)  BP: 137/60 (23 Sep 2019 05:21) (126/70 - 162/77)  BP(mean): --  RR: 17 (23 Sep 2019 05:21) (16 - 17)  SpO2: 100% (23 Sep 2019 05:21) (100% - 100%)    Physical:  Abdomen: Soft nondistended, nontender.  Wound clean    I&O's Summary    22 Sep 2019 07:01  -  23 Sep 2019 07:00  --------------------------------------------------------  IN: 0 mL / OUT: 0 mL / NET: 0 mL        LABS:                        9.4    7.48  )-----------( 185      ( 22 Sep 2019 07:45 )             28.3             09-22    139  |  107  |  5<L>  ----------------------------<  105<H>  3.5   |  27  |  0.70    Ca    8.3<L>      22 Sep 2019 07:45    TPro  6.6  /  Alb  2.7<L>  /  TBili  1.1  /  DBili  x   /  AST  24  /  ALT  27  /  AlkPhos  51  09-22

## 2019-09-23 NOTE — PROGRESS NOTE ADULT - ASSESSMENT
69y.o. Male s/p R hemicolectomy 9/18; blood loss anemia, s/p total 3 U PRBCs      -cont clears as tolerated  -OOB as tolerated  -pain control prn  -incentive spirometry  -BLAINE dressing for 5-7 days  -Monitor h/h levels

## 2019-09-23 NOTE — PROGRESS NOTE ADULT - SUBJECTIVE AND OBJECTIVE BOX
[   ] ICU                                          [   ] CCU                                      [  X ] Medical Floor    Patient is comfortable. No new complaints reported, No abdominal pain, N/V, hematemesis, hematochezia, melena, fever, chills, chest pain, SOB, cough or diarrhea reported.    VITALS  Vital Signs Last 24 Hrs  T(C): 36.4 (23 Sep 2019 05:21), Max: 36.8 (22 Sep 2019 14:38)  T(F): 97.6 (23 Sep 2019 05:21), Max: 98.2 (22 Sep 2019 14:38)  HR: 60 (23 Sep 2019 05:21) (59 - 81)  BP: 137/60 (23 Sep 2019 05:21) (126/70 - 162/77)   RR: 17 (23 Sep 2019 05:21) (16 - 17)  SpO2: 100% (23 Sep 2019 05:21) (100% - 100%)       MEDICATIONS  (STANDING):  aztreonam  IVPB 1000 milliGRAM(s) IV Intermittent every 8 hours  collagenase Ointment 1 Application(s) Topical daily  dextrose 5% + sodium chloride 0.45% 1000 milliLiter(s) (100 mL/Hr) IV Continuous <Continuous>  enoxaparin Injectable 40 milliGRAM(s) SubCutaneous daily  ferrous    sulfate 325 milliGRAM(s) Oral daily  influenza   Vaccine 0.5 milliLiter(s) IntraMuscular once    MEDICATIONS  (PRN):  acetaminophen   Tablet .. 650 milliGRAM(s) Oral every 6 hours PRN Temp greater or equal to 38C (100.4F), Moderate Pain (4 - 6)                            9.4    7.48  )-----------( 185      ( 22 Sep 2019 07:45 )             28.3       09-22    139  |  107  |  5<L>  ----------------------------<  105<H>  3.5   |  27  |  0.70    Ca    8.3<L>      22 Sep 2019 07:45    TPro  6.6  /  Alb  2.7<L>  /  TBili  1.1  /  DBili  x   /  AST  24  /  ALT  27  /  AlkPhos  51  09-22

## 2019-09-23 NOTE — PROGRESS NOTE ADULT - SUBJECTIVE AND OBJECTIVE BOX
Patient seen and examined, refusing examination today  no acute events overnight  pt reports tolerating clears but occasional nausea, no vomiting    Vital Signs Last 24 Hrs  T(C): 36.4 (23 Sep 2019 05:21), Max: 36.8 (22 Sep 2019 14:38)  T(F): 97.6 (23 Sep 2019 05:21), Max: 98.2 (22 Sep 2019 14:38)  HR: 60 (23 Sep 2019 05:21) (59 - 81)  BP: 137/60 (23 Sep 2019 05:21) (126/70 - 162/77)  BP(mean): --  RR: 17 (23 Sep 2019 05:21) (16 - 17)  SpO2: 100% (23 Sep 2019 05:21) (100% - 100%)  21 Sep 2019 07:01  -  22 Sep 2019 07:00  --------------------------------------------------------      aztreonam  IVPB 1000 milliGRAM(s) IV Intermittent every 8 hours      Physical Exam  General: NAD  Abdomen: patient refused examination today

## 2019-09-23 NOTE — PROGRESS NOTE ADULT - SUBJECTIVE AND OBJECTIVE BOX
CHIEF COMPLAINT:Patient is a 69y old  Male who presents with a chief complaint of cellulitis and right colon mass.Pt appears comfortable.    	  REVIEW OF SYSTEMS:  CONSTITUTIONAL: No fever, weight loss, or fatigue  EYES: No eye pain, visual disturbances, or discharge  ENT:  No difficulty hearing, tinnitus, vertigo; No sinus or throat pain  NECK: No pain or stiffness  RESPIRATORY: No cough, wheezing, chills or hemoptysis; No Shortness of Breath  CARDIOVASCULAR: No chest pain, palpitations, passing out, dizziness, or leg swelling  GASTROINTESTINAL: No abdominal or epigastric pain. No nausea, vomiting, or hematemesis; No diarrhea or constipation. No melena or hematochezia.  GENITOURINARY: No dysuria, frequency, hematuria, or incontinence  NEUROLOGICAL: No headaches, memory loss, loss of strength, numbness, or tremors  SKIN: No itching, burning, rashes, or lesions   LYMPH Nodes: No enlarged glands  ENDOCRINE: No heat or cold intolerance; No hair loss  MUSCULOSKELETAL: No joint pain or swelling; No muscle, back, or extremity pain  PSYCHIATRIC: No depression, anxiety, mood swings, or difficulty sleeping  HEME/LYMPH: No easy bruising, or bleeding gums  ALLERGY AND IMMUNOLOGIC: No hives or eczema	      PHYSICAL EXAM:  T(C): 36.4 (09-23-19 @ 05:21), Max: 36.8 (09-22-19 @ 14:38)  HR: 60 (09-23-19 @ 05:21) (59 - 81)  BP: 137/60 (09-23-19 @ 05:21) (126/70 - 162/77)  RR: 17 (09-23-19 @ 05:21) (16 - 17)  SpO2: 100% (09-23-19 @ 05:21) (100% - 100%)    22 Sep 2019 07:01  -  23 Sep 2019 07:00  --------------------------------------------------------  IN: 0 mL / OUT: 0 mL / NET: 0 mL        Appearance: Normal	  HEENT:   Normal oral mucosa, PERRL, EOMI	  Lymphatic: No lymphadenopathy  Cardiovascular: Normal S1 S2, No JVD, No murmurs, No edema  Respiratory: Lungs clear to auscultation	  Psychiatry: A & O x 3, Mood & affect appropriate  Gastrointestinal:  Soft, Non-tender, + BS	  Skin: No rashes, No ecchymoses, No cyanosis	  Neurologic: Non-focal  Extremities: Normal range of motion, No clubbing, cyanosis or edema  Vascular: Peripheral pulses palpable 2+ bilaterally    MEDICATIONS  (STANDING):  aztreonam  IVPB 1000 milliGRAM(s) IV Intermittent every 8 hours  collagenase Ointment 1 Application(s) Topical daily  dextrose 5% + sodium chloride 0.45% 1000 milliLiter(s) (100 mL/Hr) IV Continuous <Continuous>  enoxaparin Injectable 40 milliGRAM(s) SubCutaneous daily  ferrous    sulfate 325 milliGRAM(s) Oral daily  influenza   Vaccine 0.5 milliLiter(s) IntraMuscular once        	  LABS:	 	                       9.4    7.48  )-----------( 185      ( 22 Sep 2019 07:45 )             28.3     09-22    139  |  107  |  5<L>  ----------------------------<  105<H>  3.5   |  27  |  0.70    Ca    8.3<L>      22 Sep 2019 07:45    TPro  6.6  /  Alb  2.7<L>  /  TBili  1.1  /  DBili  x   /  AST  24  /  ALT  27  /  AlkPhos  51  09-22    proBNP: Serum Pro-Brain Natriuretic Peptide: 180 pg/mL (09-12 @ 11:59)    Lipid Profile: Cholesterol 117  LDL 60  HDL 50  TG 36    HgA1c: Hemoglobin A1C, Whole Blood: 5.3 % (09-13 @ 10:32)    TSH: Thyroid Stimulating Hormone, Serum: 1.33 uU/mL (09-13 @ 06:54)

## 2019-09-23 NOTE — PROGRESS NOTE ADULT - SUBJECTIVE AND OBJECTIVE BOX
Patient is a 69y old  Male who presents with a chief complaint of cellulitis and right colon mass (15 Sep 2019 13:14)      HPI:  69 Years old male from home with dementia was sent in by dr Quiroz office for right sided colonic mass which was recently diagnosed. Patient also have cellulitis which was treated with bactrim as outpatient but have not improved.  History source is dr Quiroz office. Patient does not gave any history but only points to lower extremity ulcers on left lower extremity. History very limited due to dementia.  Pt denies any chest pain, shortness of breath, fever, abdominal pain, change in urinary or bowel habits.  Patient is being admitted to workup for right colon mass and possible right isded colectomy. Plan is to involve GI doctor, surgery and cardiology for pre-op surgical clearance. We will need ID consult and podiatry consult for lower extremity ulcers. (12 Sep 2019 18:59)    Patient seen at bedside this PM for consult on possible left leg cellulitis.  Patient was seen in the ED recently for left leg wounds and was referred to wound care clinic in which he followed up.  Home nursing was provided for every other day dressing changes. Pt was supposed to follow up again with wound care clinic on thursday but was admitted to the hospital.  He states the dressing on his left leg has been changed regularly.  He admits to wounds in his lower legs of greater than 18 years off and on.  He states he had seen podiatrist and ED providers off and on for treatment and had home nursing for a period of time before they stopped.  He denies n/v/f at this time.     Patient seen bedside this AM alert and oriented.  Pt was standing with shoes on. Pt denies F/N/V/C/SOB. Pt denies pain in ankles.     PMH:Dementia  No pertinent past medical history  No pertinent past medical history    Allergies: penicillin (Other)    Medications: aspirin enteric coated 81 milliGRAM(s) Oral daily  aztreonam  IVPB 1000 milliGRAM(s) IV Intermittent every 8 hours  chlorhexidine 4% Liquid 1 Application(s) Topical once  enoxaparin Injectable 40 milliGRAM(s) SubCutaneous daily  erythromycin     base Tablet 500 milliGRAM(s) Oral <User Schedule>  hydrALAZINE 10 milliGRAM(s) Oral every 6 hours  influenza   Vaccine 0.5 milliLiter(s) IntraMuscular once  neomycin 500 milliGRAM(s) Oral <User Schedule>  polyethylene glycol/electrolyte Solution. 4000 milliLiter(s) Oral once  vancomycin  IVPB 1000 milliGRAM(s) IV Intermittent every 12 hours    FH:No pertinent family history in first degree relatives    PSX: No significant past surgical history  No significant past surgical history    SH: aspirin enteric coated 81 milliGRAM(s) Oral daily  aztreonam  IVPB 1000 milliGRAM(s) IV Intermittent every 8 hours  chlorhexidine 4% Liquid 1 Application(s) Topical once  enoxaparin Injectable 40 milliGRAM(s) SubCutaneous daily  erythromycin     base Tablet 500 milliGRAM(s) Oral <User Schedule>  hydrALAZINE 10 milliGRAM(s) Oral every 6 hours  influenza   Vaccine 0.5 milliLiter(s) IntraMuscular once  neomycin 500 milliGRAM(s) Oral <User Schedule>  polyethylene glycol/electrolyte Solution. 4000 milliLiter(s) Oral once  vancomycin  IVPB 1000 milliGRAM(s) IV Intermittent every 12 hours      Vital Signs Last 24 Hrs  T(C): 36.4 (23 Sep 2019 05:21), Max: 36.8 (22 Sep 2019 14:38)  T(F): 97.6 (23 Sep 2019 05:21), Max: 98.2 (22 Sep 2019 14:38)  HR: 60 (23 Sep 2019 05:21) (59 - 81)  BP: 137/60 (23 Sep 2019 05:21) (126/70 - 162/77)  BP(mean): --  RR: 17 (23 Sep 2019 05:21) (16 - 17)  SpO2: 100% (23 Sep 2019 05:21) (100% - 100%)                            9.4    7.48  )-----------( 185      ( 22 Sep 2019 07:45 )             28.3              09-22    139  |  107  |  5<L>  ----------------------------<  105<H>  3.5   |  27  |  0.70    Ca    8.3<L>      22 Sep 2019 07:45    TPro  6.6  /  Alb  2.7<L>  /  TBili  1.1  /  DBili  x   /  AST  24  /  ALT  27  /  AlkPhos  51  09-22      WBC Count: 7.48 K/uL (09-22 @ 07:45)  WBC Count: 7.49 K/uL (09-21 @ 22:56)  WBC Count: 7.34 K/uL (09-21 @ 07:36)  WBC Count: 8.40 K/uL (09-20 @ 20:00)  WBC Count: 10.24 K/uL (09-20 @ 08:41)          PHYSICAL EXAM  GEN: LORETA ATKINSON is a pleasant well-nourished, well developed 69y Male in no acute distress, alert awake, and oriented to person, place and time.   LE Focused:    Vasc:  DP 2/4 b/l.  PT pulses non-palpable due to wound on the left and thickened skin on the right.  CFT 5sec to all digits.  Mild edema to the left lower leg relative to the right.  Derm: Hyperpigmentation changes from chronic venous stasis b/l.  No open lesion on right leg.  Wound #1: Left medial ankle measuring 4.0cm x 4.3cm x 0.3cm with a fibrogranular base and surrounding hyperkeratotic tissue with patches of hypopigmenation.  No malodor, no drainage, no probe to bone, no increased in warmth, no surrounding erythema.  Wound #2: Left lateral ankle measuring 1.2cm x 1.2cm x 0.2cm with well defined margins and fibrogranular base with patches of hypopigmentation. no malodor, no drainage, no probe to bone, no increased in warmth, no surrounding erythema.  Neuro: Diminished protective sensation b/l  MSK: Decreased ankle joint range of motion b/l.  No pain with palpation of ulcers.    Imaging:   < from: Xray Ankle Complete 3 Views, Left (09.12.19 @ 14:15) >    EXAM:  ANKLE LEFT (MINIMUM 3 V)                            PROCEDURE DATE:  09/12/2019          INTERPRETATION:  CLINICAL STATEMENT: Ulcer    TECHNIQUE: AP, lateral and oblique views of the left ankle.    COMPARISON: 9/4/2019    FINDINGS:    There is no acute fracture or dislocation.  Joint spaces are intact.   There is no lytic or blastic lesion. Again seen is periosteal reaction   along the shaft of the fibula and between the tibia and fibula distally.   There are surgical clips in the soft tissues. There is soft tissue   swelling. There are degenerative changes in the midfoot. There is a   plantar calcaneal spur. There is a large amount of soft tissue   ossification posteriorly at the level of the distal leg.    IMPRESSION:    No plain film evidence of osteomyelitis    HELEN LEARY M.D.,ATTENDING RADIOLOGIST  This document has been electronically signed. Sep 12 2019  3:18PM      < end of copied text >    Cultures: Culture Results:   No growth (09-14 @ 10:20)  Culture Results:   No growth to date. (09-12 @ 19:31)  Culture Results:   No growth to date. (09-12 @ 19:30)    < from: VA Physiol Extremity Lower 3+ Level, BI (09.18.19 @ 11:51) >    EXAM:  US PHYSIOL LWR EXT 3+ LEV BI                            PROCEDURE DATE:  09/18/2019          INTERPRETATION:  Clinical Information: Peripheral vascular disease.    Technique: Bilateral lower extremity ABIs/PVR    Comparison: None    Findings/  Impression:  Right lower extremity: The ankle brachial index is 0.93. The pulse   waveforms are intact.    Left lower extremity: The ankle brachial index is 0.94. The pulse   waveforms are intact.      SG LOCKETT M.D., ATTENDINGRADIOLOGIST  This document has been electronically signed. Sep 18 2019 12:07PM    < end of copied text >      A: Venous stasis ulcerations, left ankle  Chronic non-pressure ulcers, left ankle    P:  Patient evaluated, chart reviewed  X-rays reviewed as above- no OM  Dressing applied with santyl, 4x4 gauze, Binu, ACE wrap to left leg  CALI reviewed  ESR/CRP reviewed  Continue with local wound care  Pt is stable to f/u as outpatient at [address] [place] [address]   Wound care: apply santyl adaptic dsd to wounds every day. keep dressing clean dry and intact. WBAT to right foot.   Seen with attending Dr. Lui

## 2019-09-24 ENCOUNTER — TRANSCRIPTION ENCOUNTER (OUTPATIENT)
Age: 69
End: 2019-09-24

## 2019-09-24 LAB
HCT VFR BLD CALC: 28.3 % — LOW (ref 39–50)
HGB BLD-MCNC: 9.4 G/DL — LOW (ref 13–17)
MCHC RBC-ENTMCNC: 30.5 PG — SIGNIFICANT CHANGE UP (ref 27–34)
MCHC RBC-ENTMCNC: 33.2 GM/DL — SIGNIFICANT CHANGE UP (ref 32–36)
MCV RBC AUTO: 91.9 FL — SIGNIFICANT CHANGE UP (ref 80–100)
NRBC # BLD: 0 /100 WBCS — SIGNIFICANT CHANGE UP (ref 0–0)
PLATELET # BLD AUTO: 257 K/UL — SIGNIFICANT CHANGE UP (ref 150–400)
RBC # BLD: 3.08 M/UL — LOW (ref 4.2–5.8)
RBC # FLD: 15.6 % — HIGH (ref 10.3–14.5)
SURGICAL PATHOLOGY STUDY: SIGNIFICANT CHANGE UP
WBC # BLD: 8.42 K/UL — SIGNIFICANT CHANGE UP (ref 3.8–10.5)
WBC # FLD AUTO: 8.42 K/UL — SIGNIFICANT CHANGE UP (ref 3.8–10.5)

## 2019-09-24 RX ORDER — HALOPERIDOL DECANOATE 100 MG/ML
1 INJECTION INTRAMUSCULAR ONCE
Refills: 0 | Status: COMPLETED | OUTPATIENT
Start: 2019-09-24 | End: 2019-09-24

## 2019-09-24 RX ADMIN — HALOPERIDOL DECANOATE 1 MILLIGRAM(S): 100 INJECTION INTRAMUSCULAR at 00:48

## 2019-09-24 RX ADMIN — SODIUM CHLORIDE 100 MILLILITER(S): 9 INJECTION, SOLUTION INTRAVENOUS at 22:32

## 2019-09-24 RX ADMIN — ENOXAPARIN SODIUM 40 MILLIGRAM(S): 100 INJECTION SUBCUTANEOUS at 15:18

## 2019-09-24 RX ADMIN — Medication 325 MILLIGRAM(S): at 15:18

## 2019-09-24 RX ADMIN — HALOPERIDOL DECANOATE 1 MILLIGRAM(S): 100 INJECTION INTRAMUSCULAR at 04:32

## 2019-09-24 RX ADMIN — Medication 50 MILLIGRAM(S): at 05:49

## 2019-09-24 RX ADMIN — Medication 50 MILLIGRAM(S): at 15:17

## 2019-09-24 RX ADMIN — Medication 50 MILLIGRAM(S): at 22:32

## 2019-09-24 NOTE — PROGRESS NOTE ADULT - ASSESSMENT
70 y/o male s/p R hemicolectomy 9/18; blood loss anemia, s/p total 3 U PRBCs      - cont reg diet as tolerated  - OOB as tolerated/ambulation/DVT ppx  - incentive spirometry  - pain control prn  - BLAINE dressing for 5-7 days  - Monitor H&H  - D/c planning

## 2019-09-24 NOTE — PROGRESS NOTE ADULT - SUBJECTIVE AND OBJECTIVE BOX
CHIEF COMPLAINT:Patient is a 69y old  Male who presents with a chief complaint of cellulitis and right colon mass. Pt appears comfortable.    	  REVIEW OF SYSTEMS:  CONSTITUTIONAL: No fever, weight loss, or fatigue  EYES: No eye pain, visual disturbances, or discharge  ENT:  No difficulty hearing, tinnitus, vertigo; No sinus or throat pain  NECK: No pain or stiffness  RESPIRATORY: No cough, wheezing, chills or hemoptysis; No Shortness of Breath  CARDIOVASCULAR: No chest pain, palpitations, passing out, dizziness, or leg swelling  GASTROINTESTINAL: No abdominal or epigastric pain. No nausea, vomiting, or hematemesis; No diarrhea or constipation. No melena or hematochezia.  GENITOURINARY: No dysuria, frequency, hematuria, or incontinence  NEUROLOGICAL: No headaches, memory loss, loss of strength, numbness, or tremors  SKIN: No itching, burning, rashes, or lesions   LYMPH Nodes: No enlarged glands  ENDOCRINE: No heat or cold intolerance; No hair loss  MUSCULOSKELETAL: No joint pain or swelling; No muscle, back, or extremity pain  PSYCHIATRIC: No depression, anxiety, mood swings, or difficulty sleeping  HEME/LYMPH: No easy bruising, or bleeding gums  ALLERGY AND IMMUNOLOGIC: No hives or eczema	      PHYSICAL EXAM:  T(C): 36.6 (09-24-19 @ 05:28), Max: 37 (09-24-19 @ 05:03)  HR: 86 (09-24-19 @ 05:28) (80 - 95)  BP: 120/56 (09-24-19 @ 05:28) (113/60 - 133/52)  RR: 18 (09-24-19 @ 05:28) (16 - 18)  SpO2: 96% (09-24-19 @ 05:28) (96% - 100%)      Appearance: Normal	  HEENT:   Normal oral mucosa, PERRL, EOMI	  Lymphatic: No lymphadenopathy  Cardiovascular: Normal S1 S2, No JVD, No murmurs, No edema  Respiratory: Lungs clear to auscultation	  Psychiatry: A & O x 3, Mood & affect appropriate  Gastrointestinal:  Soft, Non-tender, + BS	  Skin: No rashes, No ecchymoses, No cyanosis	  Neurologic: Non-focal  Extremities: Normal range of motion, No clubbing, cyanosis or edema  Vascular: Peripheral pulses palpable 2+ bilaterally    MEDICATIONS  (STANDING):  aztreonam  IVPB 1000 milliGRAM(s) IV Intermittent every 8 hours  collagenase Ointment 1 Application(s) Topical daily  dextrose 5% + sodium chloride 0.45% 1000 milliLiter(s) (100 mL/Hr) IV Continuous <Continuous>  enoxaparin Injectable 40 milliGRAM(s) SubCutaneous daily  ferrous    sulfate 325 milliGRAM(s) Oral daily  influenza   Vaccine 0.5 milliLiter(s) IntraMuscular once        	  LABS:	 	                        9.4    8.42  )-----------( 257      ( 24 Sep 2019 07:13 )             28.3     proBNP: Serum Pro-Brain Natriuretic Peptide: 180 pg/mL (09-12 @ 11:59)    Lipid Profile: Cholesterol 117  LDL 60  HDL 50  TG 36    HgA1c: Hemoglobin A1C, Whole Blood: 5.3 % (09-13 @ 10:32)    TSH: Thyroid Stimulating Hormone, Serum: 1.33 uU/mL (09-13 @ 06:54)

## 2019-09-24 NOTE — PROGRESS NOTE ADULT - SUBJECTIVE AND OBJECTIVE BOX
INTERVAL HPI/OVERNIGHT EVENTS:    Patient seen and examined, no acute events overnight, no complaints.     MEDICATIONS  (STANDING):  aztreonam  IVPB 1000 milliGRAM(s) IV Intermittent every 8 hours  collagenase Ointment 1 Application(s) Topical daily  dextrose 5% + sodium chloride 0.45% 1000 milliLiter(s) (100 mL/Hr) IV Continuous <Continuous>  enoxaparin Injectable 40 milliGRAM(s) SubCutaneous daily  ferrous    sulfate 325 milliGRAM(s) Oral daily  influenza   Vaccine 0.5 milliLiter(s) IntraMuscular once    MEDICATIONS  (PRN):  acetaminophen   Tablet .. 650 milliGRAM(s) Oral every 6 hours PRN Temp greater or equal to 38C (100.4F), Moderate Pain (4 - 6)      Vital Signs Last 24 Hrs  T(C): 36.6 (24 Sep 2019 05:28), Max: 37 (24 Sep 2019 05:03)  T(F): 97.8 (24 Sep 2019 05:28), Max: 98.6 (24 Sep 2019 05:03)  HR: 86 (24 Sep 2019 05:28) (80 - 95)  BP: 120/56 (24 Sep 2019 05:28) (113/60 - 133/52)  BP(mean): --  RR: 18 (24 Sep 2019 05:28) (16 - 18)  SpO2: 96% (24 Sep 2019 05:28) (96% - 100%)    Physical:  General: Alert and oriented, not in acute distress  Abdomen: Soft, nondistended, nontender  : No johnson catheter, no dysuria or hematuria  Extremities: No pedal edema, dressing to left hip    LABS:                        9.4    8.42  )-----------( 257      ( 24 Sep 2019 07:13 )             28.3

## 2019-09-24 NOTE — DISCHARGE NOTE PROVIDER - NSDCCPCAREPLAN_GEN_ALL_CORE_FT
PRINCIPAL DISCHARGE DIAGNOSIS  Diagnosis: Colonic mass  Assessment and Plan of Treatment: Follow-up with Dr. Lewis in the office in 1-2 weeks. Daily dressing changes with dry gauze.      SECONDARY DISCHARGE DIAGNOSES  Diagnosis: Ulcer of foot  Assessment and Plan of Treatment: Daily dressing changes as per podiatry.    Diagnosis: Anemia  Assessment and Plan of Treatment: PRINCIPAL DISCHARGE DIAGNOSIS  Diagnosis: Colonic mass  Assessment and Plan of Treatment: Follow-up with Dr. Lewis in the office in 1-2 weeks. Daily dressing changes with dry gauze.  Diet as tolerated      SECONDARY DISCHARGE DIAGNOSES  Diagnosis: Ulcer of foot  Assessment and Plan of Treatment: Wound care: apply santyl adaptic dsd to wounds every day. keep dressing clean dry and intact. WBAT to right foot.    Diagnosis: Anemia  Assessment and Plan of Treatment:

## 2019-09-24 NOTE — PROGRESS NOTE ADULT - SUBJECTIVE AND OBJECTIVE BOX
[   ] ICU                                          [   ] CCU                                      [ X  ] Medical Floor      Patient is comfortable. No new complaints reported, No abdominal pain, N/V, hematemesis, hematochezia, melena, fever, chills, chest pain, SOB, cough or diarrhea reported.      VITALS  Vital Signs Last 24 Hrs  T(C): 36.4 (24 Sep 2019 13:14), Max: 37 (24 Sep 2019 05:03)  T(F): 97.6 (24 Sep 2019 13:14), Max: 98.6 (24 Sep 2019 05:03)  HR: 73 (24 Sep 2019 13:14) (73 - 88)  BP: 125/76 (24 Sep 2019 13:14) (120/56 - 133/52)   RR: 16 (24 Sep 2019 13:14) (16 - 18)  SpO2: 100% (24 Sep 2019 13:14) (96% - 100%)       MEDICATIONS  (STANDING):  aztreonam  IVPB 1000 milliGRAM(s) IV Intermittent every 8 hours  collagenase Ointment 1 Application(s) Topical daily  dextrose 5% + sodium chloride 0.45% 1000 milliLiter(s) (100 mL/Hr) IV Continuous <Continuous>  enoxaparin Injectable 40 milliGRAM(s) SubCutaneous daily  ferrous    sulfate 325 milliGRAM(s) Oral daily  influenza   Vaccine 0.5 milliLiter(s) IntraMuscular once    MEDICATIONS  (PRN):  acetaminophen   Tablet .. 650 milliGRAM(s) Oral every 6 hours PRN Temp greater or equal to 38C (100.4F), Moderate Pain (4 - 6)                            9.4    8.42  )-----------( 257      ( 24 Sep 2019 07:13 )             28.3

## 2019-09-24 NOTE — DISCHARGE NOTE PROVIDER - CARE PROVIDER_API CALL
Douglas Lewis)  Surgery  [address] [facility], [place] [address]  Phone: [phone]  Fax: [phone]  Follow Up Time:

## 2019-09-24 NOTE — DISCHARGE NOTE PROVIDER - HOSPITAL COURSE
HPI:    69 Years old male from home with dementia was sent in by Dr. Quiroz's office for right sided colonic mass which was recently diagnosed. Patient also found to have cellulitis which was treated with bactrim as outpatient but has not improved. History source is dr Quiroz office. Patient unable to give history, points to lower extremity ulcers, history very limited due to dementia. Pt denies any chest pain, shortness of breath, fever, abdominal pain, change in urinary or bowel habits. Patient was admitted to the hospital 9/12/19 for right colon mass. Pt had a CT of abd/pelvis with oral contrast on 9/13/19. Pt had a colonoscopy on 9/17/19 Pt ws medically optimized and medically cleared before he underwent a right hemicolectomy on 9/18/19. Post-operative course was complicated with acute blood loss anemia, for which pt received 3U of PRBC; pt had an appropriate increase in H&H. Pt had spontaneous return of bowel function and his diet was advanced as tolerated. Pt was also followed by podiatry for his left leg ulcers, XRs were taken, no osteomyelitis was found, wound was dressed appropriately.

## 2019-09-25 RX ADMIN — ENOXAPARIN SODIUM 40 MILLIGRAM(S): 100 INJECTION SUBCUTANEOUS at 15:07

## 2019-09-25 RX ADMIN — Medication 325 MILLIGRAM(S): at 15:06

## 2019-09-25 RX ADMIN — Medication 50 MILLIGRAM(S): at 15:06

## 2019-09-25 RX ADMIN — Medication 50 MILLIGRAM(S): at 05:22

## 2019-09-25 NOTE — PROGRESS NOTE ADULT - ATTENDING COMMENTS
Agree with above, D/C Mariam CRAIG
Agree with above, D/C planning
awaiting surgery
please be advised dr jacobsen was not informed   please call dr hays id

## 2019-09-25 NOTE — PROGRESS NOTE ADULT - SUBJECTIVE AND OBJECTIVE BOX
[   ] ICU                                          [   ] CCU                                      [ X  ] Medical Floor      Patient is comfortable. No new complaints reported, No abdominal pain, N/V, hematemesis, hematochezia, melena, fever, chills, chest pain, SOB, cough or diarrhea reported.    VITALS  Vital Signs Last 24 Hrs  T(C): 36.8 (25 Sep 2019 14:01), Max: 36.8 (25 Sep 2019 14:01)  T(F): 98.2 (25 Sep 2019 14:01), Max: 98.2 (25 Sep 2019 14:01)  HR: 87 (25 Sep 2019 14:01) (65 - 87)  BP: 135/76 (25 Sep 2019 14:01) (120/42 - 135/76)   RR: 18 (25 Sep 2019 14:01) (17 - 18)  SpO2: 100% (25 Sep 2019 14:01) (97% - 100%)       MEDICATIONS  (STANDING):  aztreonam  IVPB 1000 milliGRAM(s) IV Intermittent every 8 hours  collagenase Ointment 1 Application(s) Topical daily  dextrose 5% + sodium chloride 0.45% 1000 milliLiter(s) (100 mL/Hr) IV Continuous <Continuous>  enoxaparin Injectable 40 milliGRAM(s) SubCutaneous daily  ferrous    sulfate 325 milliGRAM(s) Oral daily  influenza   Vaccine 0.5 milliLiter(s) IntraMuscular once    MEDICATIONS  (PRN):  acetaminophen   Tablet .. 650 milliGRAM(s) Oral every 6 hours PRN Temp greater or equal to 38C (100.4F), Moderate Pain (4 - 6)                            9.4    8.42  )-----------( 257      ( 24 Sep 2019 07:13 )             28.3

## 2019-09-25 NOTE — PROGRESS NOTE ADULT - SUBJECTIVE AND OBJECTIVE BOX
CHIEF COMPLAINT:Patient is a 69y old  Male who presents with a chief complaint of cellulitis and right colon mass. Pt appears comfortable.    	  REVIEW OF SYSTEMS:  CONSTITUTIONAL: No fever, weight loss, or fatigue  EYES: No eye pain, visual disturbances, or discharge  ENT:  No difficulty hearing, tinnitus, vertigo; No sinus or throat pain  NECK: No pain or stiffness  RESPIRATORY: No cough, wheezing, chills or hemoptysis; No Shortness of Breath  CARDIOVASCULAR: No chest pain, palpitations, passing out, dizziness, or leg swelling  GASTROINTESTINAL: No abdominal or epigastric pain. No nausea, vomiting, or hematemesis; No diarrhea or constipation. No melena or hematochezia.  GENITOURINARY: No dysuria, frequency, hematuria, or incontinence  NEUROLOGICAL: No headaches, memory loss, loss of strength, numbness, or tremors  SKIN: No itching, burning, rashes, or lesions   LYMPH Nodes: No enlarged glands  ENDOCRINE: No heat or cold intolerance; No hair loss  MUSCULOSKELETAL: No joint pain or swelling; No muscle, back, or extremity pain  PSYCHIATRIC: No depression, anxiety, mood swings, or difficulty sleeping  HEME/LYMPH: No easy bruising, or bleeding gums  ALLERGY AND IMMUNOLOGIC: No hives or eczema	      PHYSICAL EXAM:  T(C): 36.6 (09-25-19 @ 05:20), Max: 36.6 (09-25-19 @ 05:20)  HR: 68 (09-25-19 @ 05:20) (65 - 73)  BP: 120/72 (09-25-19 @ 05:20) (120/42 - 125/76)  RR: 18 (09-25-19 @ 05:20) (16 - 18)  SpO2: 97% (09-25-19 @ 05:20) (97% - 100%)      Appearance: Normal	  HEENT:   Normal oral mucosa, PERRL, EOMI	  Lymphatic: No lymphadenopathy  Cardiovascular: Normal S1 S2, No JVD, No murmurs, No edema  Respiratory: Lungs clear to auscultation	  Psychiatry: A & O x 3, Mood & affect appropriate  Gastrointestinal:  Soft, Non-tender, + BS	  Skin: No rashes, No ecchymoses, No cyanosis	  Neurologic: Non-focal  Extremities: Normal range of motion, No clubbing, cyanosis or edema  Vascular: Peripheral pulses palpable 2+ bilaterally    MEDICATIONS  (STANDING):  aztreonam  IVPB 1000 milliGRAM(s) IV Intermittent every 8 hours  collagenase Ointment 1 Application(s) Topical daily  dextrose 5% + sodium chloride 0.45% 1000 milliLiter(s) (100 mL/Hr) IV Continuous <Continuous>  enoxaparin Injectable 40 milliGRAM(s) SubCutaneous daily  ferrous    sulfate 325 milliGRAM(s) Oral daily  influenza   Vaccine 0.5 milliLiter(s) IntraMuscular once      LABS:	 	                     9.4    8.42  )-----------( 257      ( 24 Sep 2019 07:13 )             28.3           proBNP: Serum Pro-Brain Natriuretic Peptide: 180 pg/mL (09-12 @ 11:59)    Lipid Profile: Cholesterol 117  LDL 60  HDL 50  TG 36    HgA1c: Hemoglobin A1C, Whole Blood: 5.3 % (09-13 @ 10:32)    TSH: Thyroid Stimulating Hormone, Serum: 1.33 uU/mL (09-13 @ 06:54)

## 2019-09-25 NOTE — PROGRESS NOTE ADULT - SUBJECTIVE AND OBJECTIVE BOX
INTERVAL HPI/OVERNIGHT EVENTS:  Pt resting comfortably. c/o swelling and pain of left groin yesterday which has now resolved.   Tolerating diet.   +flatus/BM.   Denies N/V    MEDICATIONS  (STANDING):  aztreonam  IVPB 1000 milliGRAM(s) IV Intermittent every 8 hours  collagenase Ointment 1 Application(s) Topical daily  dextrose 5% + sodium chloride 0.45% 1000 milliLiter(s) (100 mL/Hr) IV Continuous <Continuous>  enoxaparin Injectable 40 milliGRAM(s) SubCutaneous daily  ferrous    sulfate 325 milliGRAM(s) Oral daily  influenza   Vaccine 0.5 milliLiter(s) IntraMuscular once    MEDICATIONS  (PRN):  acetaminophen   Tablet .. 650 milliGRAM(s) Oral every 6 hours PRN Temp greater or equal to 38C (100.4F), Moderate Pain (4 - 6)      Vital Signs Last 24 Hrs  T(C): 36.6 (25 Sep 2019 05:20), Max: 36.6 (25 Sep 2019 05:20)  T(F): 97.8 (25 Sep 2019 05:20), Max: 97.8 (25 Sep 2019 05:20)  HR: 68 (25 Sep 2019 05:20) (65 - 73)  BP: 120/72 (25 Sep 2019 05:20) (120/42 - 125/76)  BP(mean): --  RR: 18 (25 Sep 2019 05:20) (16 - 18)  SpO2: 97% (25 Sep 2019 05:20) (97% - 100%)    Physical:  General: A&Ox3. NAD.  Abdomen: Soft nondistended, nontender. Wound clean and dry. Edges approximated well    LABS:                        9.4    8.42  )-----------( 257      ( 24 Sep 2019 07:13 )             28.3

## 2019-09-26 ENCOUNTER — TRANSCRIPTION ENCOUNTER (OUTPATIENT)
Age: 69
End: 2019-09-26

## 2019-09-26 RX ADMIN — Medication 50 MILLIGRAM(S): at 21:39

## 2019-09-26 RX ADMIN — Medication 50 MILLIGRAM(S): at 13:28

## 2019-09-26 RX ADMIN — Medication 50 MILLIGRAM(S): at 06:09

## 2019-09-26 RX ADMIN — Medication 1 APPLICATION(S): at 11:25

## 2019-09-26 RX ADMIN — ENOXAPARIN SODIUM 40 MILLIGRAM(S): 100 INJECTION SUBCUTANEOUS at 11:26

## 2019-09-26 RX ADMIN — Medication 325 MILLIGRAM(S): at 11:25

## 2019-09-26 NOTE — CHART NOTE - NSCHARTNOTEFT_GEN_A_CORE
Assessment:   Patient is a 69y old  Male who presents with a chief complaint of cellulitis and right colon mass (26 Sep 2019 11:13)> pt seen at lunch, ate 100% meal. Does not report being hungry (limited info due to pt's AMS? dementia). Pt weighed on standing scale w/ PT's help (149#). Pt w/ progressive weight loss. Discussed nutritional status (severe) with Katia RIVERA, she to add Enlive BID.      Factors impacting intake: [ ] none [ ] nausea  [ ] vomiting [ ] diarrhea [ ] constipation  [ ]chewing problems [ ] swallowing issues  [ ] other:     Diet Prescription: Diet, Low Fiber (19 @ 09:44)    Intake: (Pt had been NPO/ clears ~ 8 days), now taking/ tolerating PO well)    Daily Height in cm: 175.26 (12 Sep 2019 11:29)      Daily Weight in k.6 (19 Sep 2019 11:49).  Daily wt (149#/67.7 kg)  Weight in k.6 (18 Sep 2019 05:40)  Weight in k.8 (17 Sep 2019 02:58)  Weight in k.5 (14 Sep 2019 04:40)    % Weight Change9.6%/ 2 weeks    Pertinent Medications: MEDICATIONS  (STANDING):  aztreonam  IVPB 1000 milliGRAM(s) IV Intermittent every 8 hours  collagenase Ointment 1 Application(s) Topical daily  dextrose 5% + sodium chloride 0.45% 1000 milliLiter(s) (100 mL/Hr) IV Continuous <Continuous>  enoxaparin Injectable 40 milliGRAM(s) SubCutaneous daily  ferrous    sulfate 325 milliGRAM(s) Oral daily  influenza   Vaccine 0.5 milliLiter(s) IntraMuscular once    MEDICATIONS  (PRN):  acetaminophen   Tablet .. 650 milliGRAM(s) Oral every 6 hours PRN Temp greater or equal to 38C (100.4F), Moderate Pain (4 - 6)    Pertinent Labs:   Phos 1.6 mg/dL<L>  Alb 2.7 g/dL<L>  [id number] 5.3 %  Chol 117 mg/dL LDL 60 mg/dL HDL 50 mg/dL Trig 36 mg/dL     CAPILLARY BLOOD GLUCOSE        Skin:     Estimated Needs:   [ ] no change since previous assessment  [x ] recalculated: Using 149#, at 30 kals/kg =2013 kcals, 81 gm protein (1.2 gm/ kg)      Previous Nutrition Diagnosis:   [ ] Altered GI function  [ ]Inadequate Oral Intake [ ] Swallowing Difficulty   [ ] Altered nutrition related labs [ ] Increased Nutrient Needs [ ] Overweight/Obesity   [ ] Unintended Weight Loss [ ] Food & Nutrition Related Knowledge Deficit [x ] Malnutrition (moderate)   [ ] Other:     Nutrition Diagnosis is [x ] severe PCM related to acute (altered GI fx) on chronic (Including AMS) as evidenced by pt NPO/ clears ~ 8 days (<50% nutritional  needs met >5 days with weight loss (~9.6%)/ 2 weeks (with admission wt less than 2018. Pt with loss body fat    New Nutrition Diagnosis: [ ] not applicable       Interventions:   Recommend  [ ] Change Diet To:  [x ] Nutrition Supplement: add Enlive BID  [ ] Nutrition Support  [ ] Other: MD to monitor. RD available.     Monitoring and Evaluation:   [x ] PO intake [ x ] Tolerance to diet prescription [ x ] weights [ x ] labs[ x ] follow up per protocol  [ ] other:

## 2019-09-26 NOTE — CHART NOTE - NSCHARTNOTEFT_GEN_A_CORE
Upon Nutritional Assessment by the Registered Dietitian your patient was determined to meet criteria / has evidence of the following diagnosis/diagnoses:          [ ]  Mild Protein Calorie Malnutrition        [ ]  Moderate Protein Calorie Malnutrition        [x ] Severe Protein Calorie Malnutrition        [ ] Unspecified Protein Calorie Malnutrition        [ ] Underweight / BMI <19        [ ] Morbid Obesity / BMI > 40      Findings as based on:  •  Comprehensive nutrition assessment and consultation  •  Calorie counts (nutrient intake analysis)  •  Food acceptance and intake status from observations by staff  •  Follow up  •  Patient education  •  Intervention secondary to interdisciplinary rounds  •   concerns      Treatment:    The following diet has been recommended:  Add Enlive BID    PROVIDER Section:     By signing this assessment you are acknowledging and agree with the diagnosis/diagnoses assigned by the Registered Dietitian    Comments:

## 2019-09-26 NOTE — PROGRESS NOTE ADULT - SUBJECTIVE AND OBJECTIVE BOX
Patient is a 69y old  Male who presents with a chief complaint of cellulitis and right colon mass (15 Sep 2019 13:14)      HPI:  69 Years old male from home with dementia was sent in by dr Quiroz office for right sided colonic mass which was recently diagnosed. Patient also have cellulitis which was treated with bactrim as outpatient but have not improved.  History source is dr Quiroz office. Patient does not gave any history but only points to lower extremity ulcers on left lower extremity. History very limited due to dementia.  Pt denies any chest pain, shortness of breath, fever, abdominal pain, change in urinary or bowel habits.  Patient is being admitted to workup for right colon mass and possible right isded colectomy. Plan is to involve GI doctor, surgery and cardiology for pre-op surgical clearance. We will need ID consult and podiatry consult for lower extremity ulcers. (12 Sep 2019 18:59)    Patient seen at bedside this PM for consult on possible left leg cellulitis.  Patient was seen in the ED recently for left leg wounds and was referred to wound care clinic in which he followed up.  Home nursing was provided for every other day dressing changes. Pt was supposed to follow up again with wound care clinic on thursday but was admitted to the hospital.  He states the dressing on his left leg has been changed regularly.  He admits to wounds in his lower legs of greater than 18 years off and on.  He states he had seen podiatrist and ED providers off and on for treatment and had home nursing for a period of time before they stopped.  He denies n/v/f at this time.     Patient seen bedside this AM alert and oriented.  Pt states nurses have not changed his dressing and that only podiatry has been changing his dressing.  Pt denies F/N/V/C/SOB. Pt denies pain in ankles.     PMH:Dementia  No pertinent past medical history  No pertinent past medical history    Allergies: penicillin (Other)    Medications: aspirin enteric coated 81 milliGRAM(s) Oral daily  aztreonam  IVPB 1000 milliGRAM(s) IV Intermittent every 8 hours  chlorhexidine 4% Liquid 1 Application(s) Topical once  enoxaparin Injectable 40 milliGRAM(s) SubCutaneous daily  erythromycin     base Tablet 500 milliGRAM(s) Oral <User Schedule>  hydrALAZINE 10 milliGRAM(s) Oral every 6 hours  influenza   Vaccine 0.5 milliLiter(s) IntraMuscular once  neomycin 500 milliGRAM(s) Oral <User Schedule>  polyethylene glycol/electrolyte Solution. 4000 milliLiter(s) Oral once  vancomycin  IVPB 1000 milliGRAM(s) IV Intermittent every 12 hours    FH:No pertinent family history in first degree relatives    PSX: No significant past surgical history  No significant past surgical history    SH: aspirin enteric coated 81 milliGRAM(s) Oral daily  aztreonam  IVPB 1000 milliGRAM(s) IV Intermittent every 8 hours  chlorhexidine 4% Liquid 1 Application(s) Topical once  enoxaparin Injectable 40 milliGRAM(s) SubCutaneous daily  erythromycin     base Tablet 500 milliGRAM(s) Oral <User Schedule>  hydrALAZINE 10 milliGRAM(s) Oral every 6 hours  influenza   Vaccine 0.5 milliLiter(s) IntraMuscular once  neomycin 500 milliGRAM(s) Oral <User Schedule>  polyethylene glycol/electrolyte Solution. 4000 milliLiter(s) Oral once  vancomycin  IVPB 1000 milliGRAM(s) IV Intermittent every 12 hours      Vital Signs Last 24 Hrs  T(C): 36.7 (26 Sep 2019 05:31), Max: 36.8 (25 Sep 2019 14:01)  T(F): 98 (26 Sep 2019 05:31), Max: 98.2 (25 Sep 2019 14:01)  HR: 64 (26 Sep 2019 05:31) (64 - 87)  BP: 136/63 (26 Sep 2019 05:31) (125/53 - 136/63)  BP(mean): --  RR: 18 (26 Sep 2019 05:31) (17 - 18)  SpO2: 100% (26 Sep 2019 05:31) (100% - 100%)                WBC Count: 8.42 K/uL (09.24.19 @ 07:13)  WBC Count: 7.48 K/uL (09-22 @ 07:45)  WBC Count: 7.49 K/uL (09-21 @ 22:56)  WBC Count: 7.34 K/uL (09-21 @ 07:36)  WBC Count: 8.40 K/uL (09-20 @ 20:00)  WBC Count: 10.24 K/uL (09-20 @ 08:41)          PHYSICAL EXAM  GEN: LORETA ATKINSON is a pleasant well-nourished, well developed 69y Male in no acute distress, alert awake, and oriented to person, place and time.   LE Focused:    Vasc:  DP 2/4 b/l.  PT pulses non-palpable due to wound on the left and thickened skin on the right.  CFT 5sec to all digits.  Mild edema to the left lower leg relative to the right.  Derm: Hyperpigmentation changes from chronic venous stasis b/l.  No open lesion on right leg.  Wound #1: Left medial ankle measuring 4.0cm x 4.3cm x 0.3cm with a granular base and surrounding hyperkeratotic tissue with patches of hypopigmenation.  No malodor, no drainage, no probe to bone, no increased in warmth, no surrounding erythema.  Wound #2: Left lateral ankle measuring 1.2cm x 1.2cm x 0.2cm with well defined margins and fibrogranular base with patches of hypopigmentation. no malodor, no drainage, no probe to bone, no increased in warmth, no surrounding erythema.  Neuro: Diminished protective sensation b/l  MSK: Decreased ankle joint range of motion b/l.  No pain with palpation of ulcers.    Imaging:   < from: Xray Ankle Complete 3 Views, Left (09.12.19 @ 14:15) >    EXAM:  ANKLE LEFT (MINIMUM 3 V)                            PROCEDURE DATE:  09/12/2019          INTERPRETATION:  CLINICAL STATEMENT: Ulcer    TECHNIQUE: AP, lateral and oblique views of the left ankle.    COMPARISON: 9/4/2019    FINDINGS:    There is no acute fracture or dislocation.  Joint spaces are intact.   There is no lytic or blastic lesion. Again seen is periosteal reaction   along the shaft of the fibula and between the tibia and fibula distally.   There are surgical clips in the soft tissues. There is soft tissue   swelling. There are degenerative changes in the midfoot. There is a   plantar calcaneal spur. There is a large amount of soft tissue   ossification posteriorly at the level of the distal leg.    IMPRESSION:    No plain film evidence of osteomyelitis    HELEN LEARY M.D.,ATTENDING RADIOLOGIST  This document has been electronically signed. Sep 12 2019  3:18PM      < end of copied text >    Cultures: Culture Results:   No growth (09-14 @ 10:20)  Culture Results:   No growth to date. (09-12 @ 19:31)  Culture Results:   No growth to date. (09-12 @ 19:30)    < from: [facility] Physiol Extremity Lower 3+ Level, BI (09.18.19 @ 11:51) >    EXAM:  US PHYSIOL LWR EXT 3+ LEV BI                            PROCEDURE DATE:  09/18/2019          INTERPRETATION:  Clinical Information: Peripheral vascular disease.    Technique: Bilateral lower extremity ABIs/PVR    Comparison: None    Findings/  Impression:  Right lower extremity: The ankle brachial index is 0.93. The pulse   waveforms are intact.    Left lower extremity: The ankle brachial index is 0.94. The pulse   waveforms are intact.      SG LOCKETT M.D., ATTENDINGRADIOLOGIST  This document has been electronically signed. Sep 18 2019 12:07PM    < end of copied text >      A: Venous stasis ulcerations, left ankle  Chronic non-pressure ulcers, left ankle    P:  Patient evaluated, chart reviewed  X-rays reviewed as above- no OM  Dressing applied with adaptic 4x4 gauze, Binu, ACE wrap to left leg  CALI reviewed  ESR/CRP reviewed  Continue with local wound care   Nursing orders for wound care are in, please change dressing daily.   Pt is stable to f/u as outpatient at -[facility]   Wound care: apply santyl adaptic dsd to wounds every other day. keep dressing clean dry and intact. WBAT to right foot.   Discussed with attending

## 2019-09-26 NOTE — PROGRESS NOTE ADULT - SUBJECTIVE AND OBJECTIVE BOX
[   ] ICU                                          [   ] CCU                                      [ X  ] Medical Floor      Patient is comfortable. No new complaints reported, No abdominal pain, N/V, hematemesis, hematochezia, melena, fever, chills, chest pain, SOB, cough or diarrhea reported.    VITALS  Vital Signs Last 24 Hrs  T(C): 37.1 (26 Sep 2019 20:26), Max: 37.1 (26 Sep 2019 20:26)  T(F): 98.7 (26 Sep 2019 20:26), Max: 98.7 (26 Sep 2019 20:26)  HR: 68 (26 Sep 2019 20:26) (64 - 79)  BP: 130/61 (26 Sep 2019 20:26) (125/53 - 151/61)   RR: 17 (26 Sep 2019 20:26) (17 - 18)  SpO2: 100% (26 Sep 2019 20:26) (100% - 100%)       MEDICATIONS  (STANDING):  aztreonam  IVPB 1000 milliGRAM(s) IV Intermittent every 8 hours  collagenase Ointment 1 Application(s) Topical daily  dextrose 5% + sodium chloride 0.45% 1000 milliLiter(s) (100 mL/Hr) IV Continuous <Continuous>  enoxaparin Injectable 40 milliGRAM(s) SubCutaneous daily  ferrous    sulfate 325 milliGRAM(s) Oral daily  influenza   Vaccine 0.5 milliLiter(s) IntraMuscular once    MEDICATIONS  (PRN):  acetaminophen   Tablet .. 650 milliGRAM(s) Oral every 6 hours PRN Temp greater or equal to 38C (100.4F), Moderate Pain (4 - 6)          Surgical Pathology Report (09.18.19 @ 17:35)    Surgical Pathology Report:   ACCESSION No:  [id number]    LORETA ATKINSON               3        Surgical Final Report          Final Diagnosis    1. Distal end; resection:  - Colonic wall segment with no significant histopathological  findings.    2. Donut, right colon; laparoscopically assisted right colon  resection:  - Ulcerated colonic mucosa containing residual tubular adenoma  remnants at the periphery, consistent with status post previous  biopsy site (lesion #1, located 5 cm from the ileocecal valve).  See comment.  - Tubulovillous adenoma of right colon, size 5.0 x 2.0 x 0.5 cm  (lesion #2).  See comment.  - Ulcerated colonic mucosa consistent with status post previous  biopsy site (lesion #3). See comment.  - Tubular adenoma, size 0.8 x 0.8 x 0.5 cm (lesion #4).  See  comment.  - Eighteen pericolic lymph nodes with no significant  histopathological findings, 0/18.  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    Verified by: Renita Hernandez MD  (Electronic Signature)  Reported on: 09/24/19 21:51 EDT, [address] [place] [id number]  _________________________________________________________________    Comment  See ref. cases # [id number]    Clinical History  Right colon tumor  Laparoscopically assisted right colon resection    Specimen(s) Submitted  1-Distal end  2-Donut, Right colon    Gross Description  1. The specimen is labeled Distal end and with the patient's  name.  Received in formalin is a short segment of colon measuring  2.5 cm in length and up to 4.0 cm in diameter.  The serosal  surface are grossly unremarkable with a small amount of attached  adipose tissue.  The mucosa is tan and grossly unremarkable.  No  discrete lesions are identified.  Representative sections  submitted in one cassette.            LORETA ATKINSON        Surgical Final Report            2. The specimen is received fresh and the specimen container is  labeled: Donut, Right colon. It consists of a 4.5 cm in length  and 4.0cm in circumference terminal ileal segment with an open  proximal margin.  The ileum is contiguous with the cecum and a 43  cm in length and 10.0 cm in circumference portion of ascending  colon; the distal margin of which is opened.  A 4.3 x 0.6 cm,  unremarkable appendix is present. The ileal serosa is tan-pink  and smooth. The ileal mucosa is grossly unremarkable. The ileal  wall is 0.2-0.4 cm in thickness. The ileal mesentery is 3.5 cm  wide. The mesocolon is 4.0 cm in length. The vascular pedicle is  grossly identified. The colonic serosa is tan pink, with  scattered foci of adhesions. The colonic wall is 0.2-0.4 cm in  thickness.  There are 4 lesions present. The first lesion  consists of an ulcer marked by a cylindrical clip and measures 2  x 1 cm, located 5 cm from the ileocecal valve, and 6 cm from the  radial margin.  On sectioning the lesion appears to involve the  mucosal and submucosal layers. The second lesion is located 9 cm  distally from the ulcer(1st lesion), consisting of a polypoid  mass measuring 0.5 cm transversely, 5 cm longitudinally and 2.0  cm in thickness.  It is located 7 cm from the radial margin.  Sectioning shows a tan-gray friable cut surface and appears to  involve the mucosal and submucosal layers only. The third lesion  is located 11 cm distally from the polypoid mass(2nd lesion),  consisting of a partially healed ulcer measuring 1.2 x 0.6 cm.  On sectioning it appears to involve the mucosal layer only.  The  fourth lesion is located 6.5 cm distally from the third lesion,  consisting of a small polyp measuring 0.8 x 0.8 x 0.5 cm and it  is 5 cm from the distal resection margin.  On sectioning  the  polyp appears to involve the mucosal layer only. The adjacent  mucosa is grossly unremarkable.  Also received in the same  container are two ring-shaped mucosal tissue fragments with  multiple surgical staples and sutures attached to a metal anvil,  ranging from 0.8 cm to 1 cm in length and up to 1.5 cm in  diameter.  The mucosal surfaces are grossly unremarkable.`  The  pericolic fat contains multiple lymph nodes ranging in size from  0.2 cm to 1.2 cm.  Representative sections are submitted in 24  cassettes.  1A proximal and distal margins -shaved  1B radial margin  1C appendix  1D-1E-1F.  Sections from the first lesion(entirely submitted)  1G-1P. Representative sections from the second lesion  1Q.  Sections from the third lesion(entirely submitted)  1R.  Sections from the fourth lesion(entirely submitted)  1S.  Sections from the donuts  1T. 6 lymph nodes  1U. 6 lymph nodes            EKWUNIFE, CHRISTMOODYER E               3        Surgical Final Report          1V. 6 lymph nodes  1W.  One node bisected  1X.  One node bisected    In addition to other data that may appear on the specimen  container, the label has been inspected to confirm the presence  of the patient's name and date of birth.  Alo Carson 09/23/2019 15:30

## 2019-09-26 NOTE — PROGRESS NOTE ADULT - SUBJECTIVE AND OBJECTIVE BOX
CHIEF COMPLAINT:Patient is a 69y old  Male who presents with a chief complaint of cellulitis and right colon mass.Pt appears comfortable.    	  REVIEW OF SYSTEMS:  CONSTITUTIONAL: No fever, weight loss, or fatigue  EYES: No eye pain, visual disturbances, or discharge  ENT:  No difficulty hearing, tinnitus, vertigo; No sinus or throat pain  NECK: No pain or stiffness  RESPIRATORY: No cough, wheezing, chills or hemoptysis; No Shortness of Breath  CARDIOVASCULAR: No chest pain, palpitations, passing out, dizziness, or leg swelling  GASTROINTESTINAL: No abdominal or epigastric pain. No nausea, vomiting, or hematemesis; No diarrhea or constipation. No melena or hematochezia.  GENITOURINARY: No dysuria, frequency, hematuria, or incontinence  NEUROLOGICAL: No headaches, memory loss, loss of strength, numbness, or tremors  SKIN: No itching, burning, rashes, or lesions   LYMPH Nodes: No enlarged glands  ENDOCRINE: No heat or cold intolerance; No hair loss  MUSCULOSKELETAL: No joint pain or swelling; No muscle, back, or extremity pain  PSYCHIATRIC: No depression, anxiety, mood swings, or difficulty sleeping  HEME/LYMPH: No easy bruising, or bleeding gums  ALLERGY AND IMMUNOLOGIC: No hives or eczema	      PHYSICAL EXAM:  T(C): 36.7 (09-26-19 @ 05:31), Max: 36.8 (09-25-19 @ 14:01)  HR: 64 (09-26-19 @ 05:31) (64 - 87)  BP: 136/63 (09-26-19 @ 05:31) (125/53 - 136/63)  RR: 18 (09-26-19 @ 05:31) (17 - 18)  SpO2: 100% (09-26-19 @ 05:31) (100% - 100%)      Appearance: Normal	  HEENT:   Normal oral mucosa, PERRL, EOMI	  Lymphatic: No lymphadenopathy  Cardiovascular: Normal S1 S2, No JVD, No murmurs, No edema  Respiratory: Lungs clear to auscultation	  Psychiatry: A & O x 3, Mood & affect appropriate  Gastrointestinal:  Soft, Non-tender, + BS	  Skin: No rashes, No ecchymoses, No cyanosis	  Neurologic: Non-focal  Extremities: Normal range of motion, No clubbing, cyanosis or edema  Vascular: Peripheral pulses palpable 2+ bilaterally    MEDICATIONS  (STANDING):  aztreonam  IVPB 1000 milliGRAM(s) IV Intermittent every 8 hours  collagenase Ointment 1 Application(s) Topical daily  dextrose 5% + sodium chloride 0.45% 1000 milliLiter(s) (100 mL/Hr) IV Continuous <Continuous>  enoxaparin Injectable 40 milliGRAM(s) SubCutaneous daily  ferrous    sulfate 325 milliGRAM(s) Oral daily  influenza   Vaccine 0.5 milliLiter(s) IntraMuscular once      	  LABS:	 	    proBNP: Serum Pro-Brain Natriuretic Peptide: 180 pg/mL (09-12 @ 11:59)    Lipid Profile: Cholesterol 117  LDL 60  HDL 50  TG 36    HgA1c: Hemoglobin A1C, Whole Blood: 5.3 % (09-13 @ 10:32)    TSH: Thyroid Stimulating Hormone, Serum: 1.33 uU/mL (09-13 @ 06:54)      	  Surgical Pathology Report (09.18.19 @ 17:35)    Surgical Pathology Report:   ACCESSION No:  [id number]    LORETA ATKINSON        Surgical Final Report          Final Diagnosis    1. Distal end; resection:  - Colonic wall segment with no significant histopathological  findings.    2. Donut, right colon; laparoscopically assisted right colon  resection:  - Ulcerated colonic mucosa containing residual tubular adenoma  remnants at the periphery, consistent with status post previous  biopsy site (lesion #1, located 5 cm from the ileocecal valve).  See comment.  - Tubulovillous adenoma of right colon, size 5.0 x 2.0 x 0.5 cm  (lesion #2).  See comment.  - Ulcerated colonic mucosa consistent with status post previous  biopsy site (lesion #3). See comment.  - Tubular adenoma, size 0.8 x 0.8 x 0.5 cm (lesion #4).  See  comment.  - Eighteen pericolic lymph nodes with no significant  histopathological findings, 0/18.  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    Verified by: Renita Hernandez MD  (Electronic Signature)  Reported on: 09/24/19 21:51 EDT, [address] [place] [id number]  _________________________________________________________________    Comment  See ref. cases # [id number]-, [id number]-.    Clinical History  Right colon tumor  Laparoscopically assisted right colon resection    Specimen(s) Submitted  1-Distal end  2-Donut, Right colon    Gross Description  1. The specimen is labeled Distal end and with the patient's  name.  Received in formalin is a short segment of colon measuring  2.5 cm in length and up to 4.0 cm in diameter.  The serosal  surface are grossly unremarkable with a small amount of attached  adipose tissue.  The mucosa is tan and grossly unremarkable.  No  discrete lesions are identified.  Representative sections  submitted in one cassette.            LORETA ATKINSON        Surgical Final Report            2. The specimen is received fresh and the specimen container is  labeled: Donut, Right colon. It consists of a 4.5 cm in length  and 4.0cm in circumference terminal ileal segment with an open  proximal margin.  The ileum is contiguous with the cecum and a 43  cm in length and 10.0 cm in circumference portion of ascending  colon; the distal margin of which is opened.  A 4.3 x 0.6 cm,  unremarkable appendix is present. The ileal serosa is tan-pink  and smooth. The ileal mucosa is grossly unremarkable. The ileal  wall is 0.2-0.4 cm in thickness. The ileal mesentery is 3.5 cm  wide. The mesocolon is 4.0 cm in length. The vascular pedicle is  grossly identified. The colonic serosa is tan pink, with  scattered foci of adhesions. The colonic wall is 0.2-0.4 cm in  thickness.  There are 4 lesions present. The first lesion  consists of an ulcer marked by a cylindrical clip and measures 2  x 1 cm, located 5 cm from the ileocecal valve, and 6 cm from the  radial margin.  On sectioning the lesion appears to involve the  mucosal and submucosal layers. The second lesion is located 9 cm  distally from the ulcer(1st lesion), consisting of a polypoid  mass measuring 0.5 cm transversely, 5 cm longitudinally and 2.0  cm in thickness.  It is located 7 cm from the radial margin.  Sectioning shows a tan-gray friable cut surface and appears to  involve the mucosal and submucosal layers only. The third lesion  is located 11 cm distally from the polypoid mass(2nd lesion),  consisting of a partially healed ulcer measuring 1.2 x 0.6 cm.  On sectioning it appears to involve the mucosal layer only.  The  fourth lesion is located 6.5 cm distally from the third lesion,  consisting of a small polyp measuring 0.8 x 0.8 x 0.5 cm and it  is 5 cm from the distal resection margin.  On sectioning  the  polyp appears to involve the mucosal layer only. The adjacent  mucosa is grossly unremarkable.  Also received in the same  container are two ring-shaped mucosal tissue fragments with  multiple surgical staples and sutures attached to a metal anvil,  ranging from 0.8 cm to 1 cm in length and up to 1.5 cm in  diameter.  The mucosal surfaces are grossly unremarkable.`  The  pericolic fat contains multiple lymph nodes ranging in size from  0.2 cm to 1.2 cm.  Representative sections are submitted in 24  cassettes.  1A proximal and distal margins -shaved  1B radial margin  1C appendix  1D-1E-1F.  Sections from the first lesion(entirely submitted)  1G-1P. Representative sections from the second lesion  1Q.  Sections from the third lesion(entirely submitted)  1R.  Sections from the fourth lesion(entirely submitted)  1S.  Sections from the donuts  1T. 6 lymph nodes  1U. 6 lymph nodes            LORETA ATKINSON               3        Surgical Final Report          1V. 6 lymph nodes  1W.  One node bisected  1X.  One node bisected    In addition to other data that may appear on the specimen  container, the label has been inspected to confirm the presence  of the patient's name and date of birth.  Alo Byrd 09/23/2019 15:30

## 2019-09-27 ENCOUNTER — INBOUND DOCUMENT (OUTPATIENT)
Age: 69
End: 2019-09-27

## 2019-09-27 VITALS
DIASTOLIC BLOOD PRESSURE: 67 MMHG | RESPIRATION RATE: 17 BRPM | HEART RATE: 88 BPM | TEMPERATURE: 98 F | OXYGEN SATURATION: 100 % | SYSTOLIC BLOOD PRESSURE: 125 MMHG

## 2019-09-27 RX ADMIN — Medication 50 MILLIGRAM(S): at 05:21

## 2019-09-27 RX ADMIN — Medication 325 MILLIGRAM(S): at 11:24

## 2019-09-27 RX ADMIN — ENOXAPARIN SODIUM 40 MILLIGRAM(S): 100 INJECTION SUBCUTANEOUS at 11:24

## 2019-09-27 RX ADMIN — Medication 1 APPLICATION(S): at 11:27

## 2019-09-27 RX ADMIN — Medication 50 MILLIGRAM(S): at 13:41

## 2019-09-27 RX ADMIN — SODIUM CHLORIDE 100 MILLILITER(S): 9 INJECTION, SOLUTION INTRAVENOUS at 17:22

## 2019-09-27 NOTE — PROGRESS NOTE ADULT - NEGATIVE SKIN SYMPTOMS
no dryness/no itching/no rash
no dryness/no itching/no rash
no itching/no rash/no dryness
no rash/no itching/no dryness
no rash/no itching/no dryness
no itching/no dryness/no rash
no rash/no itching/no dryness
no itching/no rash/no dryness
no rash/no itching/no dryness
no itching/no dryness/no rash
no dryness/no rash/no itching

## 2019-09-27 NOTE — PROGRESS NOTE ADULT - MS EXT PE MLT D E PC
no cyanosis/no clubbing
no cyanosis/no clubbing
no clubbing/no cyanosis
no cyanosis/no clubbing
no clubbing/no cyanosis
no cyanosis/no clubbing
no clubbing/no cyanosis
no clubbing/no cyanosis
no cyanosis/no clubbing
no cyanosis/no clubbing
no clubbing/no cyanosis

## 2019-09-27 NOTE — PROGRESS NOTE ADULT - NECK DETAILS
no JVD/supple
supple/no JVD
no JVD/supple
supple/no JVD
supple/no JVD
no JVD/supple
supple/no JVD

## 2019-09-27 NOTE — PROGRESS NOTE ADULT - MOUTH
normal mouth and gums/moist
moist/normal mouth and gums
normal mouth and gums/moist
moist/normal mouth and gums
moist/dry

## 2019-09-27 NOTE — PROGRESS NOTE ADULT - SUBJECTIVE AND OBJECTIVE BOX
[   ] ICU                                          [   ] CCU                                      [ X  ] Medical Floor      Patient is comfortable. No new complaints reported, No abdominal pain, N/V, hematemesis, hematochezia, melena, fever, chills, chest pain, SOB, cough or diarrhea reported.    VITALS  Vital Signs Last 24 Hrs  T(C): 36.6 (27 Sep 2019 14:34), Max: 37.1 (26 Sep 2019 20:26)  T(F): 97.8 (27 Sep 2019 14:34), Max: 98.7 (26 Sep 2019 20:26)  HR: 90 (27 Sep 2019 14:34) (67 - 90)  BP: 110/61 (27 Sep 2019 14:34) (110/61 - 130/61)   RR: 18 (27 Sep 2019 14:34) (16 - 18)  SpO2: 100% (27 Sep 2019 14:34) (100% - 100%)       MEDICATIONS  (STANDING):  aztreonam  IVPB 1000 milliGRAM(s) IV Intermittent every 8 hours  collagenase Ointment 1 Application(s) Topical daily  dextrose 5% + sodium chloride 0.45% 1000 milliLiter(s) (100 mL/Hr) IV Continuous <Continuous>  enoxaparin Injectable 40 milliGRAM(s) SubCutaneous daily  ferrous    sulfate 325 milliGRAM(s) Oral daily  influenza   Vaccine 0.5 milliLiter(s) IntraMuscular once    MEDICATIONS  (PRN):  acetaminophen   Tablet .. 650 milliGRAM(s) Oral every 6 hours PRN Temp greater or equal to 38C (100.4F), Moderate Pain (4 - 6)

## 2019-09-27 NOTE — PROGRESS NOTE ADULT - PROVIDER SPECIALTY LIST ADULT
Cardiology
Gastroenterology
Internal Medicine
Podiatry
Surgery
Podiatry
Podiatry
Cardiology
Surgery
Gastroenterology
Internal Medicine
Internal Medicine
Gastroenterology
Internal Medicine
Gastroenterology

## 2019-09-27 NOTE — PROGRESS NOTE ADULT - SUBJECTIVE AND OBJECTIVE BOX
Patient is a 69y old  Male who presents with a chief complaint of cellulitis and right colon mass (15 Sep 2019 13:14)      HPI:  69 Years old male from home with dementia was sent in by dr Quiroz office for right sided colonic mass which was recently diagnosed. Patient also have cellulitis which was treated with bactrim as outpatient but have not improved.  History source is dr Quiroz office. Patient does not gave any history but only points to lower extremity ulcers on left lower extremity. History very limited due to dementia.  Pt denies any chest pain, shortness of breath, fever, abdominal pain, change in urinary or bowel habits.  Patient is being admitted to workup for right colon mass and possible right isded colectomy. Plan is to involve GI doctor, surgery and cardiology for pre-op surgical clearance. We will need ID consult and podiatry consult for lower extremity ulcers. (12 Sep 2019 18:59)    Patient seen at bedside this PM for consult on possible left leg cellulitis.  Patient was seen in the ED recently for left leg wounds and was referred to wound care clinic in which he followed up.  Home nursing was provided for every other day dressing changes. Pt was supposed to follow up again with wound care clinic on thursday but was admitted to the hospital.  He states the dressing on his left leg has been changed regularly.  He admits to wounds in his lower legs of greater than 18 years off and on.  He states he had seen podiatrist and ED providers off and on for treatment and had home nursing for a period of time before they stopped.  He denies n/v/f at this time.     Patient seen bedside this AM alert and oriented by podiatry team with attending. Dressing clean, dry and intact however patient is not wearing an offloading boot. Pt has no lower extremity complaints.  Pt denies F/N/V/C/SOB. Pt denies pain in ankles.     PMH:Dementia  No pertinent past medical history  No pertinent past medical history    Allergies: penicillin (Other)    Medications: aspirin enteric coated 81 milliGRAM(s) Oral daily  aztreonam  IVPB 1000 milliGRAM(s) IV Intermittent every 8 hours  chlorhexidine 4% Liquid 1 Application(s) Topical once  enoxaparin Injectable 40 milliGRAM(s) SubCutaneous daily  erythromycin     base Tablet 500 milliGRAM(s) Oral <User Schedule>  hydrALAZINE 10 milliGRAM(s) Oral every 6 hours  influenza   Vaccine 0.5 milliLiter(s) IntraMuscular once  neomycin 500 milliGRAM(s) Oral <User Schedule>  polyethylene glycol/electrolyte Solution. 4000 milliLiter(s) Oral once  vancomycin  IVPB 1000 milliGRAM(s) IV Intermittent every 12 hours    FH:No pertinent family history in first degree relatives    PSX: No significant past surgical history  No significant past surgical history    SH: aspirin enteric coated 81 milliGRAM(s) Oral daily  aztreonam  IVPB 1000 milliGRAM(s) IV Intermittent every 8 hours  chlorhexidine 4% Liquid 1 Application(s) Topical once  enoxaparin Injectable 40 milliGRAM(s) SubCutaneous daily  erythromycin     base Tablet 500 milliGRAM(s) Oral <User Schedule>  hydrALAZINE 10 milliGRAM(s) Oral every 6 hours  influenza   Vaccine 0.5 milliLiter(s) IntraMuscular once  neomycin 500 milliGRAM(s) Oral <User Schedule>  polyethylene glycol/electrolyte Solution. 4000 milliLiter(s) Oral once  vancomycin  IVPB 1000 milliGRAM(s) IV Intermittent every 12 hours      Vital Signs Last 24 Hrs  T(C): 36.3 (27 Sep 2019 05:03), Max: 37.1 (26 Sep 2019 20:26)  T(F): 97.3 (27 Sep 2019 05:03), Max: 98.7 (26 Sep 2019 20:26)  HR: 67 (27 Sep 2019 05:03) (67 - 79)  BP: 118/62 (27 Sep 2019 05:03) (118/62 - 151/61)  BP(mean): --  RR: 16 (27 Sep 2019 05:03) (16 - 17)  SpO2: 100% (27 Sep 2019 05:03) (100% - 100%)    Labs  --	               WBC Count: 8.42 K/uL (09.24.19 @ 07:13)  WBC Count: 7.48 K/uL (09-22 @ 07:45)  WBC Count: 7.49 K/uL (09-21 @ 22:56)  WBC Count: 7.34 K/uL (09-21 @ 07:36)  WBC Count: 8.40 K/uL (09-20 @ 20:00)  WBC Count: 10.24 K/uL (09-20 @ 08:41)          PHYSICAL EXAM  GEN: LORETA ATKINSON is a pleasant well-nourished, well developed 69y Male in no acute distress, alert awake, and oriented to person, place and time.   LE Focused:    Vasc:  DP 2/4 b/l.  PT pulses non-palpable due to wound on the left and thickened skin on the right.  CFT 5sec to all digits.  Mild edema to the left lower leg relative to the right.  Derm: Hyperpigmentation changes from chronic venous stasis b/l.  No open lesion on right leg.  Wound #1: Left medial ankle measuring 4.0cm x 4.1cm x 0.3cm with a granular base and surrounding hyperkeratotic tissue with patches of hypopigmenation.  No malodor, no drainage, no probe to bone, no increased in warmth, no surrounding erythema.  Wound #2: Left lateral ankle measuring 1.0cm x 1.2cm x 0.2cm with well defined margins and fibrogranular base with patches of hypopigmentation. no malodor, no drainage, no probe to bone, no increased in warmth, no surrounding erythema. both wounds appears much improved.   Neuro: Diminished protective sensation b/l  MSK: Decreased ankle joint range of motion b/l.  No pain with palpation of ulcers.    Imaging:   < from: Xray Ankle Complete 3 Views, Left (09.12.19 @ 14:15) >    EXAM:  ANKLE LEFT (MINIMUM 3 V)                            PROCEDURE DATE:  09/12/2019          INTERPRETATION:  CLINICAL STATEMENT: Ulcer    TECHNIQUE: AP, lateral and oblique views of the left ankle.    COMPARISON: 9/4/2019    FINDINGS:    There is no acute fracture or dislocation.  Joint spaces are intact.   There is no lytic or blastic lesion. Again seen is periosteal reaction   along the shaft of the fibula and between the tibia and fibula distally.   There are surgical clips in the soft tissues. There is soft tissue   swelling. There are degenerative changes in the midfoot. There is a   plantar calcaneal spur. There is a large amount of soft tissue   ossification posteriorly at the level of the distal leg.    IMPRESSION:    No plain film evidence of osteomyelitis    HELEN LEARY M.D.,ATTENDING RADIOLOGIST  This document has been electronically signed. Sep 12 2019  3:18PM      < end of copied text >    Cultures: Culture Results:   No growth (09-14 @ 10:20)  Culture Results:   No growth to date. (09-12 @ 19:31)  Culture Results:   No growth to date. (09-12 @ 19:30)    < from: [facility] Physiol Extremity Lower 3+ Level, BI (09.18.19 @ 11:51) >    EXAM:  US PHYSIOL LWR EXT 3+ LEV BI                            PROCEDURE DATE:  09/18/2019          INTERPRETATION:  Clinical Information: Peripheral vascular disease.    Technique: Bilateral lower extremity ABIs/PVR    Comparison: None    Findings/  Impression:  Right lower extremity: The ankle brachial index is 0.93. The pulse   waveforms are intact.    Left lower extremity: The ankle brachial index is 0.94. The pulse   waveforms are intact.      SG LOCKETT M.D., ATTENDINGRADIOLOGIST  This document has been electronically signed. Sep 18 2019 12:07PM    < end of copied text >      A: Venous stasis ulcerations, left ankle  Chronic non-pressure ulcers, left ankle    P:  Patient evaluated, chart reviewed  X-rays reviewed as above, no OM  Dressing re-applied with adaptic 4x4 gauze, Binu, ACE wrap to left leg  CALI reviewed  ESR/CRP reviewed  Continue with local wound care   Nursing orders for wound care are in, please change dressing daily.   Pt is stable to f/u as outpatient at -[address] [place] [address]   Wound care orders: apply santyl to wounds, dress with adaptic dsd to every other day. Keep dressing clean dry and intact.   WBAT bilateral lower extremity  Discussed and seen with attending Dr. An

## 2019-09-27 NOTE — PROGRESS NOTE ADULT - CONSTITUTIONAL DETAILS
well-nourished/well-developed/well-groomed/no distress
well-developed/well-groomed/no distress
well-groomed/well-developed/no distress
well-developed/no distress/well-groomed
well-nourished/well-groomed/well-developed/no distress
no distress/well-developed/well-groomed
well-developed/well-nourished/no distress/well-groomed
well-nourished/well-groomed/no distress/well-developed
well-developed/well-groomed/no distress/well-nourished
no distress/well-groomed/well-developed/well-nourished
no distress/well-groomed/well-nourished/well-developed

## 2019-09-27 NOTE — PROGRESS NOTE ADULT - NSHPATTENDINGPLANDISCUSS_GEN_ALL_CORE
house staff covering

## 2019-09-27 NOTE — PROGRESS NOTE ADULT - NEGATIVE OPHTHALMOLOGIC SYMPTOMS
no diplopia/no photophobia/no lacrimation L
no lacrimation L/no diplopia/no photophobia
no lacrimation L/no photophobia/no diplopia
no diplopia/no lacrimation L/no photophobia
no lacrimation L/no diplopia/no photophobia
no lacrimation L/no photophobia/no diplopia
no photophobia/no diplopia/no lacrimation L
no diplopia/no photophobia/no lacrimation L
no lacrimation L/no photophobia/no diplopia
no photophobia/no lacrimation L/no diplopia
no photophobia/no diplopia/no lacrimation L

## 2019-09-27 NOTE — PROGRESS NOTE ADULT - SUBJECTIVE AND OBJECTIVE BOX
CHIEF COMPLAINT:Patient is a 69y old  Male who presents with a chief complaint of cellulitis and right colon mass.Pt appears comfortable.    	  REVIEW OF SYSTEMS:  CONSTITUTIONAL: No fever, weight loss, or fatigue  EYES: No eye pain, visual disturbances, or discharge  ENT:  No difficulty hearing, tinnitus, vertigo; No sinus or throat pain  NECK: No pain or stiffness  RESPIRATORY: No cough, wheezing, chills or hemoptysis; No Shortness of Breath  CARDIOVASCULAR: No chest pain, palpitations, passing out, dizziness, or leg swelling  GASTROINTESTINAL: No abdominal or epigastric pain. No nausea, vomiting, or hematemesis; No diarrhea or constipation. No melena or hematochezia.  GENITOURINARY: No dysuria, frequency, hematuria, or incontinence  NEUROLOGICAL: No headaches, memory loss, loss of strength, numbness, or tremors  SKIN: No itching, burning, rashes, or lesions   LYMPH Nodes: No enlarged glands  ENDOCRINE: No heat or cold intolerance; No hair loss  MUSCULOSKELETAL: No joint pain or swelling; No muscle, back, or extremity pain  PSYCHIATRIC: No depression, anxiety, mood swings, or difficulty sleeping  HEME/LYMPH: No easy bruising, or bleeding gums  ALLERGY AND IMMUNOLOGIC: No hives or eczema	      PHYSICAL EXAM:  T(C): 36.3 (09-27-19 @ 05:03), Max: 37.1 (09-26-19 @ 20:26)  HR: 67 (09-27-19 @ 05:03) (67 - 79)  BP: 118/62 (09-27-19 @ 05:03) (118/62 - 151/61)  RR: 16 (09-27-19 @ 05:03) (16 - 17)  SpO2: 100% (09-27-19 @ 05:03) (100% - 100%)      Appearance: Normal	  HEENT:   Normal oral mucosa, PERRL, EOMI	  Lymphatic: No lymphadenopathy  Cardiovascular: Normal S1 S2, No JVD, No murmurs, No edema  Respiratory: Lungs clear to auscultation	  Psychiatry: A & O x 3, Mood & affect appropriate  Gastrointestinal:  Soft, Non-tender, + BS	  Skin: No rashes, No ecchymoses, No cyanosis	  Neurologic: Non-focal  Extremities: Normal range of motion, No clubbing, cyanosis or edema      MEDICATIONS  (STANDING):  aztreonam  IVPB 1000 milliGRAM(s) IV Intermittent every 8 hours  collagenase Ointment 1 Application(s) Topical daily  dextrose 5% + sodium chloride 0.45% 1000 milliLiter(s) (100 mL/Hr) IV Continuous <Continuous>  enoxaparin Injectable 40 milliGRAM(s) SubCutaneous daily  ferrous    sulfate 325 milliGRAM(s) Oral daily  influenza   Vaccine 0.5 milliLiter(s) IntraMuscular once      	  	  LABS:	 	    proBNP: Serum Pro-Brain Natriuretic Peptide: 180 pg/mL (09-12 @ 11:59)    Lipid Profile: Cholesterol 117  LDL 60  HDL 50  TG 36    HgA1c: Hemoglobin A1C, Whole Blood: 5.3 % (09-13 @ 10:32)    TSH: Thyroid Stimulating Hormone, Serum: 1.33 uU/mL (09-13 @ 06:54)

## 2019-09-27 NOTE — PROGRESS NOTE ADULT - TONSILS
no swelling/no redness
no redness
no redness
no redness/no swelling
no redness
no redness/no swelling
no swelling/no redness

## 2019-09-27 NOTE — PROGRESS NOTE ADULT - ASSESSMENT
69M s/p right hemicolectomy on 9/18 for right colon mass 69M s/p right hemicolectomy on 9/18    - Low fiber  - OOB/ambulation  - Wound care as per podiatry  - D/c planning 69M s/p right hemicolectomy on 9/18    - Low fiber diet  - OOB/ambulation  - Wound care as per podiatry  - D/c planning

## 2019-09-27 NOTE — PROGRESS NOTE ADULT - NEGATIVE ENMT SYMPTOMS
no dry mouth/no dysphagia/no ear pain/no hearing difficulty/no gum bleeding/no throat pain
no ear pain/no gum bleeding/no dry mouth/no dysphagia/no throat pain/no hearing difficulty
no ear pain/no hearing difficulty/no dry mouth/no gum bleeding/no dysphagia/no throat pain
no ear pain/no hearing difficulty/no gum bleeding/no throat pain/no dysphagia/no dry mouth
no ear pain/no throat pain/no dysphagia/no gum bleeding/no dry mouth/no hearing difficulty
no ear pain/no dysphagia/no hearing difficulty/no gum bleeding/no throat pain/no dry mouth
no gum bleeding/no dry mouth/no hearing difficulty/no throat pain/no dysphagia/no ear pain
no ear pain/no throat pain/no dysphagia/no dry mouth/no hearing difficulty/no gum bleeding
no gum bleeding/no throat pain/no dry mouth/no dysphagia/no hearing difficulty/no ear pain
no ear pain/no dry mouth/no throat pain/no hearing difficulty/no dysphagia/no gum bleeding
no throat pain/no dysphagia/no hearing difficulty/no ear pain/no gum bleeding/no dry mouth

## 2019-09-27 NOTE — PROGRESS NOTE ADULT - NEGATIVE GASTROINTESTINAL SYMPTOMS
no constipation/no hematochezia/no nausea/no vomiting/no diarrhea/no melena/no jaundice/no abdominal pain
no nausea/no jaundice/no melena/no hematochezia/no diarrhea/no vomiting/no constipation/no abdominal pain
no diarrhea/no hematochezia/no melena/no jaundice/no constipation/no abdominal pain/no vomiting/no nausea
no abdominal pain/no melena/no hematochezia/no constipation/no nausea/no vomiting/no diarrhea/no jaundice
no constipation/no nausea/no vomiting/no abdominal pain/no diarrhea/no melena/no jaundice/no hematochezia
no hematochezia/no diarrhea/no melena/no jaundice/no nausea/no vomiting/no constipation/no abdominal pain
no melena/no diarrhea/no abdominal pain/no nausea/no hematochezia/no jaundice/no constipation/no vomiting
no diarrhea/no nausea/no vomiting/no constipation/no abdominal pain/no melena/no hematochezia/no jaundice
no constipation/no abdominal pain/no nausea/no vomiting/no diarrhea/no melena/no jaundice/no hematochezia
no nausea/no constipation/no vomiting/no diarrhea/no abdominal pain/no melena/no jaundice/no hematochezia
no melena/no hematochezia/no jaundice/no nausea/no constipation/no abdominal pain/no diarrhea/no vomiting

## 2019-09-27 NOTE — PROGRESS NOTE ADULT - ASSESSMENT
1. Colonic mass  2. S/p right hemicolectomy  3. Anemia  4. No evidence of acute GI bleeding    Suggestions:    1. Diet as per surgery  2. Monitor H/H  3. Surgical follow up  4. Avoid NSAID  5. DVT prophylaxis

## 2019-09-27 NOTE — PROGRESS NOTE ADULT - CARDIOVASCULAR DETAILS
positive S1/positive S2
positive S2/positive S1
positive S1/positive S2
positive S2/positive S1
positive S1/positive S2
positive S2/positive S1

## 2019-09-27 NOTE — PROGRESS NOTE ADULT - NEGATIVE HEMATOLOGY SYMPTOMS
no gum bleeding/no nose bleeding
no nose bleeding/no gum bleeding
no gum bleeding/no nose bleeding
no nose bleeding/no gum bleeding
no nose bleeding/no gum bleeding
no gum bleeding/no nose bleeding
no gum bleeding/no nose bleeding
no nose bleeding/no gum bleeding
no gum bleeding/no nose bleeding

## 2019-09-27 NOTE — PROGRESS NOTE ADULT - NOSE
no deviation/no discharge
no deviation/no discharge
no discharge/no deviation
no deviation/no discharge
no discharge/no deviation
no discharge/no deviation
no deviation/no discharge/clear discharge
no discharge/no deviation

## 2019-09-27 NOTE — PROGRESS NOTE ADULT - PHARYNX
no discharge/no redness
no redness/no discharge
no discharge/no redness
no redness/no discharge
no redness/no discharge
no discharge/no redness
no redness/no discharge
no redness/no discharge

## 2019-09-27 NOTE — PROGRESS NOTE ADULT - SUBJECTIVE AND OBJECTIVE BOX
Pt seen at bedside. No complaints. Tolerating low fiber diet. Passing flatus and having BM.     T(F): 97.3 (09-27-19 @ 05:03), Max: 98.7 (09-26-19 @ 20:26)  HR: 67 (09-27-19 @ 05:03) (67 - 79)  BP: 118/62 (09-27-19 @ 05:03) (118/62 - 151/61)  RR: 16 (09-27-19 @ 05:03) (16 - 17)  SpO2: 100% (09-27-19 @ 05:03) (100% - 100%)      PHYSICAL EXAM:  General: NAD, WDWN  Neuro:  Alert & oriented   Lung: respirations nonlabored, good inspiratory effort  Abdomen: soft, NTND.

## 2019-09-27 NOTE — PROGRESS NOTE ADULT - NEGATIVE CARDIOVASCULAR SYMPTOMS
no chest pain/no orthopnea/no palpitations
no orthopnea/no palpitations/no chest pain
no orthopnea/no chest pain/no palpitations
no orthopnea/no chest pain/no palpitations
no palpitations/no orthopnea/no chest pain
no palpitations/no orthopnea/no chest pain
no palpitations/no chest pain/no orthopnea
no orthopnea/no palpitations/no chest pain
no palpitations/no chest pain/no orthopnea
no palpitations/no orthopnea/no chest pain
no chest pain/no orthopnea/no palpitations

## 2019-09-27 NOTE — PROGRESS NOTE ADULT - RS GEN PE MLT RESP DETAILS PC
breath sounds equal/airway patent/good air movement/no rhonchi/no rales
breath sounds equal/good air movement/airway patent
airway patent/no rales/no rhonchi/breath sounds equal
no wheezes/breath sounds equal/good air movement/airway patent
no wheezes/breath sounds equal/good air movement/airway patent
good air movement/no rales/airway patent/breath sounds equal/no rhonchi
airway patent/breath sounds equal/good air movement
no rales/breath sounds equal/good air movement/airway patent/no rhonchi
no wheezes/airway patent/breath sounds equal/good air movement/no rales
no rhonchi/good air movement/breath sounds equal/airway patent/no rales/no wheezes
breath sounds equal/good air movement/no rales/airway patent/no rhonchi

## 2019-09-27 NOTE — PROGRESS NOTE ADULT - NEGATIVE PSYCHIATRIC SYMPTOMS
no suicidal ideation/no depression/no anxiety
no suicidal ideation/no anxiety/no depression
no suicidal ideation/no depression/no anxiety
no anxiety/no suicidal ideation/no depression
no depression/no anxiety/no suicidal ideation
no suicidal ideation/no anxiety/no depression
no suicidal ideation/no depression/no anxiety
no anxiety/no depression/no suicidal ideation
no suicidal ideation/no depression/no anxiety
no depression/no suicidal ideation/no anxiety
no suicidal ideation/no depression/no anxiety

## 2019-09-27 NOTE — PROGRESS NOTE ADULT - NEGATIVE GENERAL SYMPTOMS
no chills/no anorexia/no fever/no sweating
no chills/no anorexia/no sweating/no fever
no chills/no sweating/no anorexia/no fever
no fever/no chills/no sweating/no anorexia
no chills/no anorexia/no sweating/no fever
no fever/no chills/no sweating/no anorexia
no chills/no fever/no anorexia/no sweating
no sweating/no fever/no chills/no anorexia
no sweating/no fever/no anorexia/no chills

## 2019-09-27 NOTE — PROGRESS NOTE ADULT - NEUROLOGICAL DETAILS
cranial nerves intact/sensation intact/responds to verbal commands/responds to pain/alert and oriented x 3
responds to pain/sensation intact/responds to verbal commands/cranial nerves intact
cranial nerves intact/responds to verbal commands/responds to pain/sensation intact
responds to verbal commands/responds to pain/sensation intact
responds to pain/responds to verbal commands/sensation intact/cranial nerves intact
responds to pain/responds to verbal commands/sensation intact
cranial nerves intact/responds to verbal commands/responds to pain/sensation intact
responds to pain/responds to verbal commands/sensation intact/cranial nerves intact
sensation intact/responds to pain/responds to verbal commands/cranial nerves intact
responds to verbal commands/sensation intact/responds to pain

## 2019-09-27 NOTE — PROGRESS NOTE ADULT - [facility] PE MLT D E PC
no rub/regular rate and rhythm
regular rate and rhythm/no rub
no rub/regular rate and rhythm
regular rate and rhythm/no rub
no rub/regular rate and rhythm
regular rate and rhythm/no rub

## 2019-09-27 NOTE — PROGRESS NOTE ADULT - ASSESSMENT
69 Years old male from home with dementia was sent in by dr Quiroz office for right sided colonic mass, cellulitis, s/p rt hemicolectomy,acute anemia.  1.Po diet.  2.Surgical f/u.  3.GI f/u.  4.HTN-stable off  hydralazine.  5.Cellulitis-ABX.  6.Anemia-h/h stable.  7.GI and DVT prophylaxis.

## 2019-09-27 NOTE — PROGRESS NOTE ADULT - GASTROINTESTINAL DETAILS
no distention/bowel sounds normal/nontender/no rigidity/soft/no guarding/no rebound tenderness
nontender/no rebound tenderness/no rigidity/bowel sounds normal/soft/no guarding/no distention
no guarding/bowel sounds normal/nontender/no distention/no rebound tenderness/no rigidity/soft
soft/no distention/nontender/no guarding/bowel sounds normal/no rebound tenderness/no rigidity
no distention/no guarding/soft/no rebound tenderness/no rigidity
nontender/no rebound tenderness/no guarding/bowel sounds normal/no rigidity/soft/no distention
soft/no guarding/no rebound tenderness/no rigidity/nontender/no distention/bowel sounds normal
bowel sounds normal/no guarding/soft/nontender/no distention/no rigidity
nontender/soft/no rebound tenderness/bowel sounds normal/no distention/no guarding
soft/nontender/bowel sounds normal/no rigidity/no distention/no rebound tenderness/no guarding
no guarding/nontender/no distention/bowel sounds normal/no rebound tenderness/no rigidity/soft

## 2019-09-27 NOTE — PROGRESS NOTE ADULT - NEGATIVE RESPIRATORY AND THORAX SYMPTOMS
no cough/no hemoptysis/no wheezing/no dyspnea
no cough/no hemoptysis/no wheezing/no dyspnea
no dyspnea/no cough/no wheezing/no hemoptysis
no hemoptysis/no wheezing/no dyspnea/no cough
no cough/no wheezing/no hemoptysis/no dyspnea
no dyspnea/no wheezing/no cough/no hemoptysis
no cough/no hemoptysis/no dyspnea/no wheezing
no wheezing/no dyspnea/no cough/no hemoptysis
no hemoptysis/no cough/no wheezing/no dyspnea
no wheezing/no dyspnea/no cough/no hemoptysis
no cough/no dyspnea/no hemoptysis/no wheezing

## 2019-09-27 NOTE — PROGRESS NOTE ADULT - NEGATIVE GENERAL GENITOURINARY SYMPTOMS
no incontinence/no renal colic/no hematuria/no dysuria
no renal colic/no incontinence/no dysuria/no hematuria
no dysuria/no hematuria/no renal colic/no incontinence
no hematuria/no incontinence/no renal colic/no dysuria
no hematuria/no renal colic/no dysuria/no incontinence
no renal colic/no dysuria/no hematuria/no incontinence
no renal colic/no incontinence/no dysuria/no hematuria
no dysuria/no hematuria/no renal colic/no incontinence
no renal colic/no dysuria/no hematuria/no incontinence
no dysuria/no renal colic/no hematuria/no incontinence
no renal colic/no hematuria/no dysuria/no incontinence

## 2019-09-30 PROBLEM — F03.90 UNSPECIFIED DEMENTIA WITHOUT BEHAVIORAL DISTURBANCE: Chronic | Status: ACTIVE | Noted: 2019-09-12

## 2019-10-09 NOTE — ED ADULT NURSE NOTE - [facility] ED NUR DURATION
day(s) Keystone Flap Text: The defect edges were debeveled with a #15 scalpel blade.  Given the location of the defect, shape of the defect a keystone flap was deemed most appropriate.  Using a sterile surgical marker, an appropriate keystone flap was drawn incorporating the defect, outlining the appropriate donor tissue and placing the expected incisions within the relaxed skin tension lines where possible. The area thus outlined was incised deep to adipose tissue with a #15 scalpel blade.  The skin margins were undermined to an appropriate distance in all directions around the primary defect and laterally outward around the flap utilizing iris scissors.

## 2019-10-10 ENCOUNTER — APPOINTMENT (OUTPATIENT)
Dept: SURGERY | Facility: CLINIC | Age: 69
End: 2019-10-10

## 2019-10-13 PROCEDURE — 85027 COMPLETE CBC AUTOMATED: CPT

## 2019-10-13 PROCEDURE — 83036 HEMOGLOBIN GLYCOSYLATED A1C: CPT

## 2019-10-13 PROCEDURE — 80202 ASSAY OF VANCOMYCIN: CPT

## 2019-10-13 PROCEDURE — 93005 ELECTROCARDIOGRAM TRACING: CPT

## 2019-10-13 PROCEDURE — 82378 CARCINOEMBRYONIC ANTIGEN: CPT

## 2019-10-13 PROCEDURE — 84443 ASSAY THYROID STIM HORMONE: CPT

## 2019-10-13 PROCEDURE — 36415 COLL VENOUS BLD VENIPUNCTURE: CPT

## 2019-10-13 PROCEDURE — 83880 ASSAY OF NATRIURETIC PEPTIDE: CPT

## 2019-10-13 PROCEDURE — 96374 THER/PROPH/DIAG INJ IV PUSH: CPT

## 2019-10-13 PROCEDURE — 85610 PROTHROMBIN TIME: CPT

## 2019-10-13 PROCEDURE — 99285 EMERGENCY DEPT VISIT HI MDM: CPT | Mod: 25

## 2019-10-13 PROCEDURE — 86803 HEPATITIS C AB TEST: CPT

## 2019-10-13 PROCEDURE — 83690 ASSAY OF LIPASE: CPT

## 2019-10-13 PROCEDURE — A9502: CPT

## 2019-10-13 PROCEDURE — 83605 ASSAY OF LACTIC ACID: CPT

## 2019-10-13 PROCEDURE — 97530 THERAPEUTIC ACTIVITIES: CPT

## 2019-10-13 PROCEDURE — P9040: CPT

## 2019-10-13 PROCEDURE — 88304 TISSUE EXAM BY PATHOLOGIST: CPT

## 2019-10-13 PROCEDURE — 97116 GAIT TRAINING THERAPY: CPT

## 2019-10-13 PROCEDURE — 86923 COMPATIBILITY TEST ELECTRIC: CPT

## 2019-10-13 PROCEDURE — 71045 X-RAY EXAM CHEST 1 VIEW: CPT

## 2019-10-13 PROCEDURE — 88309 TISSUE EXAM BY PATHOLOGIST: CPT

## 2019-10-13 PROCEDURE — 83735 ASSAY OF MAGNESIUM: CPT

## 2019-10-13 PROCEDURE — 88305 TISSUE EXAM BY PATHOLOGIST: CPT

## 2019-10-13 PROCEDURE — 80076 HEPATIC FUNCTION PANEL: CPT

## 2019-10-13 PROCEDURE — 93017 CV STRESS TEST TRACING ONLY: CPT

## 2019-10-13 PROCEDURE — 82962 GLUCOSE BLOOD TEST: CPT

## 2019-10-13 PROCEDURE — 87040 BLOOD CULTURE FOR BACTERIA: CPT

## 2019-10-13 PROCEDURE — 86900 BLOOD TYPING SEROLOGIC ABO: CPT

## 2019-10-13 PROCEDURE — 78452 HT MUSCLE IMAGE SPECT MULT: CPT

## 2019-10-13 PROCEDURE — 82009 KETONE BODYS QUAL: CPT

## 2019-10-13 PROCEDURE — 86850 RBC ANTIBODY SCREEN: CPT

## 2019-10-13 PROCEDURE — 80053 COMPREHEN METABOLIC PANEL: CPT

## 2019-10-13 PROCEDURE — 36430 TRANSFUSION BLD/BLD COMPNT: CPT

## 2019-10-13 PROCEDURE — 80061 LIPID PANEL: CPT

## 2019-10-13 PROCEDURE — 73610 X-RAY EXAM OF ANKLE: CPT

## 2019-10-13 PROCEDURE — 82607 VITAMIN B-12: CPT

## 2019-10-13 PROCEDURE — 80048 BASIC METABOLIC PNL TOTAL CA: CPT

## 2019-10-13 PROCEDURE — 81003 URINALYSIS AUTO W/O SCOPE: CPT

## 2019-10-13 PROCEDURE — 85730 THROMBOPLASTIN TIME PARTIAL: CPT

## 2019-10-13 PROCEDURE — 86901 BLOOD TYPING SEROLOGIC RH(D): CPT

## 2019-10-13 PROCEDURE — 84100 ASSAY OF PHOSPHORUS: CPT

## 2019-10-13 PROCEDURE — 93306 TTE W/DOPPLER COMPLETE: CPT

## 2019-10-13 PROCEDURE — 93923 UPR/LXTR ART STDY 3+ LVLS: CPT

## 2019-10-13 PROCEDURE — 87086 URINE CULTURE/COLONY COUNT: CPT

## 2019-10-13 PROCEDURE — C1889: CPT

## 2019-10-13 PROCEDURE — 74176 CT ABD & PELVIS W/O CONTRAST: CPT

## 2019-12-04 ENCOUNTER — RESULT REVIEW (OUTPATIENT)
Age: 69
End: 2019-12-04

## 2019-12-04 ENCOUNTER — OUTPATIENT (OUTPATIENT)
Dept: OUTPATIENT SERVICES | Facility: HOSPITAL | Age: 69
LOS: 1 days | End: 2019-12-04
Payer: MEDICARE

## 2019-12-04 DIAGNOSIS — K92.2 GASTROINTESTINAL HEMORRHAGE, UNSPECIFIED: ICD-10-CM

## 2019-12-04 PROCEDURE — 43239 EGD BIOPSY SINGLE/MULTIPLE: CPT

## 2019-12-04 PROCEDURE — 88312 SPECIAL STAINS GROUP 1: CPT | Mod: 26

## 2019-12-04 PROCEDURE — 88305 TISSUE EXAM BY PATHOLOGIST: CPT

## 2019-12-04 PROCEDURE — 88305 TISSUE EXAM BY PATHOLOGIST: CPT | Mod: 26

## 2019-12-04 PROCEDURE — 88312 SPECIAL STAINS GROUP 1: CPT

## 2019-12-06 LAB — SURGICAL PATHOLOGY STUDY: SIGNIFICANT CHANGE UP

## 2020-05-30 NOTE — PROGRESS NOTE ADULT - ASSESSMENT
05/30/20 1734   Protocol Acuity   Vital Capacity (Actual) (L) 1.1 L   Vital Capacity ( % Calculated)  47 %   FEV1 (Actual) (L) 0.94 L   FEV1 (% Calculated) 59 %   FEV1/FVC% 85 %   Pulmonary Status 0   Surgical Status 0   Respiratory Pattern 0   Breath Sounds 0   Cough 0   Secretions 0   CXR 2   Activity Level 1   Mental Status 0   Vital Capacity % Predicted 3   FEV1/FVC% (Actual) 0   FiO2 Required 1   Home Resp Meds (Nebs or Inhalers) 0   Home Oxygen No   Hx of, or potential for, KRISTY 0   Acuity Score   Acuity Score 7   Acuity Frequency None     Patient assessed per RT Protocol. Above acuity score indicates treatments be scheduled PRN. RT will see pt PRN to reinforce technique with the incentive spirometer. Patient will be reassessed per protocol.      69 Years old male from home with dementia was sent in by dr Quiroz office for right sided colonic mass and cellulitis of LLE. s/p colonoscopy which shows mass at the hepatic flexor- surgery consulted for fu and will try to get consent from family for surgery. RCRI score class II  with 6% -30 day risk of death MI, CA. pt is moderate risk for surgery   REPEAT GI BEDOLLA

## 2020-08-14 ENCOUNTER — OUTPATIENT (OUTPATIENT)
Dept: OUTPATIENT SERVICES | Facility: HOSPITAL | Age: 70
LOS: 1 days | Discharge: HOME | End: 2020-08-14

## 2020-08-14 DIAGNOSIS — L89.90 PRESSURE ULCER OF UNSPECIFIED SITE, UNSPECIFIED STAGE: ICD-10-CM

## 2020-08-18 DIAGNOSIS — I87.313 CHRONIC VENOUS HYPERTENSION (IDIOPATHIC) WITH ULCER OF BILATERAL LOWER EXTREMITY: ICD-10-CM

## 2020-08-18 DIAGNOSIS — H53.9 UNSPECIFIED VISUAL DISTURBANCE: ICD-10-CM

## 2020-08-18 DIAGNOSIS — I83.023 VARICOSE VEINS OF LEFT LOWER EXTREMITY WITH ULCER OF ANKLE: ICD-10-CM

## 2020-08-18 DIAGNOSIS — L97.312 NON-PRESSURE CHRONIC ULCER OF RIGHT ANKLE WITH FAT LAYER EXPOSED: ICD-10-CM

## 2020-08-18 DIAGNOSIS — I83.013 VARICOSE VEINS OF RIGHT LOWER EXTREMITY WITH ULCER OF ANKLE: ICD-10-CM

## 2020-08-18 DIAGNOSIS — I83.012 VARICOSE VEINS OF RIGHT LOWER EXTREMITY WITH ULCER OF CALF: ICD-10-CM

## 2020-08-18 DIAGNOSIS — Z88.0 ALLERGY STATUS TO PENICILLIN: ICD-10-CM

## 2020-08-18 DIAGNOSIS — Z79.899 OTHER LONG TERM (CURRENT) DRUG THERAPY: ICD-10-CM

## 2020-08-18 DIAGNOSIS — Z79.82 LONG TERM (CURRENT) USE OF ASPIRIN: ICD-10-CM

## 2020-08-18 DIAGNOSIS — L97.322 NON-PRESSURE CHRONIC ULCER OF LEFT ANKLE WITH FAT LAYER EXPOSED: ICD-10-CM

## 2020-08-18 DIAGNOSIS — F03.90 UNSPECIFIED DEMENTIA WITHOUT BEHAVIORAL DISTURBANCE: ICD-10-CM

## 2020-08-20 ENCOUNTER — APPOINTMENT (OUTPATIENT)
Dept: WOUND CARE | Facility: CLINIC | Age: 70
End: 2020-08-20

## 2020-08-24 ENCOUNTER — APPOINTMENT (OUTPATIENT)
Dept: UROLOGY | Facility: CLINIC | Age: 70
End: 2020-08-24

## 2020-09-03 ENCOUNTER — APPOINTMENT (OUTPATIENT)
Dept: WOUND CARE | Facility: CLINIC | Age: 70
End: 2020-09-03

## 2020-09-03 ENCOUNTER — OUTPATIENT (OUTPATIENT)
Dept: OUTPATIENT SERVICES | Facility: HOSPITAL | Age: 70
LOS: 1 days | End: 2020-09-03
Payer: MEDICARE

## 2020-09-03 VITALS
TEMPERATURE: 98.5 F | RESPIRATION RATE: 18 BRPM | HEART RATE: 82 BPM | DIASTOLIC BLOOD PRESSURE: 77 MMHG | HEIGHT: 65 IN | OXYGEN SATURATION: 99 % | SYSTOLIC BLOOD PRESSURE: 138 MMHG

## 2020-09-03 DIAGNOSIS — Z00.00 ENCOUNTER FOR GENERAL ADULT MEDICAL EXAMINATION WITHOUT ABNORMAL FINDINGS: ICD-10-CM

## 2020-09-03 PROCEDURE — 11042 DBRDMT SUBQ TIS 1ST 20SQCM/<: CPT

## 2020-09-03 NOTE — PHYSICAL EXAM
[FreeTextEntry1] : medial ankle [FreeTextEntry2] : 6.5cm [FreeTextEntry3] : 5.3 [FreeTextEntry4] : 0.3 [de-identified] : santyl [de-identified] : 4x4 gauze\par adaptic\par michaela/kerlix\par ACE [FreeTextEntry7] : lateral ankle [FreeTextEntry8] : 10cm [FreeTextEntry9] : 7cm [de-identified] : 0.5cm [de-identified] : hyperkeratotic [de-identified] : santyl [de-identified] : 6cmcm [de-identified] : 4x4 gauze\par  [de-identified] : 5.7cm [de-identified] : 0.1cm [TWNoteComboBox1] : Left [TWNoteComboBox2] : 2 [TWNoteComboBox4] : Small [TWNoteComboBox5] : No [TWNoteComboBox6] : Venous [de-identified] : No [de-identified] : Mild [de-identified] : 50% [de-identified] : Yes [TWNoteComboBox9] : Left [de-identified] : 2 [de-identified] : Small [de-identified] : No [de-identified] : Venous [de-identified] : No [de-identified] : other [de-identified] : None [de-identified] : <20% [de-identified] : Right [de-identified] : Yes [de-identified] : 2 [de-identified] : No [de-identified] : Venous [de-identified] : <20% [de-identified] : Yes

## 2020-09-03 NOTE — HISTORY OF PRESENT ILLNESS
[FreeTextEntry1] : This 69 year old  male presents to clinic for follow up .patient states that he used to come to clinic weekly but it is hard to walk to clinic.Patient states that he change his dressing by himself twice a week.patient denies any pain on his feet.Patient denies F/C/N/V/SOB.

## 2020-09-03 NOTE — PLAN
[FreeTextEntry1] : \par Vasc: DP pulses palpable 1/4. Unable to assess PT pulses due to wound.  Mild non-pitting edema b/l.  \par Derm: See wound assessment above.  Diffuse xerosis b/l.  \par Neuro: Decreased protective sensation b/l \par Musc: No pain with palpation periwound.\par \par A: Left leg non-pressure ulceration\par Venous stasis b/l\par \par P:\par Patient evaluated and chart reviewed.\par Wound cleansed with chlorhexidine scrub.\par  Excision debridement with sterile #15 blade of the left leg wounds with removal of non-viable skin and soft tissue down to and including the subcutaneous layer. \par verbal consent obtained\par  Procedure will tolerated by the patient\par Wounds dressed with santyl, adaptic, 4x4 gauze, Binu, ACE\par Recommend home nursing to change dressing everyother day  with santyl, adaptic, 4x4 gauze, Binu, ACE\par Advised patient to follow up with podiatry weekly.\par Return to clinic in 1 week

## 2020-09-04 DIAGNOSIS — I87.2 VENOUS INSUFFICIENCY (CHRONIC) (PERIPHERAL): ICD-10-CM

## 2020-09-08 ENCOUNTER — FORM ENCOUNTER (OUTPATIENT)
Age: 70
End: 2020-09-08

## 2020-09-17 ENCOUNTER — OUTPATIENT (OUTPATIENT)
Dept: OUTPATIENT SERVICES | Facility: HOSPITAL | Age: 70
LOS: 1 days | End: 2020-09-17
Payer: MEDICARE

## 2020-09-17 ENCOUNTER — APPOINTMENT (OUTPATIENT)
Dept: WOUND CARE | Facility: CLINIC | Age: 70
End: 2020-09-17

## 2020-09-17 VITALS — WEIGHT: 162 LBS | BODY MASS INDEX: 26.96 KG/M2

## 2020-09-17 VITALS
RESPIRATION RATE: 18 BRPM | HEIGHT: 65 IN | TEMPERATURE: 98 F | DIASTOLIC BLOOD PRESSURE: 74 MMHG | OXYGEN SATURATION: 100 % | SYSTOLIC BLOOD PRESSURE: 104 MMHG | HEART RATE: 77 BPM

## 2020-09-17 DIAGNOSIS — Z00.00 ENCOUNTER FOR GENERAL ADULT MEDICAL EXAMINATION WITHOUT ABNORMAL FINDINGS: ICD-10-CM

## 2020-09-17 DIAGNOSIS — L97.919 NON-PRESSURE CHRONIC ULCER OF UNSPECIFIED PART OF RIGHT LOWER LEG WITH UNSPECIFIED SEVERITY: ICD-10-CM

## 2020-09-17 DIAGNOSIS — L97.929 NON-PRESSURE CHRONIC ULCER OF UNSPECIFIED PART OF RIGHT LOWER LEG WITH UNSPECIFIED SEVERITY: ICD-10-CM

## 2020-09-17 PROCEDURE — 11042 DBRDMT SUBQ TIS 1ST 20SQCM/<: CPT

## 2020-09-17 RX ORDER — COLLAGENASE SANTYL 250 [ARB'U]/G
250 OINTMENT TOPICAL DAILY
Qty: 1 | Refills: 5 | Status: ACTIVE | COMMUNITY
Start: 2020-09-17 | End: 1900-01-01

## 2020-09-17 NOTE — HISTORY OF PRESENT ILLNESS
[FreeTextEntry1] :  69 year old  male presents to clinic for follow up of BLE ulcers. patient states that he used to come to clinic weekly but it is hard to walk to clinic.Patient states that he change his dressing by himself twice a week. patient denies any pain on his feet.Patient denies F/C/N/V/SOB.

## 2020-09-17 NOTE — PLAN
[FreeTextEntry1] : \par Vasc: DP pulses palpable 1/4. Unable to assess PT pulses due to wound.  Mild non-pitting edema b/l.  \par Derm: See wound assessment above.  Diffuse xerosis b/l.  \par Neuro: Decreased protective sensation b/l \par Musc: No pain with palpation periwound.\par \par A: Left leg non-pressure ulceration\par Venous stasis b/l\par \par P:\par Patient evaluated and chart reviewed.\par Wound cleansed with chlorhexidine scrub.\par  Excision debridement with sterile #15 blade of the left leg wounds with removal of non-viable skin and soft tissue down to and including the subcutaneous layer. \par Verbal consent obtained\par Procedure tolerated well by the patient\par Rx Santyl\par Wounds dressed with santyl, adaptic, 4x4 gauze, Binu, ACE\par Recommend home nursing to change dressing every other day with santyl, adaptic, 4x4 gauze, Binu, ACE\par Advised patient to follow up with podiatry weekly.\par Return to clinic in 1 week

## 2020-09-17 NOTE — PHYSICAL EXAM
[FreeTextEntry1] : medial ankle [FreeTextEntry2] : 5.5cm [FreeTextEntry3] : 2.8 [FreeTextEntry4] : 0.3 [de-identified] : santyl [de-identified] : 4x4 gauze\par adaptic\par michaela/kerlix\par ACE [FreeTextEntry7] : lateral ankle [FreeTextEntry8] : 10.1 cm [FreeTextEntry9] : 5.6 cm [de-identified] : 0.2cm [de-identified] : hyperkeratotic [de-identified] : santyl [de-identified] : 4x4 gauze\par adaptic\par michaela/kerlix\par ACE [de-identified] : medial ankle  [de-identified] : 4.7 cm [de-identified] : 4.3 cm [de-identified] : 0.2 cm [de-identified] : Santyl [de-identified] : 4x4 gauze\par adaptic\par michaela/kerlix\par ACE [TWNoteComboBox1] : Left [TWNoteComboBox2] : 2 [TWNoteComboBox4] : Small [TWNoteComboBox5] : No [TWNoteComboBox6] : Venous [de-identified] : No [de-identified] : Mild [de-identified] : 50% [de-identified] : Yes [TWNoteComboBox9] : Left [de-identified] : 2 [de-identified] : Small [de-identified] : No [de-identified] : Venous [de-identified] : No [de-identified] : other [de-identified] : None [de-identified] : <20% [de-identified] : Yes [de-identified] : Right [de-identified] : 2 [de-identified] : No [de-identified] : Venous [de-identified] : <20% [de-identified] : Yes

## 2020-09-18 DIAGNOSIS — I87.2 VENOUS INSUFFICIENCY (CHRONIC) (PERIPHERAL): ICD-10-CM

## 2020-09-18 DIAGNOSIS — L97.312 NON-PRESSURE CHRONIC ULCER OF RIGHT ANKLE WITH FAT LAYER EXPOSED: ICD-10-CM

## 2020-09-18 DIAGNOSIS — L97.322 NON-PRESSURE CHRONIC ULCER OF LEFT ANKLE WITH FAT LAYER EXPOSED: ICD-10-CM

## 2020-09-24 ENCOUNTER — OUTPATIENT (OUTPATIENT)
Dept: OUTPATIENT SERVICES | Facility: HOSPITAL | Age: 70
LOS: 1 days | End: 2020-09-24
Payer: MEDICARE

## 2020-09-24 ENCOUNTER — APPOINTMENT (OUTPATIENT)
Dept: WOUND CARE | Facility: CLINIC | Age: 70
End: 2020-09-24

## 2020-09-24 VITALS
RESPIRATION RATE: 18 BRPM | HEART RATE: 86 BPM | OXYGEN SATURATION: 97 % | SYSTOLIC BLOOD PRESSURE: 124 MMHG | HEIGHT: 65 IN | BODY MASS INDEX: 27.49 KG/M2 | WEIGHT: 165 LBS | TEMPERATURE: 98.2 F | DIASTOLIC BLOOD PRESSURE: 80 MMHG

## 2020-09-24 DIAGNOSIS — Z00.00 ENCOUNTER FOR GENERAL ADULT MEDICAL EXAMINATION WITHOUT ABNORMAL FINDINGS: ICD-10-CM

## 2020-09-24 PROCEDURE — 11042 DBRDMT SUBQ TIS 1ST 20SQCM/<: CPT

## 2020-09-25 DIAGNOSIS — L97.312 NON-PRESSURE CHRONIC ULCER OF RIGHT ANKLE WITH FAT LAYER EXPOSED: ICD-10-CM

## 2020-09-25 DIAGNOSIS — I87.2 VENOUS INSUFFICIENCY (CHRONIC) (PERIPHERAL): ICD-10-CM

## 2020-09-25 DIAGNOSIS — L97.322 NON-PRESSURE CHRONIC ULCER OF LEFT ANKLE WITH FAT LAYER EXPOSED: ICD-10-CM

## 2020-10-01 ENCOUNTER — OUTPATIENT (OUTPATIENT)
Dept: OUTPATIENT SERVICES | Facility: HOSPITAL | Age: 70
LOS: 1 days | End: 2020-10-01
Payer: MEDICARE

## 2020-10-01 ENCOUNTER — APPOINTMENT (OUTPATIENT)
Dept: WOUND CARE | Facility: CLINIC | Age: 70
End: 2020-10-01

## 2020-10-01 VITALS
BODY MASS INDEX: 27.49 KG/M2 | HEART RATE: 82 BPM | HEIGHT: 65 IN | DIASTOLIC BLOOD PRESSURE: 75 MMHG | SYSTOLIC BLOOD PRESSURE: 144 MMHG | RESPIRATION RATE: 18 BRPM | TEMPERATURE: 98.5 F | OXYGEN SATURATION: 100 % | WEIGHT: 165 LBS

## 2020-10-01 DIAGNOSIS — L97.422 NON-PRESSURE CHRONIC ULCER OF LEFT HEEL AND MIDFOOT WITH FAT LAYER EXPOSED: ICD-10-CM

## 2020-10-01 DIAGNOSIS — L97.412 NON-PRESSURE CHRONIC ULCER OF RIGHT HEEL AND MIDFOOT WITH FAT LAYER EXPOSED: ICD-10-CM

## 2020-10-01 DIAGNOSIS — Z00.00 ENCOUNTER FOR GENERAL ADULT MEDICAL EXAMINATION WITHOUT ABNORMAL FINDINGS: ICD-10-CM

## 2020-10-01 DIAGNOSIS — E11.621 TYPE 2 DIABETES MELLITUS WITH FOOT ULCER: ICD-10-CM

## 2020-10-01 PROCEDURE — 11045 DBRDMT SUBQ TISS EACH ADDL: CPT

## 2020-10-01 PROCEDURE — 11042 DBRDMT SUBQ TIS 1ST 20SQCM/<: CPT

## 2020-10-08 ENCOUNTER — OUTPATIENT (OUTPATIENT)
Dept: OUTPATIENT SERVICES | Facility: HOSPITAL | Age: 70
LOS: 1 days | End: 2020-10-08
Payer: MEDICARE

## 2020-10-08 ENCOUNTER — APPOINTMENT (OUTPATIENT)
Dept: WOUND CARE | Facility: CLINIC | Age: 70
End: 2020-10-08

## 2020-10-08 VITALS
HEART RATE: 64 BPM | HEIGHT: 65 IN | RESPIRATION RATE: 18 BRPM | SYSTOLIC BLOOD PRESSURE: 145 MMHG | DIASTOLIC BLOOD PRESSURE: 72 MMHG | TEMPERATURE: 97.9 F | BODY MASS INDEX: 26.99 KG/M2 | WEIGHT: 162 LBS | OXYGEN SATURATION: 99 %

## 2020-10-08 DIAGNOSIS — Z00.00 ENCOUNTER FOR GENERAL ADULT MEDICAL EXAMINATION WITHOUT ABNORMAL FINDINGS: ICD-10-CM

## 2020-10-08 PROCEDURE — 11042 DBRDMT SUBQ TIS 1ST 20SQCM/<: CPT

## 2020-10-09 DIAGNOSIS — I87.311 CHRONIC VENOUS HYPERTENSION (IDIOPATHIC) WITH ULCER OF RIGHT LOWER EXTREMITY: ICD-10-CM

## 2020-10-09 DIAGNOSIS — I87.312 CHRONIC VENOUS HYPERTENSION (IDIOPATHIC) WITH ULCER OF LEFT LOWER EXTREMITY: ICD-10-CM

## 2020-10-13 NOTE — PHYSICAL EXAM
[FreeTextEntry1] : medial ankle [FreeTextEntry2] : 5.5cm [FreeTextEntry3] : 3.3 cm [FreeTextEntry4] : 0.2cm [de-identified] : santyl [de-identified] : 4x4 gauze\par adaptic\par michaela/kerlix\par ACE [FreeTextEntry7] : lateral ankle [FreeTextEntry8] : 7.0 cm [FreeTextEntry9] : 8.0 cm [de-identified] : 0.2cm [de-identified] : hyperkeratotic [de-identified] : santyl [de-identified] : 4x4 gauze\par adaptic\par michaela/kerlix\par ACE [de-identified] : medial ankle  [de-identified] : 5.5 cm [de-identified] : 4.2 cm [de-identified] : 0.2 cm [de-identified] : Santyl [de-identified] : 4x4 gauze\par adaptic\par michaela/kerlix\par ACE [TWNoteComboBox1] : Left [TWNoteComboBox2] : 2 [TWNoteComboBox4] : Small [TWNoteComboBox5] : No [TWNoteComboBox6] : Venous [de-identified] : No [de-identified] : Mild [de-identified] : 50% [de-identified] : Yes [TWNoteComboBox9] : Left [de-identified] : 2 [de-identified] : Small [de-identified] : No [de-identified] : Venous [de-identified] : No [de-identified] : other [de-identified] : None [de-identified] : <20% [de-identified] : Yes [de-identified] : Right [de-identified] : 2 [de-identified] : No [de-identified] : Venous [de-identified] : <20% [de-identified] : Yes

## 2020-10-13 NOTE — PHYSICAL EXAM
[FreeTextEntry1] : medial ankle [FreeTextEntry2] : 4.6cm [FreeTextEntry3] : 2.7 cm [FreeTextEntry4] : 0.2cm [de-identified] : santyl [de-identified] : 4x4 gauze\par adaptic\par michaela/kerlix\par ACE [FreeTextEntry7] : lateral ankle [FreeTextEntry8] : 6.9 cm [FreeTextEntry9] : 6.0 [de-identified] : 0.2cm [de-identified] : hyperkeratotic [de-identified] : santyl [de-identified] : 4x4 gauze\par adaptic\par michaela/kerlix\par ACE [de-identified] : medial ankle  [de-identified] : 5.4 cm [de-identified] : 5.2 cm [de-identified] : 0.2 cm [de-identified] : Santyl [de-identified] : 4x4 gauze\par adaptic\par michaela/kerlix\par ACE [TWNoteComboBox1] : Left [TWNoteComboBox2] : 2 [TWNoteComboBox4] : Small [TWNoteComboBox5] : No [TWNoteComboBox6] : Venous [de-identified] : No [de-identified] : Mild [de-identified] : 50% [de-identified] : Yes [TWNoteComboBox9] : Left [de-identified] : 2 [de-identified] : Small [de-identified] : No [de-identified] : Venous [de-identified] : No [de-identified] : other [de-identified] : None [de-identified] : <20% [de-identified] : Yes [de-identified] : Right [de-identified] : 2 [de-identified] : No [de-identified] : Venous [de-identified] : <20% [de-identified] : Yes

## 2020-10-13 NOTE — PLAN
[FreeTextEntry1] : \par Vasc: DP pulses palpable 1/4. Unable to assess PT pulses due to wound.  Mild non-pitting edema b/l.  \par Derm: See wound assessment above.  Diffuse xerosis b/l.  \par Neuro: Decreased protective sensation b/l \par Musc: No pain with palpation periwound.\par \par A: b/l leg non-pressure ulceration\par Venous stasis b/l\par \par P:\par Patient evaluated and chart reviewed.\par Wound cleansed with chlorhexidine scrub.\par  Excision debridement with sterile #15 blade of the left leg wounds with removal of non-viable skin and soft tissue down to and including the subcutaneous layer. \par Applied betadine in between patients toes.\par Advised patient to keep his toes as dry as he can to prevent further skin breakdown and maceration\par Verbal consent obtained\par Procedure tolerated well by the patient\par Wounds dressed with santyl, adaptic, 4x4 gauze, Binu, ACE\par Advised patient to follow up with podiatry weekly.\par Return to clinic in 1 week

## 2020-10-13 NOTE — HISTORY OF PRESENT ILLNESS
[FreeTextEntry1] :  70 year old  male presents to clinic for follow up of BLE ulcers. patient states that nurse is changing his dressing 3 times a day. patient denies any pain on his feet.Patient admitted mild pain on his Left foot. Patient denies F/C/N/V/SOB.

## 2020-10-13 NOTE — HISTORY OF PRESENT ILLNESS
[FreeTextEntry1] :  70 year old  male presents to clinic for follow up of BLE ulcers. patient states that nurse is changing his dressing 3 times a day. patient denies any pain on his feet.Patient admitted pain on his Left foot.Patient denies F/C/N/V/SOB.

## 2020-10-13 NOTE — PLAN
[FreeTextEntry1] : \par Vasc: DP pulses palpable 1/4. Unable to assess PT pulses due to wound.  Mild non-pitting edema b/l.  \par Derm: See wound assessment above.  Diffuse xerosis b/l.  \par Neuro: Decreased protective sensation b/l \par Musc: No pain with palpation periwound.\par \par A: Left leg non-pressure ulceration\par Venous stasis b/l\par \par P:\par Patient evaluated and chart reviewed.\par Wound cleansed with chlorhexidine scrub.\par  Excision debridement with sterile #15 blade of the left leg wounds with removal of non-viable skin and soft tissue down to and including the subcutaneous layer. \par Applied betadine in between patients toes.\par Advised patient to keep his toes as dry as he can to prevent further skin breakdown and maceration\par Verbal consent obtained\par Procedure tolerated well by the patient\par Wounds dressed with santyl, adaptic, 4x4 gauze, Binu, ACE\par Advised patient to follow up with podiatry weekly.\par Return to clinic in 1 week

## 2020-10-13 NOTE — PHYSICAL EXAM
[FreeTextEntry1] : medial ankle [FreeTextEntry2] : 5.7cm [FreeTextEntry3] : 3.2 cm [FreeTextEntry4] : 0.3cm [de-identified] : santyl [de-identified] : 4x4 gauze\par adaptic\par michaela/kerlix\par ACE [FreeTextEntry7] : lateral ankle [FreeTextEntry8] : 8.6 cm [FreeTextEntry9] : 6.1 cm [de-identified] : 0.2cm [de-identified] : hyperkeratotic [de-identified] : santyl [de-identified] : 4x4 gauze\par adaptic\par michaela/kerlix\par ACE [de-identified] : medial ankle  [de-identified] : 5.5 cm [de-identified] : 4. cm [de-identified] : 0.2 cm [de-identified] : Santyl [de-identified] : 4x4 gauze\par adaptic\par michaela/kerlix\par ACE [TWNoteComboBox1] : Left [TWNoteComboBox2] : 2 [TWNoteComboBox4] : Small [TWNoteComboBox5] : No [TWNoteComboBox6] : Venous [de-identified] : No [de-identified] : Mild [de-identified] : 50% [de-identified] : Yes [TWNoteComboBox9] : Left [de-identified] : 2 [de-identified] : Small [de-identified] : No [de-identified] : Venous [de-identified] : No [de-identified] : other [de-identified] : None [de-identified] : <20% [de-identified] : Yes [de-identified] : Right [de-identified] : 2 [de-identified] : No [de-identified] : Venous [de-identified] : <20% [de-identified] : Yes

## 2020-10-15 ENCOUNTER — OUTPATIENT (OUTPATIENT)
Dept: OUTPATIENT SERVICES | Facility: HOSPITAL | Age: 70
LOS: 1 days | End: 2020-10-15
Payer: MEDICARE

## 2020-10-15 ENCOUNTER — APPOINTMENT (OUTPATIENT)
Dept: WOUND CARE | Facility: CLINIC | Age: 70
End: 2020-10-15

## 2020-10-15 VITALS
TEMPERATURE: 98.2 F | OXYGEN SATURATION: 100 % | BODY MASS INDEX: 26.99 KG/M2 | SYSTOLIC BLOOD PRESSURE: 95 MMHG | HEART RATE: 66 BPM | WEIGHT: 162 LBS | RESPIRATION RATE: 18 BRPM | DIASTOLIC BLOOD PRESSURE: 65 MMHG | HEIGHT: 65 IN

## 2020-10-15 DIAGNOSIS — Z00.00 ENCOUNTER FOR GENERAL ADULT MEDICAL EXAMINATION WITHOUT ABNORMAL FINDINGS: ICD-10-CM

## 2020-10-15 PROCEDURE — 11042 DBRDMT SUBQ TIS 1ST 20SQCM/<: CPT

## 2020-10-15 PROCEDURE — G0463: CPT

## 2020-10-16 DIAGNOSIS — L97.322 NON-PRESSURE CHRONIC ULCER OF LEFT ANKLE WITH FAT LAYER EXPOSED: ICD-10-CM

## 2020-10-16 DIAGNOSIS — L97.312 NON-PRESSURE CHRONIC ULCER OF RIGHT ANKLE WITH FAT LAYER EXPOSED: ICD-10-CM

## 2020-10-20 NOTE — PHYSICAL EXAM
[FreeTextEntry1] : medial ankle [FreeTextEntry2] : 2.7cm  [FreeTextEntry3] : 2.6cm [FreeTextEntry4] : 0.2cm [de-identified] : santyl [de-identified] : 4x4 gauze\par adaptic\par michaela/kerlix\par ACE [FreeTextEntry8] : 4.3 cm [de-identified] : one area of hypergranulation tissue  [FreeTextEntry7] : lateral ankle [FreeTextEntry9] : 8.2 [de-identified] : 0.2cm [de-identified] : hyperkeratotic [de-identified] : santyl [de-identified] : 4x4 gauze\par adaptic\par michaela/kerlix\par ACE [de-identified] : medial ankle  [de-identified] : 5.1cm [de-identified] : 4.9cm [de-identified] : 0.2 cm [de-identified] : 4x4 gauze\par adaptic\par michaela/kerlix\par ACE [de-identified] : Santyl [de-identified] : 2 medial small wounds\par [id number]\par 1.5cmx.7cm.1 (proximal) [TWNoteComboBox2] : 2 [TWNoteComboBox1] : Left [TWNoteComboBox5] : No [TWNoteComboBox4] : Small [de-identified] : No [TWNoteComboBox6] : Venous [de-identified] : 50% [de-identified] : Mild [de-identified] : Yes [TWNoteComboBox9] : Left [de-identified] : 2 [de-identified] : Small [de-identified] : No [de-identified] : Venous [de-identified] : No [de-identified] : other [de-identified] : None [de-identified] : <20% [de-identified] : Yes [de-identified] : Right [de-identified] : 2 [de-identified] : No [de-identified] : Venous [de-identified] : <20% [de-identified] : Yes

## 2020-10-20 NOTE — HISTORY OF PRESENT ILLNESS
[FreeTextEntry1] :  69 y/o male RTC for f/u of BLE ulcers. Patient states the nurse is changing his dressing 3 times a week He denies any pain to b/l LE. He jessica many surgical shoes today to be fitted for the best one as he was unsure which one would fit best. Patient denies F/C/N/V/SOB.\par \par Allergies: none \par

## 2020-10-20 NOTE — PLAN
[FreeTextEntry1] : O:\par Vasc: DP pulses palpable 1/4. Unable to assess PT pulses due to wound.  Mild non-pitting edema b/l.  \par Derm: See wound assessment above.  Diffuse xerosis b/l.  hyperkeratotic border noted on left medial leg wound. \par Neuro: Decreased protective sensation b/l \par Musc: No pain with palpation periwound.\par \par A: b/l leg non-pressure ulceration\par Venous stasis b/l\par \par P:\par Patient evaluated and chart reviewed.\par Wound cleansed with chlorhexidine scrub.\par  Excisional debridement with sterile dermal curette of the left leg and right leg wounds with removal of non-viable skin and soft tissue down to and including the subcutaneous layer. \par Verbal consent obtained and Procedure tolerated well by the patient\par silver nitrate was applied to hypergranulation area of lateral left leg.\par 10 blade was used to debride hyperkeratotic tissue medial left leg\par Applied betadine in between patients toes.\par Wounds dressed with santyl, adaptic, 4x4 gauze, Binu, ACE\par Return to clinic in 1 week

## 2020-10-22 ENCOUNTER — OUTPATIENT (OUTPATIENT)
Dept: OUTPATIENT SERVICES | Facility: HOSPITAL | Age: 70
LOS: 1 days | End: 2020-10-22
Payer: MEDICARE

## 2020-10-22 ENCOUNTER — APPOINTMENT (OUTPATIENT)
Dept: WOUND CARE | Facility: CLINIC | Age: 70
End: 2020-10-22

## 2020-10-22 VITALS
DIASTOLIC BLOOD PRESSURE: 68 MMHG | SYSTOLIC BLOOD PRESSURE: 104 MMHG | TEMPERATURE: 97.9 F | OXYGEN SATURATION: 100 % | RESPIRATION RATE: 18 BRPM | WEIGHT: 162 LBS | HEIGHT: 65 IN | BODY MASS INDEX: 26.99 KG/M2 | HEART RATE: 62 BPM

## 2020-10-22 DIAGNOSIS — Z00.00 ENCOUNTER FOR GENERAL ADULT MEDICAL EXAMINATION WITHOUT ABNORMAL FINDINGS: ICD-10-CM

## 2020-10-22 PROCEDURE — G0463: CPT

## 2020-10-22 PROCEDURE — 11042 DBRDMT SUBQ TIS 1ST 20SQCM/<: CPT

## 2020-10-22 NOTE — PHYSICAL EXAM
[FreeTextEntry1] : medial ankle [FreeTextEntry2] : 2.6cm  [FreeTextEntry3] : 2.7cm [FreeTextEntry4] : 0.2cm [de-identified] : santyl [de-identified] : 4x4 gauze\par adaptic\par michaela/kerlix\par ACE [de-identified] : one area of hypergranulation tissue  [FreeTextEntry7] : lateral ankle [FreeTextEntry8] : 4.3 cm [FreeTextEntry9] : 8 [de-identified] : 0.2cm [de-identified] : hyperkeratotic [de-identified] : santyl [de-identified] : 4x4 gauze\par adaptic\par michaela/kerlix\par ACE [de-identified] : medial ankle  [de-identified] : 4.7cm [de-identified] : 3.6 cm [de-identified] : 0.2 cm [de-identified] : Santyl [de-identified] : 4x4 gauze\par adaptic\par michaela/kerlix\par ACE [de-identified] : 2 medial small wounds\par 0.8cmx0.7x2cmx.1\par 1.5cmx.7cm.1 (proximal) [TWNoteComboBox1] : Left [TWNoteComboBox2] : 2 [TWNoteComboBox4] : Small [TWNoteComboBox5] : No [TWNoteComboBox6] : Venous [de-identified] : No [de-identified] : Mild [de-identified] : 50% [de-identified] : Yes [TWNoteComboBox9] : Left [de-identified] : 2 [de-identified] : Small [de-identified] : No [de-identified] : Venous [de-identified] : No [de-identified] : other [de-identified] : None [de-identified] : <20% [de-identified] : Yes [de-identified] : Right [de-identified] : 2 [de-identified] : No [de-identified] : Venous [de-identified] : <20% [de-identified] : Yes

## 2020-10-22 NOTE — HISTORY OF PRESENT ILLNESS
[FreeTextEntry1] :  71 y/o male RTC for f/u of BLE ulcers. Patient states the nurse is changing his dressing 4 times a week He denies any pain to b/l LE. Patient states he has surgical shoe but comes in sneakers. Patient denies F/C/N/V/SOB.\par \par Allergies: none \par

## 2020-10-22 NOTE — PLAN
[FreeTextEntry1] : O:\par Vasc: DP pulses palpable 1/4. Unable to assess PT pulses due to wound.  Mild non-pitting edema b/l.  \par Derm: See wound assessment above.  Diffuse xerosis b/l.  hyperkeratotic border noted on left medial leg wound. \par Neuro: Decreased protective sensation b/l \par Musc: No pain with palpation periwound.\par \par A: b/l leg non-pressure ulceration\par Venous stasis b/l\par \par P:\par Patient evaluated and chart reviewed.\par Wound cleansed with chlorhexidine scrub.\par Excisional debridement with sterile dermal curette of the left leg and right leg wounds with removal of non-viable skin and soft tissue down to and including the subcutaneous layer. \par Verbal consent obtained and Procedure tolerated well by the patient\par Wounds dressed with santyl, adaptic, 4x4 gauze, Binu, ACE\par Return to clinic in 1 week

## 2020-10-26 DIAGNOSIS — L97.919 NON-PRESSURE CHRONIC ULCER OF UNSPECIFIED PART OF RIGHT LOWER LEG WITH UNSPECIFIED SEVERITY: ICD-10-CM

## 2020-11-12 ENCOUNTER — APPOINTMENT (OUTPATIENT)
Dept: WOUND CARE | Facility: CLINIC | Age: 70
End: 2020-11-12

## 2020-11-12 ENCOUNTER — OUTPATIENT (OUTPATIENT)
Dept: OUTPATIENT SERVICES | Facility: HOSPITAL | Age: 70
LOS: 1 days | End: 2020-11-12
Payer: MEDICARE

## 2020-11-12 VITALS
TEMPERATURE: 97.5 F | HEART RATE: 96 BPM | WEIGHT: 168 LBS | RESPIRATION RATE: 18 BRPM | DIASTOLIC BLOOD PRESSURE: 68 MMHG | BODY MASS INDEX: 27.99 KG/M2 | HEIGHT: 65 IN | SYSTOLIC BLOOD PRESSURE: 129 MMHG | OXYGEN SATURATION: 99 %

## 2020-11-12 DIAGNOSIS — Z00.00 ENCOUNTER FOR GENERAL ADULT MEDICAL EXAMINATION WITHOUT ABNORMAL FINDINGS: ICD-10-CM

## 2020-11-12 PROCEDURE — G0463: CPT

## 2020-11-12 RX ORDER — BECAPLERMIN 100 UG/G
0.01 GEL TOPICAL
Qty: 1 | Refills: 0 | Status: ACTIVE | COMMUNITY
Start: 2020-11-12 | End: 1900-01-01

## 2020-11-12 NOTE — PHYSICAL EXAM
[FreeTextEntry1] : medial ankle [FreeTextEntry2] : 2.5 cm  [FreeTextEntry3] : 2.5cm [FreeTextEntry4] : 0.2cm [de-identified] : santyl [de-identified] : 4x4 gauze\par adaptic\par michaela/kerlix\par ACE [de-identified] : one area of hypergranulation tissue  [FreeTextEntry7] : lateral ankle [FreeTextEntry8] : 4.1 cm [FreeTextEntry9] : 5 [de-identified] : 0.2cm [de-identified] : hyperkeratotic [de-identified] : santyl [de-identified] : 4x4 gauze\par adaptic\par michaela/kerlix\par ACE [de-identified] : medial ankle  [de-identified] : 4.7cm [de-identified] : 3.6 cm [de-identified] : 0.2 cm [de-identified] : Santyl [de-identified] : 4x4 gauze\par adaptic\par michaela/kerlix\par ACE [de-identified] : 2 medial small wounds\par 0.8cmx0.7x2cmx.1\par 1.5cmx.7cm.1 (proximal) [TWNoteComboBox1] : Left [TWNoteComboBox2] : 2 [TWNoteComboBox4] : Small [TWNoteComboBox5] : No [TWNoteComboBox6] : Venous [de-identified] : No [de-identified] : Mild [de-identified] : 50% [de-identified] : Yes [TWNoteComboBox9] : Left [de-identified] : 2 [de-identified] : Small [de-identified] : No [de-identified] : Venous [de-identified] : No [de-identified] : other [de-identified] : None [de-identified] : <20% [de-identified] : Yes [de-identified] : Right [de-identified] : 2 [de-identified] : No [de-identified] : Venous [de-identified] : <20% [de-identified] : Yes

## 2020-11-12 NOTE — PLAN
[FreeTextEntry1] : O:\par Vasc: DP pulses palpable 1/4. Unable to assess PT pulses due to wound.  Mild non-pitting edema b/l.  \par Derm: See wound assessment above.  Diffuse xerosis b/l.  hyperkeratotic border noted on left medial leg wound. \par Neuro: Decreased protective sensation b/l \par Musc: No pain with palpation periwound.\par \par A: b/l leg non-pressure ulceration\par Venous stasis b/l\par \par P:\par Patient evaluated and chart reviewed.\par Wound cleansed with chlorhexidine scrub.\par Excisional debridement with sterile dermal curette of the left leg and right leg wounds with removal of non-viable skin and soft tissue down to and including the subcutaneous layer. \par Verbal consent obtained and Procedure tolerated well by the patient\par Wounds dressed with santyl, adaptic, 4x4 gauze, Binu, ACE\par Rx: Regranex to be applied to daily with dressing changes to wounds \par Return to clinic in 1 week

## 2020-11-17 DIAGNOSIS — L97.422 NON-PRESSURE CHRONIC ULCER OF LEFT HEEL AND MIDFOOT WITH FAT LAYER EXPOSED: ICD-10-CM

## 2020-11-17 DIAGNOSIS — L97.412 NON-PRESSURE CHRONIC ULCER OF RIGHT HEEL AND MIDFOOT WITH FAT LAYER EXPOSED: ICD-10-CM

## 2020-11-17 DIAGNOSIS — I87.2 VENOUS INSUFFICIENCY (CHRONIC) (PERIPHERAL): ICD-10-CM

## 2020-12-03 ENCOUNTER — OUTPATIENT (OUTPATIENT)
Dept: OUTPATIENT SERVICES | Facility: HOSPITAL | Age: 70
LOS: 1 days | End: 2020-12-03
Payer: MEDICARE

## 2020-12-03 ENCOUNTER — APPOINTMENT (OUTPATIENT)
Dept: WOUND CARE | Facility: CLINIC | Age: 70
End: 2020-12-03

## 2020-12-03 VITALS
OXYGEN SATURATION: 100 % | DIASTOLIC BLOOD PRESSURE: 77 MMHG | TEMPERATURE: 97 F | SYSTOLIC BLOOD PRESSURE: 132 MMHG | HEART RATE: 78 BPM | RESPIRATION RATE: 18 BRPM | WEIGHT: 168 LBS | BODY MASS INDEX: 27.99 KG/M2 | HEIGHT: 65 IN

## 2020-12-03 DIAGNOSIS — L97.919 NON-PRESSURE CHRONIC ULCER OF UNSPECIFIED PART OF RIGHT LOWER LEG WITH UNSPECIFIED SEVERITY: ICD-10-CM

## 2020-12-03 DIAGNOSIS — Z00.00 ENCOUNTER FOR GENERAL ADULT MEDICAL EXAMINATION WITHOUT ABNORMAL FINDINGS: ICD-10-CM

## 2020-12-03 PROCEDURE — 11042 DBRDMT SUBQ TIS 1ST 20SQCM/<: CPT

## 2020-12-03 RX ORDER — BECAPLERMIN 100 UG/G
0.01 GEL TOPICAL
Qty: 1 | Refills: 3 | Status: ACTIVE | COMMUNITY
Start: 2020-12-03 | End: 1900-01-01

## 2020-12-03 RX ORDER — BECAPLERMIN 100 UG/G
0.01 GEL TOPICAL
Qty: 1 | Refills: 1 | Status: ACTIVE | COMMUNITY
Start: 2020-12-03 | End: 1900-01-01

## 2020-12-03 NOTE — PHYSICAL EXAM
[FreeTextEntry1] : medial ankle [FreeTextEntry2] : 1.4 cm  [FreeTextEntry3] : 1.5 cm [FreeTextEntry4] : 0.2cm [de-identified] : santyl [de-identified] : 4x4 gauze\par adaptic\par michaela/kerlix\par ACE [de-identified] : one area of hypergranulation tissue  [FreeTextEntry7] : lateral ankle [FreeTextEntry8] : 6.1 cm [FreeTextEntry9] : 3.4 [de-identified] : 0.2cm [de-identified] : hyperkeratotic [de-identified] : santyl [de-identified] : 4x4 gauze\par adaptic\par michaela/kerlix\par ACE [de-identified] : medial ankle  [de-identified] : 4.7cm [de-identified] : 3.6 cm [de-identified] : 0.2 cm [de-identified] : Santyl [de-identified] : 4x4 gauze\par adaptic\par michaela/kerlix\par ACE [de-identified] : medial small wounds\par 3.4x3 [TWNoteComboBox1] : Left [TWNoteComboBox2] : 2 [TWNoteComboBox4] : Small [TWNoteComboBox5] : No [TWNoteComboBox6] : Venous [de-identified] : No [de-identified] : Mild [de-identified] : 50% [de-identified] : Yes [TWNoteComboBox9] : Left [de-identified] : 2 [de-identified] : Small [de-identified] : No [de-identified] : Venous [de-identified] : No [de-identified] : other [de-identified] : None [de-identified] : <20% [de-identified] : Yes [de-identified] : Right [de-identified] : 2 [de-identified] : No [de-identified] : Venous [de-identified] : <20% [de-identified] : Yes

## 2020-12-03 NOTE — HISTORY OF PRESENT ILLNESS
[FreeTextEntry1] :  69 y/o male RTC for f/u of B/L LE ulcers. Patient states the nurse is changing his dressing 4 times a week He denies any pain to b/l LE. Patient states he has surgical shoe but comes in sneakers. Patient denies F/C/N/V/SOB.\par \par Allergies: none \par

## 2020-12-03 NOTE — PLAN
[FreeTextEntry1] : O:\par Vasc: DP 1/4 b/l, Unable to assess PT pulses due to wound location, Mild non-pitting edema b/l.  \par Derm: See wound assessment above.  Diffuse xerosis b/l.  hyperkeratotic border noted on left medial leg wound. \par Neuro: Decreased protective sensation b/l \par Musc: No pain with palpation periwound\par \par A: \par b/l leg non-pressure ulceration\par Venous stasis b/l\par \par P:\par Patient evaluated and chart reviewed.\par Wound cleansed with chlorhexidine scrub.\par Excisional debridement with sterile dermal curette of the left leg and right leg wounds with removal of non-viable skin and soft tissue down to and including the subcutaneous layer. \par Verbal consent obtained and Procedure tolerated well by the patient\par Wounds dressed with santyl, adaptic, 4x4 gauze, Binu, ACE\par Rx: Regranex to be applied to wound daily with dressing changes to wounds \par RTC in 1 week

## 2020-12-22 ENCOUNTER — OUTPATIENT (OUTPATIENT)
Dept: OUTPATIENT SERVICES | Facility: HOSPITAL | Age: 70
LOS: 1 days | End: 2020-12-22
Payer: MEDICARE

## 2020-12-22 ENCOUNTER — APPOINTMENT (OUTPATIENT)
Dept: WOUND CARE | Facility: CLINIC | Age: 70
End: 2020-12-22

## 2020-12-22 VITALS
TEMPERATURE: 97.9 F | OXYGEN SATURATION: 99 % | RESPIRATION RATE: 18 BRPM | SYSTOLIC BLOOD PRESSURE: 100 MMHG | HEIGHT: 65 IN | WEIGHT: 170 LBS | DIASTOLIC BLOOD PRESSURE: 63 MMHG | BODY MASS INDEX: 28.32 KG/M2 | HEART RATE: 80 BPM

## 2020-12-22 DIAGNOSIS — Z00.00 ENCOUNTER FOR GENERAL ADULT MEDICAL EXAMINATION WITHOUT ABNORMAL FINDINGS: ICD-10-CM

## 2020-12-22 PROCEDURE — 11042 DBRDMT SUBQ TIS 1ST 20SQCM/<: CPT

## 2020-12-22 PROCEDURE — 11045 DBRDMT SUBQ TISS EACH ADDL: CPT

## 2020-12-22 NOTE — HISTORY OF PRESENT ILLNESS
[FreeTextEntry1] :  69 y/o male RTC for f/u of B/L LE ulcers. Patient states the nurse is changing his dressing 3 times a week He denies any pain to b/l LE. Patient states he has surgical shoe but comes in sneakers. Patient denies F/C/N/V/SOB.\par \par Allergies: none \par

## 2020-12-22 NOTE — PHYSICAL EXAM
[FreeTextEntry1] : medial ankle [FreeTextEntry2] : 1.4 cm  [FreeTextEntry3] : 1.5 cm [FreeTextEntry4] : 0.2cm [de-identified] : santyl [de-identified] : 4x4 gauze\par adaptic\par michaela/kerlix\par ACE [de-identified] : one area of hypergranulation tissue  [FreeTextEntry7] : lateral ankle [FreeTextEntry8] : 6.1 cm [FreeTextEntry9] : 3.4 [de-identified] : 0.2cm [de-identified] : hyperkeratotic [de-identified] : santyl [de-identified] : 4x4 gauze\par adaptic\par michaela/kerlix\par ACE [de-identified] : medial ankle  [de-identified] : 5 cm [de-identified] : 3.5 cm [de-identified] : 0.2 cm [de-identified] : Santyl [de-identified] : 4x4 gauze\par adaptic\par michaela/kerlix\par ACE [de-identified] : Left distal medial ankle: 3x3.5x0.2 with fibrotic wound bed and macerated borders   [TWNoteComboBox1] : Left [TWNoteComboBox2] : 2 [TWNoteComboBox4] : Small [TWNoteComboBox5] : No [TWNoteComboBox6] : Venous [de-identified] : No [de-identified] : Mild [de-identified] : 50% [de-identified] : Yes [TWNoteComboBox9] : Left [de-identified] : 2 [de-identified] : Small [de-identified] : No [de-identified] : Venous [de-identified] : No [de-identified] : other [de-identified] : None [de-identified] : <20% [de-identified] : Yes [de-identified] : Right [de-identified] : 2 [de-identified] : No [de-identified] : Venous [de-identified] : <20% [de-identified] : Yes

## 2020-12-23 DIAGNOSIS — L97.919 NON-PRESSURE CHRONIC ULCER OF UNSPECIFIED PART OF RIGHT LOWER LEG WITH UNSPECIFIED SEVERITY: ICD-10-CM

## 2020-12-29 ENCOUNTER — OUTPATIENT (OUTPATIENT)
Dept: OUTPATIENT SERVICES | Facility: HOSPITAL | Age: 70
LOS: 1 days | End: 2020-12-29
Payer: MEDICARE

## 2020-12-29 ENCOUNTER — APPOINTMENT (OUTPATIENT)
Dept: WOUND CARE | Facility: CLINIC | Age: 70
End: 2020-12-29

## 2020-12-29 VITALS
HEART RATE: 96 BPM | TEMPERATURE: 97.3 F | DIASTOLIC BLOOD PRESSURE: 70 MMHG | OXYGEN SATURATION: 100 % | SYSTOLIC BLOOD PRESSURE: 117 MMHG | HEIGHT: 65 IN | BODY MASS INDEX: 28.32 KG/M2 | WEIGHT: 170 LBS

## 2020-12-29 DIAGNOSIS — Z00.00 ENCOUNTER FOR GENERAL ADULT MEDICAL EXAMINATION WITHOUT ABNORMAL FINDINGS: ICD-10-CM

## 2020-12-29 PROCEDURE — 11045 DBRDMT SUBQ TISS EACH ADDL: CPT

## 2020-12-29 PROCEDURE — 11042 DBRDMT SUBQ TIS 1ST 20SQCM/<: CPT

## 2020-12-29 NOTE — PHYSICAL EXAM
[FreeTextEntry1] : medial ankle [FreeTextEntry2] : 1.4 cm  [FreeTextEntry3] : 1.5 cm [FreeTextEntry4] : 0.2cm [de-identified] : santyl [de-identified] : 4x4 gauze\par adaptic\par michaela/kerlix\par ACE [de-identified] : one area of hypergranulation tissue  [FreeTextEntry7] : lateral ankle [FreeTextEntry8] : 6.1 cm [FreeTextEntry9] : 3.4 [de-identified] : 0.2cm [de-identified] : hyperkeratotic [de-identified] : santyl [de-identified] : 4x4 gauze\par adaptic\par michaela/kerlix\par ACE [de-identified] : medial ankle  [de-identified] : 5 cm [de-identified] : 3.5 cm [de-identified] : 0.2 cm [de-identified] : Santyl [de-identified] : 4x4 gauze\par adaptic\par michaela/kerlix\par ACE [de-identified] : Left distal medial ankle: 3x3.5x0.2 with fibrotic wound bed and macerated borders   [TWNoteComboBox1] : Left [TWNoteComboBox2] : 2 [TWNoteComboBox4] : Small [TWNoteComboBox5] : No [TWNoteComboBox6] : Venous [de-identified] : No [de-identified] : Mild [de-identified] : 50% [de-identified] : Yes [TWNoteComboBox9] : Left [de-identified] : 2 [de-identified] : Small [de-identified] : No [de-identified] : Venous [de-identified] : No [de-identified] : other [de-identified] : None [de-identified] : <20% [de-identified] : Yes [de-identified] : Right [de-identified] : 2 [de-identified] : No [de-identified] : Venous [de-identified] : <20% [de-identified] : Yes

## 2020-12-29 NOTE — HISTORY OF PRESENT ILLNESS
[FreeTextEntry1] :  69 y/o male RTC for f/u of B/L LE ulcers. Patient states the nurse is changing his dressing 3 times a week. Patient endorses pain to b/l LE with worse pain on Right 5th toe.  He denies any pain to b/l LE. Patient states he doesn't feel comfortable using the surgical shoe and prefers to comes in sneakers. Patient denies F/C/N/V/SOB.\par \par Allergies: none \par

## 2020-12-29 NOTE — PLAN
[FreeTextEntry1] : O:\par Vasc: DP 1/4 b/l, Unable to assess PT pulses due to wound location, +2 pitting edema R>L \par Derm: See wound assessment above, increased granular wound bed noted, Diffuse xerosis b/l.  hyperkeratotic border noted on left medial leg wound. hyperkeratotic lesion noted on dorsolateral R 5th digit  \par Neuro: Decreased protective sensation b/l \par Musc: No pain with palpation periwound\par \par A: \par b/l leg non-pressure ulceration\par Venous stasis b/l\par \par P:\par Patient evaluated and chart reviewed.\par B/L cleansed with chlorhexidine scrub.\par Excisional debridement with sterile dermal curette of the left leg and right leg wounds with removal of non-viable skin and soft tissue down to and including the subcutaneous layer. \par Procedure tolerated well by the patient\par Wounds dressed with santyl, adaptic, 4x4 gauze, Binu, webril, ACE \par Dressing to remain intact until next visit.\par RTC in 1 week

## 2020-12-30 DIAGNOSIS — I87.2 VENOUS INSUFFICIENCY (CHRONIC) (PERIPHERAL): ICD-10-CM

## 2020-12-30 DIAGNOSIS — L97.312 NON-PRESSURE CHRONIC ULCER OF RIGHT ANKLE WITH FAT LAYER EXPOSED: ICD-10-CM

## 2020-12-30 DIAGNOSIS — L97.322 NON-PRESSURE CHRONIC ULCER OF LEFT ANKLE WITH FAT LAYER EXPOSED: ICD-10-CM

## 2021-01-05 ENCOUNTER — APPOINTMENT (OUTPATIENT)
Dept: WOUND CARE | Facility: CLINIC | Age: 71
End: 2021-01-05

## 2021-01-05 ENCOUNTER — OUTPATIENT (OUTPATIENT)
Dept: OUTPATIENT SERVICES | Facility: HOSPITAL | Age: 71
LOS: 1 days | End: 2021-01-05
Payer: MEDICARE

## 2021-01-05 VITALS
RESPIRATION RATE: 18 BRPM | WEIGHT: 172 LBS | OXYGEN SATURATION: 99 % | HEART RATE: 97 BPM | SYSTOLIC BLOOD PRESSURE: 123 MMHG | BODY MASS INDEX: 28.66 KG/M2 | DIASTOLIC BLOOD PRESSURE: 69 MMHG | HEIGHT: 65 IN | TEMPERATURE: 97.7 F

## 2021-01-05 DIAGNOSIS — Z00.00 ENCOUNTER FOR GENERAL ADULT MEDICAL EXAMINATION WITHOUT ABNORMAL FINDINGS: ICD-10-CM

## 2021-01-05 PROCEDURE — 11042 DBRDMT SUBQ TIS 1ST 20SQCM/<: CPT

## 2021-01-05 PROCEDURE — 11045 DBRDMT SUBQ TISS EACH ADDL: CPT

## 2021-01-05 NOTE — PHYSICAL EXAM
[FreeTextEntry1] : medial ankle [FreeTextEntry2] : 4.5 cm  [FreeTextEntry3] : 2.5 cm [FreeTextEntry4] : 0.2cm [de-identified] : santyl [de-identified] : 4x4 gauze\par adaptic\par michaela/kerlix\par ACE [de-identified] : one area of hypergranulation tissue  [FreeTextEntry7] : lateral ankle [FreeTextEntry8] : 7.0 cm [FreeTextEntry9] : 3.5 [de-identified] : 0.2cm [de-identified] : hyperkeratotic [de-identified] : santyl [de-identified] : 4x4 gauze\par adaptic\par michaela/kerlix\par ACE [de-identified] : medial ankle  [de-identified] : 5.0 cm [de-identified] : 2.5 cm [de-identified] : 0.2 cm [de-identified] : Santyl [de-identified] : 4x4 gauze\par adaptic\par michaela/kerlix\par ACE [de-identified] : Left distal medial ankle: 3x3.5x0.2 with fibrotic wound bed and macerated borders   [TWNoteComboBox1] : Left [TWNoteComboBox2] : 2 [TWNoteComboBox4] : Small [TWNoteComboBox5] : No [TWNoteComboBox6] : Venous [de-identified] : No [de-identified] : Mild [de-identified] : 50% [de-identified] : Yes [TWNoteComboBox9] : Left [de-identified] : 2 [de-identified] : Small [de-identified] : No [de-identified] : Venous [de-identified] : No [de-identified] : other [de-identified] : None [de-identified] : <20% [de-identified] : Yes [de-identified] : Right [de-identified] : 2 [de-identified] : No [de-identified] : Venous [de-identified] : <20% [de-identified] : Yes

## 2021-01-05 NOTE — HISTORY OF PRESENT ILLNESS
[FreeTextEntry1] :  71 y/o male RTC for f/u of B/L LE ulcers. Patient states the nurse is changing his dressing 3 times a week. Patient endorses pain to b/l LE with worse pain on Right 5th toe.  He denies any pain to b/l LE. Patient states he doesn't feel comfortable using the surgical shoe and prefers to comes in sneakers. Patient states that since his last visit, he has not changed the dressings on the left foot as usual, as directed during the previous visit. Patient denies F/C/N/V/SOB.\par \par Allergies: none \par

## 2021-01-05 NOTE — PLAN
[FreeTextEntry1] : O:\par Vasc: DP 1/4 b/l, Unable to assess PT pulses due to wound location, +2 pitting edema R>L \par Derm: See wound assessment above, increased granular wound bed noted, Diffuse xerosis b/l. \par Hyperkeratotic borders noted about all wounds. Hyperkeratotic lesion noted on dorsolateral R 5th digit  \par Neuro: Decreased protective sensation b/l \par Musc: No pain with palpation periwound\par \par A: \par b/l leg non-pressure ulceration\par Venous stasis b/l\par \par P:\par Patient evaluated and chart reviewed.\par B/L cleansed with chlorhexidine scrub.\par Excisional debridement with sterile dermal curette of bilateral leg wounds with removal of non-viable skin and soft tissue down to and including the subcutaneous layer. \par Procedure tolerated well by the patient\par Wounds dressed with santyl, adaptic, 4x4 gauze, Binu, ACE \par RTC in 1 week

## 2021-01-11 DIAGNOSIS — L97.322 NON-PRESSURE CHRONIC ULCER OF LEFT ANKLE WITH FAT LAYER EXPOSED: ICD-10-CM

## 2021-01-11 DIAGNOSIS — L97.312 NON-PRESSURE CHRONIC ULCER OF RIGHT ANKLE WITH FAT LAYER EXPOSED: ICD-10-CM

## 2021-01-19 ENCOUNTER — OUTPATIENT (OUTPATIENT)
Dept: OUTPATIENT SERVICES | Facility: HOSPITAL | Age: 71
LOS: 1 days | End: 2021-01-19
Payer: MEDICARE

## 2021-01-19 ENCOUNTER — APPOINTMENT (OUTPATIENT)
Dept: WOUND CARE | Facility: CLINIC | Age: 71
End: 2021-01-19

## 2021-01-19 VITALS
HEIGHT: 65 IN | TEMPERATURE: 97.5 F | WEIGHT: 174 LBS | DIASTOLIC BLOOD PRESSURE: 66 MMHG | OXYGEN SATURATION: 100 % | SYSTOLIC BLOOD PRESSURE: 125 MMHG | HEART RATE: 61 BPM | BODY MASS INDEX: 28.99 KG/M2 | RESPIRATION RATE: 18 BRPM

## 2021-01-19 DIAGNOSIS — Z00.00 ENCOUNTER FOR GENERAL ADULT MEDICAL EXAMINATION WITHOUT ABNORMAL FINDINGS: ICD-10-CM

## 2021-01-19 PROCEDURE — 11042 DBRDMT SUBQ TIS 1ST 20SQCM/<: CPT

## 2021-01-19 NOTE — PHYSICAL EXAM
[FreeTextEntry1] : medial ankle [FreeTextEntry2] : 4.3 cm  [FreeTextEntry3] : 2.3 cm [FreeTextEntry4] : 0.2cm [de-identified] : santyl [de-identified] : 4x4 gauze\par adaptic\par michaela/kerlix\par ACE [de-identified] : one area of hypergranulation tissue  [FreeTextEntry7] : lateral ankle [FreeTextEntry8] : 6.8 cm [FreeTextEntry9] : 3.3 [de-identified] : 0.2cm [de-identified] : hyperkeratotic [de-identified] : santyl [de-identified] : 4x4 gauze\par adaptic\par michaela/kerlix\par ACE [de-identified] : medial ankle  [de-identified] : 5.0 cm [de-identified] : 2.5 cm [de-identified] : 0.2 cm [de-identified] : Santyl [de-identified] : 4x4 gauze\par adaptic\par michaela/kerlix\par ACE [de-identified] : Left distal medial ankle: 3x3.5x0.2 with fibrotic wound bed and macerated borders   [TWNoteComboBox1] : Left [TWNoteComboBox2] : 2 [TWNoteComboBox4] : Small [TWNoteComboBox5] : No [TWNoteComboBox6] : Venous [de-identified] : No [de-identified] : Mild [de-identified] : 50% [de-identified] : Yes [TWNoteComboBox9] : Left [de-identified] : 2 [de-identified] : No [de-identified] : Small [de-identified] : Venous [de-identified] : No [de-identified] : other [de-identified] : None [de-identified] : <20% [de-identified] : Right [de-identified] : Yes [de-identified] : 2 [de-identified] : No [de-identified] : Venous [de-identified] : <20% [de-identified] : Yes

## 2021-01-19 NOTE — HISTORY OF PRESENT ILLNESS
[FreeTextEntry1] :  69 y/o male RTC for f/u of B/L LE ulcers. Patient states the nurse is changing his dressing 3 times a week. Patient endorses pain to b/l LE with worse pain on Right 5th toe.  He denies any pain to b/l LE. Patient states he doesn't feel comfortable using the surgical shoe and prefers to comes in sneakers. States he has been applying regranex/DSD/ACE. Patient denies F/C/N/V/SOB.\par \par Allergies: none \par

## 2021-01-21 DIAGNOSIS — L97.312 NON-PRESSURE CHRONIC ULCER OF RIGHT ANKLE WITH FAT LAYER EXPOSED: ICD-10-CM

## 2021-01-21 DIAGNOSIS — E11.621 TYPE 2 DIABETES MELLITUS WITH FOOT ULCER: ICD-10-CM

## 2021-01-21 DIAGNOSIS — L97.322 NON-PRESSURE CHRONIC ULCER OF LEFT ANKLE WITH FAT LAYER EXPOSED: ICD-10-CM

## 2021-02-02 ENCOUNTER — APPOINTMENT (OUTPATIENT)
Dept: WOUND CARE | Facility: CLINIC | Age: 71
End: 2021-02-02

## 2021-02-16 ENCOUNTER — APPOINTMENT (OUTPATIENT)
Dept: WOUND CARE | Facility: CLINIC | Age: 71
End: 2021-02-16

## 2021-02-23 ENCOUNTER — APPOINTMENT (OUTPATIENT)
Dept: WOUND CARE | Facility: CLINIC | Age: 71
End: 2021-02-23

## 2021-02-23 ENCOUNTER — OUTPATIENT (OUTPATIENT)
Dept: OUTPATIENT SERVICES | Facility: HOSPITAL | Age: 71
LOS: 1 days | End: 2021-02-23
Payer: MEDICARE

## 2021-02-23 VITALS
WEIGHT: 177 LBS | HEART RATE: 80 BPM | SYSTOLIC BLOOD PRESSURE: 147 MMHG | TEMPERATURE: 97.7 F | HEIGHT: 65 IN | RESPIRATION RATE: 18 BRPM | BODY MASS INDEX: 29.49 KG/M2 | DIASTOLIC BLOOD PRESSURE: 81 MMHG | OXYGEN SATURATION: 100 %

## 2021-02-23 DIAGNOSIS — Z00.00 ENCOUNTER FOR GENERAL ADULT MEDICAL EXAMINATION WITHOUT ABNORMAL FINDINGS: ICD-10-CM

## 2021-02-23 PROCEDURE — 11042 DBRDMT SUBQ TIS 1ST 20SQCM/<: CPT

## 2021-02-23 PROCEDURE — 11045 DBRDMT SUBQ TISS EACH ADDL: CPT

## 2021-02-23 NOTE — PHYSICAL EXAM
[FreeTextEntry1] : lateral ankle [FreeTextEntry2] : 6.3 cm  [FreeTextEntry3] : 3.5 cm [FreeTextEntry4] : 0.2cm [de-identified] : santyl [de-identified] : 4x4 gauze\par adaptic\par michaela/kerlix\par ACE [de-identified] : one area of hypergranulation tissue  [FreeTextEntry7] : medial ankle [FreeTextEntry8] : 2.5 cm [FreeTextEntry9] : 2.0 [de-identified] : 0.2cm [de-identified] : hyperkeratotic [de-identified] : santyl [de-identified] : 4x4 gauze\par adaptic\par michaela/kerlix\par ACE [de-identified] : medial ankle  [de-identified] : 2.6 cm [de-identified] : 1.5 cm [de-identified] : Santyl [de-identified] : 0.2 cm [de-identified] : 4x4 gauze\par adaptic\par michaela/kerlix\par ACE [de-identified] : Left distal medial ankle: 3x3.5x0.2 with fibrotic wound bed and macerated borders   [TWNoteComboBox1] : Left [TWNoteComboBox2] : 2 [TWNoteComboBox4] : Small [TWNoteComboBox5] : No [TWNoteComboBox6] : Venous [de-identified] : No [de-identified] : Mild [de-identified] : 50% [de-identified] : Yes [de-identified] : 2 [TWNoteComboBox9] : Left [de-identified] : Small [de-identified] : No [de-identified] : Venous [de-identified] : No [de-identified] : other [de-identified] : None [de-identified] : <20% [de-identified] : Yes [de-identified] : Right [de-identified] : 2 [de-identified] : No [de-identified] : Venous [de-identified] : <20% [de-identified] : Yes

## 2021-02-23 NOTE — HISTORY OF PRESENT ILLNESS
[FreeTextEntry1] : 71 y/o male RTC for f/u of B/L LE ulcers. Patient states the nurse is changing his dressing 3 times a week. Patient endorses pain to b/l LE with worse pain on Right 5th toe, but states that it is improved from last week's visit. Patient states he doesn't feel comfortable using the surgical shoe and prefers to comes in sneakers. States he has been applying regranex/DSD/ACE. Patient denies F/C/N/V/SOB.\par \par Allergies: none \par

## 2021-02-24 DIAGNOSIS — L97.312 NON-PRESSURE CHRONIC ULCER OF RIGHT ANKLE WITH FAT LAYER EXPOSED: ICD-10-CM

## 2021-02-24 DIAGNOSIS — I73.9 PERIPHERAL VASCULAR DISEASE, UNSPECIFIED: ICD-10-CM

## 2021-02-24 DIAGNOSIS — L97.322 NON-PRESSURE CHRONIC ULCER OF LEFT ANKLE WITH FAT LAYER EXPOSED: ICD-10-CM

## 2021-03-02 ENCOUNTER — APPOINTMENT (OUTPATIENT)
Dept: WOUND CARE | Facility: CLINIC | Age: 71
End: 2021-03-02

## 2021-03-16 ENCOUNTER — APPOINTMENT (OUTPATIENT)
Dept: WOUND CARE | Facility: CLINIC | Age: 71
End: 2021-03-16

## 2021-03-22 ENCOUNTER — NON-APPOINTMENT (OUTPATIENT)
Age: 71
End: 2021-03-22

## 2021-04-06 ENCOUNTER — APPOINTMENT (OUTPATIENT)
Dept: WOUND CARE | Facility: CLINIC | Age: 71
End: 2021-04-06

## 2021-04-28 ENCOUNTER — INPATIENT (INPATIENT)
Facility: HOSPITAL | Age: 71
LOS: 6 days | Discharge: NURSING, REHAB OR LONG-TERM CARE | DRG: 264 | End: 2021-05-05
Attending: INTERNAL MEDICINE | Admitting: INTERNAL MEDICINE
Payer: MEDICARE

## 2021-04-28 VITALS
SYSTOLIC BLOOD PRESSURE: 146 MMHG | RESPIRATION RATE: 20 BRPM | HEART RATE: 76 BPM | OXYGEN SATURATION: 100 % | DIASTOLIC BLOOD PRESSURE: 78 MMHG | TEMPERATURE: 98 F | WEIGHT: 179.9 LBS | HEIGHT: 69 IN

## 2021-04-28 DIAGNOSIS — I83.009 VARICOSE VEINS OF UNSPECIFIED LOWER EXTREMITY WITH ULCER OF UNSPECIFIED SITE: ICD-10-CM

## 2021-04-28 DIAGNOSIS — Z29.9 ENCOUNTER FOR PROPHYLACTIC MEASURES, UNSPECIFIED: ICD-10-CM

## 2021-04-28 DIAGNOSIS — Z65.9 PROBLEM RELATED TO UNSPECIFIED PSYCHOSOCIAL CIRCUMSTANCES: ICD-10-CM

## 2021-04-28 DIAGNOSIS — L97.309 NON-PRESSURE CHRONIC ULCER OF UNSPECIFIED ANKLE WITH UNSPECIFIED SEVERITY: ICD-10-CM

## 2021-04-28 DIAGNOSIS — S81.809A UNSPECIFIED OPEN WOUND, UNSPECIFIED LOWER LEG, INITIAL ENCOUNTER: ICD-10-CM

## 2021-04-28 DIAGNOSIS — F03.90 UNSPECIFIED DEMENTIA, UNSPECIFIED SEVERITY, WITHOUT BEHAVIORAL DISTURBANCE, PSYCHOTIC DISTURBANCE, MOOD DISTURBANCE, AND ANXIETY: ICD-10-CM

## 2021-04-28 LAB
ALBUMIN SERPL ELPH-MCNC: 3.3 G/DL — LOW (ref 3.5–5)
ALP SERPL-CCNC: 91 U/L — SIGNIFICANT CHANGE UP (ref 40–120)
ALT FLD-CCNC: 14 U/L DA — SIGNIFICANT CHANGE UP (ref 10–60)
ANION GAP SERPL CALC-SCNC: 4 MMOL/L — LOW (ref 5–17)
AST SERPL-CCNC: 7 U/L — LOW (ref 10–40)
BILIRUB SERPL-MCNC: 0.2 MG/DL — SIGNIFICANT CHANGE UP (ref 0.2–1.2)
BUN SERPL-MCNC: 15 MG/DL — SIGNIFICANT CHANGE UP (ref 7–18)
CALCIUM SERPL-MCNC: 9 MG/DL — SIGNIFICANT CHANGE UP (ref 8.4–10.5)
CHLORIDE SERPL-SCNC: 105 MMOL/L — SIGNIFICANT CHANGE UP (ref 96–108)
CO2 SERPL-SCNC: 29 MMOL/L — SIGNIFICANT CHANGE UP (ref 22–31)
CREAT SERPL-MCNC: 0.92 MG/DL — SIGNIFICANT CHANGE UP (ref 0.5–1.3)
GLUCOSE SERPL-MCNC: 89 MG/DL — SIGNIFICANT CHANGE UP (ref 70–99)
HCT VFR BLD CALC: 34.6 % — LOW (ref 39–50)
HGB BLD-MCNC: 11.3 G/DL — LOW (ref 13–17)
LACTATE SERPL-SCNC: 0.8 MMOL/L — SIGNIFICANT CHANGE UP (ref 0.7–2)
MCHC RBC-ENTMCNC: 30.3 PG — SIGNIFICANT CHANGE UP (ref 27–34)
MCHC RBC-ENTMCNC: 32.7 GM/DL — SIGNIFICANT CHANGE UP (ref 32–36)
MCV RBC AUTO: 92.8 FL — SIGNIFICANT CHANGE UP (ref 80–100)
NRBC # BLD: 0 /100 WBCS — SIGNIFICANT CHANGE UP (ref 0–0)
PLATELET # BLD AUTO: 213 K/UL — SIGNIFICANT CHANGE UP (ref 150–400)
POTASSIUM SERPL-MCNC: 4.3 MMOL/L — SIGNIFICANT CHANGE UP (ref 3.5–5.3)
POTASSIUM SERPL-SCNC: 4.3 MMOL/L — SIGNIFICANT CHANGE UP (ref 3.5–5.3)
PROT SERPL-MCNC: 8.4 G/DL — HIGH (ref 6–8.3)
RBC # BLD: 3.73 M/UL — LOW (ref 4.2–5.8)
RBC # FLD: 13.6 % — SIGNIFICANT CHANGE UP (ref 10.3–14.5)
SARS-COV-2 RNA SPEC QL NAA+PROBE: SIGNIFICANT CHANGE UP
SODIUM SERPL-SCNC: 138 MMOL/L — SIGNIFICANT CHANGE UP (ref 135–145)
WBC # BLD: 5.48 K/UL — SIGNIFICANT CHANGE UP (ref 3.8–10.5)
WBC # FLD AUTO: 5.48 K/UL — SIGNIFICANT CHANGE UP (ref 3.8–10.5)

## 2021-04-28 PROCEDURE — 73610 X-RAY EXAM OF ANKLE: CPT | Mod: 26,50

## 2021-04-28 PROCEDURE — 99285 EMERGENCY DEPT VISIT HI MDM: CPT

## 2021-04-28 RX ORDER — ENOXAPARIN SODIUM 100 MG/ML
40 INJECTION SUBCUTANEOUS DAILY
Refills: 0 | Status: DISCONTINUED | OUTPATIENT
Start: 2021-04-28 | End: 2021-05-02

## 2021-04-28 NOTE — H&P ADULT - ASSESSMENT
69 yo male with PMH of dementia, colon mass s/p right hemicolectomy and LE ulcers  presents to ED after being sent by his PCP Dr Quiroz's office today for wound check and being unable to take care of himself.  71 yo male with PMH of dementia, colon mass s/p right hemicolectomy and LE ulcers  presents to ED after being sent by his PCP Dr Quiroz's office today for wound check and being unable to take care of himself.      Patient unable to provide medications. Primary team to obtain medications from pharmacy.

## 2021-04-28 NOTE — H&P ADULT - HISTORY OF PRESENT ILLNESS
69 yo M pmh of dementia and lower ext ulcers presents to ED after eval in Dr Quiroz's office today. Pt unsure why is here and unable to provide history. DAVID Ly states pt was admitted to outside hospital last week after he left home and could not find his way home. States that wounds might've gotten worse while pt was admitted. Pt currently has HHA during the day, but they are trying to they are working on getting a 24 hour HHA.  71 yo male with PMH of dementia and lower extremity ulcers presents to ED after being sent by his PCP Dr Quiroz's office today for wound check and being unable to take care of himself as per triage note. Limited history obtained from patient because of dementia, he is unsure why is here. DAVID Ly stated that patient was admitted to outside hospital last week after he left home and could not find his way home. States that his wounds might have gotten worse. Off note, patient was previously admitted to [facility] for Pt currently has HHA during the day, but they are trying to they are working on getting a 24 hour HHA.  71 yo male with PMH of dementia, colon mass s/p right hemicolectomy and LE ulcers  presents to ED after being sent by his PCP Dr Quiroz's office today for wound check and being unable to take care of himself. Limited history obtained from patient because of dementia, he is unsure why is here. DAVID Ly stated that patient was admitted to outside hospital last week after he left home and could not find his way home. States that his wounds might have gotten worse. Off note, [facility] and had right hemicolectomy done. As per family, patient currently has HHA during the day, but they are trying to get a 24 hour HHA.

## 2021-04-28 NOTE — H&P ADULT - PROBLEM SELECTOR PLAN 1
patient p/w ulcers of LE that are chronic   patient needs local wound care that he is unable to do so himself  will hold off on antibiotics for now since no clinical signs on infection  wound care consult  podiatry consult  vascular consult

## 2021-04-28 NOTE — ED ADULT NURSE NOTE - NSIMPLEMENTINTERV_GEN_ALL_ED
Implemented All Fall with Harm Risk Interventions:  [place] to call system. Call bell, personal items and telephone within reach. Instruct patient to call for assistance. Room bathroom lighting operational. Non-slip footwear when patient is off stretcher. Physically safe environment: no spills, clutter or unnecessary equipment. Stretcher in lowest position, wheels locked, appropriate side rails in place. Provide visual cue, wrist band, yellow gown, etc. Monitor gait and stability. Monitor for mental status changes and reorient to person, place, and time. Review medications for side effects contributing to fall risk. Reinforce activity limits and safety measures with patient and family. Provide visual clues: red socks.

## 2021-04-28 NOTE — H&P ADULT - NSHPPHYSICALEXAM_GEN_ALL_CORE
PHYSICAL EXAM:  GENERAL: NAD, walking around in the corridoe   HEAD:  Atraumatic, Normocephalic  EYES: EOMI, PERRLA, conjunctiva and sclera clear  ENT: Moist mucous membranes  NECK: Supple, No JVD  CHEST/LUNG: Clear to auscultation bilaterally; No rales, rhonchi, wheezing, or rubs. Unlabored respirations  HEART: Regular rate and rhythm; No murmurs, rubs, or gallops  ABDOMEN: Bowel sounds present; Soft, Nontender, Nondistended.  EXTREMITIES:  B/L feet swollen with pitting edema, ulcers on ankles with dressing, poor toe hygiene  NERVOUS SYSTEM:  Alert & Oriented X2-3  MSK: FROM all 4 extremities, full and equal strength

## 2021-04-28 NOTE — ED ADULT NURSE NOTE - OBJECTIVE STATEMENT
Pt awake alert, confuses easily to redirect, oriented to name. breathing unlabored room air, Pt noted ulcer to bilateral ankle, dressing place by MD.

## 2021-04-28 NOTE — ED PROVIDER NOTE - OBJECTIVE STATEMENT
71 yo M 69 yo M pmh of dementia and lower ext ulcers presents to ED after eval in Dr Quiroz's office today. Pt unsure why is here and unable to provide history. DAVID Ly states pt was admitted to outside hospital last week after he left home and could not find his way home. States that wounds might've gotten worse while pt was admitted. Pt currently has HHA during the day, but they are trying to they are working on getting a 24 hour HHA.

## 2021-04-28 NOTE — ED PROVIDER NOTE - CLINICAL SUMMARY MEDICAL DECISION MAKING FREE TEXT BOX
69 yo M with worsening ulcers and unable to care for himself appropriately at home. Plan - labs, xrays, admit.

## 2021-04-28 NOTE — ED PROVIDER NOTE - PHYSICAL EXAMINATION
GENERAL: well appearing, no acute distress   HEAD: atraumatic   EYES: EOMI, pink conjunctiva, strabismus   ENT: moist oral mucosa   CARDIAC: RRR, no edema, distal pulses present   RESPIRATORY: lungs CTAB, no increased work of breathing   GASTROINTESTINAL: no abdominal tenderness, no rebound or guarding, bowel sounds presents  GENITOURINARY: no CVA tenderness   MUSCULOSKELETAL: no deformity   NEUROLOGICAL: alert, oriented to person but not place or time, spontaneous movement of extremities   SKIN: +b/l ulcers L ankle; medial ulcer R ankle; insensate; not infected   PSYCHIATRIC: cooperative  HEME LYMPH: no lymphadenopathy

## 2021-04-28 NOTE — ED PROVIDER NOTE - PROGRESS NOTE DETAILS
labs - hgb 11, covid negative  Endorsed to Dr Quiroz and [facility] for admission of worsening ankle ulcers (venous ulcers?) and possible increasing HHA vs placement. Endorsed to [facility].

## 2021-04-28 NOTE — H&P ADULT - PROBLEM SELECTOR PLAN 2
continue with home meds  patient unable to provide any history continue with home meds after confirming from pharmacy  patient unable to provide any history  likely will need neuro consult as well

## 2021-04-29 DIAGNOSIS — G30.9 ALZHEIMER'S DISEASE, UNSPECIFIED: ICD-10-CM

## 2021-04-29 LAB
COVID-19 SPIKE DOMAIN AB INTERP: NEGATIVE — SIGNIFICANT CHANGE UP
COVID-19 SPIKE DOMAIN ANTIBODY RESULT: 0.4 U/ML — SIGNIFICANT CHANGE UP
SARS-COV-2 IGG+IGM SERPL QL IA: 0.4 U/ML — SIGNIFICANT CHANGE UP
SARS-COV-2 IGG+IGM SERPL QL IA: NEGATIVE — SIGNIFICANT CHANGE UP

## 2021-04-29 PROCEDURE — 70450 CT HEAD/BRAIN W/O DYE: CPT | Mod: 26

## 2021-04-29 PROCEDURE — 99222 1ST HOSP IP/OBS MODERATE 55: CPT

## 2021-04-29 RX ORDER — CHOLECALCIFEROL (VITAMIN D3) 125 MCG
1 CAPSULE ORAL
Qty: 0 | Refills: 0 | DISCHARGE

## 2021-04-29 RX ORDER — HALOPERIDOL DECANOATE 100 MG/ML
1 INJECTION INTRAMUSCULAR ONCE
Refills: 0 | Status: COMPLETED | OUTPATIENT
Start: 2021-04-29 | End: 2021-04-29

## 2021-04-29 RX ORDER — TAMSULOSIN HYDROCHLORIDE 0.4 MG/1
0.4 CAPSULE ORAL AT BEDTIME
Refills: 0 | Status: DISCONTINUED | OUTPATIENT
Start: 2021-04-29 | End: 2021-05-03

## 2021-04-29 RX ORDER — HALOPERIDOL DECANOATE 100 MG/ML
1 INJECTION INTRAMUSCULAR AT BEDTIME
Refills: 0 | Status: DISCONTINUED | OUTPATIENT
Start: 2021-04-29 | End: 2021-05-02

## 2021-04-29 RX ORDER — DONEPEZIL HYDROCHLORIDE 10 MG/1
5 TABLET, FILM COATED ORAL AT BEDTIME
Refills: 0 | Status: DISCONTINUED | OUTPATIENT
Start: 2021-04-29 | End: 2021-05-03

## 2021-04-29 RX ORDER — AZTREONAM 2 G
1 VIAL (EA) INJECTION
Qty: 0 | Refills: 0 | DISCHARGE
End: 2019-09-30

## 2021-04-29 RX ORDER — BECAPLERMIN 100 UG/G
0 GEL TOPICAL
Qty: 0 | Refills: 0 | DISCHARGE

## 2021-04-29 RX ORDER — FERROUS SULFATE 325(65) MG
1 TABLET ORAL
Qty: 0 | Refills: 0 | DISCHARGE

## 2021-04-29 RX ORDER — DULOXETINE HYDROCHLORIDE 30 MG/1
0 CAPSULE, DELAYED RELEASE ORAL
Qty: 0 | Refills: 0 | DISCHARGE

## 2021-04-29 RX ORDER — DONEPEZIL HYDROCHLORIDE 10 MG/1
0 TABLET, FILM COATED ORAL
Qty: 0 | Refills: 0 | DISCHARGE

## 2021-04-29 RX ORDER — COLLAGENASE CLOSTRIDIUM HIST. 250 UNIT/G
0 OINTMENT (GRAM) TOPICAL
Qty: 0 | Refills: 0 | DISCHARGE

## 2021-04-29 RX ADMIN — HALOPERIDOL DECANOATE 1 MILLIGRAM(S): 100 INJECTION INTRAMUSCULAR at 08:33

## 2021-04-29 RX ADMIN — ENOXAPARIN SODIUM 40 MILLIGRAM(S): 100 INJECTION SUBCUTANEOUS at 12:06

## 2021-04-29 RX ADMIN — DONEPEZIL HYDROCHLORIDE 5 MILLIGRAM(S): 10 TABLET, FILM COATED ORAL at 22:12

## 2021-04-29 RX ADMIN — HALOPERIDOL DECANOATE 1 MILLIGRAM(S): 100 INJECTION INTRAMUSCULAR at 08:22

## 2021-04-29 RX ADMIN — TAMSULOSIN HYDROCHLORIDE 0.4 MILLIGRAM(S): 0.4 CAPSULE ORAL at 22:12

## 2021-04-29 RX ADMIN — HALOPERIDOL DECANOATE 1 MILLIGRAM(S): 100 INJECTION INTRAMUSCULAR at 19:10

## 2021-04-29 NOTE — CONSULT NOTE ADULT - ASSESSMENT
From: Roopa Clarke  To: Addy Huggins  Sent: 4/15/2021 12:23 PM CDT  Subject: Other    Hi Dr Huggins,  I've had two \"episodes\" recently of dizziness and lightheadedness.  3/19...1:00 PM...felt lightheaded and nauseous...I fainted (did not get hurt) and vomited...slept most of afternoon...felt better the next day.../53 HR 60 BG 87  4/14...10:00 AM...felt dizzy and nauseous...did not faint or vomit...slept four hours.../58  HR 61   Since it happened twice in one month, I wanted you to know  Roopa   69yo male w/ Dementia    Recommendations:    - Please obtain labs: B12, folate, TSH, MMA, homocysteine, RPR, & SPEP.    - PT as tolerated  71yo male w/ Dementia    Recommendations:    - Please obtain labs: B12, folate, TSH, MMA, homocysteine, RPR, & SPEP.    - PT as tolerated       ATTENDING ADDENDUM    Moderately demented.  Languge disturbance too out of proportion to the degree of cognitive impairment to make Alzheimer's disease  likely.   71yo male w/ Dementia    Recommendations:    - Please obtain labs: B12, folate, TSH, MMA (methylmalonic acid), homocysteine, RPR, & SPEP.          ATTENDING ADDENDUM 2    Moderately demented.  Language disturbance too out of proportion to the degree of cognitive impairment to make Alzheimer's disease likely.  One possibility is Primary Progressive Aphasia.  Additional  information from family or other caregiver(s) plus cognitive testing would be indicated to better elucidate the diagnosis.  Recommendations as above. In addition would recommend referral for ou-pt evaluation by a cognitive neurologist.    [address] [place] [address]  [phone]     69yo male w/ Dementia    Recommendations:    - Please obtain labs: B12, folate, TSH, MMA (methylmalonic acid), homocysteine, RPR, & SPEP.          ATTENDING ADDENDUM 2    Moderately demented.  Language disturbance too out of proportion to the degree of cognitive impairment to make Alzheimer's disease likely.  No overt extrapyramidal signs and no supranuclear gaze palsy; not consistent with a "Parkinson Plus" syndrome.  One possibility is Primary Progressive Aphasia.  Additional  information from family or other caregiver(s) plus cognitive testing would be indicated to better elucidate the diagnosis.  Recommendations as above. In addition would recommend referral for ou-pt evaluation by a cognitive neurologist.    [address] [place] [address]  [phone]

## 2021-04-29 NOTE — PROGRESS NOTE ADULT - PROBLEM SELECTOR PLAN 3
patient unable to take care of himself as per PCP office  PT consulted  SW consulted -Patient unable to take care of himself as per PCP office  -ANTONETTE consulted

## 2021-04-29 NOTE — CONSULT NOTE ADULT - SUBJECTIVE AND OBJECTIVE BOX
HPI: 71 yo male with PMH of dementia, colon mass s/p right hemicolectomy and LE ulcers  presents to ED after being sent by his PCP Dr. Quiroz's office today for wound check and being unable to take care of himself.      Podiatry HPI: Patient is a 70M with dementia who presents with chronic b/l ankle ulcers. Patient is a patient in wound care clinic who lost follow up. Patient states he has no pain. Patient does not recall much, may be sundowning.     Patient admits to  (-) Fevers, (-) Chills, (-) Nausea, (-) Vomiting, (-) Shortness of Breath (-) calf pain (-) chest pain     Medications donepezil 5 milliGRAM(s) Oral at bedtime  enoxaparin Injectable 40 milliGRAM(s) SubCutaneous daily  haloperidol     Tablet 1 milliGRAM(s) Oral at bedtime PRN  tamsulosin 0.4 milliGRAM(s) Oral at bedtime    FHNo pertinent family history in first degree relatives    ,   PMHNo pertinent past medical history    No pertinent past medical history    Dementia       PSHNo significant past surgical history    No significant past surgical history        Labs                          11.3   5.48  )-----------( 213      ( 28 Apr 2021 19:37 )             34.6      04-28    138  |  105  |  15  ----------------------------<  89  4.3   |  29  |  0.92    Ca    9.0      28 Apr 2021 19:37    TPro  8.4<H>  /  Alb  3.3<L>  /  TBili  0.2  /  DBili  x   /  AST  7<L>  /  ALT  14  /  AlkPhos  91  04-28     Vital Signs Last 24 Hrs  T(C): 37 (29 Apr 2021 14:27), Max: 37 (29 Apr 2021 02:15)  T(F): 98.6 (29 Apr 2021 14:27), Max: 98.6 (29 Apr 2021 02:15)  HR: 77 (29 Apr 2021 14:27) (66 - 79)  BP: 125/77 (29 Apr 2021 14:27) (120/63 - 149/80)  BP(mean): --  RR: 17 (29 Apr 2021 14:27) (17 - 20)  SpO2: 99% (29 Apr 2021 14:27) (99% - 100%)          WBC Count: 5.48 K/uL (04-28-21 @ 19:37)        Physical exam   Patient resting comfortably in bed. Patient is AAOx3, ambulation status: walking     Derm: left medial ulcer ankle 4.3x3.8x0.3 fibrotic and necrotic tissue. malodorous. Left medial ankle ulcer 3.5x3.2x0.2 fibrotic. wound with thickened hyperkeratotis. No drainage. No purulence. Right medial ankle ulcer 3.7x4.3x0.2 fibrotic. no probe to bone.  No clinical signs of infection. No erythema. Nails elongated thickened 1-10.   Vasc: DP palpable left foot. No palpable pulses right foot.  No edema noted. Skin temperature noted to be warm to warm from proximal to distal b/l.   Neuro: Protective and epicritic sensation diminished  Musc: No pain on palpation. No equinus

## 2021-04-29 NOTE — PROGRESS NOTE ADULT - SUBJECTIVE AND OBJECTIVE BOX
PGY-1 Progress Note discussed with attending    PAGER #: [[phone]] TILL 5:00 PM  PLEASE CONTACT ON CALL TEAM:  - On Call Team (Please refer to Scotty) FROM 5:00 PM - 8:30PM  - Nightfloat Team FROM 8:30 -7:30 AM    CHIEF COMPLAINT & BRIEF HOSPITAL COURSE:    INTERVAL HPI/OVERNIGHT EVENTS:       REVIEW OF SYSTEMS:  CONSTITUTIONAL: No fever, weight loss, or fatigue  RESPIRATORY: No cough, wheezing, chills or hemoptysis; No shortness of breath  CARDIOVASCULAR: No chest pain, palpitations, dizziness, or leg swelling  GASTROINTESTINAL: No abdominal pain. No nausea, vomiting, or hematemesis; No diarrhea or constipation. No melena or hematochezia.  GENITOURINARY: No dysuria or hematuria, urinary frequency  NEUROLOGICAL: No headaches, memory loss, loss of strength, numbness, or tremors  SKIN: No itching, burning, rashes, or lesions     Vital Signs Last 24 Hrs  T(C): 36.6 (29 Apr 2021 05:41), Max: 37 (29 Apr 2021 02:15)  T(F): 97.9 (29 Apr 2021 05:41), Max: 98.6 (29 Apr 2021 02:15)  HR: 70 (29 Apr 2021 05:41) (66 - 79)  BP: 149/80 (29 Apr 2021 05:41) (120/63 - 149/80)  BP(mean): --  RR: 17 (29 Apr 2021 05:41) (17 - 20)  SpO2: 100% (29 Apr 2021 05:41) (100% - 100%)    PHYSICAL EXAMINATION:  GENERAL: NAD, well built  HEAD:  Atraumatic, Normocephalic  EYES:  conjunctiva and sclera clear  NECK: Supple, No JVD, Normal thyroid  CHEST/LUNG: Clear to auscultation. Clear to percussion bilaterally; No rales, rhonchi, wheezing, or rubs  HEART: Regular rate and rhythm; No murmurs, rubs, or gallops  ABDOMEN: Soft, Nontender, Nondistended; Bowel sounds present  NERVOUS SYSTEM:  Alert & Oriented X3,    EXTREMITIES:  2+ Peripheral Pulses, No clubbing, cyanosis, or edema  SKIN: warm dry                          11.3   5.48  )-----------( 213      ( 28 Apr 2021 19:37 )             34.6     04-28    138  |  105  |  15  ----------------------------<  89  4.3   |  29  |  0.92    Ca    9.0      28 Apr 2021 19:37    TPro  8.4<H>  /  Alb  3.3<L>  /  TBili  0.2  /  DBili  x   /  AST  7<L>  /  ALT  14  /  AlkPhos  91  04-28    LIVER FUNCTIONS - ( 28 Apr 2021 19:37 )  Alb: 3.3 g/dL / Pro: 8.4 g/dL / ALK PHOS: 91 U/L / ALT: 14 U/L DA / AST: 7 U/L / GGT: x                   CAPILLARY BLOOD GLUCOSE      RADIOLOGY & ADDITIONAL TESTS:                   PGY-1 Progress Note discussed with attending    PAGER #: [[phone]] TILL 5:00 PM  PLEASE CONTACT ON CALL TEAM:  - On Call Team (Please refer to Scotty) FROM 5:00 PM - 8:30PM  - Nightfloat Team FROM 8:30 -7:30 AM    CHIEF COMPLAINT & BRIEF HOSPITAL COURSE: 71 yo male with PMH of dementia, colon mass s/p right hemicolectomy and LE ulcers  presents to ED after being sent by his PCP Dr Quiroz's office today for wound check and being unable to take care of himself. Limited history obtained from patient because of dementia, he is unsure why is here. DAVID Taveradorothycorbni stated that patient was admitted to outside hospital last week after he left home and could not find his way home. States that his wounds might have gotten worse. Off note, patient was previously admitted to [facility] for foot ulcers and colon mass and had right hemicolectomy done. As per family, patient currently has HHA during the day, but they are trying to get a 24 hour HHA.    INTERVAL HPI/ OVERNIGHT EVENTS: Pt was code flight three times in the morning. AAOX1. Sending all dementia work up TSH, RPR, Vitamin B12 and Folate. CT head order. Constant observation. Psychiatry and Neurology consulted . Constant observation. S/P haldol 2mg       REVIEW OF SYSTEMS:  unable to obtain.     Vital Signs Last 24 Hrs  T(C): 36.6 (29 Apr 2021 05:41), Max: 37 (29 Apr 2021 02:15)  T(F): 97.9 (29 Apr 2021 05:41), Max: 98.6 (29 Apr 2021 02:15)  HR: 70 (29 Apr 2021 05:41) (66 - 79)  BP: 149/80 (29 Apr 2021 05:41) (120/63 - 149/80)  BP(mean): --  RR: 17 (29 Apr 2021 05:41) (17 - 20)  SpO2: 100% (29 Apr 2021 05:41) (100% - 100%)    PHYSICAL EXAMINATION:  Limited. pt not in any distress. No focal neurologic deficit     MEDICATIONS  (STANDING):  enoxaparin Injectable 40 milliGRAM(s) SubCutaneous daily    MEDICATIONS  (PRN):                            11.3   5.48  )-----------( 213      ( 28 Apr 2021 19:37 )             34.6     04-28    138  |  105  |  15  ----------------------------<  89  4.3   |  29  |  0.92    Ca    9.0      28 Apr 2021 19:37    TPro  8.4<H>  /  Alb  3.3<L>  /  TBili  0.2  /  DBili  x   /  AST  7<L>  /  ALT  14  /  AlkPhos  91  04-28    LIVER FUNCTIONS - ( 28 Apr 2021 19:37 )  Alb: 3.3 g/dL / Pro: 8.4 g/dL / ALK PHOS: 91 U/L / ALT: 14 U/L DA / AST: 7 U/L / GGT: x                   CAPILLARY BLOOD GLUCOSE      RADIOLOGY & ADDITIONAL TESTS:

## 2021-04-29 NOTE — PROGRESS NOTE ADULT - PROBLEM SELECTOR PLAN 1
patient p/w ulcers of LE that are chronic   patient needs local wound care that he is unable to do so himself  will hold off on antibiotics for now since no clinical signs on infection  wound care consult  podiatry consult  vascular consult -Worsening dementia   -AAOX1  -As per PCP , pt dementia is getting worse   -S/P 2 doses of 1 mg haldol   -CTH order   -To r/o other causes of dementia ordering Vitamin B12, folate, TSH, RPR  -Will obtain EKG   -Psych consulted   -Neurology consulted Dr. Evans

## 2021-04-29 NOTE — CONSULT NOTE ADULT - SUBJECTIVE AND OBJECTIVE BOX
Patient is a 70y old  Male who presents with a chief complaint of wound care (29 Apr 2021 10:10)      HPI:   71 yo male with PMH of dementia, colon mass s/p right hemicolectomy and LE ulcers  presents to ED after being sent by his PCP Dr Quiroz's office today for wound check and being unable to take care of himself. Limited history obtained from patient because of dementia, he is unsure why is here. DAVID Taveradorothycorbin stated that patient was admitted to outside hospital last week after he left home and could not find his way home. States that his wounds might have gotten worse. Off note, patient was previously admitted to [facility] for foot ulcers and colon mass and had right hemicolectomy done. As per family, patient currently has HHA during the day, but they are trying to get a 24 hour HHA. (28 Apr 2021 22:03)    Pt unable to provide history.  AxOx2.     Neurological Review of Systems:  No difficulty with language.  No vision loss or double vision.  No dizziness, vertigo or new hearing loss.  No difficulty with speech or swallowing.  No focal weakness.  No focal sensory changes.  No numbness or tingling in the bilateral lower extremities.  No difficulty with balance.  No difficulty with ambulation.        MEDICATIONS  (STANDING):  donepezil 5 milliGRAM(s) Oral at bedtime  enoxaparin Injectable 40 milliGRAM(s) SubCutaneous daily  tamsulosin 0.4 milliGRAM(s) Oral at bedtime    MEDICATIONS  (PRN):    Allergies    penicillin (Other)    Intolerances      PAST MEDICAL & SURGICAL HISTORY:  No pertinent past medical history    Dementia    No significant past surgical history      FAMILY HISTORY:  No pertinent family history in first degree relatives      SOCIAL HISTORY: non smoker    Review of Systems:  Limited in demented pt   Constitutional: No generalized weakness. No fevers or chills                 Eyes, Ears, Mouth, Throat: No vision loss   Respiratory: No shortness of breath or cough                            Cardiovascular: No chest pain or palpitations  Gastrointestinal: No nausea or vomiting                                     Genitourinary: No urinary incontinence or burning on urination  Musculoskeletal: No joint pain                                                           Dermatologic: No rash  Neurological: as per HPI                                                                      Psychiatric: No behavioral problems  Endocrine: No known hypoglycemia              Hematologic/Lymphatic: No easy bleeding    O:  Vital Signs Last 24 Hrs  T(C): 37 (29 Apr 2021 14:27), Max: 37 (29 Apr 2021 02:15)  T(F): 98.6 (29 Apr 2021 14:27), Max: 98.6 (29 Apr 2021 02:15)  HR: 77 (29 Apr 2021 14:27) (66 - 79)  BP: 125/77 (29 Apr 2021 14:27) (120/63 - 149/80)  RR: 17 (29 Apr 2021 14:27) (17 - 20)  SpO2: 99% (29 Apr 2021 14:27) (99% - 100%)    General Exam:   General appearance: No acute distress                 Cardiovascular: Pedal dorsalis pulses intact bilaterally    Mental Status: Oriented to self and place, not date.  Attention intact.  No dysarthria, aphasia or neglect.  Knowledge intact.  Registration, short and long term memory impaired.      Cranial Nerves: CN I - not tested.  PERRL, EOMI, VFF, no nystagmus or diplopia.  No APD.  Fundi not visualized.  CN V1-3 intact to light touch.  No facial asymmetry.  Hearing intact to finger rub bilaterally.  Tongue, uvula and palate midline.  Sternocleidomastoid and Trapezius intact bilaterally.    Motor:   Tone: Normal.                  Strength intact throughout  Pronator unable to assess at this time-does not follow command                     Dysmetria unable to assess at this time-does not follow command  No truncal ataxia.  No resting, postural or action tremor.  No myoclonus.    Sensation: Intact to light touch    Deep Tendon Reflexes: 1+ bilateral biceps, triceps, brachioradialis, knee and ankle  Toes not tested d/t b/l ankle ulcers    Gait: Normal and stable.  Romberg (-).    Other:     LABS:                        11.3   5.48  )-----------( 213      ( 28 Apr 2021 19:37 )             34.6     04-28    138  |  105  |  15  ----------------------------<  89  4.3   |  29  |  0.92    Ca    9.0      28 Apr 2021 19:37    TPro  8.4<H>  /  Alb  3.3<L>  /  TBili  0.2  /  DBili  x   /  AST  7<L>  /  ALT  14  /  AlkPhos  91  04-28            RADIOLOGY & ADDITIONAL STUDIES:    < from: CT Head No Cont (04.29.21 @ 12:47) >    EXAM:  CT BRAIN                            PROCEDURE DATE:  04/29/2021          INTERPRETATION:  Noncontrast CT of the brain.    CLINICAL INDICATION:  Altered mental status, baseline dementia    TECHNIQUE : Axial CT scanning of the brain was obtained from the skull base to the vertex without the administration of intravenous contrast. Sagittal and coronal reformats were provided.    COMPARISON: None available    FINDINGS:    No hydrocephalus, mass effect, midline shift, acute intracranial hemorrhage, or brain edema.    No displaced calvarial fracture.    Visualized paranasal sinuses and mastoid air cells are clear.    IMPRESSION:    No hydrocephalus, acute intracranial hemorrhage, mass effect, or brain edema.                TRICIA FONTENOT MD; Attending Radiologist  This document has been electronically signed. Apr 29 2021 12:52PM    < end of copied text >     Patient is a 70y old  Male who presents with a chief complaint of wound care (29 Apr 2021 10:10)      HPI:   69 yo male with PMH of dementia, colon mass s/p right hemicolectomy and LE ulcers  presents to ED after being sent by his PCP Dr Quiroz's office today for wound check and being unable to take care of himself. Limited history obtained from patient because of dementia, he is unsure why is here. DAVID Taveradorothycorbin stated that patient was admitted to outside hospital last week after he left home and could not find his way home. States that his wounds might have gotten worse. Off note, patient was previously admitted to [facility] for foot ulcers and colon mass and had right hemicolectomy done. As per family, patient currently has HHA during the day, but they are trying to get a 24 hour HHA. (28 Apr 2021 22:03)    Pt unable to provide history.  AxOx2.     Neurological Review of Systems:  No difficulty with language.  No vision loss or double vision.  No dizziness, vertigo or new hearing loss.  No difficulty with speech or swallowing.  No focal weakness.  No focal sensory changes.  No numbness or tingling in the bilateral lower extremities.  No difficulty with balance.  No difficulty with ambulation.        MEDICATIONS  (STANDING):  donepezil 5 milliGRAM(s) Oral at bedtime  enoxaparin Injectable 40 milliGRAM(s) SubCutaneous daily  tamsulosin 0.4 milliGRAM(s) Oral at bedtime    MEDICATIONS  (PRN):    Allergies    penicillin (Other)    Intolerances      PAST MEDICAL & SURGICAL HISTORY:  No pertinent past medical history    Dementia    No significant past surgical history      FAMILY HISTORY:  No pertinent family history in first degree relatives      SOCIAL HISTORY: non smoker    Review of Systems:  Limited in demented pt   Constitutional: No generalized weakness. No fevers or chills                 Eyes, Ears, Mouth, Throat: No vision loss   Respiratory: No shortness of breath or cough                            Cardiovascular: No chest pain or palpitations  Gastrointestinal: No nausea or vomiting                                     Genitourinary: No urinary incontinence or burning on urination  Musculoskeletal: No joint pain                                                           Dermatologic: No rash  Neurological: as per HPI                                                                      Psychiatric: No behavioral problems  Endocrine: No known hypoglycemia              Hematologic/Lymphatic: No easy bleeding    O:  Vital Signs Last 24 Hrs  T(C): 37 (29 Apr 2021 14:27), Max: 37 (29 Apr 2021 02:15)  T(F): 98.6 (29 Apr 2021 14:27), Max: 98.6 (29 Apr 2021 02:15)  HR: 77 (29 Apr 2021 14:27) (66 - 79)  BP: 125/77 (29 Apr 2021 14:27) (120/63 - 149/80)  RR: 17 (29 Apr 2021 14:27) (17 - 20)  SpO2: 99% (29 Apr 2021 14:27) (99% - 100%)      SEE ATTENDING ADDENDUM FOR EXAM FINDINGS  General Exam:   General appearance: No acute distress                 Cardiovascular: Pedal dorsalis pulses intact bilaterally    Mental Status: Oriented to self and place, not date.  Attention intact.  No dysarthria, aphasia or neglect.  Knowledge intact.  Registration, short and long term memory impaired.      Cranial Nerves: CN I - not tested.  PERRL, EOMI, VFF, no nystagmus or diplopia.  No APD.  Fundi not visualized.  CN V1-3 intact to light touch.  No facial asymmetry.  Hearing intact to finger rub bilaterally.  Tongue, uvula and palate midline.  Sternocleidomastoid and Trapezius intact bilaterally.    Motor:   Tone: Normal.                  Strength intact throughout  Pronator unable to assess at this time-does not follow command                     Dysmetria unable to assess at this time-does not follow command  No truncal ataxia.  No resting, postural or action tremor.  No myoclonus.    Sensation: Intact to light touch    Deep Tendon Reflexes: 1+ bilateral biceps, triceps, brachioradialis, knee and ankle  Toes not tested d/t b/l ankle ulcers    Gait: Normal and stable.  Romberg (-).    Other:     LABS:                        11.3   5.48  )-----------( 213      ( 28 Apr 2021 19:37 )             34.6     04-28    138  |  105  |  15  ----------------------------<  89  4.3   |  29  |  0.92    Ca    9.0      28 Apr 2021 19:37    TPro  8.4<H>  /  Alb  3.3<L>  /  TBili  0.2  /  DBili  x   /  AST  7<L>  /  ALT  14  /  AlkPhos  91  04-28            RADIOLOGY & ADDITIONAL STUDIES:    < from: CT Head No Cont (04.29.21 @ 12:47) >    EXAM:  CT BRAIN                            PROCEDURE DATE:  04/29/2021          INTERPRETATION:  Noncontrast CT of the brain.    CLINICAL INDICATION:  Altered mental status, baseline dementia    TECHNIQUE : Axial CT scanning of the brain was obtained from the skull base to the vertex without the administration of intravenous contrast. Sagittal and coronal reformats were provided.    COMPARISON: None available    FINDINGS:    No hydrocephalus, mass effect, midline shift, acute intracranial hemorrhage, or brain edema.    No displaced calvarial fracture.    Visualized paranasal sinuses and mastoid air cells are clear.    IMPRESSION:    No hydrocephalus, acute intracranial hemorrhage, mass effect, or brain edema.                TRICIA FONTENOT MD; Attending Radiologist  This document has been electronically signed. Apr 29 2021 12:52PM    < end of copied text >      ATTENDING ADDENDUM    I personally interviewed, examined, and participated in the care of this patient, on rounds 4/29/21 with NP Joe Lisa.  Reviewed findings and management plan with her.  In addition to or instead of the Hx and findings and plan reported above, or in particular, I note as follows:     Patient awake, alert.  Speaks in a low tone, with decreased prosody, in short phrases, in staccato fashion.  Some responses are incomprehensible or nonsensical.  He responds that he was born in [place].  Highest educational level attained: "fourth."    I asked if he meant fourth grade (four years of elementary school ) or fourth form (the [employer]) but he just kept responding "fourth."  Current year is " [pause] not August."  Country [place] " [place]long pause[place]  [place]  Unintelligible responses when asked where are we.  Denied that he was home.  Responded "i didn't know it was a hotel" when asked if we are in a hotel. after which I could not disabuse him of the idea he was in a hotel until I asked if we were in a hospital, whreupon he said "O didn't know it was a hospital."  2+2 = "4."  2x3 = "9."  He could not say or even estimate when [place] became independent; could not name the first president of [facility] (independence was achieved when he would have been 8 years old).  Did correctly respond thta prior to independnce [place] had been a [employer].      R pupil round, 3mm, reactive.  L pupil irregularly shaped , ~ 4mm, apparently not reactive.  EOMs grossly conjugate and full.  Could no test for visual fields,      No grossly evident focal/lateralized motor deficit.  Sits up and stands without assistance.  Insufficient relaxation to test DTRs.  Plantars flexor bilaterally.      Stride length < foot length.  Partial foot drop bilaterally.  Stable stance.      Moderate trophic changes of legs.     Patient is a 70y old  Male who presents with a chief complaint of wound care (29 Apr 2021 10:10)      HPI:   71 yo male with PMH of dementia, colon mass s/p right hemicolectomy and LE ulcers  presents to ED after being sent by his PCP Dr Quiroz's office today for wound check and being unable to take care of himself. Limited history obtained from patient because of dementia, he is unsure why is here. DAVID Taveradorothycorbin stated that patient was admitted to outside hospital last week after he left home and could not find his way home. States that his wounds might have gotten worse. Off note, patient was previously admitted to [facility] for foot ulcers and colon mass and had right hemicolectomy done. As per family, patient currently has HHA during the day, but they are trying to get a 24 hour HHA. (28 Apr 2021 22:03)    Pt unable to provide history.  AxOx2.     Neurological Review of Systems:  No difficulty with language.  No vision loss or double vision.  No dizziness, vertigo or new hearing loss.  No difficulty with speech or swallowing.  No focal weakness.  No focal sensory changes.  No numbness or tingling in the bilateral lower extremities.  No difficulty with balance.  No difficulty with ambulation.        MEDICATIONS  (STANDING):  donepezil 5 milliGRAM(s) Oral at bedtime  enoxaparin Injectable 40 milliGRAM(s) SubCutaneous daily  tamsulosin 0.4 milliGRAM(s) Oral at bedtime    MEDICATIONS  (PRN):    Allergies    penicillin (Other)    Intolerances      PAST MEDICAL & SURGICAL HISTORY:  No pertinent past medical history    Dementia    No significant past surgical history      FAMILY HISTORY:  No pertinent family history in first degree relatives      SOCIAL HISTORY: non smoker    Review of Systems:  Limited in demented pt   Constitutional: No generalized weakness. No fevers or chills                 Eyes, Ears, Mouth, Throat: No vision loss   Respiratory: No shortness of breath or cough                            Cardiovascular: No chest pain or palpitations  Gastrointestinal: No nausea or vomiting                                     Genitourinary: No urinary incontinence or burning on urination  Musculoskeletal: No joint pain                                                           Dermatologic: No rash  Neurological: as per HPI                                                                      Psychiatric: No behavioral problems  Endocrine: No known hypoglycemia              Hematologic/Lymphatic: No easy bleeding    O:  Vital Signs Last 24 Hrs  T(C): 37 (29 Apr 2021 14:27), Max: 37 (29 Apr 2021 02:15)  T(F): 98.6 (29 Apr 2021 14:27), Max: 98.6 (29 Apr 2021 02:15)  HR: 77 (29 Apr 2021 14:27) (66 - 79)  BP: 125/77 (29 Apr 2021 14:27) (120/63 - 149/80)  RR: 17 (29 Apr 2021 14:27) (17 - 20)  SpO2: 99% (29 Apr 2021 14:27) (99% - 100%)      SEE ATTENDING ADDENDUM 1 FOR EXAM FINDINGS  General Exam:   General appearance: No acute distress                 Cardiovascular: Pedal dorsalis pulses intact bilaterally    Mental Status: Oriented to self and place, not date.  Attention intact.  No dysarthria, aphasia or neglect.  Knowledge intact.  Registration, short and long term memory impaired.      Cranial Nerves: CN I - not tested.  PERRL, EOMI, VFF, no nystagmus or diplopia.  No APD.  Fundi not visualized.  CN V1-3 intact to light touch.  No facial asymmetry.  Hearing intact to finger rub bilaterally.  Tongue, uvula and palate midline.  Sternocleidomastoid and Trapezius intact bilaterally.    Motor:   Tone: Normal.                  Strength intact throughout  Pronator unable to assess at this time-does not follow command                     Dysmetria unable to assess at this time-does not follow command  No truncal ataxia.  No resting, postural or action tremor.  No myoclonus.    Sensation: Intact to light touch    Deep Tendon Reflexes: 1+ bilateral biceps, triceps, brachioradialis, knee and ankle  Toes not tested d/t b/l ankle ulcers    Gait: Normal and stable.  Romberg (-).    Other:     LABS:                        11.3   5.48  )-----------( 213      ( 28 Apr 2021 19:37 )             34.6     04-28    138  |  105  |  15  ----------------------------<  89  4.3   |  29  |  0.92    Ca    9.0      28 Apr 2021 19:37    TPro  8.4<H>  /  Alb  3.3<L>  /  TBili  0.2  /  DBili  x   /  AST  7<L>  /  ALT  14  /  AlkPhos  91  04-28            RADIOLOGY & ADDITIONAL STUDIES:    < from: CT Head No Cont (04.29.21 @ 12:47) >    EXAM:  CT BRAIN                            PROCEDURE DATE:  04/29/2021          INTERPRETATION:  Noncontrast CT of the brain.    CLINICAL INDICATION:  Altered mental status, baseline dementia    TECHNIQUE : Axial CT scanning of the brain was obtained from the skull base to the vertex without the administration of intravenous contrast. Sagittal and coronal reformats were provided.    COMPARISON: None available    FINDINGS:    No hydrocephalus, mass effect, midline shift, acute intracranial hemorrhage, or brain edema.    No displaced calvarial fracture.    Visualized paranasal sinuses and mastoid air cells are clear.    IMPRESSION:    No hydrocephalus, acute intracranial hemorrhage, mass effect, or brain edema.                TRICIA FONTENOT MD; Attending Radiologist  This document has been electronically signed. Apr 29 2021 12:52PM    < end of copied text >      ATTENDING ADDENDUM    I personally interviewed, examined, and participated in the care of this patient, on rounds 4/29/21 with NP Joe Lisa.  Reviewed findings and management plan with her.  In addition to or instead of the Hx and findings and plan reported above, or in particular, I note as follows:     Patient awake, alert.  Speaks in a low tone, with decreased prosody, in short phrases, in staccato fashion.  Some responses are incomprehensible or nonsensical.  He responds that he was born in [place].  Highest educational level attained: "fourth."    I asked if he meant fourth grade (four years of elementary school ) or fourth form ([employer]) but he just kept responding "fourth."  Current year is " [pause] not August."  Country is " [place]long pause[place]  [place]  Unintelligible responses when asked where are we.  Denied that he was home.  Responded "i didn't know it was a hotel" when asked if we are in a hotel. after which I could not disabuse him of the idea he was in a hotel until I asked if we were in a hospital, whreupon he said "O didn't know it was a hospital."  2+2 = "4."  2x3 = "9."  He could not say or even estimate when [place] became independent; could not name the first president of [facility] (independence was achieved when he would have been 8 years old).  Did correctly respond thta prior to independnce [place] had been a [employer].      R pupil round, 3mm, reactive.  L pupil irregularly shaped , ~ 4mm, apparently not reactive.  EOMs grossly conjugate and full.  Could no test for visual fields,      No grossly evident focal/lateralized motor deficit.  Sits up and stands without assistance.  Insufficient relaxation to test DTRs.  Plantars flexor bilaterally.      Stride length < foot length.  Partial foot drop bilaterally.  Stable stance.      Moderate trophic changes of legs.     Patient is a 70y old  Male who presents with a chief complaint of wound care (29 Apr 2021 10:10)      HPI:   71 yo male with PMH of dementia, colon mass s/p right hemicolectomy and LE ulcers  presents to ED after being sent by his PCP Dr Quiroz's office today for wound check and being unable to take care of himself. Limited history obtained from patient because of dementia, he is unsure why is here. DAVID Taveradorothycorbin stated that patient was admitted to outside hospital last week after he left home and could not find his way home. States that his wounds might have gotten worse. Off note, patient was previously admitted to [facility] for foot ulcers and colon mass and had right hemicolectomy done. As per family, patient currently has HHA during the day, but they are trying to get a 24 hour HHA. (28 Apr 2021 22:03)    Pt unable to provide history.  AxOx2.     Neurological Review of Systems:  No difficulty with language.  No vision loss or double vision.  No dizziness, vertigo or new hearing loss.  No difficulty with speech or swallowing.  No focal weakness.  No focal sensory changes.  No numbness or tingling in the bilateral lower extremities.  No difficulty with balance.  No difficulty with ambulation.        MEDICATIONS  (STANDING):  donepezil 5 milliGRAM(s) Oral at bedtime  enoxaparin Injectable 40 milliGRAM(s) SubCutaneous daily  tamsulosin 0.4 milliGRAM(s) Oral at bedtime    MEDICATIONS  (PRN):    Allergies    penicillin (Other)    Intolerances      PAST MEDICAL & SURGICAL HISTORY:  No pertinent past medical history    Dementia    No significant past surgical history      FAMILY HISTORY:  No pertinent family history in first degree relatives      SOCIAL HISTORY: non smoker    Review of Systems:  Limited in demented pt   Constitutional: No generalized weakness. No fevers or chills                 Eyes, Ears, Mouth, Throat: No vision loss   Respiratory: No shortness of breath or cough                            Cardiovascular: No chest pain or palpitations  Gastrointestinal: No nausea or vomiting                                     Genitourinary: No urinary incontinence or burning on urination  Musculoskeletal: No joint pain                                                           Dermatologic: No rash  Neurological: as per HPI                                                                      Psychiatric: No behavioral problems  Endocrine: No known hypoglycemia              Hematologic/Lymphatic: No easy bleeding    O:  Vital Signs Last 24 Hrs  T(C): 37 (29 Apr 2021 14:27), Max: 37 (29 Apr 2021 02:15)  T(F): 98.6 (29 Apr 2021 14:27), Max: 98.6 (29 Apr 2021 02:15)  HR: 77 (29 Apr 2021 14:27) (66 - 79)  BP: 125/77 (29 Apr 2021 14:27) (120/63 - 149/80)  RR: 17 (29 Apr 2021 14:27) (17 - 20)  SpO2: 99% (29 Apr 2021 14:27) (99% - 100%)      SEE ATTENDING ADDENDUM 1 FOR EXAM FINDINGS  General Exam:   General appearance: No acute distress                 Cardiovascular: Pedal dorsalis pulses intact bilaterally    Mental Status: Oriented to self and place, not date.  Attention intact.  No dysarthria, aphasia or neglect.  Knowledge intact.  Registration, short and long term memory impaired.      Cranial Nerves: CN I - not tested.  PERRL, EOMI, VFF, no nystagmus or diplopia.  No APD.  Fundi not visualized.  CN V1-3 intact to light touch.  No facial asymmetry.  Hearing intact to finger rub bilaterally.  Tongue, uvula and palate midline.  Sternocleidomastoid and Trapezius intact bilaterally.    Motor:   Tone: Normal.                  Strength intact throughout  Pronator unable to assess at this time-does not follow command                     Dysmetria unable to assess at this time-does not follow command  No truncal ataxia.  No resting, postural or action tremor.  No myoclonus.    Sensation: Intact to light touch    Deep Tendon Reflexes: 1+ bilateral biceps, triceps, brachioradialis, knee and ankle  Toes not tested d/t b/l ankle ulcers    Gait: Normal and stable.  Romberg (-).    Other:     LABS:                        11.3   5.48  )-----------( 213      ( 28 Apr 2021 19:37 )             34.6     04-28    138  |  105  |  15  ----------------------------<  89  4.3   |  29  |  0.92    Ca    9.0      28 Apr 2021 19:37    TPro  8.4<H>  /  Alb  3.3<L>  /  TBili  0.2  /  DBili  x   /  AST  7<L>  /  ALT  14  /  AlkPhos  91  04-28            RADIOLOGY & ADDITIONAL STUDIES:    < from: CT Head No Cont (04.29.21 @ 12:47) >    EXAM:  CT BRAIN                            PROCEDURE DATE:  04/29/2021          INTERPRETATION:  Noncontrast CT of the brain.    CLINICAL INDICATION:  Altered mental status, baseline dementia    TECHNIQUE : Axial CT scanning of the brain was obtained from the skull base to the vertex without the administration of intravenous contrast. Sagittal and coronal reformats were provided.    COMPARISON: None available    FINDINGS:    No hydrocephalus, mass effect, midline shift, acute intracranial hemorrhage, or brain edema.    No displaced calvarial fracture.    Visualized paranasal sinuses and mastoid air cells are clear.    IMPRESSION:    No hydrocephalus, acute intracranial hemorrhage, mass effect, or brain edema.                TRICIA FONTENOT MD; Attending Radiologist  This document has been electronically signed. Apr 29 2021 12:52PM    < end of copied text >      ATTENDING ADDENDUM    I personally interviewed, examined, and participated in the care of this patient, on rounds 4/29/21 with NP Joe Lisa.  Reviewed findings and management plan with her.  In addition to or instead of the Hx and findings and plan reported above, or in particular, I note as follows:     Patient awake, alert.  Speaks in a low tone, with decreased prosody, in short phrases, in staccato fashion.  Some responses are incomprehensible or nonsensical.  He responds that he was born in [place].  Highest educational level attained: "fourth."    I asked if he meant fourth grade (four years of elementary school ) or fourth form (the [employer] system) but he just kept responding "fourth."  Current year is " [pause] not August."  Country is " [long pause]  [place]  Unintelligible responses when asked where are we.  Denied that he was home.  Responded "i didn't know it was a hotel" when asked if we are in a hotel. after which I could not disabuse him of the idea he was in a hotel until I asked if we were in a hospital, whreupon he said "O didn't know it was a hospital."  2+2 = "4."  2x3 = "9."  He could not say or even estimate when [place] became independent; could not name the first president of [facility] (independence was achieved when he would have been 8 years old).  Did correctly respond thta prior to independnce [place] had been a [employer].      R pupil round, 3mm, reactive.  L pupil irregularly shaped , ~ 4mm, apparently not reactive.  EOMs grossly conjugate and full.  Could no test for visual fields,      No grossly evident focal/lateralized motor deficit.  Sits up and stands without assistance.  Insufficient relaxation to test DTRs.  Plantars flexor bilaterally.      Stride length < foot length.  Partial foot drop bilaterally.  Stable stance.      No tremor, abnormal involuntary movements, or rigidity noted.       Moderate trophic changes of legs.

## 2021-04-29 NOTE — PROGRESS NOTE ADULT - ASSESSMENT
71 yo male with PMH of dementia, colon mass s/p right hemicolectomy and LE ulcers  presents to ED after being sent by his PCP Dr Quiroz's office today for wound check and being unable to take care of himself.

## 2021-04-29 NOTE — CONSULT NOTE ADULT - ASSESSMENT
A:   b/l chronic ankle wounds     Patient seen and evaluated   Chart created   Excisionally debrided loose hyperkeratosis down to and including level of subcutaneous tissue using sterile pick ups and scissors.   Dressed with santyl dsd  ordered xrays bl ankle and feet   Possible debridement in OR in plan for early next week   no WB restrictions   Podiatry to follow while in house   Discussed with attending Dr. Patel    A:   b/l chronic ankle wounds     Patient seen and evaluated   Chart created   Excisionally debrided loose hyperkeratosis down to and including level of subcutaneous tissue using sterile pick ups and scissors.   Dressed with santyl dsd  ordered xrays bl ankle and feet   Possible debridement in OR in plan for early next week   no WB restrictions   Recommend vascular consult   Podiatry to follow while in house   Discussed with attending Dr. Patel

## 2021-04-29 NOTE — PROGRESS NOTE ADULT - PROBLEM SELECTOR PLAN 2
continue with home meds after confirming from pharmacy  patient unable to provide any history  likely will need neuro consult as well -Patient p/w ulcers of LE that are chronic   -Patient needs local wound care that he is unable to do so himself  -hold off on antibiotics for now since no clinical signs on infection  -Wound care consulted  -Podiatry consulted  vascular consult

## 2021-04-29 NOTE — ADVANCED PRACTICE NURSE CONSULT - ASSESSMENT
This is a 70yr old male patient admitted for Ankle Ulceration, presenting with Bilateral Lower Extremity Ulcers, to which the patient is being followed by Podiatry and Vascular with a treatment plan to be formulated to address this issue. There is currently no further need for wound care specialist consultation at this time

## 2021-04-30 DIAGNOSIS — Z04.6 ENCOUNTER FOR GENERAL PSYCHIATRIC EXAMINATION, REQUESTED BY AUTHORITY: ICD-10-CM

## 2021-04-30 DIAGNOSIS — Z90.49 ACQUIRED ABSENCE OF OTHER SPECIFIED PARTS OF DIGESTIVE TRACT: Chronic | ICD-10-CM

## 2021-04-30 DIAGNOSIS — F05 DELIRIUM DUE TO KNOWN PHYSIOLOGICAL CONDITION: ICD-10-CM

## 2021-04-30 LAB
ALBUMIN SERPL ELPH-MCNC: 3.2 G/DL — LOW (ref 3.5–5)
ALP SERPL-CCNC: 70 U/L — SIGNIFICANT CHANGE UP (ref 40–120)
ALT FLD-CCNC: 17 U/L DA — SIGNIFICANT CHANGE UP (ref 10–60)
ANION GAP SERPL CALC-SCNC: 5 MMOL/L — SIGNIFICANT CHANGE UP (ref 5–17)
AST SERPL-CCNC: 24 U/L — SIGNIFICANT CHANGE UP (ref 10–40)
BILIRUB SERPL-MCNC: 0.4 MG/DL — SIGNIFICANT CHANGE UP (ref 0.2–1.2)
BUN SERPL-MCNC: 13 MG/DL — SIGNIFICANT CHANGE UP (ref 7–18)
CALCIUM SERPL-MCNC: 8.7 MG/DL — SIGNIFICANT CHANGE UP (ref 8.4–10.5)
CHLORIDE SERPL-SCNC: 106 MMOL/L — SIGNIFICANT CHANGE UP (ref 96–108)
CO2 SERPL-SCNC: 28 MMOL/L — SIGNIFICANT CHANGE UP (ref 22–31)
CREAT SERPL-MCNC: 0.94 MG/DL — SIGNIFICANT CHANGE UP (ref 0.5–1.3)
GLUCOSE SERPL-MCNC: 72 MG/DL — SIGNIFICANT CHANGE UP (ref 70–99)
HCT VFR BLD CALC: 27.7 % — LOW (ref 39–50)
HGB BLD-MCNC: 9.1 G/DL — LOW (ref 13–17)
MAGNESIUM SERPL-MCNC: 2.2 MG/DL — SIGNIFICANT CHANGE UP (ref 1.6–2.6)
MCHC RBC-ENTMCNC: 30.4 PG — SIGNIFICANT CHANGE UP (ref 27–34)
MCHC RBC-ENTMCNC: 32.9 GM/DL — SIGNIFICANT CHANGE UP (ref 32–36)
MCV RBC AUTO: 92.6 FL — SIGNIFICANT CHANGE UP (ref 80–100)
NRBC # BLD: 0 /100 WBCS — SIGNIFICANT CHANGE UP (ref 0–0)
PHOSPHATE SERPL-MCNC: 3.3 MG/DL — SIGNIFICANT CHANGE UP (ref 2.5–4.5)
PLATELET # BLD AUTO: 166 K/UL — SIGNIFICANT CHANGE UP (ref 150–400)
POTASSIUM SERPL-MCNC: 5.6 MMOL/L — HIGH (ref 3.5–5.3)
POTASSIUM SERPL-SCNC: 5.6 MMOL/L — HIGH (ref 3.5–5.3)
PROT SERPL-MCNC: 8.3 G/DL — SIGNIFICANT CHANGE UP (ref 6–8.3)
RBC # BLD: 2.99 M/UL — LOW (ref 4.2–5.8)
RBC # FLD: 13.5 % — SIGNIFICANT CHANGE UP (ref 10.3–14.5)
SODIUM SERPL-SCNC: 139 MMOL/L — SIGNIFICANT CHANGE UP (ref 135–145)
WBC # BLD: 4.59 K/UL — SIGNIFICANT CHANGE UP (ref 3.8–10.5)
WBC # FLD AUTO: 4.59 K/UL — SIGNIFICANT CHANGE UP (ref 3.8–10.5)

## 2021-04-30 PROCEDURE — 93923 UPR/LXTR ART STDY 3+ LVLS: CPT | Mod: 26

## 2021-04-30 PROCEDURE — 99221 1ST HOSP IP/OBS SF/LOW 40: CPT

## 2021-04-30 PROCEDURE — 90792 PSYCH DIAG EVAL W/MED SRVCS: CPT

## 2021-04-30 RX ORDER — CALCIUM GLUCONATE 100 MG/ML
2 VIAL (ML) INTRAVENOUS ONCE
Refills: 0 | Status: COMPLETED | OUTPATIENT
Start: 2021-04-30 | End: 2021-04-30

## 2021-04-30 RX ORDER — SODIUM ZIRCONIUM CYCLOSILICATE 10 G/10G
5 POWDER, FOR SUSPENSION ORAL ONCE
Refills: 0 | Status: COMPLETED | OUTPATIENT
Start: 2021-04-30 | End: 2021-04-30

## 2021-04-30 RX ORDER — ALBUTEROL 90 UG/1
1 AEROSOL, METERED ORAL ONCE
Refills: 0 | Status: COMPLETED | OUTPATIENT
Start: 2021-04-30 | End: 2021-04-30

## 2021-04-30 RX ADMIN — ENOXAPARIN SODIUM 40 MILLIGRAM(S): 100 INJECTION SUBCUTANEOUS at 12:58

## 2021-04-30 RX ADMIN — SODIUM ZIRCONIUM CYCLOSILICATE 5 GRAM(S): 10 POWDER, FOR SUSPENSION ORAL at 12:58

## 2021-04-30 RX ADMIN — ALBUTEROL 1 PUFF(S): 90 AEROSOL, METERED ORAL at 12:59

## 2021-04-30 RX ADMIN — TAMSULOSIN HYDROCHLORIDE 0.4 MILLIGRAM(S): 0.4 CAPSULE ORAL at 21:15

## 2021-04-30 RX ADMIN — Medication 100 GRAM(S): at 12:59

## 2021-04-30 RX ADMIN — DONEPEZIL HYDROCHLORIDE 5 MILLIGRAM(S): 10 TABLET, FILM COATED ORAL at 21:15

## 2021-04-30 NOTE — BH CONSULTATION LIAISON ASSESSMENT NOTE - NSICDXBHSECONDARYDX_PSY_ALL_CORE
Delirium of mixed origin   F05  Encounter for general psychiatric examination, requested by authority   Z04.6

## 2021-04-30 NOTE — PROGRESS NOTE ADULT - SUBJECTIVE AND OBJECTIVE BOX
PGY-1 Progress Note discussed with attending    PAGER #: [[phone]] TILL 5:00 PM  PLEASE CONTACT ON CALL TEAM:  - On Call Team (Please refer to Scotty) FROM 5:00 PM - 8:30PM  - Nightfloat Team FROM 8:30 -7:30 AM    INTERVAL HPI/OVERNIGHT EVENTS:   No adverse events overnight. Will be going ot OR on Monday 5/3/21 at 2pm for b/l wound debridement and graft application with Dr. Patel. Vascular Sx consulted.      REVIEW OF SYSTEMS:  CONSTITUTIONAL: No fever, weight loss, or fatigue  RESPIRATORY: No cough, wheezing, chills or hemoptysis; No shortness of breath  CARDIOVASCULAR: No chest pain, palpitations, dizziness, or leg swelling  GASTROINTESTINAL: No abdominal pain. No nausea, vomiting, or hematemesis; No diarrhea or constipation. No melena or hematochezia.  GENITOURINARY: No dysuria or hematuria, urinary frequency  NEUROLOGICAL: No headaches, memory loss, loss of strength, numbness, or tremors  SKIN: No itching, burning, rashes, or lesions     Vital Signs Last 24 Hrs  T(C): 36.9 (30 Apr 2021 04:40), Max: 37 (29 Apr 2021 14:27)  T(F): 98.4 (30 Apr 2021 04:40), Max: 98.6 (29 Apr 2021 14:27)  HR: 74 (30 Apr 2021 09:44) (60 - 77)  BP: 136/52 (30 Apr 2021 09:44) (125/77 - 136/52)  BP(mean): --  RR: 18 (30 Apr 2021 04:40) (16 - 18)  SpO2: 100% (30 Apr 2021 04:40) (99% - 100%)    PHYSICAL EXAMINATION:  GENERAL: NAD, well built  HEAD:  Atraumatic, Normocephalic  EYES:  conjunctiva and sclera clear  NECK: Supple, No JVD, Normal thyroid  CHEST/LUNG: Clear to auscultation. Clear to percussion bilaterally; No rales, rhonchi, wheezing, or rubs  HEART: Regular rate and rhythm; No murmurs, rubs, or gallops  ABDOMEN: Soft, Nontender, Nondistended; Bowel sounds present  NERVOUS SYSTEM:  Alert & Oriented X3,    EXTREMITIES:  2+ Peripheral Pulses, No clubbing, cyanosis, or edema  SKIN: warm dry                          9.1    4.59  )-----------( 166      ( 30 Apr 2021 06:52 )             27.7     04-30    139  |  106  |  13  ----------------------------<  72  5.6<H>   |  28  |  0.94    Ca    8.7      30 Apr 2021 06:52  Phos  3.3     04-30  Mg     2.2     04-30    TPro  8.3  /  Alb  3.2<L>  /  TBili  0.4  /  DBili  x   /  AST  24  /  ALT  17  /  AlkPhos  70  04-30    LIVER FUNCTIONS - ( 30 Apr 2021 06:52 )  Alb: 3.2 g/dL / Pro: 8.3 g/dL / ALK PHOS: 70 U/L / ALT: 17 U/L DA / AST: 24 U/L / GGT: x             CAPILLARY BLOOD GLUCOSE      RADIOLOGY & ADDITIONAL TESTS:

## 2021-04-30 NOTE — BH CONSULTATION LIAISON ASSESSMENT NOTE - CURRENT MEDICATION
MEDICATIONS  (STANDING):  donepezil 5 milliGRAM(s) Oral at bedtime  enoxaparin Injectable 40 milliGRAM(s) SubCutaneous daily  tamsulosin 0.4 milliGRAM(s) Oral at bedtime    MEDICATIONS  (PRN):  haloperidol     Tablet 1 milliGRAM(s) Oral at bedtime PRN Agitation

## 2021-04-30 NOTE — BH CONSULTATION LIAISON ASSESSMENT NOTE - HPI (INCLUDE ILLNESS QUALITY, SEVERITY, DURATION, TIMING, CONTEXT, MODIFYING FACTORS, ASSOCIATED SIGNS AND SYMPTOMS)
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 Quiroz after recent appt for inability to care for LE wounds and concern for progressing dementia. Psych consult was called for assessment and management of dementia. On exam, pt is calm and pleasant but extremely confused and incoherent, speaking in a disconnected fashion about seeing "balls of fire" coming out of people and objects. MD attempts to elicit whether pt is literally seeing fire or if he is describing a Restoration or metaphorical experience. Pt states that he "makes pictures" of the fire and accepts MD's pen and paper to draw a picture, but only scribbles on the paper. Pt does not provide any meaningful information when asked about his medical issues, ADLs or home health care arrangements.

## 2021-04-30 NOTE — BH CONSULTATION LIAISON ASSESSMENT NOTE - DESCRIPTION
B/R in [place] [facility]. , but came to [place] around [place] without his family (wife and 2 children still live in [place]). 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 Lives alone on co-op apt with private hire 24h HHA. Emergency contact is tania Johnson, who lives in [place].

## 2021-04-30 NOTE — PROGRESS NOTE ADULT - PROBLEM SELECTOR PLAN 1
-Worsening dementia   -AAOX1  -As per PCP , pt dementia is getting worse   -S/P 2 doses of 1 mg haldol   -CTH order   -To r/o other causes of dementia ordering Vitamin B12, folate, TSH, RPR  -Psych consulted   -Neurology consulted Dr. Evans

## 2021-04-30 NOTE — PHYSICAL THERAPY INITIAL EVALUATION ADULT - LEVEL OF INDEPENDENCE: SUPINE/SIT, REHAB EVAL
independent Cosentyx Counseling:  I discussed with the patient the risks of Cosentyx including but not limited to worsening of Crohn's disease, immunosuppression, allergic reactions and infections. The patient understands that monitoring is required including a PPD at baseline and must alert us or the primary physician if symptoms of infection or other concerning signs are noted.

## 2021-04-30 NOTE — PROGRESS NOTE ADULT - PROBLEM SELECTOR PLAN 2
-Patient p/w ulcers of LE that are chronic   -Patient needs local wound care that he is unable to do so himself  -hold off on antibiotics for now since no clinical signs on infection  -Wound care consulted  -Podiatry consulted  vascular consult

## 2021-04-30 NOTE — PROGRESS NOTE ADULT - ASSESSMENT
71 yo male with PMH of dementia, colon mass s/p right hemicolectomy and LE ulcers  presents to ED after being sent by his PCP Dr Quiroz's office today for wound check and being unable to take care of himself.    RCRI - Class 1 Risk, Pt has risk of 3.9% of 30-day risk of death, MI or cardiac arrest. Pt is physically and clinically optimized to go for surgery on 5/3, discussed with Dr. Quiroz.

## 2021-04-30 NOTE — BH CONSULTATION LIAISON ASSESSMENT NOTE - RISK ASSESSMENT
Risk factors: Older age, progressive dementia with hallucinations vs delusions. Protective factors: Absent h/o SI/SA/SIB, stable domicile, supportive extended family, attachment to independence, help seeking. Acute risk level appears low at the time of assessment, but chronic risk may be mildly elevated due to above risk factors.

## 2021-04-30 NOTE — CONSULT NOTE ADULT - ATTENDING COMMENTS
As above  LE ulcers.  Art perf adeq  Cont podiatry/wound care fu  FU in office outpt for vasc surveillnce

## 2021-04-30 NOTE — PROGRESS NOTE ADULT - SUBJECTIVE AND OBJECTIVE BOX
Patient was seen and examined  Patient is a 70y old  Male who presents with a chief complaint of wound care (30 Apr 2021 08:08)      INTERVAL HPI/OVERNIGHT EVENTS:  T(C): 36.9 (04-30-21 @ 04:40), Max: 37 (04-29-21 @ 14:27)  HR: 60 (04-30-21 @ 04:40) (60 - 77)  BP: 129/57 (04-30-21 @ 04:40) (125/77 - 130/50)  RR: 18 (04-30-21 @ 04:40) (16 - 18)  SpO2: 100% (04-30-21 @ 04:40) (99% - 100%)  Wt(kg): --  I&O's Summary      LABS:                        9.1    4.59  )-----------( 166      ( 30 Apr 2021 06:52 )             27.7     04-30    139  |  106  |  13  ----------------------------<  72  5.6<H>   |  28  |  0.94    Ca    8.7      30 Apr 2021 06:52  Phos  3.3     04-30  Mg     2.2     04-30    TPro  8.3  /  Alb  3.2<L>  /  TBili  0.4  /  DBili  x   /  AST  24  /  ALT  17  /  AlkPhos  70  04-30        CAPILLARY BLOOD GLUCOSE                  MEDICATIONS  (STANDING):  donepezil 5 milliGRAM(s) Oral at bedtime  enoxaparin Injectable 40 milliGRAM(s) SubCutaneous daily  tamsulosin 0.4 milliGRAM(s) Oral at bedtime    MEDICATIONS  (PRN):  haloperidol     Tablet 1 milliGRAM(s) Oral at bedtime PRN Agitation      RADIOLOGY & ADDITIONAL TESTS:    Imaging Personally Reviewed:  [ ] YES  [ ] NO    REVIEW OF SYSTEMS:  CONSTITUTIONAL: No fever, weight loss, or fatigue  EYES: No eye pain, visual disturbances, or discharge  ENMT:  No difficulty hearing, tinnitus, vertigo; No sinus or throat pain  NECK: No pain or stiffness  BREASTS: No pain, masses, or nipple discharge  RESPIRATORY: No cough, wheezing, chills or hemoptysis; No shortness of breath  CARDIOVASCULAR: No chest pain, palpitations, dizziness, or leg swelling  GASTROINTESTINAL: No abdominal or epigastric pain. No nausea, vomiting, or hematemesis; No diarrhea or constipation. No melena or hematochezia.  GENITOURINARY: No dysuria, frequency, hematuria, or incontinence  NEUROLOGICAL: No headaches, memory loss, loss of strength, numbness, or tremors  SKIN: No itching, burning, rashes, or lesions   LYMPH NODES: No enlarged glands  ENDOCRINE: No heat or cold intolerance; No hair loss  MUSCULOSKELETAL: No joint pain or swelling; No muscle, back, or extremity pain  PSYCHIATRIC: No depression, anxiety, mood swings, or difficulty sleeping  HEME/LYMPH: No easy bruising, or bleeding gums  ALLERY AND IMMUNOLOGIC: No hives or eczema      Consultant(s) Notes Reviewed:  [ x ] YES  [ ] NO    PHYSICAL EXAM:  GENERAL: NAD, well-groomed, well-developed  HEAD:  Atraumatic, Normocephalic  EYES: EOMI, PERRLA, conjunctiva and sclera clear  ENMT: No tonsillar erythema, exudates, or enlargement; Moist mucous membranes, Good dentition, No lesions  NECK: Supple, No JVD, Normal thyroid  NERVOUS SYSTEM:  awake confused  CHEST/LUNG: Clear to percussion bilaterally; No rales, rhonchi, wheezing, or rubs  HEART: Regular rate and rhythm; No murmurs, rubs, or gallops  ABDOMEN: Soft, Nontender, Nondistended; Bowel sounds present  EXTREMITIES:  2+ Peripheral Pulses, No clubbing, cyanosis, or edema  LYMPH: No lymphadenopathy noted  SKIN: No rashes or lesions    Care Discussed with Consultants/Other Providers [ x] YES  [ ] NO

## 2021-04-30 NOTE — CONSULT NOTE ADULT - ASSESSMENT
70M with b/l chronic venous ulcers  No signs of infection  CALI/PVR reviewed    -LE elevation  -Wound care per podiatry  -No acute vascular intervention warranted at this time  -D/w Dr. Flores

## 2021-04-30 NOTE — PROGRESS NOTE ADULT - PROBLEM SELECTOR PLAN 2
-Patient p/w ulcers of LE that are chronic   -Patient needs local wound care that he is unable to do so himself  -hold off on antibiotics for now since no clinical signs on infection  -Podiatry consulted  -Will be getting bilateral wound debridement and graft application on Monday 5/3 by Dr. Patel  -RCRI - Class 1 Risk, Pt has risk of 3.9% of 30-day risk of death, MI or cardiac arrest. Pt is physically and clinically optimized to go for surgery on 5/3, discussed with Dr. Quiroz.  -Vascular consulted, CALI ordered

## 2021-04-30 NOTE — BH CONSULTATION LIAISON ASSESSMENT NOTE - NSBHCONSULTRECOMMENDOTHER_PSY_A_CORE FT
1. For management of behavioral disturbance 2/2 dementia and delirium, recommend Zyprexa 2.5 mg q8h PRN agitation  -- PO dose form is preferred if pt is able to swallow, but IM can be used as backup if PO refused or cannot be given  2. For SEVERE agitation, recommend Zyprexa 5 mg ODT or IM q12h PRN severe agitation  3. Pt does NOT have capacity to participate in DC planning or leave the hospital AMA  --per SW, pt has nephews in [place] who should be asked to consent on his behalf  4. Given recent h/o wandering and poor self-care even with private hire 24h HHA, pt is highly likely to need placement in LTC setting  --HCP will have to consent to placement, as pt does not have capacity due to dementia  5. Psychiatry will continue to follow  6. Case d/w Dr. Fairchild of primary team    Asha Correa MD  Director, Consultation-Liaison Psychiatry Service  [id number]

## 2021-04-30 NOTE — CONSULT NOTE ADULT - SUBJECTIVE AND OBJECTIVE BOX
HPI:   69 yo male with PMH of dementia, colon mass s/p right hemicolectomy and LE ulcers  presents to ED after being sent by his PCP Dr Quiroz's office today for wound check and being unable to take care of himself. Limited history obtained from patient because of dementia, he is unsure why is here. DAVID Ly stated that patient was admitted to outside hospital last week after he left home and could not find his way home. States that his wounds might have gotten worse. Off note, patient was previously admitted to [facility] for foot ulcers and colon mass and had right hemicolectomy done. As per family, patient currently has HHA during the day, but they are trying to get a 24 hour HHA. (28 Apr 2021 22:03)    VASCULAR CONSULT:  Pt seen and examined at bedside for vascular consult d/t chronic venous ulcers of bilateral lower extremities. Pt with hx of dementia, adequate history was unable to be obtained. States he has had these wounds for 3-5 years. As per documentation patient was sent in by PCP Dr. Quiroz for wound care.   Pt offers no acute complaints  Afebrile    PAST MEDICAL & SURGICAL HISTORY:  No pertinent past medical history  Dementia  No significant past surgical history    MEDICATIONS  (STANDING):  ALBUTerol    90 MICROgram(s) HFA Inhaler 1 Puff(s) Inhalation once  calcium gluconate IVPB 2 Gram(s) IV Intermittent once  donepezil 5 milliGRAM(s) Oral at bedtime  enoxaparin Injectable 40 milliGRAM(s) SubCutaneous daily  sodium zirconium cyclosilicate 5 Gram(s) Oral once  tamsulosin 0.4 milliGRAM(s) Oral at bedtime    MEDICATIONS  (PRN):  haloperidol     Tablet 1 milliGRAM(s) Oral at bedtime PRN Agitation    Allergies  penicillin (Other)    Vital Signs Last 24 Hrs  T(C): 36.9 (30 Apr 2021 04:40), Max: 37 (29 Apr 2021 14:27)  T(F): 98.4 (30 Apr 2021 04:40), Max: 98.6 (29 Apr 2021 14:27)  HR: 74 (30 Apr 2021 09:44) (60 - 77)  BP: 136/52 (30 Apr 2021 09:44) (125/77 - 136/52)  RR: 18 (30 Apr 2021 04:40) (16 - 18)  SpO2: 100% (30 Apr 2021 04:40) (99% - 100%)    Physical:  Gen: awake, alert. NAD  Lower extremities:  Left lower extremity: left medial ulcer ankle 4.3x3.8x0.3 fibrotic and necrotic tissue. malodorous. Left medial ankle ulcer 3.5x3.2x0.2 fibrotic. B/L skin hyperpigmentation. No drainage. No purulence. Non-foul smelling. No tenderness. DP palpable pulse. Skin warm to touch. Sensation intact  Right lower extremity: Right medial ankle ulcer 3.7x4.3x0.2 fibrotic. No drainage, purulence, or foul odor. No erythema. No tenderness. +DP/PT pulses palpable. Skin warm to touch. Sensation intact    LABS:                        9.1    4.59  )-----------( 166      ( 30 Apr 2021 06:52 )             27.7              04-30    139  |  106  |  13  ----------------------------<  72  5.6<H>   |  28  |  0.94    Ca    8.7      30 Apr 2021 06:52  Phos  3.3     04-30  Mg     2.2     04-30    TPro  8.3  /  Alb  3.2<L>  /  TBili  0.4  /  DBili  x   /  AST  24  /  ALT  17  /  AlkPhos  70  04-30

## 2021-04-30 NOTE — BH CONSULTATION LIAISON ASSESSMENT NOTE - OTHER
Impaired by dementia Reports frequently seeing "balls of fire" emerging from people and objects (denies currently) Appears very confused Preoccupied with seeing "balls of fire" emerging from people and objects  but estranged from wife for many years

## 2021-04-30 NOTE — PROGRESS NOTE ADULT - PROBLEM SELECTOR PLAN 1
-Worsening dementia   -AAOX1  -As per PCP , pt dementia is getting worse   -S/P 2 doses of 1 mg haldol   -CTH order   -To r/o other causes of dementia ordering Vitamin B12, folate, TSH, RPR  -Will obtain EKG   -Psych consulted   -Neurology consulted Dr. Evans

## 2021-04-30 NOTE — PHYSICAL THERAPY INITIAL EVALUATION ADULT - GENERAL OBSERVATIONS, REHAB EVAL
pt emr review, pmh of dementia, presented functional motorwise, independent toileting adls level floor and incline locomotion with steady gait

## 2021-04-30 NOTE — PROGRESS NOTE ADULT - SUBJECTIVE AND OBJECTIVE BOX
HPI: 71 yo male with PMH of dementia, colon mass s/p right hemicolectomy and LE ulcers  presents to ED after being sent by his PCP Dr. Quiroz's office today for wound check and being unable to take care of himself.      Podiatry HPI: Patient is a 70M with dementia who presents with chronic b/l ankle ulcers. Patient is a patient in wound care clinic who lost follow up. Patient states he has no pain. Patient does not recall much, may be sundowning.     Podiatry progress: Patient seen resting in bed and communicative, relays no pain or acute over night events.   Patient admits to  (-) Fevers, (-) Chills, (-) Nausea, (-) Vomiting, (-) Shortness of Breath (-) calf pain (-) chest pain     Medications donepezil 5 milliGRAM(s) Oral at bedtime  enoxaparin Injectable 40 milliGRAM(s) SubCutaneous daily  haloperidol     Tablet 1 milliGRAM(s) Oral at bedtime PRN  tamsulosin 0.4 milliGRAM(s) Oral at bedtime    FHNo pertinent family history in first degree relatives    ,   PMHNo pertinent past medical history    No pertinent past medical history    Dementia       PSHNo significant past surgical history    No significant past surgical history        Labs                          9.1    4.59  )-----------( 166      ( 30 Apr 2021 06:52 )             27.7      04-30    139  |  106  |  13  ----------------------------<  72  5.6<H>   |  28  |  0.94    Ca    8.7      30 Apr 2021 06:52  Phos  3.3     04-30  Mg     2.2     04-30    TPro  8.3  /  Alb  3.2<L>  /  TBili  0.4  /  DBili  x   /  AST  24  /  ALT  17  /  AlkPhos  70  04-30     Vital Signs Last 24 Hrs  T(C): 36.9 (30 Apr 2021 04:40), Max: 37 (29 Apr 2021 14:27)  T(F): 98.4 (30 Apr 2021 04:40), Max: 98.6 (29 Apr 2021 14:27)  HR: 60 (30 Apr 2021 04:40) (60 - 77)  BP: 129/57 (30 Apr 2021 04:40) (125/77 - 130/50)  BP(mean): --  RR: 18 (30 Apr 2021 04:40) (16 - 18)  SpO2: 100% (30 Apr 2021 04:40) (99% - 100%)          WBC Count: 4.59 K/uL (04-30-21 @ 06:52)        Physical exam   Patient resting comfortably in bed. Patient is AAOx3, ambulation status: walking     Derm: left medial ulcer ankle 4.3x3.8x0.3 fibrotic and necrotic tissue. malodorous. Left medial ankle ulcer 3.5x3.2x0.2 fibrotic. wound with thickened hyperkeratotis. No drainage. No purulence. Right medial ankle ulcer 3.7x4.3x0.2 fibrotic. no probe to bone.  No clinical signs of infection. No erythema. Nails elongated thickened 1-10.   Vasc: DP palpable left foot. No palpable pulses right foot.  No edema noted. Skin temperature noted to be warm to warm from proximal to distal b/l.   Neuro: Protective and epicritic sensation diminished  Musc: No pain on palpation. No equinus      < from: Xray Ankle Complete 3 Views, Bilateral (04.28.21 @ 20:45) >  EXAM:  XR ANKLE COMP MIN 3 VIEWS BI                            PROCEDURE DATE:  04/28/2021          INTERPRETATION:  Clinical information: Bilateral ankle ulcers.    3 views each of the right and left ankle.    No comparison.    Right ankle:    There is irregularity of the medial soft tissues, correlate for ulcer.    There is prominent ossification in the posterior medial soft tissues.  There is interosseous periosteal thickening.  No focal osteolysis is noted. No periarticular osseous erosion is noted.    There is prominent calcaneal enthesopathy.    The evaluation of the subtalar joint is limited on this exam.    Left ankle:    There is irregularity of both the lateral and medial soft tissues, correlate for ulceration. There is prominent ossification in the posterior medial soft tissues. In addition, there is prominent interosseous periosteal thickening, as well as diffuse periosteal cortical thickening involving the fibula.  No focal osteolysis is noted.    Calcaneal enthesopathy is present.  Evaluation of the subtalar joint is limited on this exam.    Soft tissue surgical clips are present.    IMPRESSION:    Findings as discussed above. If there is a clinical suspicion for an active osteomyelitis, recommend further evaluation with MRI if there are no clinical contraindications.            LILY MILLER M.D., ATTENDING RADIOLOGIST  This document has been electronically signed. Apr 29 2021  3:55PM    < end of copied text >

## 2021-04-30 NOTE — BH CONSULTATION LIAISON ASSESSMENT NOTE - SUMMARY
71 yo Citizen of [place]-American M, retired teacher,  but living apart from family (wife and children are in [place]), with PHx of dementia, known to MD from capacity consult during prior adm here in 9/2019, and MHx of chronic LE cellulitis and ulcers and R colon mass s/p hemicolectomy 9/2019, BIB self referred by PMD Dr. Quiroz after recent appt for inability to care for LE wounds and concern for progressing dementia. Psych consult was called for assessment and management of dementia. On exam, pt presents pleasant but extremely confused, speaking in a disconnected and incoherent fashion about seeing "fire" coming out of people and objects. Pt's dementia, which was already evident at MD's first encounter with pt in 9/2019, has clearly progressed very significantly, and pt now appears to be experiencing recurrent hallucinations vs delusions. Pt's cognitive impairment is now so severe that it appears very doubtful that he could continue to live safely at home, even with 24h private hire HHA coverage (which per family pt already has). Pt appears highly likely to need LTP in nursing home capable of caring for pts with advanced dementia. To address behavioral disturbance i/s/o dementia and delirium, we recommend PRN Zyprexa, to be titrated for effect. Pt does not appear to present an acute risk of harm to self or others at the time of assessment, and does not appear to be in need of admission to IP psych at the time of assessment.

## 2021-04-30 NOTE — PROGRESS NOTE ADULT - ASSESSMENT
A:   b/l chronic ankle wounds     Patient seen and evaluated   Chart created   Dressed with santyl dsd b/l ankles   reviewed xrays bl ankle   Patient to be taken to OR Monday 5/3/21 at 2pm for b/l wound debridement and graft application with Dr. Patel   Request medical clearance   Request new covid test on Sunday 5/2/21   no WB restrictions   Recommend vascular consult   Podiatry to follow while in house   Discussed with attending Dr. Patel    A:   b/l chronic ankle wounds     Patient seen and evaluated   Chart created   Dressed with santyl dsd b/l ankles   reviewed xrays bl ankle   Patient to be taken to OR Monday 5/3/21 at 2pm for b/l wound debridement and graft application with Dr. Patel   Request medical clearance   Request new covid test on Sunday 5/2/21   2 physician consent may be needed day of surgery   no WB restrictions   Recommend vascular consult   Podiatry to follow while in house   Seen and evaluated bedside with attending Dr. Patel    A:   b/l chronic ankle wounds     Patient seen and evaluated   Chart created   Dressed with santyl dsd b/l ankles   reviewed xrays bl ankle   Patient to be taken to OR Monday 5/3/21 at 2pm for b/l wound debridement and graft application with Dr. Patel   Request medical clearance   Request new covid test on Sunday 5/2/21   2 physician consent may be needed day of surgery   no WB restrictions   Recommend vascular consult   CALI PV b/l pending   Podiatry to follow while in house   Seen and evaluated bedside with attending Dr. Patel

## 2021-05-01 LAB
ALBUMIN SERPL ELPH-MCNC: 3.1 G/DL — LOW (ref 3.5–5)
ALP SERPL-CCNC: 69 U/L — SIGNIFICANT CHANGE UP (ref 40–120)
ALT FLD-CCNC: 13 U/L DA — SIGNIFICANT CHANGE UP (ref 10–60)
ANION GAP SERPL CALC-SCNC: 7 MMOL/L — SIGNIFICANT CHANGE UP (ref 5–17)
AST SERPL-CCNC: 11 U/L — SIGNIFICANT CHANGE UP (ref 10–40)
BILIRUB SERPL-MCNC: 0.3 MG/DL — SIGNIFICANT CHANGE UP (ref 0.2–1.2)
BUN SERPL-MCNC: 17 MG/DL — SIGNIFICANT CHANGE UP (ref 7–18)
CALCIUM SERPL-MCNC: 8.8 MG/DL — SIGNIFICANT CHANGE UP (ref 8.4–10.5)
CHLORIDE SERPL-SCNC: 109 MMOL/L — HIGH (ref 96–108)
CO2 SERPL-SCNC: 24 MMOL/L — SIGNIFICANT CHANGE UP (ref 22–31)
CREAT SERPL-MCNC: 0.94 MG/DL — SIGNIFICANT CHANGE UP (ref 0.5–1.3)
FOLATE SERPL-MCNC: 5.9 NG/ML — SIGNIFICANT CHANGE UP
GLUCOSE SERPL-MCNC: 92 MG/DL — SIGNIFICANT CHANGE UP (ref 70–99)
HCT VFR BLD CALC: 33.5 % — LOW (ref 39–50)
HCYS SERPL-MCNC: 12.2 UMOL/L — SIGNIFICANT CHANGE UP
HGB BLD-MCNC: 11 G/DL — LOW (ref 13–17)
MAGNESIUM SERPL-MCNC: 2 MG/DL — SIGNIFICANT CHANGE UP (ref 1.6–2.6)
MCHC RBC-ENTMCNC: 30.2 PG — SIGNIFICANT CHANGE UP (ref 27–34)
MCHC RBC-ENTMCNC: 32.8 GM/DL — SIGNIFICANT CHANGE UP (ref 32–36)
MCV RBC AUTO: 92 FL — SIGNIFICANT CHANGE UP (ref 80–100)
NRBC # BLD: 0 /100 WBCS — SIGNIFICANT CHANGE UP (ref 0–0)
PHOSPHATE SERPL-MCNC: 3.2 MG/DL — SIGNIFICANT CHANGE UP (ref 2.5–4.5)
PLATELET # BLD AUTO: 207 K/UL — SIGNIFICANT CHANGE UP (ref 150–400)
POTASSIUM SERPL-MCNC: 3.8 MMOL/L — SIGNIFICANT CHANGE UP (ref 3.5–5.3)
POTASSIUM SERPL-SCNC: 3.8 MMOL/L — SIGNIFICANT CHANGE UP (ref 3.5–5.3)
PROT SERPL-MCNC: 7.3 G/DL — SIGNIFICANT CHANGE UP (ref 6–8.3)
PROT SERPL-MCNC: 7.3 G/DL — SIGNIFICANT CHANGE UP (ref 6–8.3)
PROT SERPL-MCNC: 7.9 G/DL — SIGNIFICANT CHANGE UP (ref 6–8.3)
RBC # BLD: 3.64 M/UL — LOW (ref 4.2–5.8)
RBC # FLD: 13.8 % — SIGNIFICANT CHANGE UP (ref 10.3–14.5)
SODIUM SERPL-SCNC: 140 MMOL/L — SIGNIFICANT CHANGE UP (ref 135–145)
T PALLIDUM AB TITR SER: NEGATIVE — SIGNIFICANT CHANGE UP
TSH SERPL-MCNC: 1.1 UU/ML — SIGNIFICANT CHANGE UP (ref 0.34–4.82)
VIT B12 SERPL-MCNC: 386 PG/ML — SIGNIFICANT CHANGE UP (ref 232–1245)
WBC # BLD: 4.23 K/UL — SIGNIFICANT CHANGE UP (ref 3.8–10.5)
WBC # FLD AUTO: 4.23 K/UL — SIGNIFICANT CHANGE UP (ref 3.8–10.5)

## 2021-05-01 RX ADMIN — DONEPEZIL HYDROCHLORIDE 5 MILLIGRAM(S): 10 TABLET, FILM COATED ORAL at 22:50

## 2021-05-01 RX ADMIN — ENOXAPARIN SODIUM 40 MILLIGRAM(S): 100 INJECTION SUBCUTANEOUS at 12:21

## 2021-05-01 RX ADMIN — TAMSULOSIN HYDROCHLORIDE 0.4 MILLIGRAM(S): 0.4 CAPSULE ORAL at 22:50

## 2021-05-01 NOTE — PROGRESS NOTE ADULT - SUBJECTIVE AND OBJECTIVE BOX
HPI: 71 yo male with PMH of dementia, colon mass s/p right hemicolectomy and LE ulcers  presents to ED after being sent by his PCP Dr. Quiroz's office today for wound check and being unable to take care of himself.      Podiatry HPI: Patient is a 70M with dementia who presents with chronic b/l ankle ulcers. Patient is a patient in wound care clinic who lost follow up. Patient states he has no pain. Patient does not recall much, may be sundowning.     Podiatry progress: Patient seen resting in bed and communicative but spaces out during conversation, relays no pain or acute over night events. AAOx2    Patient admits to  (-) Fevers, (-) Chills, (-) Nausea, (-) Vomiting, (-) Shortness of Breath (-) calf pain (-) chest pain     Medications donepezil 5 milliGRAM(s) Oral at bedtime  enoxaparin Injectable 40 milliGRAM(s) SubCutaneous daily  haloperidol     Tablet 1 milliGRAM(s) Oral at bedtime PRN  tamsulosin 0.4 milliGRAM(s) Oral at bedtime    FHNo pertinent family history in first degree relatives    ,   PMHNo pertinent past medical history    No pertinent past medical history    Dementia       PSHNo significant past surgical history    No significant past surgical history    S/P right hemicolectomy        Labs                          11.0   4.23  )-----------( 207      ( 01 May 2021 05:35 )             33.5      04-30    139  |  106  |  13  ----------------------------<  72  5.6<H>   |  28  |  0.94    Ca    8.7      30 Apr 2021 06:52  Phos  3.3     04-30  Mg     2.2     04-30    TPro  8.3  /  Alb  3.2<L>  /  TBili  0.4  /  DBili  x   /  AST  24  /  ALT  17  /  AlkPhos  70  04-30     Vital Signs Last 24 Hrs  T(C): 36.6 (01 May 2021 04:52), Max: 36.6 (30 Apr 2021 14:25)  T(F): 97.9 (01 May 2021 04:52), Max: 97.9 (01 May 2021 04:52)  HR: 72 (01 May 2021 04:52) (66 - 74)  BP: 127/71 (01 May 2021 04:52) (127/71 - 152/70)  BP(mean): --  RR: 18 (01 May 2021 04:52) (16 - 18)  SpO2: 98% (01 May 2021 04:52) (98% - 100%)          WBC Count: 4.23 K/uL (05-01-21 @ 05:35)    Physical exam   Patient resting comfortably in bed. Patient is AAOx3, ambulation status: walking     Derm: left medial ulcer ankle 4.3x3.8x0.3 fibrotic and necrotic tissue. malodorous. Left medial ankle ulcer 3.5x3.2x0.2 fibrotic. wound with thickened hyperkeratotis. No drainage. No purulence. Right medial ankle ulcer 3.7x4.3x0.2 fibrotic. no probe to bone.  No clinical signs of infection. No erythema. Nails elongated thickened 1-10.   Vasc: DP palpable left foot. No palpable pulses right foot.  No edema noted. Skin temperature noted to be warm to warm from proximal to distal b/l.   Neuro: Protective and epicritic sensation diminished  Musc: No pain on palpation. No equinus      < from: Xray Ankle Complete 3 Views, Bilateral (04.28.21 @ 20:45) >  EXAM:  XR ANKLE COMP MIN 3 VIEWS BI                            PROCEDURE DATE:  04/28/2021          INTERPRETATION:  Clinical information: Bilateral ankle ulcers.    3 views each of the right and left ankle.    No comparison.    Right ankle:    There is irregularity of the medial soft tissues, correlate for ulcer.    There is prominent ossification in the posterior medial soft tissues.  There is interosseous periosteal thickening.  No focal osteolysis is noted. No periarticular osseous erosion is noted.    There is prominent calcaneal enthesopathy.    The evaluation of the subtalar joint is limited on this exam.    Left ankle:    There is irregularity of both the lateral and medial soft tissues, correlate for ulceration. There is prominent ossification in the posterior medial soft tissues. In addition, there is prominent interosseous periosteal thickening, as well as diffuse periosteal cortical thickening involving the fibula.  No focal osteolysis is noted.    Calcaneal enthesopathy is present.  Evaluation of the subtalar joint is limited on this exam.    Soft tissue surgical clips are present.    IMPRESSION:    Findings as discussed above. If there is a clinical suspicion for an active osteomyelitis, recommend further evaluation with MRI if there are no clinical contraindications.            LILY MILLER M.D., ATTENDING RADIOLOGIST  This document has been electronically signed. Apr 29 2021  3:55PM    < end of copied text >    < from: [facility] Physiol Extremity Lower 3+ Level, BI (04.30.21 @ 11:06) >    EXAM:  US PHYSIOL LWR EXT 3+ LEV BI                            PROCEDURE DATE:  04/30/2021          INTERPRETATION:  Clinical information: Nondiabetic, nonhealing left lower extremity ulcer, nonpalpable distal pulses.    COMPARISON: Lower extremityarterial Doppler study dated 9/18/2019.    TECHNIQUE: Lower extremity arterial Doppler study.    FINDINGS: Ankle brachial index measures 1.24 on the right and 1.16 on the left, compared with prior values of 0.93 and 0.94. No segmental arterial pressure gradient is detected.    PVR waveforms are normal in amplitude and pulsatility throughout.    IMPRESSION: No Doppler evidence of hemodynamically significant arterial flow limitation in either lower extremity.            SMITA MORRIS MD; Attending Radiologist  This document has been electronically signed. Apr 30 2021  2:13PM    < end of copied text >

## 2021-05-01 NOTE — PROGRESS NOTE ADULT - ASSESSMENT
A:   b/l chronic ankle wounds     Patient seen and evaluated   Chart created   Dressed with santyl dsd b/l ankles   reviewed xrays bl ankle   Patient to be taken to OR Monday 5/3/21 at 2pm for b/l wound debridement and graft application with Dr. Patel   Request medical clearance   Request new covid test on Sunday 5/2/21   2 physician consent may be needed day of surgery   no WB restrictions   Recommend vascular consult   CALI PVR b/l reviewed, noted above   Podiatry to follow while in house   Seen and evaluated bedside with attending Dr. Patel

## 2021-05-01 NOTE — PROGRESS NOTE ADULT - SUBJECTIVE AND OBJECTIVE BOX
Patient was seen and examined  Patient is a 70y old  Male who presents with a chief complaint of wound care (30 Apr 2021 12:45)      INTERVAL HPI/OVERNIGHT EVENTS:  T(C): 36.6 (05-01-21 @ 04:52), Max: 36.6 (04-30-21 @ 14:25)  HR: 72 (05-01-21 @ 04:52) (66 - 74)  BP: 127/71 (05-01-21 @ 04:52) (127/71 - 152/70)  RR: 18 (05-01-21 @ 04:52) (16 - 18)  SpO2: 98% (05-01-21 @ 04:52) (98% - 100%)  Wt(kg): --  I&O's Summary      LABS:                        11.0   4.23  )-----------( 207      ( 01 May 2021 05:35 )             33.5     04-30    139  |  106  |  13  ----------------------------<  72  5.6<H>   |  28  |  0.94    Ca    8.7      30 Apr 2021 06:52  Phos  3.3     04-30  Mg     2.2     04-30    TPro  8.3  /  Alb  3.2<L>  /  TBili  0.4  /  DBili  x   /  AST  24  /  ALT  17  /  AlkPhos  70  04-30        CAPILLARY BLOOD GLUCOSE                  MEDICATIONS  (STANDING):  donepezil 5 milliGRAM(s) Oral at bedtime  enoxaparin Injectable 40 milliGRAM(s) SubCutaneous daily  tamsulosin 0.4 milliGRAM(s) Oral at bedtime    MEDICATIONS  (PRN):  haloperidol     Tablet 1 milliGRAM(s) Oral at bedtime PRN Agitation      RADIOLOGY & ADDITIONAL TESTS:    Imaging Personally Reviewed:  [ ] YES  [ ] NO    REVIEW OF SYSTEMS:  confused    Consultant(s) Notes Reviewed:  [ x ] YES  [ ] NO    PHYSICAL EXAM:  GENERAL: NAD, well-groomed, well-developed  HEAD:  Atraumatic, Normocephalic  EYES: EOMI, PERRLA, conjunctiva and sclera clear  ENMT: No tonsillar erythema, exudates, or enlargement; Moist mucous membranes, Good dentition, No lesions  NECK: Supple, No JVD, Normal thyroid  NERVOUS SYSTEM: confused   CHEST/LUNG: Clear to percussion bilaterally; No rales, rhonchi, wheezing, or rubs  HEART: Regular rate and rhythm; No murmurs, rubs, or gallops  ABDOMEN: Soft, Nontender, Nondistended; Bowel sounds present  EXTREMITIES:  le ulcer   LYMPH: No lymphadenopathy noted  SKIN: No rashes or lesions    Care Discussed with Consultants/Other Providers [ x] YES  [ ] NO

## 2021-05-01 NOTE — PROGRESS NOTE ADULT - ASSESSMENT
69 yo male with PMH of dementia, colon mass s/p right hemicolectomy and LE ulcers  presents to ED after being sent by his PCP Dr Quiroz's office today for wound check and being unable to take care of himself.    RCRI - Class 1 Risk, Pt has risk of 3.9% of 30-day risk of death, MI or cardiac arrest. Pt is physically and clinically optimized to go for surgery on 5/3, discussed with Dr. Quiroz.

## 2021-05-02 ENCOUNTER — TRANSCRIPTION ENCOUNTER (OUTPATIENT)
Age: 71
End: 2021-05-02

## 2021-05-02 LAB
ALBUMIN SERPL ELPH-MCNC: 3.5 G/DL — SIGNIFICANT CHANGE UP (ref 3.5–5)
ALP SERPL-CCNC: 70 U/L — SIGNIFICANT CHANGE UP (ref 40–120)
ALT FLD-CCNC: 14 U/L DA — SIGNIFICANT CHANGE UP (ref 10–60)
ANION GAP SERPL CALC-SCNC: 4 MMOL/L — LOW (ref 5–17)
AST SERPL-CCNC: 10 U/L — SIGNIFICANT CHANGE UP (ref 10–40)
BILIRUB SERPL-MCNC: 0.4 MG/DL — SIGNIFICANT CHANGE UP (ref 0.2–1.2)
BUN SERPL-MCNC: 18 MG/DL — SIGNIFICANT CHANGE UP (ref 7–18)
CALCIUM SERPL-MCNC: 8.9 MG/DL — SIGNIFICANT CHANGE UP (ref 8.4–10.5)
CHLORIDE SERPL-SCNC: 109 MMOL/L — HIGH (ref 96–108)
CO2 SERPL-SCNC: 26 MMOL/L — SIGNIFICANT CHANGE UP (ref 22–31)
CREAT SERPL-MCNC: 0.97 MG/DL — SIGNIFICANT CHANGE UP (ref 0.5–1.3)
GLUCOSE SERPL-MCNC: 87 MG/DL — SIGNIFICANT CHANGE UP (ref 70–99)
HCT VFR BLD CALC: 35.7 % — LOW (ref 39–50)
HGB BLD-MCNC: 12 G/DL — LOW (ref 13–17)
MAGNESIUM SERPL-MCNC: 2.1 MG/DL — SIGNIFICANT CHANGE UP (ref 1.6–2.6)
MCHC RBC-ENTMCNC: 30.4 PG — SIGNIFICANT CHANGE UP (ref 27–34)
MCHC RBC-ENTMCNC: 33.6 GM/DL — SIGNIFICANT CHANGE UP (ref 32–36)
MCV RBC AUTO: 90.4 FL — SIGNIFICANT CHANGE UP (ref 80–100)
NRBC # BLD: 0 /100 WBCS — SIGNIFICANT CHANGE UP (ref 0–0)
PHOSPHATE SERPL-MCNC: 3.3 MG/DL — SIGNIFICANT CHANGE UP (ref 2.5–4.5)
PLATELET # BLD AUTO: 207 K/UL — SIGNIFICANT CHANGE UP (ref 150–400)
POTASSIUM SERPL-MCNC: 3.8 MMOL/L — SIGNIFICANT CHANGE UP (ref 3.5–5.3)
POTASSIUM SERPL-SCNC: 3.8 MMOL/L — SIGNIFICANT CHANGE UP (ref 3.5–5.3)
PROT SERPL-MCNC: 8.3 G/DL — SIGNIFICANT CHANGE UP (ref 6–8.3)
RBC # BLD: 3.95 M/UL — LOW (ref 4.2–5.8)
RBC # FLD: 13.4 % — SIGNIFICANT CHANGE UP (ref 10.3–14.5)
SARS-COV-2 RNA SPEC QL NAA+PROBE: SIGNIFICANT CHANGE UP
SODIUM SERPL-SCNC: 139 MMOL/L — SIGNIFICANT CHANGE UP (ref 135–145)
WBC # BLD: 4.67 K/UL — SIGNIFICANT CHANGE UP (ref 3.8–10.5)
WBC # FLD AUTO: 4.67 K/UL — SIGNIFICANT CHANGE UP (ref 3.8–10.5)

## 2021-05-02 PROCEDURE — 73630 X-RAY EXAM OF FOOT: CPT | Mod: 26,50

## 2021-05-02 RX ORDER — OLANZAPINE 15 MG/1
2.5 TABLET, FILM COATED ORAL EVERY 8 HOURS
Refills: 0 | Status: DISCONTINUED | OUTPATIENT
Start: 2021-05-02 | End: 2021-05-03

## 2021-05-02 NOTE — PROGRESS NOTE ADULT - ASSESSMENT
A:   b/l chronic ankle wounds     Patient seen and evaluated   Dressed with santyl dsd b/l ankles   reviewed xrays bl ankle   Patient to be taken to OR Monday 5/3/21 at 2pm for b/l wound debridement and graft application with Dr. Patel   Appreciated medical clearance and covid test prior to surgery  Ordered stat covid test  5/1/21   Ordered NPO after midnight  2 physician consent may be needed day of surgery   Podiatry consent will be obtained on Monday 5/3/21 prior to surgery  No WB restrictions   Recommend vascular consult   CALI PVR b/l reviewed, noted above   Podiatry to follow while in house   Seen and evaluated bedside with attending Dr. Patel    A:   b/l chronic ankle wounds     Patient seen and evaluated   Dressed with santyl dsd b/l ankles   reviewed xrays bl ankle   Patient to be taken to OR Monday 5/3/21 at 2pm for b/l wound debridement and graft application with Dr. Patel   Appreciated medical clearance and covid test prior to surgery  Ordered stat covid test  5/1/21   Ordered NPO after midnight  2 physician consent may be needed day of surgery , appreciated one consent from attending of medicine department  Podiatry consent will be obtained on Monday 5/3/21 prior to surgery with Dr. Patel  No WB restrictions   Recommend vascular consult   CALI PVR b/l reviewed, noted above   Podiatry to follow while in house   Seen and evaluated bedside with attending Dr. Patel

## 2021-05-02 NOTE — DISCHARGE NOTE PROVIDER - NSDCQMERRANDS_GEN_ALL_CORE
Problem: Communication  Goal: The ability to communicate needs accurately and effectively will improve  Outcome: PROGRESSING AS EXPECTED   Updated on POC  Problem: Safety  Goal: Will remain free from injury  Outcome: PROGRESSING AS EXPECTED   Pt uses call light appropriately    Yes

## 2021-05-02 NOTE — PROGRESS NOTE ADULT - PROBLEM SELECTOR PLAN 3
- Patient unable to take care of himself as per PCP office  - SW consulted  - Will need LTC placement based on Psych evaluation

## 2021-05-02 NOTE — PROGRESS NOTE ADULT - ASSESSMENT
69 yo male with PMH of dementia, colon mass s/p right hemicolectomy and LE ulcers  presents to ED after being sent by his PCP Dr Quiroz's office today for wound check and being unable to take care of himself.    RCRI - Class 1 Risk, Pt has risk of 3.9% of 30-day risk of death, MI or cardiac arrest. Pt is physically and clinically optimized to go for surgery on 5/3, discussed with Dr. uQiroz.

## 2021-05-02 NOTE — DISCHARGE NOTE PROVIDER - NSDCMRMEDTOKEN_GEN_ALL_CORE_FT
Aspir 81 oral delayed release tablet: 1 tab(s) orally once a day  donepezil 5 mg oral tablet: 1 tab(s) orally once a day (at bedtime)  tamsulosin 0.4 mg capsule:   Vitamin D3 50,000 intl units (1250 mcg) oral capsule: 1 tab(s) orally once a week   Aspir 81 oral delayed release tablet: 1 tab(s) orally once a day  cholecalciferol 1000 intl units (25 mcg) oral capsule: 1 tab(s) orally once a day   donepezil 5 mg oral tablet: 1 tab(s) orally once a day (at bedtime)  ferrous sulfate 325 mg (65 mg elemental iron) oral tablet: 1 tab(s) orally once a day  OLANZapine 2.5 mg oral tablet: 1 tab(s) orally every 8 hours, As needed, Agitation  tamsulosin 0.4 mg capsule:

## 2021-05-02 NOTE — DISCHARGE NOTE PROVIDER - HOSPITAL COURSE
69 yo male with PMH of dementia, colon mass s/p right hemicolectomy and LE ulcers  presents to ED after being sent by his PCP Dr Quiroz's office today for wound check and being unable to take care of himself.    Pt has been having worsening dementia, he is AAOx1 at baseline now, can never fixate on question and has wandering thoughts of the past. CT head was negative for any acute pathology. Electrolytes, vitamin B12, folate, homocysteine, MMA, TSH, RPR were all within the normal range. Psychiatry consulted. Neurology consulted Dr. Evans.     Patient presented with ulcers of LE that are chronic venous stasis ulcers. Patient needs local wound care that he is unable to do so himself  -hold off on antibiotics for now since no clinical signs on infection  -Podiatry consulted  -Will be getting bilateral wound debridement and graft application on Monday 5/3 by Dr. Patel  -RCRI - Class 1 Risk, Pt has risk of 3.9% of 30-day risk of death, MI or cardiac arrest. Pt is physically and clinically optimized to go for surgery on 5/3, discussed with Dr. Quiroz.  -Vascular consulted, CALI ordered.      Problem/Plan - 3:  ·  Problem: Social problem.  Plan: -Patient unable to take care of himself as per PCP office  -SW consulted.    71 yo male with PMH of dementia, colon mass s/p right hemicolectomy and LE ulcers  presents to ED after being sent by his PCP Dr Quiroz's office today for wound check and being unable to take care of himself.    Pt has been having worsening dementia, he is AAOx1 at baseline now, can never fixate on question and has wandering thoughts of the past. CT head was negative for any acute pathology. Electrolytes, vitamin B12, folate, homocysteine, MMA, TSH, RPR were all within the normal range. Neurology Dr. Evans consulted. Psychiatry Dr. Correa consulted and recommended to start Zyprexa 2.5 mg Q8h PRN for agitation and pt will need LTC placement.  Patient presented with ulcers of LE that are chronic venous stasis ulcers, unable to take care of himself. Podiatry consulted and did a bilateral wound debridement and graft application on Monday 5/3 by Dr. Patel. Pt is physically and clinically optimized to go for surgery on 5/3, discussed with Dr. Quiroz. Vascular consulted, CALI was negative for bilateral LE vascular compromise.      Patient is stable for discharge per attending and is advised to follow up with PCP as outpatient  Please refer to patient's complete medical chart with documents for a full hospital course, for this is only a brief summary.   71 yo male with PMH of dementia, colon mass s/p right hemicolectomy and LE ulcers  presents to ED after being sent by his PCP Dr Quiroz's office today for wound check and being unable to take care of himself.    Pt has been having worsening dementia, he is AAOx1 at baseline now, can never fixate on question and has wandering thoughts of the past. CT head was negative for any acute pathology. Electrolytes, vitamin B12, folate, homocysteine, MMA, TSH, RPR were all within the normal range. Neurology Dr. Evans consulted. Psychiatry Dr. Correa consulted and recommended to start Zyprexa 2.5 mg Q8h PRN for agitation and pt will need LTC placement.  Patient presented with ulcers of LE that are chronic venous stasis ulcers, unable to take care of himself. Podiatry consulted and did a bilateral wound debridement and graft application on Monday 5/3 by Dr. Patel. Pt is physically and clinically optimized to go for surgery on 5/3, discussed with Dr. Quiroz. Vascular consulted, CALI was negative for bilateral LE vascular compromise.      Patient has worsening dementia, given worsening dementia, and decline in functional and physical status- pt will need placement at Rehab for optimal care and to prevent re-admission.    Patient is stable for discharge per attending and is advised to follow up with PCP as outpatient  Please refer to patient's complete medical chart with documents for a full hospital course, for this is only a brief summary.   Patient is a 71 yo male with PMH of worsening Dementia, Colon mass s/p right hemicolectomy and LE ulcers presents to ED after being sent by his PCP Dr Quiroz's office for wound check and being unable to take care of himself.    Pt has been having worsening dementia, he is AAOx1 at baseline now, can never fixate on question and has wandering thoughts of the past. CT head was negative for any acute pathology. Electrolytes, vitamin B12, folate, homocysteine, MMA, TSH, RPR were all within the normal range. Neurology Dr. Evans consulted. Psychiatry Dr. Correa consulted and recommended to start Zyprexa 2.5 mg Q8h PRN for agitation (which he did not require during hospital admission) and recommended LTC placement.    Patient presented with ulcers of LE that are chronic venous stasis ulcers, unable to take care of himself. Podiatry performed Excisional debridement of Left ankle wound, Right foot ulcer, Right ankle wound and graft application of Bilateral Ankle wounds (Integra bilayer and derm-max) on May 3 by Dr. Patel. Patient advised to follow outpatient at Podiatry clinic.     Patient has worsening dementia, given worsening dementia, and decline in functional and physical status- pt will be discharged to Rehab for optimal care and to prevent re-admission.    Patient is stable for discharge per Attending and is advised to follow up with PCP as outpatient  Please refer to patient's complete medical chart with documents for a full hospital course, for this is only a brief summary.

## 2021-05-02 NOTE — DISCHARGE NOTE PROVIDER - NSDCCPCAREPLAN_GEN_ALL_CORE_FT
PRINCIPAL DISCHARGE DIAGNOSIS  Diagnosis: Venous ulcer-leg syndrome, bilateral  Assessment and Plan of Treatment: Patient presented with ulcers of LE that are chronic venous stasis ulcers, unable to take care of himself. Podiatry consulted and did a bilateral wound debridement and graft application on Monday 5/3 by Dr. Patel. Pt is physically and clinically optimized to go for surgery on 5/3, discussed with Dr. Quiroz. Vascular consulted, CALI was negative for bilateral LE vascular compromise.      SECONDARY DISCHARGE DIAGNOSES  Diagnosis: Dementia  Assessment and Plan of Treatment: Pt has been having worsening dementia, he is AAOx1 at baseline now, can never fixate on question and has wandering thoughts of the past. CT head was negative for any acute pathology. Electrolytes, vitamin B12, folate, homocysteine, MMA, TSH, RPR were all within the normal range. Neurology Dr. Evans consulted. Psychiatry Dr. Correa consulted and recommended to start Zyprexa 2.5 mg Q8h PRN for agitation and pt will need LTC placement.     PRINCIPAL DISCHARGE DIAGNOSIS  Diagnosis: Venous ulcer-leg syndrome, bilateral  Assessment and Plan of Treatment: Patient presented with ulcers of lower legs that are known as Chronic venous stasis ulcers. Podiatry followed the patient, Excisional debridement of Left ankle wound, Right foot ulcer, Right ankle wound and graft application of Bilateral Ankle wounds (Integra bilayer and derm-max) on May 3 by Dr. Patel. Vascular consulted, CALI was negative for bilateral LE vascular compromise. Patient advised to follow up at the Podiatry Clinic at [address] [place] [address] following hospital discharge.      SECONDARY DISCHARGE DIAGNOSES  Diagnosis: Dementia  Assessment and Plan of Treatment: Patient has been having worsening dementia, he is AAOx1 at baseline now, can never fixate on question and has wandering thoughts of the past. CT head was negative for any acute abnormalities. Electrolytes, Vitamin B12, Folate, Homocysteine, Methylmalonic acid, Thyroid stimulating hormone, Syphilis testing were all within the normal range. Neurology Dr. Evans and Psychiatry Dr. Correa followed the patient, recommended to start Zyprexa 2.5 mg every 8 hours as needed for agitation, patient did not require Zyprexa during his hospital admission. Patient would ideally be a candidate for Long term placement given worsening dementia and deterioration in physical status, however, family opted for Rehab option at this time.

## 2021-05-02 NOTE — DISCHARGE NOTE PROVIDER - NSFOLLOWUPCLINICS_GEN_ALL_ED_FT
[facility]  Podiatry/Wound [facility]  [address] [place] [address]  Phone: [phone]  Fax: [phone]

## 2021-05-02 NOTE — PROGRESS NOTE ADULT - SUBJECTIVE AND OBJECTIVE BOX
HPI: 71 yo male with PMH of dementia, colon mass s/p right hemicolectomy and LE ulcers  presents to ED after being sent by his PCP Dr. Quiroz's office today for wound check and being unable to take care of himself.      Podiatry HPI: Patient is a 70M with dementia who presents with chronic b/l ankle ulcers. Patient is a patient in wound care clinic who lost follow up. Patient states he has no pain. Patient does not recall much, may be sundowning.     Podiatry progress: Patient seen resting in bed and communicative but spaces out during conversation, relays no pain or acute over night events. Patient was very confused in  the morning  based on nurse in the room.    Patient admits to  (-) Fevers, (-) Chills, (-) Nausea, (-) Vomiting, (-) Shortness of Breath (-) calf pain (-) chest pain     Medications donepezil 5 milliGRAM(s) Oral at bedtime  enoxaparin Injectable 40 milliGRAM(s) SubCutaneous daily  haloperidol     Tablet 1 milliGRAM(s) Oral at bedtime PRN  tamsulosin 0.4 milliGRAM(s) Oral at bedtime    FHNo pertinent family history in first degree relatives    ,   PMHNo pertinent past medical history    No pertinent past medical history    Dementia       PSHNo significant past surgical history    No significant past surgical history    S/P right hemicolectomy        Labs                          11.0   4.23  )-----------( 207      ( 01 May 2021 05:35 )             33.5      04-30    139  |  106  |  13  ----------------------------<  72  5.6<H>   |  28  |  0.94    Ca    8.7      30 Apr 2021 06:52  Phos  3.3     04-30  Mg     2.2     04-30    TPro  8.3  /  Alb  3.2<L>  /  TBili  0.4  /  DBili  x   /  AST  24  /  ALT  17  /  AlkPhos  70  04-30     Vital Signs Last 24 Hrs  T(C): 36.6 (01 May 2021 04:52), Max: 36.6 (30 Apr 2021 14:25)  T(F): 97.9 (01 May 2021 04:52), Max: 97.9 (01 May 2021 04:52)  HR: 72 (01 May 2021 04:52) (66 - 74)  BP: 127/71 (01 May 2021 04:52) (127/71 - 152/70)  BP(mean): --  RR: 18 (01 May 2021 04:52) (16 - 18)  SpO2: 98% (01 May 2021 04:52) (98% - 100%)          WBC Count: 4.23 K/uL (05-01-21 @ 05:35)    Physical exam   Patient resting comfortably in bed. Patient is AAOx3, ambulation status: walking     Derm: left medial ulcer ankle 4.3x3.8x0.3 fibrotic and necrotic tissue. malodorous. Left medial ankle ulcer 3.5x3.2x0.2 fibrotic. wound with thickened hyperkeratotis. No drainage. No purulence. Right medial ankle ulcer 3.7x4.3x0.2 fibrotic. no probe to bone.  No clinical signs of infection. No erythema. Nails elongated thickened 1-10.   Vasc: DP palpable left foot. No palpable pulses right foot.  No edema noted. Skin temperature noted to be warm to warm from proximal to distal b/l.   Neuro: Protective and epicritic sensation diminished  Musc: No pain on palpation. No equinus      < from: Xray Ankle Complete 3 Views, Bilateral (04.28.21 @ 20:45) >  EXAM:  XR ANKLE COMP MIN 3 VIEWS BI                            PROCEDURE DATE:  04/28/2021          INTERPRETATION:  Clinical information: Bilateral ankle ulcers.    3 views each of the right and left ankle.    No comparison.    Right ankle:    There is irregularity of the medial soft tissues, correlate for ulcer.    There is prominent ossification in the posterior medial soft tissues.  There is interosseous periosteal thickening.  No focal osteolysis is noted. No periarticular osseous erosion is noted.    There is prominent calcaneal enthesopathy.    The evaluation of the subtalar joint is limited on this exam.    Left ankle:    There is irregularity of both the lateral and medial soft tissues, correlate for ulceration. There is prominent ossification in the posterior medial soft tissues. In addition, there is prominent interosseous periosteal thickening, as well as diffuse periosteal cortical thickening involving the fibula.  No focal osteolysis is noted.    Calcaneal enthesopathy is present.  Evaluation of the subtalar joint is limited on this exam.    Soft tissue surgical clips are present.    IMPRESSION:    Findings as discussed above. If there is a clinical suspicion for an active osteomyelitis, recommend further evaluation with MRI if there are no clinical contraindications.            LILY MILLER M.D., ATTENDING RADIOLOGIST  This document has been electronically signed. Apr 29 2021  3:55PM    < end of copied text >    < from: [facility] Physiol Extremity Lower 3+ Level, BI (04.30.21 @ 11:06) >    EXAM:  US PHYSIOL LWR EXT 3+ LEV BI                            PROCEDURE DATE:  04/30/2021          INTERPRETATION:  Clinical information: Nondiabetic, nonhealing left lower extremity ulcer, nonpalpable distal pulses.    COMPARISON: Lower extremityarterial Doppler study dated 9/18/2019.    TECHNIQUE: Lower extremity arterial Doppler study.    FINDINGS: Ankle brachial index measures 1.24 on the right and 1.16 on the left, compared with prior values of 0.93 and 0.94. No segmental arterial pressure gradient is detected.    PVR waveforms are normal in amplitude and pulsatility throughout.    IMPRESSION: No Doppler evidence of hemodynamically significant arterial flow limitation in either lower extremity.            SMITA MORRIS MD; Attending Radiologist  This document has been electronically signed. Apr 30 2021  2:13PM    < end of copied text >

## 2021-05-02 NOTE — PROGRESS NOTE ADULT - SUBJECTIVE AND OBJECTIVE BOX
PGY-1 Progress Note discussed with attending    PAGER #: [[phone]] TILL 5:00 PM  PLEASE CONTACT ON CALL TEAM:  - On Call Team (Please refer to Scotty) FROM 5:00 PM - 8:30PM  - Nightfloat Team FROM 8:30 -7:30 AM    INTERVAL HPI/OVERNIGHT EVENTS:   No adverse events overnight    REVIEW OF SYSTEMS:  CONSTITUTIONAL: No fever, weight loss, or fatigue  RESPIRATORY: No cough, wheezing, chills or hemoptysis; No shortness of breath  CARDIOVASCULAR: No chest pain, palpitations, dizziness, or leg swelling  GASTROINTESTINAL: No abdominal pain. No nausea, vomiting, or hematemesis; No diarrhea or constipation. No melena or hematochezia.  GENITOURINARY: No dysuria or hematuria, urinary frequency  NEUROLOGICAL: No headaches, memory loss, loss of strength, numbness, or tremors  SKIN: No itching, burning, rashes, or lesions     Vital Signs Last 24 Hrs  T(C): 36.3 (02 May 2021 05:45), Max: 36.9 (01 May 2021 13:50)  T(F): 97.3 (02 May 2021 05:45), Max: 98.4 (01 May 2021 13:50)  HR: 71 (02 May 2021 05:45) (61 - 77)  BP: 141/60 (02 May 2021 05:45) (120/59 - 141/60)  BP(mean): --  RR: 18 (02 May 2021 05:45) (16 - 18)  SpO2: 98% (02 May 2021 05:45) (97% - 99%)    PHYSICAL EXAMINATION:  GENERAL: NAD, well built  HEAD:  Atraumatic, Normocephalic  EYES:  conjunctiva and sclera clear  NECK: Supple, No JVD, Normal thyroid  CHEST/LUNG: Clear to auscultation. Clear to percussion bilaterally; No rales, rhonchi, wheezing, or rubs  HEART: Regular rate and rhythm; No murmurs, rubs, or gallops  ABDOMEN: Soft, Nontender, Nondistended; Bowel sounds present  NERVOUS SYSTEM:  Alert & Oriented X3,    EXTREMITIES:  2+ Peripheral Pulses, No clubbing, cyanosis, or edema  SKIN: warm dry                          12.0   4.67  )-----------( 207      ( 02 May 2021 06:15 )             35.7     05-02    139  |  109<H>  |  18  ----------------------------<  87  3.8   |  26  |  0.97    Ca    8.9      02 May 2021 06:15  Phos  3.3     05-02  Mg     2.1     05-02    TPro  8.3  /  Alb  3.5  /  TBili  0.4  /  DBili  x   /  AST  10  /  ALT  14  /  AlkPhos  70  05-02    LIVER FUNCTIONS - ( 02 May 2021 06:15 )  Alb: 3.5 g/dL / Pro: 8.3 g/dL / ALK PHOS: 70 U/L / ALT: 14 U/L DA / AST: 10 U/L / GGT: x                   CAPILLARY BLOOD GLUCOSE      RADIOLOGY & ADDITIONAL TESTS:

## 2021-05-02 NOTE — PROGRESS NOTE ADULT - PROBLEM SELECTOR PLAN 2
-Patient p/w ulcers of LE that are chronic   - Patient needs local wound care that he is unable to do so himself  - hold off on antibiotics for now since no clinical signs on infection  - Podiatry consulted  - Will be getting bilateral wound debridement and graft application on Monday 5/3 by Dr. Patel  - RCRI - Class 1 Risk, Pt has risk of 3.9% of 30-day risk of death, MI or cardiac arrest. Pt is physically and clinically optimized to go for surgery on 5/3, discussed with Dr. Quiroz.  - Vascular consulted, CALI negative for vascular compromise

## 2021-05-02 NOTE — PROGRESS NOTE ADULT - PROBLEM SELECTOR PLAN 1
As per PCP , pt dementia is getting worse   - AAOX1  - s/p 2 doses of 1 mg haldol in ED  - CTH negative for acute pathology  - Vitamin B12, folate, TSH, RPR - nml  - Neurology consulted Dr. Evans  - On Zyprexa PRN for agitation  - Psychiatry Dr. Correa consulted

## 2021-05-03 LAB
ALBUMIN SERPL ELPH-MCNC: 3.2 G/DL — LOW (ref 3.5–5)
ALP SERPL-CCNC: 72 U/L — SIGNIFICANT CHANGE UP (ref 40–120)
ALT FLD-CCNC: 16 U/L DA — SIGNIFICANT CHANGE UP (ref 10–60)
ANION GAP SERPL CALC-SCNC: 4 MMOL/L — LOW (ref 5–17)
APTT BLD: 33.5 SEC — SIGNIFICANT CHANGE UP (ref 27.5–35.5)
AST SERPL-CCNC: 13 U/L — SIGNIFICANT CHANGE UP (ref 10–40)
BILIRUB SERPL-MCNC: 0.3 MG/DL — SIGNIFICANT CHANGE UP (ref 0.2–1.2)
BUN SERPL-MCNC: 16 MG/DL — SIGNIFICANT CHANGE UP (ref 7–18)
CALCIUM SERPL-MCNC: 8.4 MG/DL — SIGNIFICANT CHANGE UP (ref 8.4–10.5)
CHLORIDE SERPL-SCNC: 108 MMOL/L — SIGNIFICANT CHANGE UP (ref 96–108)
CO2 SERPL-SCNC: 28 MMOL/L — SIGNIFICANT CHANGE UP (ref 22–31)
CREAT SERPL-MCNC: 0.91 MG/DL — SIGNIFICANT CHANGE UP (ref 0.5–1.3)
GLUCOSE SERPL-MCNC: 95 MG/DL — SIGNIFICANT CHANGE UP (ref 70–99)
HCT VFR BLD CALC: 32.3 % — LOW (ref 39–50)
HGB BLD-MCNC: 11 G/DL — LOW (ref 13–17)
INR BLD: 1.21 RATIO — HIGH (ref 0.88–1.16)
MAGNESIUM SERPL-MCNC: 2.1 MG/DL — SIGNIFICANT CHANGE UP (ref 1.6–2.6)
MCHC RBC-ENTMCNC: 31.1 PG — SIGNIFICANT CHANGE UP (ref 27–34)
MCHC RBC-ENTMCNC: 34.1 GM/DL — SIGNIFICANT CHANGE UP (ref 32–36)
MCV RBC AUTO: 91.2 FL — SIGNIFICANT CHANGE UP (ref 80–100)
NRBC # BLD: 0 /100 WBCS — SIGNIFICANT CHANGE UP (ref 0–0)
PHOSPHATE SERPL-MCNC: 2.9 MG/DL — SIGNIFICANT CHANGE UP (ref 2.5–4.5)
PLATELET # BLD AUTO: 214 K/UL — SIGNIFICANT CHANGE UP (ref 150–400)
POTASSIUM SERPL-MCNC: 3.7 MMOL/L — SIGNIFICANT CHANGE UP (ref 3.5–5.3)
POTASSIUM SERPL-SCNC: 3.7 MMOL/L — SIGNIFICANT CHANGE UP (ref 3.5–5.3)
PROT SERPL-MCNC: 7.9 G/DL — SIGNIFICANT CHANGE UP (ref 6–8.3)
PROTHROM AB SERPL-ACNC: 14.3 SEC — HIGH (ref 10.6–13.6)
RBC # BLD: 3.54 M/UL — LOW (ref 4.2–5.8)
RBC # FLD: 13.5 % — SIGNIFICANT CHANGE UP (ref 10.3–14.5)
SODIUM SERPL-SCNC: 140 MMOL/L — SIGNIFICANT CHANGE UP (ref 135–145)
WBC # BLD: 4.9 K/UL — SIGNIFICANT CHANGE UP (ref 3.8–10.5)
WBC # FLD AUTO: 4.9 K/UL — SIGNIFICANT CHANGE UP (ref 3.8–10.5)

## 2021-05-03 PROCEDURE — 99232 SBSQ HOSP IP/OBS MODERATE 35: CPT

## 2021-05-03 RX ORDER — SODIUM CHLORIDE 9 MG/ML
1000 INJECTION, SOLUTION INTRAVENOUS
Refills: 0 | Status: DISCONTINUED | OUTPATIENT
Start: 2021-05-03 | End: 2021-05-03

## 2021-05-03 RX ORDER — HYDROMORPHONE HYDROCHLORIDE 2 MG/ML
0.5 INJECTION INTRAMUSCULAR; INTRAVENOUS; SUBCUTANEOUS
Refills: 0 | Status: DISCONTINUED | OUTPATIENT
Start: 2021-05-03 | End: 2021-05-03

## 2021-05-03 RX ORDER — HYDROMORPHONE HYDROCHLORIDE 2 MG/ML
1 INJECTION INTRAMUSCULAR; INTRAVENOUS; SUBCUTANEOUS
Refills: 0 | Status: DISCONTINUED | OUTPATIENT
Start: 2021-05-03 | End: 2021-05-03

## 2021-05-03 RX ADMIN — SODIUM CHLORIDE 75 MILLILITER(S): 9 INJECTION, SOLUTION INTRAVENOUS at 17:10

## 2021-05-03 NOTE — PROGRESS NOTE ADULT - SUBJECTIVE AND OBJECTIVE BOX
PGY-1 Progress Note discussed with attending    PAGER #: [[phone]] TILL 5:00 PM  PLEASE CONTACT ON CALL TEAM:  - On Call Team (Please refer to Scotty) FROM 5:00 PM - 8:30PM  - Nightfloat Team FROM 8:30 -7:30 AM    CHIEF COMPLAINT & BRIEF HOSPITAL COURSE: 69 yo male with PMH of dementia, colon mass s/p right hemicolectomy and LE ulcers  presents to ED after being sent by his PCP Dr Quiroz's office today for wound check and being unable to take care of himself.    Pt has been having worsening dementia, he is AAOx1 at baseline now, can never fixate on question and has wandering thoughts of the past. CT head was negative for any acute pathology. Electrolytes, vitamin B12, folate, homocysteine, MMA, TSH, RPR were all within the normal range. Neurology Dr. Evans consulted. Psychiatry Dr. Correa consulted and recommended to start Zyprexa 2.5 mg Q8h PRN for agitation and pt will need LTC placement.  Patient presented with ulcers of LE that are chronic venous stasis ulcers, unable to take care of himself. Podiatry consulted and did a bilateral wound debridement and graft application on Monday 5/3 by Dr. Patel. Pt is physically and clinically optimized to go for surgery on 5/3, discussed with Dr. Quiroz. Vascular consulted, CALI was negative for bilateral LE vascular compromise.      INTERVAL HPI/OVERNIGHT EVENTS: Patient resting in bed upon examination, AAO x 1, scheduled for b/l wound debridement and graft application today. No acute overnight events, no PRN Zyprexa received overnight    MEDICATIONS  (STANDING):    MEDICATIONS  (PRN):      REVIEW OF SYSTEMS:  CONSTITUTIONAL: No fever, weight loss, or fatigue  RESPIRATORY: No cough, wheezing, chills or hemoptysis; No shortness of breath  CARDIOVASCULAR: No chest pain, palpitations, dizziness, or leg swelling  GASTROINTESTINAL: No abdominal pain. No nausea, vomiting, or hematemesis; No diarrhea or constipation. No melena or hematochezia.  GENITOURINARY: No dysuria or hematuria, urinary frequency  NEUROLOGICAL: No headaches, memory loss, loss of strength, numbness, or tremors  SKIN: No itching, burning, rashes, or lesions     Vital Signs Last 24 Hrs  T(C): 36.5 (03 May 2021 13:11), Max: 36.6 (02 May 2021 21:17)  T(F): 97.7 (03 May 2021 13:11), Max: 97.9 (02 May 2021 21:17)  HR: 52 (03 May 2021 13:12) (52 - 66)  BP: 134/55 (03 May 2021 13:12) (131/64 - 145/59)  BP(mean): --  RR: 16 (03 May 2021 13:12) (16 - 18)  SpO2: 99% (03 May 2021 13:11) (97% - 99%)    PHYSICAL EXAMINATION:  GENERAL: NAD, well built  HEAD:  Atraumatic, Normocephalic  EYES:  conjunctiva and sclera clear  NECK: Supple, No JVD, Normal thyroid  CHEST/LUNG: Clear to auscultation. Clear to percussion bilaterally; No rales, rhonchi, wheezing, or rubs  HEART: Regular rate and rhythm; No murmurs, rubs, or gallops  ABDOMEN: Soft, Nontender, Nondistended; Bowel sounds present  NERVOUS SYSTEM:  Alert & Oriented X3,    EXTREMITIES:  2+ Peripheral Pulses, No clubbing, cyanosis, or edema. Left ankle medial ulcer 4 x 4 x 0.3  SKIN: warm dry                          11.0   4.90  )-----------( 214      ( 03 May 2021 05:56 )             32.3     05-03    140  |  108  |  16  ----------------------------<  95  3.7   |  28  |  0.91    Ca    8.4      03 May 2021 05:56  Phos  2.9     05-03  Mg     2.1     05-03    TPro  7.9  /  Alb  3.2<L>  /  TBili  0.3  /  DBili  x   /  AST  13  /  ALT  16  /  AlkPhos  72  05-03    LIVER FUNCTIONS - ( 03 May 2021 05:56 )  Alb: 3.2 g/dL / Pro: 7.9 g/dL / ALK PHOS: 72 U/L / ALT: 16 U/L DA / AST: 13 U/L / GGT: x               PT/INR - ( 03 May 2021 05:56 )   PT: 14.3 sec;   INR: 1.21 ratio         PTT - ( 03 May 2021 05:56 )  PTT:33.5 sec    CAPILLARY BLOOD GLUCOSE      RADIOLOGY & ADDITIONAL TESTS:

## 2021-05-03 NOTE — PROGRESS NOTE ADULT - ASSESSMENT
A:   b/l chronic ankle wounds     Patient seen and evaluated   reviewed xrays bl ankle   CALI PVR b/l reviewed, noted above   B/L LE dressings maintained C/D/I  Patient to be taken to OR today at 1 pm for b/l wound debridement and graft application with Dr. Patel   Medical clearance appreciated, pt RCRI - Class 1 Risk, Pt has risk of 3.9% of 30-day risk of death, MI or cardiac arrest.   5/2/21 Covid test negative   Informed consent obtained with patient's proxy Mr. Ly Obealor [phone]  No WB restrictions   Podiatry to follow while in house   Discussed with attending Dr. Patel

## 2021-05-03 NOTE — BRIEF OPERATIVE NOTE - NSICDXBRIEFPOSTOP_GEN_ALL_CORE_FT
POST-OP DIAGNOSIS:  Ulcer of left lower extremity 03-May-2021 14:54:43  Gonzalo Dueans  Ulcer of right lower extremity 03-May-2021 14:54:57  Gonzalo Duenas

## 2021-05-03 NOTE — BRIEF OPERATIVE NOTE - NSICDXBRIEFPROCEDURE_GEN_ALL_CORE_FT
PROCEDURES:  Excisional debridement of ulcer of left foot 03-May-2021 14:51:10 Excisional debridement of Left ankle wounds, medial and lateral Gonzalo Duenas  Excisional debridement of ulcer of right foot 03-May-2021 14:52:48 Excisional debridement of Right medial ankle wound Gonzalo Duenas   PROCEDURES:  Excisional debridement of ulcer of left foot 03-May-2021 14:51:10 Excisional debridement of Left ankle wounds, medial and lateral Gonzalo Duenas  Excisional debridement of ulcer of right foot 03-May-2021 14:52:48 Excisional debridement of Right medial ankle wound Gonzalo Duenas  Application, graft, foot 03-May-2021 15:00:57 graft application to b/l ankle wounds (integra bilayer and derm-iain) Gonzalo Duenas

## 2021-05-03 NOTE — PROGRESS NOTE ADULT - PROBLEM SELECTOR PLAN 2
-Patient p/w ulcers of LE that are chronic   - Patient needs local wound care that he is unable to do so himself  - hold off on antibiotics for now since no clinical signs on infection  - Podiatry consulted  - Will be getting bilateral wound debridement and graft application today 5/3 by Dr. Patel  - RCRI - Class 1 Risk, Pt has risk of 3.9% of 30-day risk of death, MI or cardiac arrest. Pt is physically and clinically optimized to go for surgery on 5/3, discussed with Dr. Quiroz.  - Vascular consulted, CALI negative for vascular compromise

## 2021-05-03 NOTE — PROGRESS NOTE ADULT - SUBJECTIVE AND OBJECTIVE BOX
HPI: 69 yo male with PMH of dementia, colon mass s/p right hemicolectomy and LE ulcers  presents to ED after being sent by his PCP Dr. Quiroz's office today for wound check and being unable to take care of himself.      Podiatry HPI: Patient is a 70M with dementia who presents with chronic b/l ankle ulcers. Patient is a patient in wound care clinic who lost follow up. Patient states he has no pain. Patient does not recall much, may be sundowning.     Podiatry progress: Patient seen resting in bed, AAOx1. Notes he remembers he will have surgery today. Denies pain to b/l LE.     Medications donepezil 5 milliGRAM(s) Oral at bedtime  OLANZapine 2.5 milliGRAM(s) Oral every 8 hours PRN  tamsulosin 0.4 milliGRAM(s) Oral at bedtime    FHNo pertinent family history in first degree relatives    ,   PMHNo pertinent past medical history    No pertinent past medical history    Dementia       PSHNo significant past surgical history    No significant past surgical history    S/P right hemicolectomy        Labs                          11.0   4.90  )-----------( 214      ( 03 May 2021 05:56 )             32.3      05-03    140  |  108  |  16  ----------------------------<  95  3.7   |  28  |  0.91    Ca    8.4      03 May 2021 05:56  Phos  2.9     05-03  Mg     2.1     05-03    TPro  7.9  /  Alb  3.2<L>  /  TBili  0.3  /  DBili  x   /  AST  13  /  ALT  16  /  AlkPhos  72  05-03     Vital Signs Last 24 Hrs  T(C): 36.6 (03 May 2021 05:35), Max: 36.7 (02 May 2021 13:02)  T(F): 97.8 (03 May 2021 05:35), Max: 98.1 (02 May 2021 13:02)  HR: 58 (03 May 2021 05:35) (58 - 76)  BP: 131/64 (03 May 2021 05:35) (131/64 - 152/62)  BP(mean): --  RR: 17 (03 May 2021 05:35) (17 - 18)  SpO2: 99% (03 May 2021 05:35) (97% - 99%)          WBC Count: 4.90 K/uL (05-03-21 @ 05:56)      PHYSICAL EXAM 5/3: Pre surgical, dressing maintained C/D/I    PHYSICAL EXAM: 5/2/21   Derm: left medial ulcer ankle 4.3x3.8x0.3 fibrotic and necrotic tissue. malodorous. Left medial ankle ulcer 3.5x3.2x0.2 fibrotic. wound with thickened hyperkeratotis. No drainage. No purulence. Right medial ankle ulcer 3.7x4.3x0.2 fibrotic. no probe to bone.  No clinical signs of infection. No erythema. Nails elongated thickened 1-10.   Vasc: DP palpable left foot. No palpable pulses right foot.  No edema noted. Skin temperature noted to be warm to warm from proximal to distal b/l.   Neuro: Protective and epicritic sensation diminished  Musc: No pain on palpation. No equinus      IMAGING:  < from: Xray Ankle Complete 3 Views, Bilateral (04.28.21 @ 20:45) >  EXAM:  XR ANKLE COMP MIN 3 VIEWS BI                            PROCEDURE DATE:  04/28/2021          INTERPRETATION:  Clinical information: Bilateral ankle ulcers.    3 views each of the right and left ankle.    No comparison.    Right ankle:    There is irregularity of the medial soft tissues, correlate for ulcer.    There is prominent ossification in the posterior medial soft tissues.  There is interosseous periosteal thickening.  No focal osteolysis is noted. No periarticular osseous erosion is noted.    There is prominent calcaneal enthesopathy.    The evaluation of the subtalar joint is limited on this exam.    Left ankle:    There is irregularity of both the lateral and medial soft tissues, correlate for ulceration. There is prominent ossification in the posterior medial soft tissues. In addition, there is prominent interosseous periosteal thickening, as well as diffuse periosteal cortical thickening involving the fibula.  No focal osteolysis is noted.    Calcaneal enthesopathy is present.  Evaluation of the subtalar joint is limited on this exam.    Soft tissue surgical clips are present.    IMPRESSION:    Findings as discussed above. If there is a clinical suspicion for an active osteomyelitis, recommend further evaluation with MRI if there are no clinical contraindications.            LILY MILLER M.D., ATTENDING RADIOLOGIST  This document has been electronically signed. Apr 29 2021  3:55PM    < end of copied text >    < from: [facility] Physiol Extremity Lower 3+ Level, BI (04.30.21 @ 11:06) >    EXAM:  US PHYSIOL LWR EXT 3+ LEV BI                            PROCEDURE DATE:  04/30/2021          INTERPRETATION:  Clinical information: Nondiabetic, nonhealing left lower extremity ulcer, nonpalpable distal pulses.    COMPARISON: Lower extremityarterial Doppler study dated 9/18/2019.    TECHNIQUE: Lower extremity arterial Doppler study.    FINDINGS: Ankle brachial index measures 1.24 on the right and 1.16 on the left, compared with prior values of 0.93 and 0.94. No segmental arterial pressure gradient is detected.    PVR waveforms are normal in amplitude and pulsatility throughout.    IMPRESSION: No Doppler evidence of hemodynamically significant arterial flow limitation in either lower extremity.            SMITA MORRIS MD; Attending Radiologist  This document has been electronically signed. Apr 30 2021  2:13PM    < end of copied text >

## 2021-05-03 NOTE — BRIEF OPERATIVE NOTE - NSICDXBRIEFPREOP_GEN_ALL_CORE_FT
PRE-OP DIAGNOSIS:  Ulcer of right lower extremity 03-May-2021 14:53:39  Gonzalo Duenas  Ulcer of left lower extremity 03-May-2021 14:54:02  Gonzalo Duenas

## 2021-05-03 NOTE — PROGRESS NOTE ADULT - SUBJECTIVE AND OBJECTIVE BOX
Patient was seen and examined  Patient is a 70y old  Male who presents with a chief complaint of wound care (03 May 2021 08:57)      INTERVAL HPI/OVERNIGHT EVENTS:  T(C): 36.5 (05-03-21 @ 13:11), Max: 36.6 (05-02-21 @ 21:17)  HR: 52 (05-03-21 @ 13:12) (52 - 66)  BP: 134/55 (05-03-21 @ 13:12) (131/64 - 145/59)  RR: 16 (05-03-21 @ 13:12) (16 - 18)  SpO2: 99% (05-03-21 @ 13:11) (97% - 99%)  Wt(kg): --  I&O's Summary      LABS:                        11.0   4.90  )-----------( 214      ( 03 May 2021 05:56 )             32.3     05-03    140  |  108  |  16  ----------------------------<  95  3.7   |  28  |  0.91    Ca    8.4      03 May 2021 05:56  Phos  2.9     05-03  Mg     2.1     05-03    TPro  7.9  /  Alb  3.2<L>  /  TBili  0.3  /  DBili  x   /  AST  13  /  ALT  16  /  AlkPhos  72  05-03    PT/INR - ( 03 May 2021 05:56 )   PT: 14.3 sec;   INR: 1.21 ratio         PTT - ( 03 May 2021 05:56 )  PTT:33.5 sec    CAPILLARY BLOOD GLUCOSE                  MEDICATIONS  (STANDING):    MEDICATIONS  (PRN):      RADIOLOGY & ADDITIONAL TESTS:    Imaging Personally Reviewed:  [ ] YES  [ ] NO    REVIEW OF SYSTEMS:  confused      Consultant(s) Notes Reviewed:  [  x] YES  [ ] NO    PHYSICAL EXAM:  GENERAL: NAD, well-groomed, well-developed  HEAD:  Atraumatic, Normocephalic  EYES: EOMI, PERRLA, conjunctiva and sclera clear  ENMT: No tonsillar erythema, exudates, or enlargement; Moist mucous membranes, Good dentition, No lesions  NECK: Supple, No JVD, Normal thyroid  NERVOUS SYSTEM:  confused  CHEST/LUNG: Clear to percussion bilaterally; No rales, rhonchi, wheezing, or rubs  HEART: Regular rate and rhythm; No murmurs, rubs, or gallops  ABDOMEN: Soft, Nontender, Nondistended; Bowel sounds present  EXTREMITIES:  le dressings  LYMPH: No lymphadenopathy noted  SKIN: No rashes or lesions    Care Discussed with Consultants/Other Providers [ x] YES  [ ] NO

## 2021-05-03 NOTE — PROGRESS NOTE ADULT - PROBLEM SELECTOR PLAN 1
As per PCP , pt dementia is getting worse   - AAOX1, CTH negative for acute pathology  - Vitamin B12, folate, TSH, RPR - nml  - Neurology following Dr. Evans  - On Zyprexa PRN for agitation, no PRN doses received  - Psychiatry Dr. Correa following

## 2021-05-03 NOTE — PROGRESS NOTE ADULT - ASSESSMENT
71 yo male with PMH of dementia, colon mass s/p right hemicolectomy and LE ulcers  presents to ED after being sent by his PCP Dr Quiroz's office for wound check and being unable to take care of himself, admitted for scheduled for Bilateral wound debridement and graft application     RCRI - Class 1 Risk, Pt has risk of 3.9% of 30-day risk of death, MI or cardiac arrest. Pt is physically and clinically optimized to go for surgery on 5/3, discussed with Dr. Quiroz.

## 2021-05-04 LAB
% ALBUMIN: 49.5 % — SIGNIFICANT CHANGE UP
% ALPHA 1: 4.1 % — SIGNIFICANT CHANGE UP
% ALPHA 2: 9.3 % — SIGNIFICANT CHANGE UP
% BETA: 11.8 % — SIGNIFICANT CHANGE UP
% GAMMA: 25.3 % — SIGNIFICANT CHANGE UP
% M SPIKE: 8.7 % — SIGNIFICANT CHANGE UP
ALBUMIN SERPL ELPH-MCNC: 3.6 G/DL — SIGNIFICANT CHANGE UP (ref 3.6–5.5)
ALBUMIN/GLOB SERPL ELPH: 1 RATIO — SIGNIFICANT CHANGE UP
ALPHA1 GLOB SERPL ELPH-MCNC: 0.3 G/DL — SIGNIFICANT CHANGE UP (ref 0.1–0.4)
ALPHA2 GLOB SERPL ELPH-MCNC: 0.7 G/DL — SIGNIFICANT CHANGE UP (ref 0.5–1)
ANION GAP SERPL CALC-SCNC: 6 MMOL/L — SIGNIFICANT CHANGE UP (ref 5–17)
B-GLOBULIN SERPL ELPH-MCNC: 0.9 G/DL — SIGNIFICANT CHANGE UP (ref 0.5–1)
BUN SERPL-MCNC: 14 MG/DL — SIGNIFICANT CHANGE UP (ref 7–18)
CALCIUM SERPL-MCNC: 8.8 MG/DL — SIGNIFICANT CHANGE UP (ref 8.4–10.5)
CHLORIDE SERPL-SCNC: 106 MMOL/L — SIGNIFICANT CHANGE UP (ref 96–108)
CO2 SERPL-SCNC: 26 MMOL/L — SIGNIFICANT CHANGE UP (ref 22–31)
CREAT SERPL-MCNC: 0.96 MG/DL — SIGNIFICANT CHANGE UP (ref 0.5–1.3)
GAMMA GLOBULIN: 1.8 G/DL — HIGH (ref 0.6–1.6)
GLUCOSE SERPL-MCNC: 101 MG/DL — HIGH (ref 70–99)
HCT VFR BLD CALC: 34.4 % — LOW (ref 39–50)
HGB BLD-MCNC: 11.5 G/DL — LOW (ref 13–17)
INTERPRETATION SERPL IFE-IMP: SIGNIFICANT CHANGE UP
M-SPIKE: 0.6 G/DL — HIGH (ref 0–0)
MAGNESIUM SERPL-MCNC: 2 MG/DL — SIGNIFICANT CHANGE UP (ref 1.6–2.6)
MCHC RBC-ENTMCNC: 29.9 PG — SIGNIFICANT CHANGE UP (ref 27–34)
MCHC RBC-ENTMCNC: 33.4 GM/DL — SIGNIFICANT CHANGE UP (ref 32–36)
MCV RBC AUTO: 89.6 FL — SIGNIFICANT CHANGE UP (ref 80–100)
NRBC # BLD: 0 /100 WBCS — SIGNIFICANT CHANGE UP (ref 0–0)
PHOSPHATE SERPL-MCNC: 3 MG/DL — SIGNIFICANT CHANGE UP (ref 2.5–4.5)
PLATELET # BLD AUTO: 206 K/UL — SIGNIFICANT CHANGE UP (ref 150–400)
POTASSIUM SERPL-MCNC: 3.9 MMOL/L — SIGNIFICANT CHANGE UP (ref 3.5–5.3)
POTASSIUM SERPL-SCNC: 3.9 MMOL/L — SIGNIFICANT CHANGE UP (ref 3.5–5.3)
PROT PATTERN SERPL ELPH-IMP: SIGNIFICANT CHANGE UP
RBC # BLD: 3.84 M/UL — LOW (ref 4.2–5.8)
RBC # FLD: 13.2 % — SIGNIFICANT CHANGE UP (ref 10.3–14.5)
SODIUM SERPL-SCNC: 138 MMOL/L — SIGNIFICANT CHANGE UP (ref 135–145)
WBC # BLD: 5.91 K/UL — SIGNIFICANT CHANGE UP (ref 3.8–10.5)
WBC # FLD AUTO: 5.91 K/UL — SIGNIFICANT CHANGE UP (ref 3.8–10.5)

## 2021-05-04 PROCEDURE — 99232 SBSQ HOSP IP/OBS MODERATE 35: CPT

## 2021-05-04 RX ORDER — OLANZAPINE 15 MG/1
2.5 TABLET, FILM COATED ORAL EVERY 8 HOURS
Refills: 0 | Status: DISCONTINUED | OUTPATIENT
Start: 2021-05-04 | End: 2021-05-05

## 2021-05-04 RX ORDER — ENOXAPARIN SODIUM 100 MG/ML
40 INJECTION SUBCUTANEOUS DAILY
Refills: 0 | Status: DISCONTINUED | OUTPATIENT
Start: 2021-05-04 | End: 2021-05-05

## 2021-05-04 RX ORDER — DONEPEZIL HYDROCHLORIDE 10 MG/1
5 TABLET, FILM COATED ORAL AT BEDTIME
Refills: 0 | Status: DISCONTINUED | OUTPATIENT
Start: 2021-05-04 | End: 2021-05-05

## 2021-05-04 RX ORDER — TAMSULOSIN HYDROCHLORIDE 0.4 MG/1
0.4 CAPSULE ORAL AT BEDTIME
Refills: 0 | Status: DISCONTINUED | OUTPATIENT
Start: 2021-05-04 | End: 2021-05-05

## 2021-05-04 RX ADMIN — TAMSULOSIN HYDROCHLORIDE 0.4 MILLIGRAM(S): 0.4 CAPSULE ORAL at 21:37

## 2021-05-04 RX ADMIN — DONEPEZIL HYDROCHLORIDE 5 MILLIGRAM(S): 10 TABLET, FILM COATED ORAL at 21:37

## 2021-05-04 RX ADMIN — ENOXAPARIN SODIUM 40 MILLIGRAM(S): 100 INJECTION SUBCUTANEOUS at 12:04

## 2021-05-04 NOTE — PROGRESS NOTE ADULT - PROBLEM SELECTOR PLAN 1
As per PCP and Dr. Correa, dementia has worsened compared to previous dimensions   - AAOX1, CTH negative for acute pathology  - Vitamin B12, folate, TSH, RPR - nml  - On Zyprexa PRN for agitation, no PRN doses received  - Neurology following Dr. Evans and Psychiatry Dr. Correa following

## 2021-05-04 NOTE — PROGRESS NOTE ADULT - ASSESSMENT
A:   b/l chronic ankle wounds     Patient seen and evaluated   reviewed xrays bl ankle   CALI PVR b/l reviewed, noted above   applied new ace bandage, dressing kept intact  No WB restrictions   patient is stable for discharge from podiatry standpoint  patient needs to follow up with podiatry clinic upon discharge at [address] [place] [address]  Tel:[phone]  Podiatry to follow while in house   Patient seen and evaluated with attending Dr. Mahmood  Discussed with attending Dr. Patel

## 2021-05-04 NOTE — PROGRESS NOTE ADULT - ASSESSMENT
71 yo male with PMH of dementia, colon mass s/p right hemicolectomy and LE ulcers  presents to ED after being sent by his PCP Dr Quiroz's office for wound check and being unable to take care of himself,  Bilateral wound debridement and graft application

## 2021-05-04 NOTE — PROGRESS NOTE ADULT - SUBJECTIVE AND OBJECTIVE BOX
Patient was seen and examined  Patient is a 70y old  Male who presents with a chief complaint of wound care (03 May 2021 14:07)      INTERVAL HPI/OVERNIGHT EVENTS:  T(C): 36.4 (05-04-21 @ 05:37), Max: 36.6 (05-03-21 @ 15:43)  HR: 66 (05-04-21 @ 05:37) (52 - 70)  BP: 161/72 (05-04-21 @ 05:37) (134/55 - 168/66)  RR: 17 (05-04-21 @ 05:37) (14 - 20)  SpO2: 100% (05-04-21 @ 05:37) (97% - 100%)  Wt(kg): --  I&O's Summary      LABS:                        11.5   5.91  )-----------( 206      ( 04 May 2021 06:08 )             34.4     05-04    138  |  106  |  14  ----------------------------<  101<H>  3.9   |  26  |  0.96    Ca    8.8      04 May 2021 06:08  Phos  3.0     05-04  Mg     2.0     05-04    TPro  7.9  /  Alb  3.2<L>  /  TBili  0.3  /  DBili  x   /  AST  13  /  ALT  16  /  AlkPhos  72  05-03    PT/INR - ( 03 May 2021 05:56 )   PT: 14.3 sec;   INR: 1.21 ratio         PTT - ( 03 May 2021 05:56 )  PTT:33.5 sec    CAPILLARY BLOOD GLUCOSE                  MEDICATIONS  (STANDING):  donepezil 5 milliGRAM(s) Oral at bedtime  enoxaparin Injectable 40 milliGRAM(s) SubCutaneous daily  tamsulosin 0.4 milliGRAM(s) Oral at bedtime    MEDICATIONS  (PRN):  OLANZapine 2.5 milliGRAM(s) Oral every 8 hours PRN Agitation      RADIOLOGY & ADDITIONAL TESTS:    Imaging Personally Reviewed:  [ ] YES  [ ] NO    REVIEW OF SYSTEMS:  unable to provide       Consultant(s) Notes Reviewed:  [ x ] YES  [ ] NO    PHYSICAL EXAM:  GENERAL: NAD, well-groomed, well-developed  HEAD:  Atraumatic, Normocephalic  EYES: EOMI, PERRLA, conjunctiva and sclera clear  ENMT: No tonsillar erythema, exudates, or enlargement; Moist mucous membranes, Good dentition, No lesions  NECK: Supple, No JVD, Normal thyroid  NERVOUS SYSTEM:  awake confused  CHEST/LUNG: Clear to percussion bilaterally; No rales, rhonchi, wheezing, or rubs  HEART: Regular rate and rhythm; No murmurs, rubs, or gallops  ABDOMEN: Soft, Nontender, Nondistended; Bowel sounds present  EXTREMITIES:  dressing  LYMPH: No lymphadenopathy noted  SKIN: No rashes or lesions    Care Discussed with Consultants/Other Providers [ x] YES  [ ] NO

## 2021-05-04 NOTE — PROGRESS NOTE ADULT - SUBJECTIVE AND OBJECTIVE BOX
HPI: 69 yo male with PMH of dementia, colon mass s/p right hemicolectomy and LE ulcers  presents to ED after being sent by his PCP Dr. Quiroz's office today for wound check and being unable to take care of himself.      Podiatry HPI: Patient is a 70M with dementia who presents with chronic b/l ankle ulcers. Patient is a patient in wound care clinic who lost follow up. Patient states he has no pain. Patient does not recall much, may be sundowning.     Podiatry progress: Patient seen resting in bed, AAOx1. s/p debridement b/l ankle 5/3/21. Denies pain to b/l LE. patient denies any acute overnight event.  Medications donepezil 5 milliGRAM(s) Oral at bedtime  enoxaparin Injectable 40 milliGRAM(s) SubCutaneous daily  OLANZapine 2.5 milliGRAM(s) Oral every 8 hours PRN  tamsulosin 0.4 milliGRAM(s) Oral at bedtime    FHNo pertinent family history in first degree relatives    ,   PMHNo pertinent past medical history    No pertinent past medical history    Dementia       PSHNo significant past surgical history    No significant past surgical history    S/P right hemicolectomy        Labs                          11.5   5.91  )-----------( 206      ( 04 May 2021 06:08 )             34.4      05-04    138  |  106  |  14  ----------------------------<  101<H>  3.9   |  26  |  0.96    Ca    8.8      04 May 2021 06:08  Phos  3.0     05-04  Mg     2.0     05-04    TPro  7.9  /  Alb  3.2<L>  /  TBili  0.3  /  DBili  x   /  AST  13  /  ALT  16  /  AlkPhos  72  05-03     Vital Signs Last 24 Hrs  T(C): 36.4 (04 May 2021 05:37), Max: 36.6 (03 May 2021 15:43)  T(F): 97.6 (04 May 2021 05:37), Max: 97.9 (03 May 2021 15:43)  HR: 66 (04 May 2021 05:37) (52 - 70)  BP: 161/72 (04 May 2021 05:37) (134/55 - 168/66)  BP(mean): 92 (03 May 2021 15:43) (87 - 97)  RR: 17 (04 May 2021 05:37) (14 - 20)  SpO2: 100% (04 May 2021 05:37) (97% - 100%)          WBC Count: 5.91 K/uL (05-04-21 @ 06:08)      PHYSICAL EXAM 5/3: Pre surgical, dressing maintained C/D/I    PHYSICAL EXAM: 5/2/21   Derm: left medial ulcer ankle 4.3x3.8x0.3 fibrotic and necrotic tissue. malodorous. Left medial ankle ulcer 3.5x3.2x0.2 fibrotic. wound with thickened hyperkeratotis. No drainage. No purulence. Right medial ankle ulcer 3.7x4.3x0.2 fibrotic. no probe to bone.  No clinical signs of infection. No erythema. Nails elongated thickened 1-10.   Vasc: DP palpable left foot. No palpable pulses right foot.  No edema noted. Skin temperature noted to be warm to warm from proximal to distal b/l.   Neuro: Protective and epicritic sensation diminished  Musc: No pain on palpation. No equinus      IMAGING:  < from: Xray Ankle Complete 3 Views, Bilateral (04.28.21 @ 20:45) >  EXAM:  XR ANKLE COMP MIN 3 VIEWS BI                            PROCEDURE DATE:  04/28/2021          INTERPRETATION:  Clinical information: Bilateral ankle ulcers.    3 views each of the right and left ankle.    No comparison.    Right ankle:    There is irregularity of the medial soft tissues, correlate for ulcer.    There is prominent ossification in the posterior medial soft tissues.  There is interosseous periosteal thickening.  No focal osteolysis is noted. No periarticular osseous erosion is noted.    There is prominent calcaneal enthesopathy.    The evaluation of the subtalar joint is limited on this exam.    Left ankle:    There is irregularity of both the lateral and medial soft tissues, correlate for ulceration. There is prominent ossification in the posterior medial soft tissues. In addition, there is prominent interosseous periosteal thickening, as well as diffuse periosteal cortical thickening involving the fibula.  No focal osteolysis is noted.    Calcaneal enthesopathy is present.  Evaluation of the subtalar joint is limited on this exam.    Soft tissue surgical clips are present.    IMPRESSION:    Findings as discussed above. If there is a clinical suspicion for an active osteomyelitis, recommend further evaluation with MRI if there are no clinical contraindications.            LILY MILLER M.D., ATTENDING RADIOLOGIST  This document has been electronically signed. Apr 29 2021  3:55PM    < end of copied text >    < from: [facility] Physiol Extremity Lower 3+ Level, BI (04.30.21 @ 11:06) >    EXAM:  US PHYSIOL LWR EXT 3+ LEV BI                            PROCEDURE DATE:  04/30/2021          INTERPRETATION:  Clinical information: Nondiabetic, nonhealing left lower extremity ulcer, nonpalpable distal pulses.    COMPARISON: Lower extremityarterial Doppler study dated 9/18/2019.    TECHNIQUE: Lower extremity arterial Doppler study.    FINDINGS: Ankle brachial index measures 1.24 on the right and 1.16 on the left, compared with prior values of 0.93 and 0.94. No segmental arterial pressure gradient is detected.    PVR waveforms are normal in amplitude and pulsatility throughout.    IMPRESSION: No Doppler evidence of hemodynamically significant arterial flow limitation in either lower extremity.            SMITA MORRIS MD; Attending Radiologist  This document has been electronically signed. Apr 30 2021  2:13PM    < end of copied text >

## 2021-05-04 NOTE — PROGRESS NOTE ADULT - PROBLEM SELECTOR PLAN 2
-Patient p/w ulcers of LE that are chronic   - Patient needs local wound care that he is unable to do so himself  - hold off on antibiotics for now since no clinical signs on infection  - Podiatry consulted  - bilateral wound debridement and graft application by Dr. Patel  - Vascular consulted, CALI negative for vascular compromise

## 2021-05-04 NOTE — PROGRESS NOTE ADULT - PROBLEM SELECTOR PLAN 3
Given new wound care, worsening dementia, and decline in functional and physical status- pt will need placement at Rehab for optimal care and to prevent re-admission. Request for Auth for rehab placement to be placed today 5/4- discussed with pt's emergency contact this morning, agreeable for Rehab Given worsening dementia, and decline in functional and physical status- pt will need placement at Rehab for optimal care and to prevent re-admission. Request for Auth for rehab placement to be placed today 5/4- discussed with pt's emergency contact this morning, agreeable for Rehab

## 2021-05-04 NOTE — PROGRESS NOTE ADULT - ASSESSMENT
69 yo male with PMH of dementia, colon mass s/p right hemicolectomy and LE ulcers  presents to ED after being sent by his PCP Dr Quiroz's office for wound check and being unable to take care of himself, admitted for scheduled for Bilateral wound debridement and graft application

## 2021-05-04 NOTE — PROGRESS NOTE ADULT - PROBLEM SELECTOR PLAN 2
-Patient p/w ulcers of LE that are chronic, s/p Excisional debridement of Left ankle wound, Right foot ulcer, Right ankle wound and graft application of Bilateral Ankle wounds (Integra bilayer and derm-iain) on May 3 by Dr. Patel  -Pt will need wound care, will ideally need to be placed at Rehab given worsening dementia and decline in physical and mental status. Auth to be obtained -Patient p/w ulcers of LE that are chronic, s/p Excisional debridement of Left ankle wound, Right foot ulcer, Right ankle wound and graft application of Bilateral Ankle wounds (Integra bilayer and derm-iain) on May 3 by Dr. Patel  -Pt to follow up at Podiatry clinic

## 2021-05-04 NOTE — PROGRESS NOTE ADULT - SUBJECTIVE AND OBJECTIVE BOX
PGY-1 Progress Note discussed with attending    PAGER #: [[phone]] TILL 5:00 PM  PLEASE CONTACT ON CALL TEAM:  - On Call Team (Please refer to Scotty) FROM 5:00 PM - 8:30PM  - Nightfloat Team FROM 8:30 -7:30 AM    CHIEF COMPLAINT & BRIEF HOSPITAL COURSE: Patient is a 69 yo male with PMH of worsening Dementia, Colon mass s/p right hemicolectomy and LE ulcers presents to ED after being sent by his PCP Dr Quiroz's office for wound check and being unable to take care of himself.    Pt has been having worsening dementia, he is AAOx1 at baseline now, can never fixate on question and has wandering thoughts of the past. CT head was negative for any acute pathology. Electrolytes, vitamin B12, folate, homocysteine, MMA, TSH, RPR were all within the normal range. Neurology Dr. Evans consulted. Psychiatry Dr. Correa consulted and recommended to start Zyprexa 2.5 mg Q8h PRN for agitation (which he did not require during hospital admission) and recommended LTC placement.    Patient presented with ulcers of LE that are chronic venous stasis ulcers, unable to take care of himself. Podiatry performed Excisional debridement of Left ankle wound, Right foot ulcer, Right ankle wound and graft application of Bilateral Ankle wounds (Integra bilayer and derm-iain) on May 3 by .        INTERVAL HPI/OVERNIGHT EVENTS: Patient has worsening dementia, given new wound care, worsening dementia, and decline in functional and physical status- pt will need placement at Rehab for optimal care and to prevent re-admission. Request for Auth for rehab placement to be placed today- discussed with pt's emergency contact this morning, agreeable for Rehab    MEDICATIONS  (STANDING):  donepezil 5 milliGRAM(s) Oral at bedtime  enoxaparin Injectable 40 milliGRAM(s) SubCutaneous daily  tamsulosin 0.4 milliGRAM(s) Oral at bedtime    MEDICATIONS  (PRN):  OLANZapine 2.5 milliGRAM(s) Oral every 8 hours PRN Agitation      REVIEW OF SYSTEMS:  CONSTITUTIONAL: No fever, weight loss, or fatigue  RESPIRATORY: No cough, wheezing, chills or hemoptysis; No shortness of breath  CARDIOVASCULAR: No chest pain, palpitations, dizziness, or leg swelling  GASTROINTESTINAL: No abdominal pain. No nausea, vomiting, or hematemesis; No diarrhea or constipation. No melena or hematochezia.  GENITOURINARY: No dysuria or hematuria, urinary frequency  NEUROLOGICAL: No headaches, memory loss, loss of strength, numbness, or tremors  SKIN: No itching, burning, rashes, or lesions     Vital Signs Last 24 Hrs  T(C): 36.4 (04 May 2021 05:37), Max: 36.6 (03 May 2021 15:43)  T(F): 97.6 (04 May 2021 05:37), Max: 97.9 (03 May 2021 15:43)  HR: 66 (04 May 2021 05:37) (57 - 70)  BP: 161/72 (04 May 2021 05:37) (148/66 - 168/66)  BP(mean): 92 (03 May 2021 15:43) (87 - 97)  RR: 17 (04 May 2021 05:37) (14 - 20)  SpO2: 100% (04 May 2021 05:37) (97% - 100%)    PHYSICAL EXAMINATION:  GENERAL: Pleasant disposition, pt lying comfortably in bed  HEAD:  Atraumatic, Normocephalic  EYES:  conjunctiva and sclera clear  NECK: Supple, No JVD, Normal thyroid  CHEST/LUNG: Clear to auscultation. Clear to percussion bilaterally; No rales, rhonchi, wheezing, or rubs  HEART: Regular rate and rhythm; No murmurs, rubs, or gallops  ABDOMEN: Soft, Nontender, Nondistended; Bowel sounds present  NERVOUS SYSTEM:  Alert & Oriented X 1,    EXTREMITIES:  2+ Peripheral Pulses, No clubbing, cyanosis, or edema. Left ankle medial ulcer now bandaged  SKIN: warm dry                          11.5   5.91  )-----------( 206      ( 04 May 2021 06:08 )             34.4     05-04    138  |  106  |  14  ----------------------------<  101<H>  3.9   |  26  |  0.96    Ca    8.8      04 May 2021 06:08  Phos  3.0     05-04  Mg     2.0     05-04    TPro  7.9  /  Alb  3.2<L>  /  TBili  0.3  /  DBili  x   /  AST  13  /  ALT  16  /  AlkPhos  72  05-03    LIVER FUNCTIONS - ( 03 May 2021 05:56 )  Alb: 3.2 g/dL / Pro: 7.9 g/dL / ALK PHOS: 72 U/L / ALT: 16 U/L DA / AST: 13 U/L / GGT: x               PT/INR - ( 03 May 2021 05:56 )   PT: 14.3 sec;   INR: 1.21 ratio         PTT - ( 03 May 2021 05:56 )  PTT:33.5 sec    CAPILLARY BLOOD GLUCOSE      RADIOLOGY & ADDITIONAL TESTS:                   PGY-1 Progress Note discussed with attending    PAGER #: [[phone]] TILL 5:00 PM  PLEASE CONTACT ON CALL TEAM:  - On Call Team (Please refer to Scotty) FROM 5:00 PM - 8:30PM  - Nightfloat Team FROM 8:30 -7:30 AM    CHIEF COMPLAINT & BRIEF HOSPITAL COURSE: Patient is a 71 yo male with PMH of worsening Dementia, Colon mass s/p right hemicolectomy and LE ulcers presents to ED after being sent by his PCP Dr Quiroz's office for wound check and being unable to take care of himself.    Pt has been having worsening dementia, he is AAOx1 at baseline now, can never fixate on question and has wandering thoughts of the past. CT head was negative for any acute pathology. Electrolytes, vitamin B12, folate, homocysteine, MMA, TSH, RPR were all within the normal range. Neurology Dr. Evans consulted. Psychiatry Dr. Correa consulted and recommended to start Zyprexa 2.5 mg Q8h PRN for agitation (which he did not require during hospital admission) and recommended LTC placement.    Patient presented with ulcers of LE that are chronic venous stasis ulcers, unable to take care of himself. Podiatry performed Excisional debridement of Left ankle wound, Right foot ulcer, Right ankle wound and graft application of Bilateral Ankle wounds (Integra bilayer and derm-iain) on May 3 by .        INTERVAL HPI/OVERNIGHT EVENTS: Patient has worsening dementia, given worsening dementia, and decline in functional and physical status- pt will need placement at Rehab for optimal care and to prevent re-admission. Request for Auth for rehab placement to be placed today- discussed with pt's emergency contact this morning, agreeable for Rehab    MEDICATIONS  (STANDING):  donepezil 5 milliGRAM(s) Oral at bedtime  enoxaparin Injectable 40 milliGRAM(s) SubCutaneous daily  tamsulosin 0.4 milliGRAM(s) Oral at bedtime    MEDICATIONS  (PRN):  OLANZapine 2.5 milliGRAM(s) Oral every 8 hours PRN Agitation      REVIEW OF SYSTEMS:  CONSTITUTIONAL: No fever, weight loss, or fatigue  RESPIRATORY: No cough, wheezing, chills or hemoptysis; No shortness of breath  CARDIOVASCULAR: No chest pain, palpitations, dizziness, or leg swelling  GASTROINTESTINAL: No abdominal pain. No nausea, vomiting, or hematemesis; No diarrhea or constipation. No melena or hematochezia.  GENITOURINARY: No dysuria or hematuria, urinary frequency  NEUROLOGICAL: No headaches, memory loss, loss of strength, numbness, or tremors  SKIN: No itching, burning, rashes, or lesions     Vital Signs Last 24 Hrs  T(C): 36.4 (04 May 2021 05:37), Max: 36.6 (03 May 2021 15:43)  T(F): 97.6 (04 May 2021 05:37), Max: 97.9 (03 May 2021 15:43)  HR: 66 (04 May 2021 05:37) (57 - 70)  BP: 161/72 (04 May 2021 05:37) (148/66 - 168/66)  BP(mean): 92 (03 May 2021 15:43) (87 - 97)  RR: 17 (04 May 2021 05:37) (14 - 20)  SpO2: 100% (04 May 2021 05:37) (97% - 100%)    PHYSICAL EXAMINATION:  GENERAL: Pleasant disposition, pt lying comfortably in bed  HEAD:  Atraumatic, Normocephalic  EYES:  conjunctiva and sclera clear  NECK: Supple, No JVD, Normal thyroid  CHEST/LUNG: Clear to auscultation. Clear to percussion bilaterally; No rales, rhonchi, wheezing, or rubs  HEART: Regular rate and rhythm; No murmurs, rubs, or gallops  ABDOMEN: Soft, Nontender, Nondistended; Bowel sounds present  NERVOUS SYSTEM:  Alert & Oriented X 1,    EXTREMITIES:  2+ Peripheral Pulses, No clubbing, cyanosis, or edema. Left ankle medial ulcer now bandaged  SKIN: warm dry                          11.5   5.91  )-----------( 206      ( 04 May 2021 06:08 )             34.4     05-04    138  |  106  |  14  ----------------------------<  101<H>  3.9   |  26  |  0.96    Ca    8.8      04 May 2021 06:08  Phos  3.0     05-04  Mg     2.0     05-04    TPro  7.9  /  Alb  3.2<L>  /  TBili  0.3  /  DBili  x   /  AST  13  /  ALT  16  /  AlkPhos  72  05-03    LIVER FUNCTIONS - ( 03 May 2021 05:56 )  Alb: 3.2 g/dL / Pro: 7.9 g/dL / ALK PHOS: 72 U/L / ALT: 16 U/L DA / AST: 13 U/L / GGT: x               PT/INR - ( 03 May 2021 05:56 )   PT: 14.3 sec;   INR: 1.21 ratio         PTT - ( 03 May 2021 05:56 )  PTT:33.5 sec    CAPILLARY BLOOD GLUCOSE      RADIOLOGY & ADDITIONAL TESTS:

## 2021-05-05 ENCOUNTER — TRANSCRIPTION ENCOUNTER (OUTPATIENT)
Age: 71
End: 2021-05-05

## 2021-05-05 VITALS — HEART RATE: 60 BPM | DIASTOLIC BLOOD PRESSURE: 50 MMHG | SYSTOLIC BLOOD PRESSURE: 116 MMHG

## 2021-05-05 LAB
ANION GAP SERPL CALC-SCNC: 4 MMOL/L — LOW (ref 5–17)
BUN SERPL-MCNC: 23 MG/DL — HIGH (ref 7–18)
CALCIUM SERPL-MCNC: 8.8 MG/DL — SIGNIFICANT CHANGE UP (ref 8.4–10.5)
CHLORIDE SERPL-SCNC: 108 MMOL/L — SIGNIFICANT CHANGE UP (ref 96–108)
CO2 SERPL-SCNC: 28 MMOL/L — SIGNIFICANT CHANGE UP (ref 22–31)
CREAT SERPL-MCNC: 1.06 MG/DL — SIGNIFICANT CHANGE UP (ref 0.5–1.3)
GLUCOSE SERPL-MCNC: 92 MG/DL — SIGNIFICANT CHANGE UP (ref 70–99)
HCT VFR BLD CALC: 32.9 % — LOW (ref 39–50)
HGB BLD-MCNC: 10.9 G/DL — LOW (ref 13–17)
MCHC RBC-ENTMCNC: 30.3 PG — SIGNIFICANT CHANGE UP (ref 27–34)
MCHC RBC-ENTMCNC: 33.1 GM/DL — SIGNIFICANT CHANGE UP (ref 32–36)
MCV RBC AUTO: 91.4 FL — SIGNIFICANT CHANGE UP (ref 80–100)
NRBC # BLD: 0 /100 WBCS — SIGNIFICANT CHANGE UP (ref 0–0)
PLATELET # BLD AUTO: 194 K/UL — SIGNIFICANT CHANGE UP (ref 150–400)
POTASSIUM SERPL-MCNC: 3.9 MMOL/L — SIGNIFICANT CHANGE UP (ref 3.5–5.3)
POTASSIUM SERPL-SCNC: 3.9 MMOL/L — SIGNIFICANT CHANGE UP (ref 3.5–5.3)
RBC # BLD: 3.6 M/UL — LOW (ref 4.2–5.8)
RBC # FLD: 13.5 % — SIGNIFICANT CHANGE UP (ref 10.3–14.5)
SODIUM SERPL-SCNC: 140 MMOL/L — SIGNIFICANT CHANGE UP (ref 135–145)
WBC # BLD: 8.09 K/UL — SIGNIFICANT CHANGE UP (ref 3.8–10.5)
WBC # FLD AUTO: 8.09 K/UL — SIGNIFICANT CHANGE UP (ref 3.8–10.5)

## 2021-05-05 PROCEDURE — 99261: CPT

## 2021-05-05 PROCEDURE — 85610 PROTHROMBIN TIME: CPT

## 2021-05-05 PROCEDURE — 94640 AIRWAY INHALATION TREATMENT: CPT

## 2021-05-05 PROCEDURE — 99232 SBSQ HOSP IP/OBS MODERATE 35: CPT

## 2021-05-05 PROCEDURE — 70450 CT HEAD/BRAIN W/O DYE: CPT

## 2021-05-05 PROCEDURE — 84443 ASSAY THYROID STIM HORMONE: CPT

## 2021-05-05 PROCEDURE — 84100 ASSAY OF PHOSPHORUS: CPT

## 2021-05-05 PROCEDURE — 85730 THROMBOPLASTIN TIME PARTIAL: CPT

## 2021-05-05 PROCEDURE — 83090 ASSAY OF HOMOCYSTEINE: CPT

## 2021-05-05 PROCEDURE — 80053 COMPREHEN METABOLIC PANEL: CPT

## 2021-05-05 PROCEDURE — 85027 COMPLETE CBC AUTOMATED: CPT

## 2021-05-05 PROCEDURE — 86769 SARS-COV-2 COVID-19 ANTIBODY: CPT

## 2021-05-05 PROCEDURE — 99285 EMERGENCY DEPT VISIT HI MDM: CPT | Mod: 25

## 2021-05-05 PROCEDURE — 84165 PROTEIN E-PHORESIS SERUM: CPT

## 2021-05-05 PROCEDURE — 87635 SARS-COV-2 COVID-19 AMP PRB: CPT

## 2021-05-05 PROCEDURE — 82607 VITAMIN B-12: CPT

## 2021-05-05 PROCEDURE — 0031A: CPT

## 2021-05-05 PROCEDURE — 86780 TREPONEMA PALLIDUM: CPT

## 2021-05-05 PROCEDURE — 83921 ORGANIC ACID SINGLE QUANT: CPT

## 2021-05-05 PROCEDURE — 97116 GAIT TRAINING THERAPY: CPT

## 2021-05-05 PROCEDURE — 83605 ASSAY OF LACTIC ACID: CPT

## 2021-05-05 PROCEDURE — 84155 ASSAY OF PROTEIN SERUM: CPT

## 2021-05-05 PROCEDURE — 82746 ASSAY OF FOLIC ACID SERUM: CPT

## 2021-05-05 PROCEDURE — 97161 PT EVAL LOW COMPLEX 20 MIN: CPT

## 2021-05-05 PROCEDURE — 73630 X-RAY EXAM OF FOOT: CPT

## 2021-05-05 PROCEDURE — 80048 BASIC METABOLIC PNL TOTAL CA: CPT

## 2021-05-05 PROCEDURE — 73610 X-RAY EXAM OF ANKLE: CPT

## 2021-05-05 PROCEDURE — 93005 ELECTROCARDIOGRAM TRACING: CPT

## 2021-05-05 PROCEDURE — 86334 IMMUNOFIX E-PHORESIS SERUM: CPT

## 2021-05-05 PROCEDURE — 36415 COLL VENOUS BLD VENIPUNCTURE: CPT

## 2021-05-05 PROCEDURE — 93923 UPR/LXTR ART STDY 3+ LVLS: CPT

## 2021-05-05 PROCEDURE — 83735 ASSAY OF MAGNESIUM: CPT

## 2021-05-05 RX ORDER — ASPIRIN/CALCIUM CARB/MAGNESIUM 324 MG
81 TABLET ORAL DAILY
Refills: 0 | Status: DISCONTINUED | OUTPATIENT
Start: 2021-05-05 | End: 2021-05-05

## 2021-05-05 RX ORDER — FERROUS SULFATE 325(65) MG
1 TABLET ORAL
Qty: 0 | Refills: 0 | DISCHARGE
Start: 2021-05-05

## 2021-05-05 RX ORDER — FERROUS SULFATE 325(65) MG
325 TABLET ORAL DAILY
Refills: 0 | Status: DISCONTINUED | OUTPATIENT
Start: 2021-05-05 | End: 2021-05-05

## 2021-05-05 RX ORDER — CHOLECALCIFEROL (VITAMIN D3) 125 MCG
1 CAPSULE ORAL
Qty: 0 | Refills: 0 | DISCHARGE

## 2021-05-05 RX ORDER — CHOLECALCIFEROL (VITAMIN D3) 125 MCG
1 CAPSULE ORAL
Qty: 30 | Refills: 0
Start: 2021-05-05 | End: 2021-06-03

## 2021-05-05 RX ORDER — OLANZAPINE 15 MG/1
1 TABLET, FILM COATED ORAL
Qty: 0 | Refills: 0 | DISCHARGE
Start: 2021-05-05

## 2021-05-05 RX ORDER — JNJ-78436735 50000000000 [PFU]/.5ML
0.5 SUSPENSION INTRAMUSCULAR ONCE
Refills: 0 | Status: COMPLETED | OUTPATIENT
Start: 2021-05-05 | End: 2021-05-05

## 2021-05-05 RX ADMIN — JNJ-78436735 0.5 MILLILITER(S): 50000000000 SUSPENSION INTRAMUSCULAR at 16:30

## 2021-05-05 RX ADMIN — ENOXAPARIN SODIUM 40 MILLIGRAM(S): 100 INJECTION SUBCUTANEOUS at 12:35

## 2021-05-05 NOTE — BH CONSULTATION LIAISON PROGRESS NOTE - NSBHCONSULTRECOMMENDOTHER_PSY_A_CORE FT
1. For management of behavioral disturbance 2/2 dementia and delirium, recommend Zyprexa 2.5 mg q8h PRN agitation  -- PO dose form is preferred if pt is able to swallow, but IM can be used as backup if PO refused or cannot be given  2. For SEVERE agitation, recommend Zyprexa 5 mg ODT or IM q12h PRN severe agitation  3. Pt does NOT have capacity to participate in DC planning or leave the hospital AMA  --per SW, pt has nephews in [place] who should be asked to consent on his behalf  4. Given recent h/o wandering and poor self-care even with private hire 24h HHA, pt is highly likely to need placement in LTC setting  --HCP will have to consent to placement, as pt does not have capacity due to dementia  5. Psychiatry will continue to follow  6. Case d/w THEE Dumont of primary team    Asha Correa MD  Director, Consultation-Liaison Psychiatry Service  [id number]
1. For management of behavioral disturbance 2/2 dementia and delirium, recommend Zyprexa 2.5 mg q8h PRN agitation  -- PO dose form is preferred if pt is able to swallow, but IM can be used as backup if PO refused or cannot be given  2. For SEVERE agitation, recommend Zyprexa 5 mg ODT or IM q12h PRN severe agitation  3. Pt does NOT have capacity to participate in DC planning or leave the hospital AMA  --per SW, pt has nephews in [place] who should be asked to consent on his behalf  4. Pt is psych cleared for dispo to [facility] as of today's assessment  --HCP will have to consent to placement, as pt does not have capacity due to dementia  5. Psychiatry is signing off in anticipation of DC  6. Case d/w Dr. Haji of primary team    Asha Correa MD  Director, Consultation-Liaison Psychiatry Service  [id number]
1. For management of behavioral disturbance 2/2 dementia and delirium, recommend Zyprexa 2.5 mg q8h PRN agitation  -- PO dose form is preferred if pt is able to swallow, but IM can be used as backup if PO refused or cannot be given  2. For SEVERE agitation, recommend Zyprexa 5 mg ODT or IM q12h PRN severe agitation  3. Pt does NOT have capacity to participate in DC planning or leave the hospital AMA  --per SW, pt has nephews in [place] who should be asked to consent on his behalf  4. Given recent h/o wandering and poor self-care even with private hire 24h HHA, pt is highly likely to need placement in LTC setting  --HCP will have to consent to placement, as pt does not have capacity due to dementia  5. Psychiatry will continue to follow  6. Case d/w THEE Dumont of primary team    Asha Correa MD  Director, Consultation-Liaison Psychiatry Service  [id number]

## 2021-05-05 NOTE — DIETITIAN INITIAL EVALUATION ADULT. - NAME AND PHONE
Pt to be followed by dietitian at skilled nursing facility when medically ready to be discharged Pt to be followed by dietitian at skilled nursing facility when medically ready to be discharged.

## 2021-05-05 NOTE — PROGRESS NOTE ADULT - PROBLEM SELECTOR PROBLEM 3
Social problem

## 2021-05-05 NOTE — DISCHARGE NOTE NURSING/CASE MANAGEMENT/SOCIAL WORK - NSDCVIVACCINE_GEN_ALL_CORE_FT
Severe acute respiratory syndrome coronavirus 2 (SARS-CoV-2) (Coronavirus disease [COVID-19]) vaccine , 2021/5/5 16:30 , Felicity Regan (RN)  Influenza , 2018/10/29 12:19 , Kinga Nguyen (RN)

## 2021-05-05 NOTE — PROGRESS NOTE ADULT - ASSESSMENT
71 yo male with PMH of dementia, colon mass s/p right hemicolectomy and LE ulcers  presents to ED after being sent by his PCP Dr Quiroz's office for wound check and being unable to take care of himself, admitted for scheduled for Bilateral wound debridement and graft application

## 2021-05-05 NOTE — BH CONSULTATION LIAISON PROGRESS NOTE - NSBHMSETHTPROC_PSY_A_CORE
Disorganized/Circumstantial/Tangential/Flight of ideas/Illogical/Impaired reasoning

## 2021-05-05 NOTE — DIETITIAN INITIAL EVALUATION ADULT. - PERTINENT LABORATORY DATA
05-05 Na140 mmol/L Glu 92 mg/dL K+ 3.9 mmol/L Cr  1.06 mg/dL BUN 23 mg/dL<H>   05-04 Phos 3.0 mg/dL   05-03 Alb 3.2 g/dL<L>

## 2021-05-05 NOTE — PROGRESS NOTE ADULT - PROVIDER SPECIALTY LIST ADULT
Podiatry
Internal Medicine
Podiatry
Internal Medicine

## 2021-05-05 NOTE — PROGRESS NOTE ADULT - NSICDXPILOT_GEN_ALL_CORE
[place]

## 2021-05-05 NOTE — BH CONSULTATION LIAISON PROGRESS NOTE - NSBHPSYCHOLCOGABN_PSY_A_CORE
disoriented to time/disoriented to place/disoriented to situation

## 2021-05-05 NOTE — PROGRESS NOTE ADULT - ASSESSMENT
A:   b/l chronic ankle wounds     Patient seen and evaluated   reviewed xrays bl ankle   CALI PVR b/l reviewed, noted above   applied new ace bandage, dressing kept intact  No WB restrictions   patient is stable for discharge from podiatry standpoint  patient needs to follow up with podiatry clinic upon discharge at [address] [place] [address]  Tel:[phone]  Podiatry to follow while in house   Patient seen and evaluated with attending Dr. Jimenez  Discussed with attending Dr. Patel

## 2021-05-05 NOTE — BH CONSULTATION LIAISON PROGRESS NOTE - NSCDBILL_PSY_A_CORE
15496 - Inpatient, moderate complexity
[id number] - Inpatient, moderate complexity
88022 - Inpatient, moderate complexity

## 2021-05-05 NOTE — BH CONSULTATION LIAISON PROGRESS NOTE - NSBHCHARTREVIEWLAB_PSY_A_CORE FT
11.0   4.90  )-----------( 214      ( 03 May 2021 05:56 )             32.3   05-03    140  |  108  |  16  ----------------------------<  95  3.7   |  28  |  0.91    Ca    8.4      03 May 2021 05:56  Phos  2.9     05-03  Mg     2.1     05-03    TPro  7.9  /  Alb  3.2<L>  /  TBili  0.3  /  DBili  x   /  AST  13  /  ALT  16  /  AlkPhos  72  05-03  
                      10.9   8.09  )-----------( 194      ( 05 May 2021 06:29 )             32.9   05-05    140  |  108  |  23<H>  ----------------------------<  92  3.9   |  28  |  1.06    Ca    8.8      05 May 2021 06:29  Phos  3.0     05-04  Mg     2.0     05-04    
                      11.0   4.90  )-----------( 214      ( 03 May 2021 05:56 )             32.3   05-03    140  |  108  |  16  ----------------------------<  95  3.7   |  28  |  0.91    Ca    8.4      03 May 2021 05:56  Phos  2.9     05-03  Mg     2.1     05-03    TPro  7.9  /  Alb  3.2<L>  /  TBili  0.3  /  DBili  x   /  AST  13  /  ALT  16  /  AlkPhos  72  05-03

## 2021-05-05 NOTE — BH CONSULTATION LIAISON PROGRESS NOTE - NSBHASSESSMENTFT_PSY_ALL_CORE
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 Quiroz after recent appt for inability to care for LE wounds and concern for progressing dementia. Psych consult was called for assessment and management of dementia. On exam, pt presents pleasant but extremely confused, speaking in a disconnected and incoherent fashion about seeing "fire" coming out of people and objects. Pt's dementia, which was already evident at MD's first encounter with pt in 9/2019, has clearly progressed very significantly, and pt now appears to be experiencing recurrent hallucinations vs delusions. Pt's cognitive impairment is now so severe that it appears very doubtful that he could continue to live safely at home, even with 24h private hire HHA coverage (which per family pt already has). Pt appears highly likely to need LTP in nursing home capable of caring for pts with advanced dementia. To address behavioral disturbance i/s/o dementia and delirium, we recommend PRN Zyprexa, to be titrated for effect (pt has not needed this medication since initial encounter, as his behavior has been calm and cooperative). Pt does not appear to present an acute risk of harm to self or others at the time of assessment, and does not appear to be in need of admission to IP psych at the time of assessment.
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 Quiroz after recent appt for inability to care for LE wounds and concern for progressing dementia. Psych consult was called for assessment and management of dementia. On initial exam, pt presents pleasant but extremely confused, speaking in a disconnected and incoherent fashion about seeing "fire" coming out of people and objects. Pt's dementia, which was already evident at MD's first encounter with pt in 9/2019, has clearly progressed very significantly, and pt now appears to be experiencing recurrent hallucinations vs delusions. On f/u today, pt is calm and pleasant and does not endorse any VH/delusional content, but he remains extremely confused and incoherent in his speech. Pt's cognitive impairment is now so severe that it appears very doubtful that he could continue to live safely at home, even with 24h private hire A coverage (which per family pt already has). Pt appears highly likely to need LTP in nursing home capable of caring for pts with advanced dementia. To address behavioral disturbance i/s/o dementia and delirium, we recommend PRN Zyprexa, to be titrated for effect (pt has not needed this medication since initial encounter, as his behavior has been calm and cooperative). Pt does not appear to present an acute risk of harm to self or others at the time of assessment, and does not appear to be in need of admission to IP psych at the time of assessment.
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 Quiroz after recent appt for inability to care for LE wounds and concern for progressing dementia. Psych consult was called for assessment and management of dementia. On initial exam, pt presents pleasant but extremely confused, speaking in a disconnected and incoherent fashion about seeing "fire" coming out of people and objects. Pt's dementia, which was already evident at MD's first encounter with pt in 9/2019, has clearly progressed very significantly, and pt now appears to be experiencing recurrent hallucinations vs delusions. On f/u today, pt is calm and pleasant and does not endorse any VH/delusional content, but he remains extremely confused and incoherent in his speech. Pt's cognitive impairment is now so severe that it appears somewhat doubtful that he could continue to live safely at home, even with 24h private hire A coverage (which per family pt already has), but family has expressed a strong preference for pt to proceed to [facility] and then home.  To address behavioral disturbance i/s/o dementia and delirium, we recommend PRN Zyprexa, to be titrated for effect (pt has not needed this medication since initial encounter, as his behavior has been calm and cooperative). Pt is psych cleared for dispo to [facility] as of today's assessment. Pt does not appear to present an acute risk of harm to self or others at the time of assessment, and does not appear to be in need of admission to IP psych at the time of assessment.

## 2021-05-05 NOTE — DIETITIAN INITIAL EVALUATION ADULT. - ORAL NUTRITION SUPPLEMENTS
May consider oral nutritional supplement ( Ensure Enlive ) if persistently decreased intake as medically feasible May consider oral nutritional supplement ( Ensure Enlive ) if persistently decreased intake as medically feasible.

## 2021-05-05 NOTE — PROGRESS NOTE ADULT - SUBJECTIVE AND OBJECTIVE BOX
HPI: 71 yo male with PMH of dementia, colon mass s/p right hemicolectomy and LE ulcers  presents to ED after being sent by his PCP Dr. Quiroz's office today for wound check and being unable to take care of himself.      Podiatry HPI: Patient is a 70M with dementia who presents with chronic b/l ankle ulcers. Patient is a patient in wound care clinic who lost follow up. Patient states he has no pain. Patient does not recall much, may be sundowning.     Podiatry progress: Patient seen resting in bed, AAOx1. s/p debridement b/l ankle 5/3/21. Denies pain to b/l LE. patient denies any acute overnight event.          Medications aspirin  chewable 81 milliGRAM(s) Oral daily  coronavirus (EUA) Vaccine (BOBBY) 0.5 milliLiter(s) IntraMuscular once  donepezil 5 milliGRAM(s) Oral at bedtime  enoxaparin Injectable 40 milliGRAM(s) SubCutaneous daily  ferrous    sulfate 325 milliGRAM(s) Oral daily  OLANZapine 2.5 milliGRAM(s) Oral every 8 hours PRN  tamsulosin 0.4 milliGRAM(s) Oral at bedtime    FHNo pertinent family history in first degree relatives    ,   PMHNo pertinent past medical history    No pertinent past medical history    Dementia       PSHNo significant past surgical history    No significant past surgical history    S/P right hemicolectomy        Labs                          10.9   8.09  )-----------( 194      ( 05 May 2021 06:29 )             32.9      05-05    140  |  108  |  23<H>  ----------------------------<  92  3.9   |  28  |  1.06    Ca    8.8      05 May 2021 06:29  Phos  3.0     05-04  Mg     2.0     05-04       Vital Signs Last 24 Hrs  T(C): 36.4 (05 May 2021 04:53), Max: 36.6 (04 May 2021 21:39)  T(F): 97.5 (05 May 2021 04:53), Max: 97.9 (04 May 2021 21:39)  HR: 60 (05 May 2021 10:26) (52 - 74)  BP: 116/50 (05 May 2021 10:26) (114/54 - 135/54)  BP(mean): --  RR: 18 (05 May 2021 04:53) (16 - 18)  SpO2: 100% (05 May 2021 04:53) (100% - 100%)          WBC Count: 8.09 K/uL (05-05-21 @ 06:29)           PHYSICAL EXAM 5/3: Pre surgical, dressing maintained C/D/I    PHYSICAL EXAM: 5/2/21   Derm: left medial ulcer ankle 4.3x3.8x0.3 fibrotic and necrotic tissue. malodorous. Left medial ankle ulcer 3.5x3.2x0.2 fibrotic. wound with thickened hyperkeratotis. No drainage. No purulence. Right medial ankle ulcer 3.7x4.3x0.2 fibrotic. no probe to bone.  No clinical signs of infection. No erythema. Nails elongated thickened 1-10.   Vasc: DP palpable left foot. No palpable pulses right foot.  No edema noted. Skin temperature noted to be warm to warm from proximal to distal b/l.   Neuro: Protective and epicritic sensation diminished  Musc: No pain on palpation. No equinus      IMAGING:  < from: Xray Ankle Complete 3 Views, Bilateral (04.28.21 @ 20:45) >  EXAM:  XR ANKLE COMP MIN 3 VIEWS BI                            PROCEDURE DATE:  04/28/2021          INTERPRETATION:  Clinical information: Bilateral ankle ulcers.    3 views each of the right and left ankle.    No comparison.    Right ankle:    There is irregularity of the medial soft tissues, correlate for ulcer.    There is prominent ossification in the posterior medial soft tissues.  There is interosseous periosteal thickening.  No focal osteolysis is noted. No periarticular osseous erosion is noted.    There is prominent calcaneal enthesopathy.    The evaluation of the subtalar joint is limited on this exam.    Left ankle:    There is irregularity of both the lateral and medial soft tissues, correlate for ulceration. There is prominent ossification in the posterior medial soft tissues. In addition, there is prominent interosseous periosteal thickening, as well as diffuse periosteal cortical thickening involving the fibula.  No focal osteolysis is noted.    Calcaneal enthesopathy is present.  Evaluation of the subtalar joint is limited on this exam.    Soft tissue surgical clips are present.    IMPRESSION:    Findings as discussed above. If there is a clinical suspicion for an active osteomyelitis, recommend further evaluation with MRI if there are no clinical contraindications.            LILY MILLER M.D., ATTENDING RADIOLOGIST  This document has been electronically signed. Apr 29 2021  3:55PM    < end of copied text >    < from: [facility] Physiol Extremity Lower 3+ Level, BI (04.30.21 @ 11:06) >    EXAM:  US PHYSIOL LWR EXT 3+ LEV BI                            PROCEDURE DATE:  04/30/2021          INTERPRETATION:  Clinical information: Nondiabetic, nonhealing left lower extremity ulcer, nonpalpable distal pulses.    COMPARISON: Lower extremityarterial Doppler study dated 9/18/2019.    TECHNIQUE: Lower extremity arterial Doppler study.    FINDINGS: Ankle brachial index measures 1.24 on the right and 1.16 on the left, compared with prior values of 0.93 and 0.94. No segmental arterial pressure gradient is detected.    PVR waveforms are normal in amplitude and pulsatility throughout.    IMPRESSION: No Doppler evidence of hemodynamically significant arterial flow limitation in either lower extremity.            SMITA MORRIS MD; Attending Radiologist  This document has been electronically signed. Apr 30 2021  2:13PM    < end of copied text >

## 2021-05-05 NOTE — BH CONSULTATION LIAISON PROGRESS NOTE - CURRENT MEDICATION
MEDICATIONS  (STANDING):  donepezil 5 milliGRAM(s) Oral at bedtime  enoxaparin Injectable 40 milliGRAM(s) SubCutaneous daily  tamsulosin 0.4 milliGRAM(s) Oral at bedtime    MEDICATIONS  (PRN):  OLANZapine 2.5 milliGRAM(s) Oral every 8 hours PRN Agitation  
MEDICATIONS  (STANDING):  donepezil 5 milliGRAM(s) Oral at bedtime  enoxaparin Injectable 40 milliGRAM(s) SubCutaneous daily  tamsulosin 0.4 milliGRAM(s) Oral at bedtime    MEDICATIONS  (PRN):  OLANZapine 2.5 milliGRAM(s) Oral every 8 hours PRN Agitation  
MEDICATIONS  (STANDING):  donepezil 5 milliGRAM(s) Oral at bedtime  tamsulosin 0.4 milliGRAM(s) Oral at bedtime    MEDICATIONS  (PRN):  OLANZapine 2.5 milliGRAM(s) Oral every 8 hours PRN Agitation

## 2021-05-05 NOTE — DIETITIAN INITIAL EVALUATION ADULT. - OTHER INFO
Pt lives home with HHA help PTA, alert, confused with dementia; unable to take care of himself at home, worsening dementia, declining functional and physical status, s/p Psychiatric.  consult noted, Pending discharge planning to skilled nursing facility when medically ready Pt lives home with HHA help PTA, alert, verbally responsive, but confused with dementia; Limited intake/wt change history data available at present; tolerating meals well, 70 to 100% intake 5/4/4021 per flow sheet, 100% intake at breakfast today, no GI distress, swallow/chewing problem reported; unable to take care of himself at home, worsening dementia, declining functional and physical status, s/p Psychiatric.  consult noted, Pending discharge planning to skilled nursing facility when medically ready per team

## 2021-05-05 NOTE — BH CONSULTATION LIAISON PROGRESS NOTE - NSBHFUPINTERVALHXFT_PSY_A_CORE
Per chart and staff reports, pt was in good behavioral control over the weekend and did not require any PRNs. Pt seen in his room for f/u, found lying on bed awake and alert. Pt responds to MD's greeting pleasantly and appears in NAD, describing mood as "I'm fine." Pt denies seeing any "balls of fire" coming out of people or objects in the past few days ("I only see it sometimes"). MD asks pt about 24h HHA which per family report he had PTA; pt says, "She didn't come." 
Per chart and staff reports, pt's behavior continues to be well-controlled and he has not required any PRNs. Per staff, pt's family has expressed preference for him to proceed to [facility] for [facility] care, then return home with 24/7 HHA. Pt seen in his room for f/u, found sitting up in bed awake and alert. Pt responds to MD's greeting pleasantly and appears in NAD, describing mood as "I feel good." 
Per chart and staff reports, pt went to OR yesterday afternoon for debridement of his BL ankle wounds. Pt seen in his room for f/u, found sitting up in bed awake and alert, still wearing the disposable bouffant cap from OR. Pt responds to MD's greeting pleasantly and appears in NAD, describing mood as "I want the old days to come back." MD attempts to elicit what pt misses about the old days, but he does not appear to understand the question.

## 2021-05-05 NOTE — BH CONSULTATION LIAISON PROGRESS NOTE - NSBHFUPREASONCONS_PSY_A_CORE
delirium/dementia/psychosis/med management

## 2021-05-05 NOTE — BH CONSULTATION LIAISON PROGRESS NOTE - NSBHCHARTREVIEWVS_PSY_A_CORE FT
Vital Signs Last 24 Hrs  T(C): 36.4 (05 May 2021 04:53), Max: 36.6 (04 May 2021 14:19)  T(F): 97.5 (05 May 2021 04:53), Max: 97.9 (04 May 2021 14:19)  HR: 60 (05 May 2021 10:26) (52 - 74)  BP: 116/50 (05 May 2021 10:26) (114/54 - 135/54)  BP(mean): --  RR: 18 (05 May 2021 04:53) (16 - 18)  SpO2: 100% (05 May 2021 04:53) (100% - 100%)
Vital Signs Last 24 Hrs  T(C): 36.5 (03 May 2021 13:11), Max: 36.6 (02 May 2021 21:17)  T(F): 97.7 (03 May 2021 13:11), Max: 97.9 (02 May 2021 21:17)  HR: 52 (03 May 2021 13:11) (52 - 66)  BP: 134/55 (03 May 2021 13:11) (131/64 - 145/59)  BP(mean): --  RR: 16 (03 May 2021 13:11) (16 - 18)  SpO2: 99% (03 May 2021 13:11) (97% - 99%)
Vital Signs Last 24 Hrs  T(C): 36.4 (04 May 2021 05:37), Max: 36.6 (03 May 2021 15:43)  T(F): 97.6 (04 May 2021 05:37), Max: 97.9 (03 May 2021 15:43)  HR: 66 (04 May 2021 05:37) (57 - 70)  BP: 161/72 (04 May 2021 05:37) (148/66 - 168/66)  BP(mean): 92 (03 May 2021 15:43) (87 - 97)  RR: 17 (04 May 2021 05:37) (14 - 20)  SpO2: 100% (04 May 2021 05:37) (97% - 100%)

## 2021-05-05 NOTE — DIETITIAN INITIAL EVALUATION ADULT. - ADD RECOMMEND
Rec: MVI/minerals, Vit C supplements as medically feasible, Monitor needs for Zn supplement Rec: MVI/minerals, Vit C supplements as medically feasible, Monitor needs for Zn supplement.

## 2021-05-05 NOTE — PROGRESS NOTE ADULT - PROBLEM SELECTOR PLAN 3
Given worsening dementia, and decline in functional and physical status- pt will need placement at Rehab for optimal care and to prevent re-admission. Request for Auth for rehab placement to be placed today 5/4- discussed with pt's emergency contact this morning, agreeable for Rehab

## 2021-05-05 NOTE — DIETITIAN INITIAL EVALUATION ADULT. - PROBLEM SELECTOR PLAN 2
continue with home meds after confirming from pharmacy  patient unable to provide any history  likely will need neuro consult as well plan per MD

## 2021-05-05 NOTE — BH CONSULTATION LIAISON PROGRESS NOTE - NSICDXBHPRIMARYDX_PSY_ALL_CORE
Dementia, senile with delusions   F03.90  

## 2021-05-05 NOTE — DIETITIAN INITIAL EVALUATION ADULT. - PERTINENT MEDS FT
MEDICATIONS  (STANDING):  donepezil 5 milliGRAM(s) Oral at bedtime  enoxaparin Injectable 40 milliGRAM(s) SubCutaneous daily  tamsulosin 0.4 milliGRAM(s) Oral at bedtime

## 2021-05-05 NOTE — BH CONSULTATION LIAISON PROGRESS NOTE - NSBHCONSFOLLOWNEEDS_PSY_ALL_CORE
Needs further psych safety assessment prior to discharge
Needs further psych safety assessment prior to discharge
No psychiatric contraindications to discharge

## 2021-05-05 NOTE — PROGRESS NOTE ADULT - REASON FOR ADMISSION
wound care

## 2021-05-05 NOTE — DISCHARGE NOTE NURSING/CASE MANAGEMENT/SOCIAL WORK - PATIENT PORTAL LINK FT
You can access the FollowMyHealth Patient Portal offered by [facility] by registering at the following website: [email or web address]. By joining [facility]’s FollowMyHealth portal, you will also be able to view your health information using other applications (apps) compatible with our system.

## 2021-05-05 NOTE — PROGRESS NOTE ADULT - SUBJECTIVE AND OBJECTIVE BOX
Patient was seen and examined  Patient is a 70y old  Male who presents with a chief complaint of wound care (05 May 2021 08:38)      INTERVAL HPI/OVERNIGHT EVENTS:  T(C): 36.4 (05-05-21 @ 04:53), Max: 36.6 (05-04-21 @ 14:19)  HR: 52 (05-05-21 @ 04:53) (52 - 74)  BP: 135/54 (05-05-21 @ 04:53) (114/54 - 135/54)  RR: 18 (05-05-21 @ 04:53) (16 - 18)  SpO2: 100% (05-05-21 @ 04:53) (100% - 100%)  Wt(kg): --  I&O's Summary      LABS:                        10.9   8.09  )-----------( 194      ( 05 May 2021 06:29 )             32.9     05-05    140  |  108  |  23<H>  ----------------------------<  92  3.9   |  28  |  1.06    Ca    8.8      05 May 2021 06:29  Phos  3.0     05-04  Mg     2.0     05-04          CAPILLARY BLOOD GLUCOSE                  MEDICATIONS  (STANDING):  donepezil 5 milliGRAM(s) Oral at bedtime  enoxaparin Injectable 40 milliGRAM(s) SubCutaneous daily  tamsulosin 0.4 milliGRAM(s) Oral at bedtime    MEDICATIONS  (PRN):  OLANZapine 2.5 milliGRAM(s) Oral every 8 hours PRN Agitation      RADIOLOGY & ADDITIONAL TESTS:    Imaging Personally Reviewed:  [ ] YES  [ ] NO    REVIEW OF SYSTEMS:  confused      Consultant(s) Notes Reviewed:  [ ] YES  [ ] NO    PHYSICAL EXAM:  GENERAL: NAD, well-groomed, well-developed  HEAD:  Atraumatic, Normocephalic  EYES: EOMI, PERRLA, conjunctiva and sclera clear  ENMT: No tonsillar erythema, exudates, or enlargement; Moist mucous membranes, Good dentition, No lesions  NECK: Supple, No JVD, Normal thyroid  NERVOUS SYSTEM: confused  CHEST/LUNG: Clear to percussion bilaterally; No rales, rhonchi, wheezing, or rubs  HEART: Regular rate and rhythm; No murmurs, rubs, or gallops  ABDOMEN: Soft, Nontender, Nondistended; Bowel sounds present  EXTREMITIES:  dressing   LYMPH: No lymphadenopathy noted  SKIN: No rashes or lesions    Care Discussed with Consultants/Other Providers [ x] YES  [ ] NO

## 2021-05-05 NOTE — DIETITIAN INITIAL EVALUATION ADULT. - PROBLEM SELECTOR PLAN 1
patient p/w ulcers of LE that are chronic   patient needs local wound care that he is unable to do so himself  will hold off on antibiotics for now since no clinical signs on infection  wound care consult  podiatry consult  vascular consult plan per MD

## 2021-05-05 NOTE — PROGRESS NOTE ADULT - PROBLEM SELECTOR PLAN 2
-Patient p/w ulcers of LE that are chronic, s/p Excisional debridement of Left ankle wound, Right foot ulcer, Right ankle wound and graft application of Bilateral Ankle wounds (Integra bilayer and derm-iain) on May 3 by Dr. Patel  -Pt to follow up at Podiatry clinic

## 2021-05-05 NOTE — DIETITIAN INITIAL EVALUATION ADULT. - FACTORS AFF FOOD INTAKE
acute on chronic comorbidities including dementia, colon mass with right hemicolectomy , chronic ulcers/change in mental status/Worship/ethnic/cultural/personal food preferences/other (specify)

## 2021-05-07 LAB — METHYLMALONATE SERPL-SCNC: 170 NMOL/L — SIGNIFICANT CHANGE UP (ref 0–378)

## 2021-05-20 NOTE — DIETITIAN INITIAL EVALUATION ADULT. - WEIGHT FOR BMI (KG)
pt arrives by ambulance from home s/p witnessed syncopal episode this morning. + fall to ground from chair. c/o left rib pain. no head injury. pt denies cp/sob. pt appears diaphoretic. hx of cardiac stents.  in field 74.4

## 2021-05-25 ENCOUNTER — APPOINTMENT (OUTPATIENT)
Dept: WOUND CARE | Facility: CLINIC | Age: 71
End: 2021-05-25

## 2021-10-04 ENCOUNTER — INPATIENT (INPATIENT)
Facility: HOSPITAL | Age: 71
LOS: 7 days | Discharge: NURSING, REHAB OR LONG-TERM CARE | End: 2021-10-12
Attending: HOSPITALIST | Admitting: HOSPITALIST
Payer: MEDICARE

## 2021-10-04 VITALS
RESPIRATION RATE: 16 BRPM | OXYGEN SATURATION: 99 % | HEIGHT: 69 IN | HEART RATE: 80 BPM | DIASTOLIC BLOOD PRESSURE: 82 MMHG | SYSTOLIC BLOOD PRESSURE: 120 MMHG | TEMPERATURE: 99 F

## 2021-10-04 DIAGNOSIS — Z90.49 ACQUIRED ABSENCE OF OTHER SPECIFIED PARTS OF DIGESTIVE TRACT: Chronic | ICD-10-CM

## 2021-10-04 LAB
ALBUMIN SERPL ELPH-MCNC: 3.4 G/DL — SIGNIFICANT CHANGE UP (ref 3.3–5)
ALP SERPL-CCNC: 58 U/L — SIGNIFICANT CHANGE UP (ref 40–120)
ALT FLD-CCNC: 42 U/L — HIGH (ref 4–41)
ANION GAP SERPL CALC-SCNC: 14 MMOL/L — SIGNIFICANT CHANGE UP (ref 7–14)
APAP SERPL-MCNC: <15 UG/ML — SIGNIFICANT CHANGE UP (ref 15–25)
AST SERPL-CCNC: 56 U/L — HIGH (ref 4–40)
BASOPHILS # BLD AUTO: 0.01 K/UL — SIGNIFICANT CHANGE UP (ref 0–0.2)
BASOPHILS NFR BLD AUTO: 0.2 % — SIGNIFICANT CHANGE UP (ref 0–2)
BILIRUB SERPL-MCNC: 0.7 MG/DL — SIGNIFICANT CHANGE UP (ref 0.2–1.2)
BUN SERPL-MCNC: 24 MG/DL — HIGH (ref 7–23)
CALCIUM SERPL-MCNC: 9.3 MG/DL — SIGNIFICANT CHANGE UP (ref 8.4–10.5)
CHLORIDE SERPL-SCNC: 116 MMOL/L — HIGH (ref 98–107)
CK SERPL-CCNC: 658 U/L — HIGH (ref 30–200)
CO2 SERPL-SCNC: 21 MMOL/L — LOW (ref 22–31)
CREAT SERPL-MCNC: 0.8 MG/DL — SIGNIFICANT CHANGE UP (ref 0.5–1.3)
EOSINOPHIL # BLD AUTO: 0.02 K/UL — SIGNIFICANT CHANGE UP (ref 0–0.5)
EOSINOPHIL NFR BLD AUTO: 0.3 % — SIGNIFICANT CHANGE UP (ref 0–6)
ETHANOL SERPL-MCNC: <10 MG/DL — SIGNIFICANT CHANGE UP
GLUCOSE SERPL-MCNC: 104 MG/DL — HIGH (ref 70–99)
HCT VFR BLD CALC: 39.9 % — SIGNIFICANT CHANGE UP (ref 39–50)
HGB BLD-MCNC: 13.1 G/DL — SIGNIFICANT CHANGE UP (ref 13–17)
IANC: 4.64 K/UL — SIGNIFICANT CHANGE UP (ref 1.5–8.5)
IMM GRANULOCYTES NFR BLD AUTO: 0.6 % — SIGNIFICANT CHANGE UP (ref 0–1.5)
LYMPHOCYTES # BLD AUTO: 1 K/UL — SIGNIFICANT CHANGE UP (ref 1–3.3)
LYMPHOCYTES # BLD AUTO: 15.3 % — SIGNIFICANT CHANGE UP (ref 13–44)
MCHC RBC-ENTMCNC: 30.6 PG — SIGNIFICANT CHANGE UP (ref 27–34)
MCHC RBC-ENTMCNC: 32.8 GM/DL — SIGNIFICANT CHANGE UP (ref 32–36)
MCV RBC AUTO: 93.2 FL — SIGNIFICANT CHANGE UP (ref 80–100)
MONOCYTES # BLD AUTO: 0.81 K/UL — SIGNIFICANT CHANGE UP (ref 0–0.9)
MONOCYTES NFR BLD AUTO: 12.4 % — SIGNIFICANT CHANGE UP (ref 2–14)
NEUTROPHILS # BLD AUTO: 4.64 K/UL — SIGNIFICANT CHANGE UP (ref 1.8–7.4)
NEUTROPHILS NFR BLD AUTO: 71.2 % — SIGNIFICANT CHANGE UP (ref 43–77)
NRBC # BLD: 0 /100 WBCS — SIGNIFICANT CHANGE UP
NRBC # FLD: 0 K/UL — SIGNIFICANT CHANGE UP
PLATELET # BLD AUTO: 181 K/UL — SIGNIFICANT CHANGE UP (ref 150–400)
POTASSIUM SERPL-MCNC: 4.2 MMOL/L — SIGNIFICANT CHANGE UP (ref 3.5–5.3)
POTASSIUM SERPL-SCNC: 4.2 MMOL/L — SIGNIFICANT CHANGE UP (ref 3.5–5.3)
PROT SERPL-MCNC: 8.4 G/DL — HIGH (ref 6–8.3)
RBC # BLD: 4.28 M/UL — SIGNIFICANT CHANGE UP (ref 4.2–5.8)
RBC # FLD: 13.6 % — SIGNIFICANT CHANGE UP (ref 10.3–14.5)
SALICYLATES SERPL-MCNC: <5 MG/DL — LOW (ref 15–30)
SODIUM SERPL-SCNC: 151 MMOL/L — HIGH (ref 135–145)
TROPONIN T, HIGH SENSITIVITY RESULT: 19 NG/L — SIGNIFICANT CHANGE UP
WBC # BLD: 6.52 K/UL — SIGNIFICANT CHANGE UP (ref 3.8–10.5)
WBC # FLD AUTO: 6.52 K/UL — SIGNIFICANT CHANGE UP (ref 3.8–10.5)

## 2021-10-04 PROCEDURE — 71045 X-RAY EXAM CHEST 1 VIEW: CPT | Mod: 26

## 2021-10-04 PROCEDURE — 99291 CRITICAL CARE FIRST HOUR: CPT

## 2021-10-04 PROCEDURE — 70450 CT HEAD/BRAIN W/O DYE: CPT | Mod: 26

## 2021-10-04 RX ORDER — OLANZAPINE 15 MG/1
10 TABLET, FILM COATED ORAL ONCE
Refills: 0 | Status: COMPLETED | OUTPATIENT
Start: 2021-10-04 | End: 2021-10-04

## 2021-10-04 RX ORDER — SODIUM CHLORIDE 9 MG/ML
500 INJECTION, SOLUTION INTRAVENOUS
Refills: 0 | Status: DISCONTINUED | OUTPATIENT
Start: 2021-10-04 | End: 2021-10-05

## 2021-10-04 RX ADMIN — OLANZAPINE 10 MILLIGRAM(S): 15 TABLET, FILM COATED ORAL at 20:56

## 2021-10-04 RX ADMIN — SODIUM CHLORIDE 100 MILLILITER(S): 9 INJECTION, SOLUTION INTRAVENOUS at 22:03

## 2021-10-04 NOTE — ED ADULT NURSE NOTE - CHIEF COMPLAINT QUOTE
Patient brought to ER by EMS from [facility] for aggressive and combative behavior.

## 2021-10-04 NOTE — ED PROVIDER NOTE - PHYSICAL EXAMINATION
General: Elderly chronically ill appearing man, cachectic   HEENT: Normocephalic and atraumatic, dry mucous membranes. Trachea midline.   Cardiac: Normal S1 and S2 w/ RRR. No MRG.  Pulmonary: No increased WOB.   Abdominal: Soft, NTND  Neurologic: Awake, does not follow commands  Musculoskeletal: No limited ROM.  Vascular: Warm and well perfused  Skin: Color appropriate for race.   Psychiatric: Calm, does not participate in exam  Allan Angel M.D. PGY-4

## 2021-10-04 NOTE — ED ADULT TRIAGE NOTE - CHIEF COMPLAINT QUOTE
Patient brought to ER by EMS from [facility] for aggressive and combative behavior.

## 2021-10-04 NOTE — ED PROVIDER NOTE - CLINICAL SUMMARY MEDICAL DECISION MAKING FREE TEXT BOX
71M PMH dementia coming in from NH for agitation, refusing to eat. Pt appears dehydrated on exam, does not follow commands. Will check for infection, electrolyte abnormalities. Likely will require admission

## 2021-10-04 NOTE — ED PROVIDER NOTE - ATTENDING CONTRIBUTION TO CARE
I (Gerardo) agree with above, I performed a history and physical. Counseled lesli medical staff, physician assistant, and/or medical student on medical decision making as documented. Medical decisions and treatment interventions were made in real time during the patient encounter. Additionally and/or with the following exceptions: the patient presented with failure to thrive, not eating, transferred from nursing home, the patient is non -verbal, records reviewed and case discussed with nursing home who described agitated behavior and refusal to eat. no  history obtainable from patient ||| vital signs normal and stable during my period of care ||| no grimacing on abdomen exam, has lower extremity skin changes consistent with peripheral arterial disease ||| labs wnl, head ct non diagnostic, but no history of trauma, admitted for failure to thrive.

## 2021-10-04 NOTE — ED ADULT NURSE NOTE - NSIMPLEMENTINTERV_GEN_ALL_ED
Implemented All Fall Risk Interventions:  [place] to call system. Call bell, personal items and telephone within reach. Instruct patient to call for assistance. Room bathroom lighting operational. Non-slip footwear when patient is off stretcher. Physically safe environment: no spills, clutter or unnecessary equipment. Stretcher in lowest position, wheels locked, appropriate side rails in place. Provide visual cue, wrist band, yellow gown, etc. Monitor gait and stability. Monitor for mental status changes and reorient to person, place, and time. Review medications for side effects contributing to fall risk. Reinforce activity limits and safety measures with patient and family.

## 2021-10-04 NOTE — ED PROVIDER NOTE - PROGRESS NOTE DETAILS
Resident Douglas: preliminary eval concerning for Hypernatremia, will initiate soft IV Fluid Hydration - suspect etiology to be 2/2 to FTT. Spoke with Psych - no [facility] beds available for placement, such pt will have to be medically admitted for further evaluation. Resident Douglas: case endorsed to Hospitalist (MD Ana).

## 2021-10-04 NOTE — ED PROVIDER NOTE - OBJECTIVE STATEMENT
71M PMH dementia, colon mass s/p hemicolectomy, BL LE ulcers presents with agitation. As per RN at [facility], pt has been there for a few months. Last few days he has been very difficult, refusing food/water, refusing medications and today becoming agitated and trying to fight with staff. States he is not oriented at baseline.

## 2021-10-05 DIAGNOSIS — L98.499 NON-PRESSURE CHRONIC ULCER OF SKIN OF OTHER SITES WITH UNSPECIFIED SEVERITY: ICD-10-CM

## 2021-10-05 DIAGNOSIS — R62.7 ADULT FAILURE TO THRIVE: ICD-10-CM

## 2021-10-05 DIAGNOSIS — E87.0 HYPEROSMOLALITY AND HYPERNATREMIA: ICD-10-CM

## 2021-10-05 DIAGNOSIS — F03.91 UNSPECIFIED DEMENTIA WITH BEHAVIORAL DISTURBANCE: ICD-10-CM

## 2021-10-05 DIAGNOSIS — Z29.9 ENCOUNTER FOR PROPHYLACTIC MEASURES, UNSPECIFIED: ICD-10-CM

## 2021-10-05 DIAGNOSIS — R74.01 ELEVATION OF LEVELS OF LIVER TRANSAMINASE LEVELS: ICD-10-CM

## 2021-10-05 LAB
ALBUMIN SERPL ELPH-MCNC: 3.6 G/DL — SIGNIFICANT CHANGE UP (ref 3.3–5)
ALP SERPL-CCNC: 57 U/L — SIGNIFICANT CHANGE UP (ref 40–120)
ALT FLD-CCNC: 41 U/L — SIGNIFICANT CHANGE UP (ref 4–41)
ANION GAP SERPL CALC-SCNC: 11 MMOL/L — SIGNIFICANT CHANGE UP (ref 7–14)
ANION GAP SERPL CALC-SCNC: 9 MMOL/L — SIGNIFICANT CHANGE UP (ref 7–14)
APPEARANCE UR: CLEAR — SIGNIFICANT CHANGE UP
AST SERPL-CCNC: 49 U/L — HIGH (ref 4–40)
BACTERIA # UR AUTO: NEGATIVE — SIGNIFICANT CHANGE UP
BASOPHILS # BLD AUTO: 0.01 K/UL — SIGNIFICANT CHANGE UP (ref 0–0.2)
BASOPHILS NFR BLD AUTO: 0.2 % — SIGNIFICANT CHANGE UP (ref 0–2)
BILIRUB SERPL-MCNC: 0.7 MG/DL — SIGNIFICANT CHANGE UP (ref 0.2–1.2)
BILIRUB UR-MCNC: NEGATIVE — SIGNIFICANT CHANGE UP
BUN SERPL-MCNC: 18 MG/DL — SIGNIFICANT CHANGE UP (ref 7–23)
BUN SERPL-MCNC: 21 MG/DL — SIGNIFICANT CHANGE UP (ref 7–23)
CALCIUM SERPL-MCNC: 8.9 MG/DL — SIGNIFICANT CHANGE UP (ref 8.4–10.5)
CALCIUM SERPL-MCNC: 9.2 MG/DL — SIGNIFICANT CHANGE UP (ref 8.4–10.5)
CHLORIDE SERPL-SCNC: 117 MMOL/L — HIGH (ref 98–107)
CHLORIDE SERPL-SCNC: 118 MMOL/L — HIGH (ref 98–107)
CK SERPL-CCNC: 556 U/L — HIGH (ref 30–200)
CO2 SERPL-SCNC: 25 MMOL/L — SIGNIFICANT CHANGE UP (ref 22–31)
CO2 SERPL-SCNC: 25 MMOL/L — SIGNIFICANT CHANGE UP (ref 22–31)
COLOR SPEC: YELLOW — SIGNIFICANT CHANGE UP
COVID-19 SPIKE DOMAIN AB INTERP: POSITIVE
COVID-19 SPIKE DOMAIN ANTIBODY RESULT: >250 U/ML — HIGH
CREAT SERPL-MCNC: 0.78 MG/DL — SIGNIFICANT CHANGE UP (ref 0.5–1.3)
CREAT SERPL-MCNC: 0.82 MG/DL — SIGNIFICANT CHANGE UP (ref 0.5–1.3)
CRP SERPL-MCNC: 73.1 MG/L — HIGH
DIFF PNL FLD: NEGATIVE — SIGNIFICANT CHANGE UP
EOSINOPHIL # BLD AUTO: 0.06 K/UL — SIGNIFICANT CHANGE UP (ref 0–0.5)
EOSINOPHIL NFR BLD AUTO: 1.2 % — SIGNIFICANT CHANGE UP (ref 0–6)
EPI CELLS # UR: 1 /HPF — SIGNIFICANT CHANGE UP (ref 0–5)
ERYTHROCYTE [SEDIMENTATION RATE] IN BLOOD: 79 MM/HR — HIGH (ref 1–15)
GLUCOSE SERPL-MCNC: 125 MG/DL — HIGH (ref 70–99)
GLUCOSE SERPL-MCNC: 158 MG/DL — HIGH (ref 70–99)
GLUCOSE UR QL: NEGATIVE — SIGNIFICANT CHANGE UP
HCT VFR BLD CALC: 40.9 % — SIGNIFICANT CHANGE UP (ref 39–50)
HGB BLD-MCNC: 13.6 G/DL — SIGNIFICANT CHANGE UP (ref 13–17)
HYALINE CASTS # UR AUTO: 5 /LPF — SIGNIFICANT CHANGE UP (ref 0–7)
IANC: 3.2 K/UL — SIGNIFICANT CHANGE UP (ref 1.5–8.5)
IMM GRANULOCYTES NFR BLD AUTO: 0.6 % — SIGNIFICANT CHANGE UP (ref 0–1.5)
KETONES UR-MCNC: NEGATIVE — SIGNIFICANT CHANGE UP
LEUKOCYTE ESTERASE UR-ACNC: NEGATIVE — SIGNIFICANT CHANGE UP
LYMPHOCYTES # BLD AUTO: 1 K/UL — SIGNIFICANT CHANGE UP (ref 1–3.3)
LYMPHOCYTES # BLD AUTO: 20.3 % — SIGNIFICANT CHANGE UP (ref 13–44)
MAGNESIUM SERPL-MCNC: 2.4 MG/DL — SIGNIFICANT CHANGE UP (ref 1.6–2.6)
MAGNESIUM SERPL-MCNC: 2.5 MG/DL — SIGNIFICANT CHANGE UP (ref 1.6–2.6)
MCHC RBC-ENTMCNC: 30.8 PG — SIGNIFICANT CHANGE UP (ref 27–34)
MCHC RBC-ENTMCNC: 33.3 GM/DL — SIGNIFICANT CHANGE UP (ref 32–36)
MCV RBC AUTO: 92.5 FL — SIGNIFICANT CHANGE UP (ref 80–100)
MONOCYTES # BLD AUTO: 0.63 K/UL — SIGNIFICANT CHANGE UP (ref 0–0.9)
MONOCYTES NFR BLD AUTO: 12.8 % — SIGNIFICANT CHANGE UP (ref 2–14)
NEUTROPHILS # BLD AUTO: 3.2 K/UL — SIGNIFICANT CHANGE UP (ref 1.8–7.4)
NEUTROPHILS NFR BLD AUTO: 64.9 % — SIGNIFICANT CHANGE UP (ref 43–77)
NITRITE UR-MCNC: NEGATIVE — SIGNIFICANT CHANGE UP
NRBC # BLD: 0 /100 WBCS — SIGNIFICANT CHANGE UP
NRBC # FLD: 0 K/UL — SIGNIFICANT CHANGE UP
PH UR: 5.5 — SIGNIFICANT CHANGE UP (ref 5–8)
PHOSPHATE SERPL-MCNC: 2.5 MG/DL — SIGNIFICANT CHANGE UP (ref 2.5–4.5)
PHOSPHATE SERPL-MCNC: 3 MG/DL — SIGNIFICANT CHANGE UP (ref 2.5–4.5)
PLATELET # BLD AUTO: 203 K/UL — SIGNIFICANT CHANGE UP (ref 150–400)
POTASSIUM SERPL-MCNC: 3.5 MMOL/L — SIGNIFICANT CHANGE UP (ref 3.5–5.3)
POTASSIUM SERPL-MCNC: 3.7 MMOL/L — SIGNIFICANT CHANGE UP (ref 3.5–5.3)
POTASSIUM SERPL-SCNC: 3.5 MMOL/L — SIGNIFICANT CHANGE UP (ref 3.5–5.3)
POTASSIUM SERPL-SCNC: 3.7 MMOL/L — SIGNIFICANT CHANGE UP (ref 3.5–5.3)
PROT SERPL-MCNC: 8.3 G/DL — SIGNIFICANT CHANGE UP (ref 6–8.3)
PROT UR-MCNC: ABNORMAL
RBC # BLD: 4.42 M/UL — SIGNIFICANT CHANGE UP (ref 4.2–5.8)
RBC # FLD: 13.5 % — SIGNIFICANT CHANGE UP (ref 10.3–14.5)
RBC CASTS # UR COMP ASSIST: 6 /HPF — HIGH (ref 0–4)
SARS-COV-2 IGG+IGM SERPL QL IA: >250 U/ML — HIGH
SARS-COV-2 IGG+IGM SERPL QL IA: POSITIVE
SARS-COV-2 RNA SPEC QL NAA+PROBE: SIGNIFICANT CHANGE UP
SODIUM SERPL-SCNC: 152 MMOL/L — HIGH (ref 135–145)
SODIUM SERPL-SCNC: 153 MMOL/L — HIGH (ref 135–145)
SP GR SPEC: 1.02 — SIGNIFICANT CHANGE UP (ref 1–1.05)
T4 AB SER-ACNC: 8.02 UG/DL — SIGNIFICANT CHANGE UP (ref 5.1–13)
TROPONIN T, HIGH SENSITIVITY RESULT: 19 NG/L — SIGNIFICANT CHANGE UP
TSH SERPL-MCNC: 1.28 UIU/ML — SIGNIFICANT CHANGE UP (ref 0.27–4.2)
UROBILINOGEN FLD QL: SIGNIFICANT CHANGE UP
WBC # BLD: 4.93 K/UL — SIGNIFICANT CHANGE UP (ref 3.8–10.5)
WBC # FLD AUTO: 4.93 K/UL — SIGNIFICANT CHANGE UP (ref 3.8–10.5)
WBC UR QL: 7 /HPF — HIGH (ref 0–5)

## 2021-10-05 PROCEDURE — 93010 ELECTROCARDIOGRAM REPORT: CPT

## 2021-10-05 PROCEDURE — 99223 1ST HOSP IP/OBS HIGH 75: CPT

## 2021-10-05 PROCEDURE — 12345: CPT | Mod: NC,GC

## 2021-10-05 PROCEDURE — 73600 X-RAY EXAM OF ANKLE: CPT | Mod: 26,50

## 2021-10-05 RX ORDER — OLANZAPINE 15 MG/1
2.5 TABLET, FILM COATED ORAL EVERY 8 HOURS
Refills: 0 | Status: DISCONTINUED | OUTPATIENT
Start: 2021-10-05 | End: 2021-10-12

## 2021-10-05 RX ORDER — CLOPIDOGREL BISULFATE 75 MG/1
1 TABLET, FILM COATED ORAL
Qty: 0 | Refills: 0 | DISCHARGE

## 2021-10-05 RX ORDER — MIDAZOLAM HYDROCHLORIDE 1 MG/ML
1 INJECTION, SOLUTION INTRAMUSCULAR; INTRAVENOUS ONCE
Refills: 0 | Status: DISCONTINUED | OUTPATIENT
Start: 2021-10-05 | End: 2021-10-05

## 2021-10-05 RX ORDER — SODIUM CHLORIDE 9 MG/ML
1000 INJECTION, SOLUTION INTRAVENOUS
Refills: 0 | Status: DISCONTINUED | OUTPATIENT
Start: 2021-10-05 | End: 2021-10-06

## 2021-10-05 RX ORDER — ASPIRIN/CALCIUM CARB/MAGNESIUM 324 MG
81 TABLET ORAL DAILY
Refills: 0 | Status: DISCONTINUED | OUTPATIENT
Start: 2021-10-05 | End: 2021-10-12

## 2021-10-05 RX ORDER — DONEPEZIL HYDROCHLORIDE 10 MG/1
1 TABLET, FILM COATED ORAL
Qty: 0 | Refills: 0 | DISCHARGE

## 2021-10-05 RX ORDER — TAMSULOSIN HYDROCHLORIDE 0.4 MG/1
0.4 CAPSULE ORAL AT BEDTIME
Refills: 0 | Status: DISCONTINUED | OUTPATIENT
Start: 2021-10-05 | End: 2021-10-12

## 2021-10-05 RX ORDER — OLANZAPINE 15 MG/1
10 TABLET, FILM COATED ORAL ONCE
Refills: 0 | Status: COMPLETED | OUTPATIENT
Start: 2021-10-05 | End: 2021-10-05

## 2021-10-05 RX ORDER — CLOPIDOGREL BISULFATE 75 MG/1
75 TABLET, FILM COATED ORAL DAILY
Refills: 0 | Status: DISCONTINUED | OUTPATIENT
Start: 2021-10-05 | End: 2021-10-12

## 2021-10-05 RX ORDER — TAMSULOSIN HYDROCHLORIDE 0.4 MG/1
0 CAPSULE ORAL
Qty: 0 | Refills: 0 | DISCHARGE

## 2021-10-05 RX ORDER — ACETAMINOPHEN 500 MG
650 TABLET ORAL EVERY 6 HOURS
Refills: 0 | Status: DISCONTINUED | OUTPATIENT
Start: 2021-10-05 | End: 2021-10-12

## 2021-10-05 RX ORDER — ASPIRIN/CALCIUM CARB/MAGNESIUM 324 MG
1 TABLET ORAL
Qty: 0 | Refills: 0 | DISCHARGE

## 2021-10-05 RX ORDER — ONDANSETRON 8 MG/1
4 TABLET, FILM COATED ORAL EVERY 8 HOURS
Refills: 0 | Status: DISCONTINUED | OUTPATIENT
Start: 2021-10-05 | End: 2021-10-05

## 2021-10-05 RX ORDER — LANOLIN ALCOHOL/MO/W.PET/CERES
3 CREAM (GRAM) TOPICAL AT BEDTIME
Refills: 0 | Status: DISCONTINUED | OUTPATIENT
Start: 2021-10-05 | End: 2021-10-12

## 2021-10-05 RX ORDER — ENOXAPARIN SODIUM 100 MG/ML
40 INJECTION SUBCUTANEOUS DAILY
Refills: 0 | Status: DISCONTINUED | OUTPATIENT
Start: 2021-10-05 | End: 2021-10-06

## 2021-10-05 RX ADMIN — MIDAZOLAM HYDROCHLORIDE 1 MILLIGRAM(S): 1 INJECTION, SOLUTION INTRAMUSCULAR; INTRAVENOUS at 01:04

## 2021-10-05 RX ADMIN — SODIUM CHLORIDE 100 MILLILITER(S): 9 INJECTION, SOLUTION INTRAVENOUS at 06:08

## 2021-10-05 RX ADMIN — SODIUM CHLORIDE 100 MILLILITER(S): 9 INJECTION, SOLUTION INTRAVENOUS at 18:48

## 2021-10-05 RX ADMIN — OLANZAPINE 10 MILLIGRAM(S): 15 TABLET, FILM COATED ORAL at 00:30

## 2021-10-05 RX ADMIN — ENOXAPARIN SODIUM 40 MILLIGRAM(S): 100 INJECTION SUBCUTANEOUS at 12:44

## 2021-10-05 NOTE — H&P ADULT - HISTORY OF PRESENT ILLNESS
Patient currently sedated, unable to participate in the interview process. HPI obtained from [facility] ([phone]) and from patient's cousin Obealor ([phone]).     This is a 71M with history of Dementia (AAOx2-3 as per cousin, to self, usually to place, sometimes takes a while to remember time), Colon Cancer s/p hemicolectomy (as per cousin they were told he does not need any further therapy), and b/l LE ulcers (?PAD) who presents to the hospital from his NH with worsening agitation. As per NH, patient has been refusing his medications, wound treatments, and has had a poor appetite for the past few days. His cousin said that at baseline the patient is able to have a conversation with people and his current behavior is abnormal, denies any known acute complaints for the patient that could have cause a change in his behavior. Patient currently sedated, laying in bed, arouses to sternal rub but bats the interviewer away, does not answer questions.     On arrival to the ED, his vitals were T 98.7, P 80, /82, RR 16, O2 sat 99% RA. His lab work showed hypernatremia, elevated BUN/Cr ratio, mild transaminitis, and mild elevated CK. His trops were stable at 19, his serum tox was negative, and his chest xray showed clear lungs. His CTH was nondiagnostic. In the ED he was noted to be very agitation, combative, and had to be sedated with zyprexa 10mg IM x2 and versed 1mg IVP x1. He was started on IVF D5/half NS in the ED. He was admitted to medicine.

## 2021-10-05 NOTE — PROGRESS NOTE ADULT - PROBLEM SELECTOR PLAN 1
- Patient with worsening agitation at his NH, unclear on cause, possibly 2/2 worsening dementia but as per cousin patient was in his USOH until recently and his current agitation is unusual for him  - CTH here nondiag, cousin reports that patient was recommended to have a MRI done as outpatient about 2 years ago (unclear on reason, cousin states it was for follow up of a prior CTH 6 months ago), has not been done yet, most recent CTH from April 2021 without any significant findings, currently sedated, neuro exam nonfocal but patient not fully cooperating with exam. Pt afebrile, BP stable, no leukocytosis, CXR showing no acute pathology  - Neuro checks q8h  - CTH obtained here nondiag; Repeat CTH when patient is more cooperative  - UA, xrays of b/l ankles, ESR/CRP to eval for possible infectious etiology but currently no concern for infection, will monitor off abx  - Zyprexa prn for agitation  - Monitor QT interval in AM  - CK elevation, suspect likely 2/2 his agitation, trend for now - Patient with worsening agitation at his NH, unclear on cause, possibly 2/2 worsening dementia but as per cousin patient was in his USOH until recently and his current agitation is unusual for him  - CTH here nondiag, cousin reports that patient was recommended to have a MRI done as outpatient about 2 years ago (unclear on reason, cousin states it was for follow up of a prior CTH 6 months ago), has not been done yet, most recent CTH from April 2021 without any significant findings, currently sedated, neuro exam nonfocal but patient not fully cooperating with exam. Pt afebrile, BP stable, no leukocytosis, CXR showing no acute pathology  - Neuro checks q8h  - CTH obtained here nondiag; Repeat CTH when patient is more cooperative  - UA, xrays of b/l ankles, ESR/CRP to eval for possible infectious etiology but currently no concern for infection, will monitor off abx  - TSH, free T4 levels  - B12, folate levels  - Zyprexa prn for agitation  - Monitor QT interval in AM  - CK elevation, suspect likely 2/2 his agitation, trend for now - Patient with worsening agitation at his NH, unclear on cause, possibly 2/2 worsening dementia but as per cousin patient was in his USOH until recently and his current agitation is unusual for him  - CTH here nondiag, cousin reports that patient was recommended to have a MRI done as outpatient about 2 years ago (unclear on reason, cousin states it was for follow up of a prior CTH 6 months ago), has not been done yet, most recent CTH from April 2021 without any significant findings, currently sedated, neuro exam nonfocal but patient not fully cooperating with exam. Pt afebrile, BP stable, no leukocytosis, CXR showing no acute pathology  - CTH obtained here nondiag; Repeat CTH when patient is more cooperative  - TSH, free T4 levels wnl  - F/u B12, folate levels  - F/u UA   - F/u b/l ankle x-ray  - Neuro checks q8h  - Zyprexa prn for agitation  - Monitor QT interval in AM  - CK elevation, suspect likely 2/2 his agitation; downtrending (658 -> 556)  - If pt becomes febrile, will obtain blood cultures

## 2021-10-05 NOTE — H&P ADULT - PROBLEM SELECTOR PLAN 5
DVT ppx - Lovenox  Diet - Currently sedated so will keep NPO, dysphagia screen when more awake, patient on regular consistency and thin liquids as per NH documents  Activity - OOB to chair/with assistance  Fall and aspiration precautions

## 2021-10-05 NOTE — PROGRESS NOTE ADULT - PROBLEM SELECTOR PLAN 3
B/l LE wounds in with crusting and clear discharge but no significant erythema, warmth, or TTP noted  - Lower suspicious for infection but will check ESR/CRP, b/l ankle xrays  - Unclear history of PAD, on aspirin and plavix, would c/w for now, clarify PAD history in AM  - Wound care eval in AM B/l LE wounds in with crusting and clear discharge but no significant erythema, warmth, or TTP noted  - Elevated ESR (79) and CRP (73.1)  - F/u b/l ankle x-ray  - Unclear history of PAD, on aspirin and plavix, would c/w for now  - F/u wound care eval

## 2021-10-05 NOTE — H&P ADULT - PROBLEM SELECTOR PLAN 1
- Patient with worsening agitation at his NH, unclear on cause, possibly 2/2 worsening dementia but as per cousin patient was in his USOH until recently and his current agitation is unusual for him  - CTH here nondiag, cousin reports that patient was recommended to have a MRI done as outpatient about 2 years ago (unclear on reason, cousin states it was for follow up of a prior CTH 6 months ago), has not been done yet, most recent CTH from April 2021 without any significant findings, currently sedated, neuro exam nonfocal but patient not fully cooperating with exam, would place on neurological checks q8h, consider getting repeat CTH when patient more cooperative  - Will check UA, xrays of b/l ankles, ESR/CRP to eval for possible infectious cause to his agitation but currently no concern for infection, will monitor off abx  - Will place on zyprexa prn for agitation, monitor QT interval in AM  - Has mild CK elevation, suspect likely 2/2 his agitation, would trend for now

## 2021-10-05 NOTE — H&P ADULT - NSHPSOCIALHISTORY_GEN_ALL_CORE
Currently residing at [facility]  Ind ambulator as per NH  No known current EtOH or tobacco use as per NH

## 2021-10-05 NOTE — H&P ADULT - NSHPLABSRESULTS_GEN_ALL_CORE
LABS and ADDITIONAL STUDIES:                        13.1   6.52  )-----------( 181      ( 04 Oct 2021 20:11 )             39.9     10-04    151<H>  |  116<H>  |  24<H>  ----------------------------<  104<H>  4.2   |  21<L>  |  0.80    Ca    9.3      04 Oct 2021 20:11    TPro  8.4<H>  /  Alb  3.4  /  TBili  0.7  /  DBili  x   /  AST  56<H>  /  ALT  42<H>  /  AlkPhos  58  10-04    CARDIAC MARKERS ( 04 Oct 2021 20:11 )  x     / x     / 658 U/L / x     / x      Trop 19 -> 19    LIVER FUNCTIONS - ( 04 Oct 2021 20:11 )  Alb: 3.4 g/dL / Pro: 8.4 g/dL / ALK PHOS: 58 U/L / ALT: 42 U/L / AST: 56 U/L / GGT: x           Serum tox neg    < from: Xray Chest 1 View AP/PA (10.04.21 @ 20:30) >  FINDINGS:  The lungs are clear. No pleural effusion or pneumothorax.  Heart size is poorly evaluated on this view.  Degenerative changes of the spine.    IMPRESSION:  Clear lungs.  < end of copied text >    CTH nondiagnostic    EKG - Sinus rhythm, rate 72, 1 PVC, RBBB (old) with expected TWI V1-V3

## 2021-10-05 NOTE — PROGRESS NOTE ADULT - SUBJECTIVE AND OBJECTIVE BOX
Patient is a 71y old  Male who presents with a chief complaint of Agitation (05 Oct 2021 05:38)      OVERNIGHT EVENTS: Pt brought to floor overnight. In the ED, pt received IM Zyprexa x2 and IVP Versed x1. Pt sedated and lethargic since, no additional PRNs required. This morning at bedside, pt is lethargic and unable to participate in interview.    REVIEW OF SYSTEMS:  Unable to obtain ROS from patient 2/2 sedation.    ALLERGIES:  penicillin (Other)      FAMILY HISTORY:  FAMILY HISTORY:  No pertinent family history in first degree relatives        VITALS:  T(C): 36.3 (10-05-21 @ 05:50), Max: 37.1 (10-04-21 @ 16:21)  HR: 58 (10-05-21 @ 05:50) (58 - 85)  BP: 136/87 (10-05-21 @ 05:50) (118/62 - 149/82)  RR: 16 (10-05-21 @ 05:50) (14 - 18)  SpO2: 100% (10-05-21 @ 05:50) (99% - 100%)  Wt(kg): --      PHYSICAL EXAM:  GENERAL: NAD, sedated, arousable to sternal rub but does not respond  HEAD:  Atraumatic, Normocephalic  EYES: unable to assess  HEENT: dry mucuous membranes, poor dentition  NECK: No JVD  NERVOUS SYSTEM: arousable to sternal rub, moves all 4 extremities  CHEST/LUNG: Clear to percussion bilaterally; No rales, rhonchi, wheezing, or rubs  HEART: Regular rate and rhythm; No murmurs, rubs, or gallops  ABDOMEN: Soft, Nontender, Nondistended  EXTREMITIES:  2+ radial pulses; 1+ DP/PT, LE cool to touch below ankles, no edema  SKIN: darkening of skin in feet extending to b/l ankles; ulcerations bilaterally in medial malleolar region, clear discharge; no erythema, edema    MEDICATIONS  (STANDING):  aspirin enteric coated 81 milliGRAM(s) Oral daily  clopidogrel Tablet 75 milliGRAM(s) Oral daily  dextrose 5% + sodium chloride 0.45%. 500 milliLiter(s) (100 mL/Hr) IV Continuous <Continuous>  enoxaparin Injectable 40 milliGRAM(s) SubCutaneous daily  tamsulosin 0.4 milliGRAM(s) Oral at bedtime    MEDICATIONS  (PRN):  acetaminophen   Tablet .. 650 milliGRAM(s) Oral every 6 hours PRN Temp greater or equal to 38C (100.4F), Mild Pain (1 - 3)  aluminum hydroxide/magnesium hydroxide/simethicone Suspension 30 milliLiter(s) Oral every 4 hours PRN Dyspepsia  melatonin 3 milliGRAM(s) Oral at bedtime PRN Insomnia  OLANZapine 2.5 milliGRAM(s) Oral every 8 hours PRN Agitation  OLANZapine Injectable 2.5 milliGRAM(s) IntraMuscular every 8 hours PRN Agitation  ondansetron Injectable 4 milliGRAM(s) IV Push every 8 hours PRN Nausea and/or Vomiting      LAB/STUDIES:  CAPILLARY BLOOD GLUCOSE                              13.1   6.52  )-----------( 181      ( 04 Oct 2021 20:11 )             39.9     10-04    151<H>  |  116<H>  |  24<H>  ----------------------------<  104<H>  4.2   |  21<L>  |  0.80    Ca    9.3      04 Oct 2021 20:11    TPro  8.4<H>  /  Alb  3.4  /  TBili  0.7  /  DBili  x   /  AST  56<H>  /  ALT  42<H>  /  AlkPhos  58  10-04               8.4  | 0.7  | 56       ------------------[58      ( 04 Oct 2021 20:11 )  3.4  | x    | 42          Lipase:x      Amylase:x            CARDIAC MARKERS ( 04 Oct 2021 20:11 )  x     / x     / 658 U/L / x     / x                IMAGING:  < from: CT Head No Cont (10.04.21 @ 22:36) >    EXAM:  CT BRAIN        PROCEDURE DATE:  Oct  4 2021         INTERPRETATION:  HISTORY: Agitation, altered mental status.    COMPARISON: Head CT dated 4/29/2021.    TECHNIQUE: Axial noncontrast CT images from the skull base to the vertex were obtained and submitted for interpretation. Coronal and sagittal reformatted images were performed. Bone and soft tissue windows were evaluated.    FINDINGS:    Exam is extremely limited due to motion artifact.  No gross intracranial abnormality is appreciated.    IMPRESSION: Essentially nondiagnostic exam.    Consider repeat study when patient can tolerate.    --- End of Report ---    < end of copied text >  < from: Xray Chest 1 View AP/PA (10.04.21 @ 20:30) >  EXAM:  XR CHEST AP OR PA 1V        PROCEDURE DATE:  Oct  4 2021     ******PRELIMINARY REPORT******    ******PRELIMINARY REPORT******            INTERPRETATION:  CLINICAL INFORMATION: Failure to thrive.    TECHNIQUE: AP view of the chest.    COMPARISON: No comparison available.    FINDINGS:    The lungs are clear. No pleural effusion or pneumothorax.  Heart size is poorly evaluated on this view.  Degenerative changes of the spine.    IMPRESSION:    Clear lungs.    < end of copied text >

## 2021-10-05 NOTE — ED ADULT NURSE REASSESSMENT NOTE - NS ED NURSE REASSESS COMMENT FT1
break coverage RN - pt combative, agitated, hitting, throwing things, attempting to hit staff, attempted to pull out IV. pt medicated as per MD order x 2. will continue to monitor.

## 2021-10-05 NOTE — H&P ADULT - ASSESSMENT
This is a 71M with history as above who presents to the hospital with worsening agitation and hypernatremia.

## 2021-10-05 NOTE — H&P ADULT - PROBLEM SELECTOR PLAN 3
- b/l LE wounds with crusting and clear discharge but no significant erythema, warmth, or TTP noted, doubt currently infected but will check ESR/CRP, b/l ankle xrays  - Unclear history of PAD, on aspirin and plavix, would c/w for now, clarify PAD history in AM - b/l LE wounds with crusting and clear discharge but no significant erythema, warmth, or TTP noted, doubt currently infected but will check ESR/CRP, b/l ankle xrays  - Unclear history of PAD, on aspirin and plavix, would c/w for now, clarify PAD history in AM  - Wound care eval in AM

## 2021-10-05 NOTE — PROGRESS NOTE ADULT - ATTENDING COMMENTS
Pt currently still sedated s/p mediation on presentation. Monitor temperature curve. When pt more alert will collect urine. BCx if fever. Continue behavioral prns.

## 2021-10-05 NOTE — PROGRESS NOTE ADULT - PROBLEM SELECTOR PLAN 4
- Mild transaminitis, likely spurious 2/2 hemolysis  - AM CMP - ED labs showed mild transaminitis, likely spurious 2/2 hemolysis  - Transaminitis improving (AST/ALT 49/41)

## 2021-10-05 NOTE — PROGRESS NOTE ADULT - ASSESSMENT
71y M with PMH dementia (AAOx2-3 per cousin), colon cancer s/p hemicolectomy, and b/l LE ulcers who presented from his NH for worsening agitation, found to be hypernatremic with an elevated BUN/Cr, mild transaminitis, and elevated CK. Pt otherwise afebrile and hemodynamically stable, admitted for medical management.

## 2021-10-05 NOTE — PATIENT PROFILE ADULT - NSTOBACCOUNKNOWNSMK_GEN_A_CORE_RD
Preventive Care at the 12 Month Visit  Growth Measurements & Percentiles  Head Circumference:   No head circumference on file for this encounter.   Weight: 0 lbs 0 oz / 9.44 kg (actual weight) / No weight on file for this encounter.   Length: Data Unavailable / 0 cm No height on file for this encounter.   Weight for length: No height and weight on file for this encounter.    Your toddler s next Preventive Check-up will be at 15 months of age.      Development  At this age, your child may:    Pull herself to a stand and walk with help.    Take a few steps alone.    Use a pincer grasp to get something.    Point or bang two objects together and put one object inside another.    Say one to three meaningful words (besides  mama  and  adali ) correctly.    Start to understand that an object hidden by a cloth is still there (object permanence).    Play games like  peek-a-mccarthy,   pat-a-cake  and  so-big  and wave  bye-bye.       Feeding Tips    Weaning from the bottle will protect your child s dental health.  Once your child can handle a cup (around 9 months of age), you can start taking her off the bottle.  Your goal should be to have your child off of the bottle by 12-15 months of age at the latest.  A  sippy cup  causes fewer problems than a bottle; an open cup is even better.    Your child may refuse to eat foods she used to like.  Your child may become very  picky  about what she will eat.  Offer foods, but do not make your child eat them.    Be aware of textures that your child can chew without choking/gagging.    You may give your child whole milk.  Your pediatric provider may discuss options other than whole milk.  Your child should drink less than 24 ounces of milk each day.  If your child does not drink much milk, talk to your doctor about sources of calcium.    Limit the amount of fruit juice your child drinks to none or less than 4 ounces each day.    Brush your child s teeth with a small amount of fluoridated  toothpaste one to two times each day.  Let your child play with the toothbrush after brushing.      Sleep    Your child will typically take two naps each day (most will decrease to one nap a day around 15-18 months old).    Your child may average about 13 hours of sleep each day.    Continue your regular nighttime routine which may include bathing, brushing teeth and reading.    Safety    Even if your child weighs more than 20 pounds, you should leave the car seat rear facing until your child is 2 years of age.    Falls at this age are common.  Keep sinha on stairways and doors to dangerous areas.    Children explore by putting many things in the mouth.  Keep all medicines, cleaning supplies and poisons out of your child s reach.  Call the poison control center or your health care provider for directions in case your baby swallows poison.    Put the poison control number on all phones: 1-726.559.9315.    Keep electrical cords and harmful objects out of your child s reach.  Put plastic covers on unused electrical outlets.    Do not give your child small foods (such as peanuts, popcorn, pieces of hot dog or grapes) that could cause choking.    Turn your hot water heater to less than 120 degrees Fahrenheit.    Never put hot liquids near table or countertop edges.  Keep your child away from a hot stove, oven and furnace.    When cooking on the stove, turn pot handles to the inside and use the back burners.  When grilling, be sure to keep your child away from the grill.    Do not let your child be near running machines, lawn mowers or cars.    Never leave your child alone in the bathtub or near water.    What Your Child Needs    Your child can understand almost everything you say.  She will respond to simple directions.  Do not swear or fight with your partner or other adults.  Your child will repeat what you say.    Show your child picture books.  Point to objects and name them.    Hold and cuddle your child as often as  she will allow.    Encourage your child to play alone as well as with you and siblings.    Your child will become more independent.  She will say  I do  or  I can do it.   Let your child do as much as is possible.  Let her makes decisions as long as they are reasonable.    You will need to teach your child through discipline.  Teach and praise positive behaviors.  Protect her from harmful or poor behaviors.  Temper tantrums are common and should be ignored.  Make sure the child is safe during the tantrum.  If you give in, your child will throw more tantrums.    Never physically or emotionally hurt your child.  If you are losing control, take a few deep breaths, put your child in a safe place, and go into another room for a few minutes.  If possible, have someone else watch your child so you can take a break.  Call a friend, the Parent Warmline (917-503-8392) or call the Crisis Nursery ([phone]).      Dental Care    Your pediatric provider will speak with your regarding the need for regular dental appointments for cleanings and check-ups starting when your child s first tooth appears.      Your child may need fluoride supplements if you have well water.    Brush your child s teeth with a small amount (smaller than a pea) of fluoridated tooth paste once or twice daily.    Lab Work    Hemoglobin and lead levels will be checked.           Cognitively Impaired

## 2021-10-05 NOTE — PROGRESS NOTE ADULT - PROBLEM SELECTOR PLAN 2
- Poor PO intake at NH  - C/w D5 1/2 NS  - AM CMP - Poor PO intake at NH  - AM CMP showed continued hypernatremia (151 -> 153), will increase IVF to 1L D5 1/2 NS

## 2021-10-05 NOTE — PROGRESS NOTE ADULT - PROBLEM SELECTOR PLAN 5
DVT ppx - Lovenox  Diet - Currently NPO; dysphagia screen when more awake, patient on regular consistency and thin liquids as per NH documents  Activity - OOB to chair/with assistance  Fall and aspiration precautions

## 2021-10-06 LAB
ALBUMIN SERPL ELPH-MCNC: 3.3 G/DL — SIGNIFICANT CHANGE UP (ref 3.3–5)
ALP SERPL-CCNC: 53 U/L — SIGNIFICANT CHANGE UP (ref 40–120)
ALT FLD-CCNC: 32 U/L — SIGNIFICANT CHANGE UP (ref 4–41)
ANION GAP SERPL CALC-SCNC: 10 MMOL/L — SIGNIFICANT CHANGE UP (ref 7–14)
ANION GAP SERPL CALC-SCNC: 10 MMOL/L — SIGNIFICANT CHANGE UP (ref 7–14)
AST SERPL-CCNC: 32 U/L — SIGNIFICANT CHANGE UP (ref 4–40)
BASE EXCESS BLDV CALC-SCNC: 4.8 MMOL/L — HIGH (ref -2–3)
BILIRUB SERPL-MCNC: 0.7 MG/DL — SIGNIFICANT CHANGE UP (ref 0.2–1.2)
BUN SERPL-MCNC: 13 MG/DL — SIGNIFICANT CHANGE UP (ref 7–23)
BUN SERPL-MCNC: 16 MG/DL — SIGNIFICANT CHANGE UP (ref 7–23)
CA-I SERPL-SCNC: 1.23 MMOL/L — SIGNIFICANT CHANGE UP (ref 1.15–1.33)
CALCIUM SERPL-MCNC: 8.4 MG/DL — SIGNIFICANT CHANGE UP (ref 8.4–10.5)
CALCIUM SERPL-MCNC: 8.9 MG/DL — SIGNIFICANT CHANGE UP (ref 8.4–10.5)
CHLORIDE BLDV-SCNC: 121 MMOL/L — HIGH (ref 96–108)
CHLORIDE SERPL-SCNC: 116 MMOL/L — HIGH (ref 98–107)
CHLORIDE SERPL-SCNC: 119 MMOL/L — HIGH (ref 98–107)
CO2 BLDV-SCNC: 31.9 MMOL/L — HIGH (ref 22–26)
CO2 SERPL-SCNC: 20 MMOL/L — LOW (ref 22–31)
CO2 SERPL-SCNC: 25 MMOL/L — SIGNIFICANT CHANGE UP (ref 22–31)
CREAT SERPL-MCNC: 0.8 MG/DL — SIGNIFICANT CHANGE UP (ref 0.5–1.3)
CREAT SERPL-MCNC: 0.9 MG/DL — SIGNIFICANT CHANGE UP (ref 0.5–1.3)
GAS PNL BLDV: 151 MMOL/L — HIGH (ref 136–145)
GAS PNL BLDV: SIGNIFICANT CHANGE UP
GLUCOSE BLDC GLUCOMTR-MCNC: 88 MG/DL — SIGNIFICANT CHANGE UP (ref 70–99)
GLUCOSE BLDV-MCNC: 109 MG/DL — HIGH (ref 70–99)
GLUCOSE SERPL-MCNC: 119 MG/DL — HIGH (ref 70–99)
GLUCOSE SERPL-MCNC: 122 MG/DL — HIGH (ref 70–99)
HCO3 BLDV-SCNC: 30 MMOL/L — HIGH (ref 22–29)
HCT VFR BLD CALC: 38.5 % — LOW (ref 39–50)
HCT VFR BLDA CALC: 39 % — SIGNIFICANT CHANGE UP (ref 39–51)
HGB BLD CALC-MCNC: 13.1 G/DL — SIGNIFICANT CHANGE UP (ref 13–17)
HGB BLD-MCNC: 12.6 G/DL — LOW (ref 13–17)
LACTATE BLDV-MCNC: 1 MMOL/L — SIGNIFICANT CHANGE UP (ref 0.5–2)
MAGNESIUM SERPL-MCNC: 2.4 MG/DL — SIGNIFICANT CHANGE UP (ref 1.6–2.6)
MCHC RBC-ENTMCNC: 30.1 PG — SIGNIFICANT CHANGE UP (ref 27–34)
MCHC RBC-ENTMCNC: 32.7 GM/DL — SIGNIFICANT CHANGE UP (ref 32–36)
MCV RBC AUTO: 91.9 FL — SIGNIFICANT CHANGE UP (ref 80–100)
NRBC # BLD: 0 /100 WBCS — SIGNIFICANT CHANGE UP
NRBC # FLD: 0 K/UL — SIGNIFICANT CHANGE UP
PCO2 BLDV: 48 MMHG — SIGNIFICANT CHANGE UP (ref 42–55)
PH BLDV: 7.41 — SIGNIFICANT CHANGE UP (ref 7.32–7.43)
PHOSPHATE SERPL-MCNC: 2.2 MG/DL — LOW (ref 2.5–4.5)
PLATELET # BLD AUTO: 202 K/UL — SIGNIFICANT CHANGE UP (ref 150–400)
PO2 BLDV: 23 MMHG — SIGNIFICANT CHANGE UP
POTASSIUM BLDV-SCNC: 3.8 MMOL/L — SIGNIFICANT CHANGE UP (ref 3.5–5.1)
POTASSIUM SERPL-MCNC: 4.1 MMOL/L — SIGNIFICANT CHANGE UP (ref 3.5–5.3)
POTASSIUM SERPL-MCNC: 4.3 MMOL/L — SIGNIFICANT CHANGE UP (ref 3.5–5.3)
POTASSIUM SERPL-SCNC: 4.1 MMOL/L — SIGNIFICANT CHANGE UP (ref 3.5–5.3)
POTASSIUM SERPL-SCNC: 4.3 MMOL/L — SIGNIFICANT CHANGE UP (ref 3.5–5.3)
PROCALCITONIN SERPL-MCNC: 0.11 NG/ML — HIGH (ref 0.02–0.1)
PROT SERPL-MCNC: 7.6 G/DL — SIGNIFICANT CHANGE UP (ref 6–8.3)
RBC # BLD: 4.19 M/UL — LOW (ref 4.2–5.8)
RBC # FLD: 13.4 % — SIGNIFICANT CHANGE UP (ref 10.3–14.5)
SAO2 % BLDV: 36.3 % — SIGNIFICANT CHANGE UP
SODIUM SERPL-SCNC: 149 MMOL/L — HIGH (ref 135–145)
SODIUM SERPL-SCNC: 151 MMOL/L — HIGH (ref 135–145)
WBC # BLD: 6.3 K/UL — SIGNIFICANT CHANGE UP (ref 3.8–10.5)
WBC # FLD AUTO: 6.3 K/UL — SIGNIFICANT CHANGE UP (ref 3.8–10.5)

## 2021-10-06 PROCEDURE — 71045 X-RAY EXAM CHEST 1 VIEW: CPT | Mod: 26

## 2021-10-06 PROCEDURE — 99233 SBSQ HOSP IP/OBS HIGH 50: CPT | Mod: GC

## 2021-10-06 PROCEDURE — 70450 CT HEAD/BRAIN W/O DYE: CPT | Mod: 26

## 2021-10-06 RX ORDER — SODIUM CHLORIDE 9 MG/ML
1000 INJECTION, SOLUTION INTRAVENOUS
Refills: 0 | Status: DISCONTINUED | OUTPATIENT
Start: 2021-10-06 | End: 2021-10-07

## 2021-10-06 RX ORDER — POTASSIUM PHOSPHATE, MONOBASIC POTASSIUM PHOSPHATE, DIBASIC 236; 224 MG/ML; MG/ML
30 INJECTION, SOLUTION INTRAVENOUS ONCE
Refills: 0 | Status: COMPLETED | OUTPATIENT
Start: 2021-10-06 | End: 2021-10-06

## 2021-10-06 RX ORDER — SODIUM CHLORIDE 9 MG/ML
1000 INJECTION, SOLUTION INTRAVENOUS
Refills: 0 | Status: DISCONTINUED | OUTPATIENT
Start: 2021-10-05 | End: 2021-10-06

## 2021-10-06 RX ADMIN — SODIUM CHLORIDE 100 MILLILITER(S): 9 INJECTION, SOLUTION INTRAVENOUS at 09:42

## 2021-10-06 RX ADMIN — POTASSIUM PHOSPHATE, MONOBASIC POTASSIUM PHOSPHATE, DIBASIC 83.33 MILLIMOLE(S): 236; 224 INJECTION, SOLUTION INTRAVENOUS at 10:01

## 2021-10-06 NOTE — PROGRESS NOTE ADULT - PROBLEM SELECTOR PLAN 2
- Poor PO intake at NH  - AM CMP showed continued hypernatremia (151 -> 153), will increase IVF to 1L D5 1/2 NS - Poor PO intake at NH  - Pt continues to be hypernatremic (151 -> 153 -> 151) s/p 1.5L D5 1/2 NS

## 2021-10-06 NOTE — PROVIDER CONTACT NOTE (OTHER) - BACKGROUND
Patient admitted for failure to thrive
pt is 72 y/o male admit diagnosis failure to thrive in adult pmh includes colon cancer and dementia

## 2021-10-06 NOTE — PROGRESS NOTE ADULT - ASSESSMENT
71y M with PMH dementia (AAOx2-3 per cousin), colon cancer s/p hemicolectomy, and b/l LE ulcers who presented from his NH for worsening agitation, found to be hypernatremic with an elevated BUN/Cr, mild transaminitis, and elevated CK. Pt otherwise afebrile and hemodynamically stable, admitted for medical management. 71y M with PMH dementia (AAOx2-3 per cousin), colon cancer s/p hemicolectomy, and b/l LE ulcers who presented from his NH for worsening agitation, found to be hypernatremic with an elevated BUN/Cr, mild transaminitis, and elevated CK. Pt otherwise afebrile and hemodynamically stable, admitted for medical management. Pt minimally awake or alert.

## 2021-10-06 NOTE — PROGRESS NOTE ADULT - SUBJECTIVE AND OBJECTIVE BOX
Patient is a 71y old  Male who presents with a chief complaint of Agitation (05 Oct 2021 07:40)      OVERNIGHT EVENTS: No overnight events. INCOMPLETE    REVIEW OF SYSTEMS:  CONSTITUTIONAL: No fever, weight loss, or fatigue  EYES: No eye pain, visual disturbances, or discharge  HEENT:  No difficulty hearing, tinnitus, vertigo; No sinus or throat pain  NECK: No pain or stiffness  RESPIRATORY: No cough, wheezing, chills or hemoptysis; No shortness of breath  CARDIOVASCULAR: No chest pain, palpitations, dizziness, or leg swelling  GASTROINTESTINAL: No abdominal or epigastric pain. No nausea, vomiting, or hematemesis; No diarrhea or constipation. No melena or hematochezia.  GENITOURINARY: No dysuria, frequency, hematuria, or incontinence  NEUROLOGICAL: No headaches, memory loss, loss of strength, numbness, or tremors  SKIN: No itching, burning, rashes, or lesions   MUSCULOSKELETAL: No joint pain or swelling; No muscle, back, or extremity pain    ALLERGIES:  penicillin (Other)      FAMILY HISTORY:  FAMILY HISTORY:  No pertinent family history in first degree relatives        VITALS:  T(C): 36.5 (10-06-21 @ 05:20), Max: 36.5 (10-05-21 @ 12:49)  HR: 62 (10-06-21 @ 05:20) (62 - 67)  BP: 152/72 (10-06-21 @ 05:20) (121/61 - 152/72)  RR: 16 (10-06-21 @ 05:20) (16 - 16)  SpO2: 99% (10-06-21 @ 05:20) (97% - 99%)  Wt(kg): --      PHYSICAL EXAM:  GENERAL: NAD, well-groomed, well-developed  HEAD:  Atraumatic, Normocephalic  EYES: EOMI, PERRLA, conjunctiva and sclera clear  HEENT: No tonsillar erythema, exudates, or enlargement; Moist mucous membranes, Good dentition, No lesions  NECK: Supple, No JVD  NERVOUS SYSTEM:  Alert & Oriented X3, Motor Strength 5/5 B/L upper and lower extremities  CHEST/LUNG: Clear to percussion bilaterally; No rales, rhonchi, wheezing, or rubs  HEART: Regular rate and rhythm; No murmurs, rubs, or gallops  ABDOMEN: Soft, Nontender, Nondistended; Bowel sounds present  EXTREMITIES:  2+ Peripheral Pulses, No clubbing, cyanosis, or edema  SKIN: No rashes or lesions    MEDICATIONS  (STANDING):  aspirin enteric coated 81 milliGRAM(s) Oral daily  clopidogrel Tablet 75 milliGRAM(s) Oral daily  dextrose 5% + sodium chloride 0.45%. 1000 milliLiter(s) (100 mL/Hr) IV Continuous <Continuous>  enoxaparin Injectable 40 milliGRAM(s) SubCutaneous daily  tamsulosin 0.4 milliGRAM(s) Oral at bedtime    MEDICATIONS  (PRN):  acetaminophen   Tablet .. 650 milliGRAM(s) Oral every 6 hours PRN Temp greater or equal to 38C (100.4F), Mild Pain (1 - 3)  aluminum hydroxide/magnesium hydroxide/simethicone Suspension 30 milliLiter(s) Oral every 4 hours PRN Dyspepsia  melatonin 3 milliGRAM(s) Oral at bedtime PRN Insomnia  OLANZapine 2.5 milliGRAM(s) Oral every 8 hours PRN Agitation  OLANZapine Injectable 2.5 milliGRAM(s) IntraMuscular every 8 hours PRN Agitation      LAB/STUDIES:  CAPILLARY BLOOD GLUCOSE                              13.6   4.93  )-----------( 203      ( 05 Oct 2021 07:45 )             40.9     10-05    152<H>  |  118<H>  |  18  ----------------------------<  158<H>  3.5   |  25  |  0.78    Ca    8.9      05 Oct 2021 14:45  Phos  2.5     10-05  Mg     2.40     10-    TPro  8.3  /  Alb  3.6  /  TBili  0.7  /  DBili  x   /  AST  49<H>  /  ALT  41  /  AlkPhos  57  10-               8.3  | 0.7  | 49       ------------------[57      ( 05 Oct 2021 07:42 )  3.6  | x    | 41          Lipase:x      Amylase:x          Urinalysis Basic - ( 05 Oct 2021 15:03 )    Color: Yellow / Appearance: Clear / S.025 / pH: x  Gluc: x / Ketone: Negative  / Bili: Negative / Urobili: <2 mg/dL   Blood: x / Protein: Trace / Nitrite: Negative   Leuk Esterase: Negative / RBC: 6 /HPF / WBC 7 /HPF   Sq Epi: x / Non Sq Epi: 1 /HPF / Bacteria: Negative      CARDIAC MARKERS ( 05 Oct 2021 07:42 )  x     / x     / 556 U/L / x     / x      CARDIAC MARKERS ( 04 Oct 2021 20:11 )  x     / x     / 658 U/L / x     / x                IMAGING:  < from: Xray Ankle 2 Views, Bilateral (10.05.21 @ 11:59) >  EXAM:  XR ANKLE 2 VIEWS BI        PROCEDURE DATE:  Oct  5 2021         INTERPRETATION:  CLINICAL INDICATION: bilateral lower extremity wound    EXAM:  Frontal and lateral views of both ankles from 10/5/2021 at 1159. Compared to prior study from 2021.    IMPRESSION:  Less conspicuous previously seen bilateral distal calf and ankle region soft tissue wounds probably reflecting degree of interval healing.    Redemonstrated, without significant change, bilateral distal tibial and fibular periosteal reactions which could reflect sequelae of chronic vascular insufficiency and/or stigmata of prior infection. No currently suspected gross radiographic evidence for acute osteomyelitis.    Irregular heterotopic ossifications again noted bilateralposterior distal calf/ankle regions.    No dislocations or acute appearing fractures.    Bilaterally congruent ankle mortises. Redemonstrated chronically fused appearing bilateral subtalar joints. Thick bilateral calcaneal enthesophytes again noted.    No discrete suspicious lytic or blastic lesions.    < end of copied text >   Patient is a 71y old  Male who presents with a chief complaint of Agitation (05 Oct 2021 07:40)      OVERNIGHT EVENTS: No overnight events. Pt examined at bedside, sedated and sleeping, unable to be awakened with sternal rub. Pt unable to participate in interview.    REVIEW OF SYSTEMS:  Unable to obtain ROS 2/2 sedation.    ALLERGIES:  penicillin (Other)      FAMILY HISTORY:  FAMILY HISTORY:  No pertinent family history in first degree relatives        VITALS:  T(C): 36.5 (10-06-21 @ 05:20), Max: 36.5 (10-05-21 @ 12:49)  HR: 62 (10-06-21 @ 05:20) (62 - 67)  BP: 152/72 (10-06-21 @ 05:20) (121/61 - 152/72)  RR: 16 (10-06-21 @ 05:20) (16 - 16)  SpO2: 99% (10-06-21 @ 05:20) (97% - 99%)  Wt(kg): --      PHYSICAL EXAM:  GENERAL: NAD, sleeping in bed  HEAD:  Atraumatic, Normocephalic  EYES: unable to assess  HEENT: Poor dentition, dry mucous membranes  NERVOUS SYSTEM: minimal responsiveness to sternal rub, does not open eyes upon commands; moves all 4 extremities freely  CHEST/LUNG: Clear to percussion bilaterally; No rales, rhonchi, wheezing, or rubs  HEART: Regular rate and rhythm; No murmurs, rubs, or gallops  ABDOMEN: Soft, Nontender, Nondistended; Bowel sounds present  EXTREMITIES: Palpable pulses in b/l UE and LE; No clubbing, cyanosis, or edema;   SKIN: B/l lower extremity ulcerations in medial malleolar region, no erythema, warmth or discharge    MEDICATIONS  (STANDING):  aspirin enteric coated 81 milliGRAM(s) Oral daily  clopidogrel Tablet 75 milliGRAM(s) Oral daily  dextrose 5% + sodium chloride 0.45%. 1000 milliLiter(s) (100 mL/Hr) IV Continuous <Continuous>  enoxaparin Injectable 40 milliGRAM(s) SubCutaneous daily  tamsulosin 0.4 milliGRAM(s) Oral at bedtime    MEDICATIONS  (PRN):  acetaminophen   Tablet .. 650 milliGRAM(s) Oral every 6 hours PRN Temp greater or equal to 38C (100.4F), Mild Pain (1 - 3)  aluminum hydroxide/magnesium hydroxide/simethicone Suspension 30 milliLiter(s) Oral every 4 hours PRN Dyspepsia  melatonin 3 milliGRAM(s) Oral at bedtime PRN Insomnia  OLANZapine 2.5 milliGRAM(s) Oral every 8 hours PRN Agitation  OLANZapine Injectable 2.5 milliGRAM(s) IntraMuscular every 8 hours PRN Agitation      LAB/STUDIES:  CAPILLARY BLOOD GLUCOSE                              13.6   4.93  )-----------( 203      ( 05 Oct 2021 07:45 )             40.9     10-    152<H>  |  118<H>  |  18  ----------------------------<  158<H>  3.5   |  25  |  0.78    Ca    8.9      05 Oct 2021 14:45  Phos  2.5     10-05  Mg     2.40     10-05    TPro  8.3  /  Alb  3.6  /  TBili  0.7  /  DBili  x   /  AST  49<H>  /  ALT  41  /  AlkPhos  57  10               8.3  | 0.7  | 49       ------------------[57      ( 05 Oct 2021 07:42 )  3.6  | x    | 41          Lipase:x      Amylase:x          Urinalysis Basic - ( 05 Oct 2021 15:03 )    Color: Yellow / Appearance: Clear / S.025 / pH: x  Gluc: x / Ketone: Negative  / Bili: Negative / Urobili: <2 mg/dL   Blood: x / Protein: Trace / Nitrite: Negative   Leuk Esterase: Negative / RBC: 6 /HPF / WBC 7 /HPF   Sq Epi: x / Non Sq Epi: 1 /HPF / Bacteria: Negative      CARDIAC MARKERS ( 05 Oct 2021 07:42 )  x     / x     / 556 U/L / x     / x      CARDIAC MARKERS ( 04 Oct 2021 20:11 )  x     / x     / 658 U/L / x     / x                IMAGING:  < from: Xray Ankle 2 Views, Bilateral (10.05.21 @ 11:59) >  EXAM:  XR ANKLE 2 VIEWS BI        PROCEDURE DATE:  Oct  5 2021         INTERPRETATION:  CLINICAL INDICATION: bilateral lower extremity wound    EXAM:  Frontal and lateral views of both ankles from 10/5/2021 at 1159. Compared to prior study from 2021.    IMPRESSION:  Less conspicuous previously seen bilateral distal calf and ankle region soft tissue wounds probably reflecting degree of interval healing.    Redemonstrated, without significant change, bilateral distal tibial and fibular periosteal reactions which could reflect sequelae of chronic vascular insufficiency and/or stigmata of prior infection. No currently suspected gross radiographic evidence for acute osteomyelitis.    Irregular heterotopic ossifications again noted bilateralposterior distal calf/ankle regions.    No dislocations or acute appearing fractures.    Bilaterally congruent ankle mortises. Redemonstrated chronically fused appearing bilateral subtalar joints. Thick bilateral calcaneal enthesophytes again noted.    No discrete suspicious lytic or blastic lesions.    < end of copied text >

## 2021-10-06 NOTE — PROVIDER CONTACT NOTE (OTHER) - ACTION/TREATMENT ORDERED:
md notified, md stated to draw labs, check vitals, will order ct head, md saw patient at bedside,
MD notified and states patient will be NPO. Nursing care continued

## 2021-10-06 NOTE — PROGRESS NOTE ADULT - PROBLEM SELECTOR PLAN 3
B/l LE wounds in with crusting and clear discharge but no significant erythema, warmth, or TTP noted  - Elevated ESR (79) and CRP (73.1)  - F/u b/l ankle x-ray  - Unclear history of PAD, on aspirin and plavix, would c/w for now  - F/u wound care eval B/l LE wounds in with crusting and clear discharge but no significant erythema, warmth, or TTP noted  - Elevated ESR (79) and CRP (73.1)  - B/l ankle x-ray neg for acute infection  - Unclear history of PAD, on aspirin and plavix, would c/w for now  - F/u wound care eval

## 2021-10-06 NOTE — PROVIDER CONTACT NOTE (OTHER) - ASSESSMENT
pt is a&ox0, pt is not speaking at this time, pt is fighting, holding my hands tight, moving his arms and legs, opening eyes.
Patient is a&ox0; non verbal, lethargic and unable to follow commands.

## 2021-10-06 NOTE — PROGRESS NOTE ADULT - PROBLEM SELECTOR PLAN 4
- ED labs showed mild transaminitis, likely spurious 2/2 hemolysis  - Transaminitis improving (AST/ALT 49/41)

## 2021-10-06 NOTE — PROGRESS NOTE ADULT - ATTENDING COMMENTS
No event overnight but this morning pt is less responsive, moving extremities only to aversive stimuli, not following commands. F/u STAT CT head and Neurology evaluation.

## 2021-10-06 NOTE — PROGRESS NOTE ADULT - PROBLEM SELECTOR PLAN 1
- Patient with worsening agitation at his NH, unclear on cause, possibly 2/2 worsening dementia but as per cousin patient was in his USOH until recently and his current agitation is unusual for him  - CTH here nondiag, cousin reports that patient was recommended to have a MRI done as outpatient about 2 years ago (unclear on reason, cousin states it was for follow up of a prior CTH 6 months ago), has not been done yet, most recent CTH from April 2021 without any significant findings, currently sedated, neuro exam nonfocal but patient not fully cooperating with exam. Pt afebrile, BP stable, no leukocytosis, CXR showing no acute pathology  - CTH obtained here nondiag; Repeat CTH when patient is more cooperative  - TSH, free T4 levels wnl  - F/u B12, folate levels  - F/u UA   - F/u b/l ankle x-ray  - Neuro checks q8h  - Zyprexa prn for agitation  - Monitor QT interval in AM  - CK elevation, suspect likely 2/2 his agitation; downtrending (658 -> 556)  - If pt becomes febrile, will obtain blood cultures - Patient with worsening agitation at his NH, unclear on cause, possibly 2/2 worsening dementia but as per cousin patient was in his USOH until recently and his current agitation is unusual for him  - CTH here nondiag, cousin reports that patient was recommended to have a MRI done as outpatient about 2 years ago (unclear on reason, cousin states it was for follow up of a prior CTH 6 months ago), has not been done yet, most recent CTH from April 2021 without any significant findings, currently sedated, neuro exam nonfocal but patient not fully cooperating with exam. Pt afebrile, BP stable, no leukocytosis, CXR showing no acute pathology  - CTH obtained here nondiag; can consider repeating CTH when patient is more cooperative  - TSH, free T4 levels wnl  - UA negative  - Ankle X-ray neg for acute osteomyelitis  - Elevated ESR, CRP  - CK elevation, suspect likely 2/2 his agitation; downtrending (658 -> 556)  - F/u B12, folate levels  - Neuro checks q8h  - Zyprexa prn for agitation  - If pt becomes febrile, will obtain blood cultures - Patient with worsening agitation at his NH, unclear on cause, possibly 2/2 worsening dementia but as per cousin patient was in his USOH until recently and his current agitation is unusual for him  - CTH here nondiag, cousin reports that patient was recommended to have a MRI done as outpatient about 2 years ago (unclear on reason, cousin states it was for follow up of a prior CTH 6 months ago), has not been done yet, most recent CTH from April 2021 without any significant findings, currently sedated, neuro exam nonfocal but patient not fully cooperating with exam. Pt afebrile, BP stable, no leukocytosis, CXR showing no acute pathology  - Initial CTH nondiag, pt now with change in mental status, will obtain CTH  - TSH, free T4 levels wnl  - UA negative  - Ankle X-ray neg for acute osteomyelitis  - Elevated ESR, CRP  - CK elevation, suspect likely 2/2 his agitation; downtrending (658 -> 556)  - F/u B12, folate levels  - Neuro checks q8h  - Zyprexa prn for agitation  - If pt becomes febrile, will obtain blood cultures

## 2021-10-07 LAB
ALBUMIN SERPL ELPH-MCNC: 3.5 G/DL — SIGNIFICANT CHANGE UP (ref 3.3–5)
ALP SERPL-CCNC: 57 U/L — SIGNIFICANT CHANGE UP (ref 40–120)
ALT FLD-CCNC: 29 U/L — SIGNIFICANT CHANGE UP (ref 4–41)
ANION GAP SERPL CALC-SCNC: 9 MMOL/L — SIGNIFICANT CHANGE UP (ref 7–14)
AST SERPL-CCNC: 31 U/L — SIGNIFICANT CHANGE UP (ref 4–40)
BILIRUB SERPL-MCNC: 0.9 MG/DL — SIGNIFICANT CHANGE UP (ref 0.2–1.2)
BUN SERPL-MCNC: 13 MG/DL — SIGNIFICANT CHANGE UP (ref 7–23)
CALCIUM SERPL-MCNC: 8.9 MG/DL — SIGNIFICANT CHANGE UP (ref 8.4–10.5)
CHLORIDE SERPL-SCNC: 113 MMOL/L — HIGH (ref 98–107)
CO2 SERPL-SCNC: 27 MMOL/L — SIGNIFICANT CHANGE UP (ref 22–31)
CREAT SERPL-MCNC: 0.83 MG/DL — SIGNIFICANT CHANGE UP (ref 0.5–1.3)
GLUCOSE SERPL-MCNC: 145 MG/DL — HIGH (ref 70–99)
HCT VFR BLD CALC: 38.2 % — LOW (ref 39–50)
HGB BLD-MCNC: 12.6 G/DL — LOW (ref 13–17)
MAGNESIUM SERPL-MCNC: 2.2 MG/DL — SIGNIFICANT CHANGE UP (ref 1.6–2.6)
MCHC RBC-ENTMCNC: 30.1 PG — SIGNIFICANT CHANGE UP (ref 27–34)
MCHC RBC-ENTMCNC: 33 GM/DL — SIGNIFICANT CHANGE UP (ref 32–36)
MCV RBC AUTO: 91.4 FL — SIGNIFICANT CHANGE UP (ref 80–100)
NRBC # BLD: 0 /100 WBCS — SIGNIFICANT CHANGE UP
NRBC # FLD: 0 K/UL — SIGNIFICANT CHANGE UP
PHOSPHATE SERPL-MCNC: 2.5 MG/DL — SIGNIFICANT CHANGE UP (ref 2.5–4.5)
PLATELET # BLD AUTO: 205 K/UL — SIGNIFICANT CHANGE UP (ref 150–400)
POTASSIUM SERPL-MCNC: 3.9 MMOL/L — SIGNIFICANT CHANGE UP (ref 3.5–5.3)
POTASSIUM SERPL-SCNC: 3.9 MMOL/L — SIGNIFICANT CHANGE UP (ref 3.5–5.3)
PROT SERPL-MCNC: 7.7 G/DL — SIGNIFICANT CHANGE UP (ref 6–8.3)
RBC # BLD: 4.18 M/UL — LOW (ref 4.2–5.8)
RBC # FLD: 13.1 % — SIGNIFICANT CHANGE UP (ref 10.3–14.5)
SODIUM SERPL-SCNC: 149 MMOL/L — HIGH (ref 135–145)
WBC # BLD: 6.85 K/UL — SIGNIFICANT CHANGE UP (ref 3.8–10.5)
WBC # FLD AUTO: 6.85 K/UL — SIGNIFICANT CHANGE UP (ref 3.8–10.5)

## 2021-10-07 PROCEDURE — 99233 SBSQ HOSP IP/OBS HIGH 50: CPT | Mod: GC

## 2021-10-07 RX ORDER — CEFTRIAXONE 500 MG/1
1000 INJECTION, POWDER, FOR SOLUTION INTRAMUSCULAR; INTRAVENOUS EVERY 24 HOURS
Refills: 0 | Status: COMPLETED | OUTPATIENT
Start: 2021-10-08 | End: 2021-10-10

## 2021-10-07 RX ORDER — CEFTRIAXONE 500 MG/1
INJECTION, POWDER, FOR SOLUTION INTRAMUSCULAR; INTRAVENOUS
Refills: 0 | Status: COMPLETED | OUTPATIENT
Start: 2021-10-07 | End: 2021-10-11

## 2021-10-07 RX ORDER — SODIUM CHLORIDE 9 MG/ML
1000 INJECTION, SOLUTION INTRAVENOUS
Refills: 0 | Status: DISCONTINUED | OUTPATIENT
Start: 2021-10-07 | End: 2021-10-08

## 2021-10-07 RX ORDER — CEFTRIAXONE 500 MG/1
1000 INJECTION, POWDER, FOR SOLUTION INTRAMUSCULAR; INTRAVENOUS ONCE
Refills: 0 | Status: COMPLETED | OUTPATIENT
Start: 2021-10-07 | End: 2021-10-07

## 2021-10-07 RX ORDER — ENOXAPARIN SODIUM 100 MG/ML
40 INJECTION SUBCUTANEOUS DAILY
Refills: 0 | Status: DISCONTINUED | OUTPATIENT
Start: 2021-10-07 | End: 2021-10-12

## 2021-10-07 RX ADMIN — OLANZAPINE 2.5 MILLIGRAM(S): 15 TABLET, FILM COATED ORAL at 10:34

## 2021-10-07 RX ADMIN — SODIUM CHLORIDE 100 MILLILITER(S): 9 INJECTION, SOLUTION INTRAVENOUS at 08:23

## 2021-10-07 RX ADMIN — CEFTRIAXONE 1000 MILLIGRAM(S): 500 INJECTION, POWDER, FOR SOLUTION INTRAMUSCULAR; INTRAVENOUS at 11:01

## 2021-10-07 RX ADMIN — ENOXAPARIN SODIUM 40 MILLIGRAM(S): 100 INJECTION SUBCUTANEOUS at 11:01

## 2021-10-07 RX ADMIN — OLANZAPINE 2.5 MILLIGRAM(S): 15 TABLET, FILM COATED ORAL at 18:35

## 2021-10-07 NOTE — PROGRESS NOTE ADULT - SUBJECTIVE AND OBJECTIVE BOX
Patient is a 71y old  Male who presents with a chief complaint of Agitation (06 Oct 2021 07:05)      OVERNIGHT EVENTS: No overnight events. INCOMPLETE    REVIEW OF SYSTEMS:  CONSTITUTIONAL: No fever, weight loss, or fatigue  EYES: No eye pain, visual disturbances, or discharge  HEENT:  No difficulty hearing, tinnitus, vertigo; No sinus or throat pain  NECK: No pain or stiffness  BREASTS: No pain, masses, or nipple discharge  RESPIRATORY: No cough, wheezing, chills or hemoptysis; No shortness of breath  CARDIOVASCULAR: No chest pain, palpitations, dizziness, or leg swelling  GASTROINTESTINAL: No abdominal or epigastric pain. No nausea, vomiting, or hematemesis; No diarrhea or constipation. No melena or hematochezia.  GENITOURINARY: No dysuria, frequency, hematuria, or incontinence  NEUROLOGICAL: No headaches, memory loss, loss of strength, numbness, or tremors  SKIN: No itching, burning, rashes, or lesions   MUSCULOSKELETAL: No joint pain or swelling; No muscle, back, or extremity pain    ALLERGIES:  penicillin (Other)      FAMILY HISTORY:  FAMILY HISTORY:  No pertinent family history in first degree relatives        VITALS:  T(C): 36.6 (10-07-21 @ 05:50), Max: 36.6 (10-07-21 @ 05:50)  HR: 67 (10-07-21 @ 05:50) (62 - 69)  BP: 153/78 (10-07-21 @ 05:50) (145/76 - 153/78)  RR: 16 (10-07-21 @ 05:50) (14 - 18)  SpO2: 98% (10-07-21 @ 05:50) (95% - 100%)  Wt(kg): --      PHYSICAL EXAM:  GENERAL: NAD, well-groomed, well-developed  HEAD:  Atraumatic, Normocephalic  EYES: EOMI, PERRLA, conjunctiva and sclera clear  HEENT: No tonsillar erythema, exudates, or enlargement; Moist mucous membranes, Good dentition, No lesions  NECK: Supple, No JVD  NERVOUS SYSTEM:  Alert & Oriented X3, Motor Strength 5/5 B/L upper and lower extremities  CHEST/LUNG: Clear to percussion bilaterally; No rales, rhonchi, wheezing, or rubs  HEART: Regular rate and rhythm; No murmurs, rubs, or gallops  ABDOMEN: Soft, Nontender, Nondistended; Bowel sounds present  EXTREMITIES:  2+ Peripheral Pulses, No clubbing, cyanosis, or edema  SKIN: No rashes or lesions    MEDICATIONS  (STANDING):  aspirin enteric coated 81 milliGRAM(s) Oral daily  clopidogrel Tablet 75 milliGRAM(s) Oral daily  dextrose 5% + sodium chloride 0.45%. 1000 milliLiter(s) (100 mL/Hr) IV Continuous <Continuous>  tamsulosin 0.4 milliGRAM(s) Oral at bedtime    MEDICATIONS  (PRN):  acetaminophen   Tablet .. 650 milliGRAM(s) Oral every 6 hours PRN Temp greater or equal to 38C (100.4F), Mild Pain (1 - 3)  aluminum hydroxide/magnesium hydroxide/simethicone Suspension 30 milliLiter(s) Oral every 4 hours PRN Dyspepsia  melatonin 3 milliGRAM(s) Oral at bedtime PRN Insomnia  OLANZapine 2.5 milliGRAM(s) Oral every 8 hours PRN Agitation  OLANZapine Injectable 2.5 milliGRAM(s) IntraMuscular every 8 hours PRN Agitation      LAB/STUDIES:  CAPILLARY BLOOD GLUCOSE      POCT Blood Glucose.: 88 mg/dL (06 Oct 2021 09:28)                          12.6   6.30  )-----------( 202      ( 06 Oct 2021 09:57 )             38.5     10    149<H>  |  119<H>  |  13  ----------------------------<  119<H>  4.3   |  20<L>  |  0.80    Ca    8.4      06 Oct 2021 17:59  Phos  2.2     10-06  Mg     2.40     10-06    TPro  7.6  /  Alb  3.3  /  TBili  0.7  /  DBili  x   /  AST  32  /  ALT  32  /  AlkPhos  53  10               7.6  | 0.7  | 32       ------------------[53      ( 06 Oct 2021 07:18 )  3.3  | x    | 32          Lipase:x      Amylase:x          Urinalysis Basic - ( 05 Oct 2021 15:03 )    Color: Yellow / Appearance: Clear / S.025 / pH: x  Gluc: x / Ketone: Negative  / Bili: Negative / Urobili: <2 mg/dL   Blood: x / Protein: Trace / Nitrite: Negative   Leuk Esterase: Negative / RBC: 6 /HPF / WBC 7 /HPF   Sq Epi: x / Non Sq Epi: 1 /HPF / Bacteria: Negative      CARDIAC MARKERS ( 05 Oct 2021 07:42 )  x     / x     / 556 U/L / x     / x              Culture - Urine (collected 05 Oct 2021 19:16)  Source: Clean Catch Clean Catch (Midstream)  Preliminary Report (07 Oct 2021 07:28):    50,000 - 99,000 CFU/mL Proteus mirabilis        IMAGING:   Patient is a 71y old  Male who presents with a chief complaint of Agitation (06 Oct 2021 07:05)      OVERNIGHT EVENTS: No overnight events. Pt examined at bedside, initially minimally responsive to sternal rub. However when pt is examined from the right side of the bed, patient more alert, however limited responses, unable to answer questions. 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    REVIEW OF SYSTEMS:  Unable to obtain 2/2 mental status    ALLERGIES:  penicillin (Other)      FAMILY HISTORY:  FAMILY HISTORY:  No pertinent family history in first degree relatives        VITALS:  T(C): 36.6 (10-07-21 @ 05:50), Max: 36.6 (10-07-21 @ 05:50)  HR: 67 (10-07-21 @ 05:50) (62 - 69)  BP: 153/78 (10-07-21 @ 05:50) (145/76 - 153/78)  RR: 16 (10-07-21 @ 05:50) (14 - 18)  SpO2: 98% (10-07-21 @ 05:50) (95% - 100%)  Wt(kg): --      PHYSICAL EXAM:  GENERAL: NAD, frail  HEAD:  Atraumatic, Normocephalic  EYES: conjunctiva and sclera clear; left upper lid ptosis  HEENT: Dry mucous membranes, poor dentition  NECK: Supple, No JVD  NERVOUS SYSTEM:  Alert & Oriented X1  CHEST/LUNG: Clear to percussion bilaterally; No rales, rhonchi, wheezing, or rubs  HEART: Regular rate and rhythm; No murmurs, rubs, or gallops  ABDOMEN: Soft, Nontender, Nondistended; Bowel sounds present  EXTREMITIES:  2+ radial pulse, 1+ DP pulse; No clubbing, cyanosis, or edema  SKIN: B/l ulceration in medial malleolar region, no discharge, warmth, erythema    MEDICATIONS  (STANDING):  aspirin enteric coated 81 milliGRAM(s) Oral daily  clopidogrel Tablet 75 milliGRAM(s) Oral daily  dextrose 5% + sodium chloride 0.45%. 1000 milliLiter(s) (100 mL/Hr) IV Continuous <Continuous>  tamsulosin 0.4 milliGRAM(s) Oral at bedtime    MEDICATIONS  (PRN):  acetaminophen   Tablet .. 650 milliGRAM(s) Oral every 6 hours PRN Temp greater or equal to 38C (100.4F), Mild Pain (1 - 3)  aluminum hydroxide/magnesium hydroxide/simethicone Suspension 30 milliLiter(s) Oral every 4 hours PRN Dyspepsia  melatonin 3 milliGRAM(s) Oral at bedtime PRN Insomnia  OLANZapine 2.5 milliGRAM(s) Oral every 8 hours PRN Agitation  OLANZapine Injectable 2.5 milliGRAM(s) IntraMuscular every 8 hours PRN Agitation      LAB/STUDIES:  CAPILLARY BLOOD GLUCOSE      POCT Blood Glucose.: 88 mg/dL (06 Oct 2021 09:28)                          12.6   6.30  )-----------( 202      ( 06 Oct 2021 09:57 )             38.5     10-    149<H>  |  119<H>  |  13  ----------------------------<  119<H>  4.3   |  20<L>  |  0.80    Ca    8.4      06 Oct 2021 17:59  Phos  2.2     10-  Mg     2.40     10    TPro  7.6  /  Alb  3.3  /  TBili  0.7  /  DBili  x   /  AST  32  /  ALT  32  /  AlkPhos  53  10-               7.6  | 0.7  | 32       ------------------[53      ( 06 Oct 2021 07:18 )  3.3  | x    | 32          Lipase:x      Amylase:x          Urinalysis Basic - ( 05 Oct 2021 15:03 )    Color: Yellow / Appearance: Clear / S.025 / pH: x  Gluc: x / Ketone: Negative  / Bili: Negative / Urobili: <2 mg/dL   Blood: x / Protein: Trace / Nitrite: Negative   Leuk Esterase: Negative / RBC: 6 /HPF / WBC 7 /HPF   Sq Epi: x / Non Sq Epi: 1 /HPF / Bacteria: Negative      CARDIAC MARKERS ( 05 Oct 2021 07:42 )  x     / x     / 556 U/L / x     / x              Culture - Urine (collected 05 Oct 2021 19:16)  Source: Clean Catch Clean Catch (Midstream)  Preliminary Report (07 Oct 2021 07:28):    50,000 - 99,000 CFU/mL Proteus mirabilis        IMAGING:  < from: CT Head No Cont (10.06.21 @ 13:55) >    EXAM:  CT BRAIN        PROCEDURE DATE:  Oct  6 2021         INTERPRETATION:  Clinical indication: Change in mental status.    Multiple axial sections were performed from base to vertex contrast enhancement. Incidental short interval follow    This exam is compared to prior noncontrast head CT performed on 2021.    This exam is again somewhat limited by motion    Parenchymal volume loss is seen in    Grossly, no acute hemorrhage with mass effect is seen. Better  CT is recommended when patient can tolerate it or sedation be given.    Evaluation of the osseous structures with the appropriate window appears unremarkable.    The visualized paranasal sinuses mastoid and middle ear effusions appear clear.    IMPRESSION: Thisexam is somewhat limited by motion though grossly unremarkable. Better  CT is recommended when patient can tolerate it or sedation be given.    < end of copied text >  < from: Xray Chest 1 View-PORTABLE IMMEDIATE (Xray Chest 1 View-PORTABLE IMMEDIATE .) (10.06.21 @ 10:43) >    EXAM:  XR CHEST PORTABLE IMMED 1V        PROCEDURE DATE:  Oct  6 2021         INTERPRETATION:  CLINICAL INFORMATION: Altered mental status.    TECHNIQUE: Single portable view of the chest.    COMPARISON: Chest x-ray from 10/4/2021.    FINDINGS:    Left lung shows hazy peripheral opacity image midportion also present on the study of 2 days ago.  There is no pneumothorax or large pleural effusion.  Heart size cannot be accurately assessed in this projection.  No acute osseous abnormality.    IMPRESSION: Left midlung peripheral opacity may represent mild covid 19 pneumonia.    < end of copied text >

## 2021-10-07 NOTE — ADVANCED PRACTICE NURSE CONSULT - REASON FOR CONSULT
Patient seen on skin care rounds after wound care referral received for assessment of skin impairment and recommendations of topical management. Chart reviewed: WBC 6.85, H/H 12.6/38.2, platelets 205, BMI 21.5, Smooth 11. Patient H/O of dementia (AAOx2-3 per cousin), colon cancer s/p hemicolectomy, and b/l LE ulcers who presented from his NH for worsening agitation, found to be hypernatremic with an elevated BUN/Cr, mild transaminitis, and elevated CK. Pt otherwise afebrile and hemodynamically stable, admitted for medical management. Pt minimally awake or alert.

## 2021-10-07 NOTE — ADVANCED PRACTICE NURSE CONSULT - ASSESSMENT
General: Patient lethargic but agitated when providing care, bedbound, incontinent of urine and stool- perineal care provided for urinary incontinence. Patient thin, frail, muscle wasting noted. Skin warm, dry, scattered areas of hyperpigmentation and hypopigmentation, Blanchable erythema on bilateral heels.     At risk for incontinence associated dermatitis     Vascular: Bilateral lower extremities with scattered areas of hyper and hypopigmentation.  Dry, flaky skin. Thickened fungal toenails. Increased coolness from midfoot to distal toes. No Edema. Capillary refill >3 seconds. Faint DP/PT pulses.    Bilateral feet with multiple areas of dry hypopigmented plaques to Left medial/ lateral ankle and right medial/lateral ankle. The left lateral ankle with an area of dried serous drainage ([id number]) aggrevated by frictional forces as patient agitated at times. Scant fibrinous drainage, no odor. Periwound skin without s/s of soft tissue infection. Goals of care: Maintain dried scab, provide antibacterial, debridement not appropriate at this time due to patients compromised vascular status.

## 2021-10-07 NOTE — PROGRESS NOTE ADULT - PROBLEM SELECTOR PLAN 5
DVT ppx - Lovenox  Diet - Currently NPO; dysphagia screen when more awake, patient on regular consistency and thin liquids as per NH documents  Activity - OOB to chair/with assistance  Fall and aspiration precautions DVT ppx - SCDs; currently holding Lovenox;  Diet - Currently NPO; dysphagia screen when more awake, patient on regular consistency and thin liquids as per NH documents  Activity - OOB to chair/with assistance  Fall and aspiration precautions

## 2021-10-07 NOTE — ADVANCED PRACTICE NURSE CONSULT - RECOMMEDATIONS
Topical recommendations:     Areas at risk for IAD- Cleanse with skin cleanser, pat dry. Apply JADYN moisture barrier cream twice a day and PRN with incontinent episodes. With episodes of incontinence only remove soiled layer of Triad, then reinforce with thin layer.     Bilateral medial and lateral ankles- Paint with betadine daily, allow to air dry.     Continue low air loss bed therapy,  heel elevation with offloading boots, turn & reposition q2h with Z-flow fluidized pillow, continue moisture management with barrier creams as specified above & single breathable pad, continue measures to decrease friction/shear.     Please contact Wound/Ostomy Care Service Line if we can be of further assistance (ext [phone]).

## 2021-10-07 NOTE — PROGRESS NOTE ADULT - PROBLEM SELECTOR PLAN 3
B/l LE wounds in with crusting and clear discharge but no significant erythema, warmth, or TTP noted  - Elevated ESR (79) and CRP (73.1)  - B/l ankle x-ray neg for acute infection  - Unclear history of PAD, on aspirin and plavix, would c/w for now  - F/u wound care eval B/l LE wounds in with crusting and clear discharge but no significant erythema, warmth, or TTP noted  - Elevated ESR (79) and CRP (73.1)  - B/l ankle x-ray neg for acute infection  - Unclear history of PAD; home med plavix and aspirin on hold 2/2 pt NPO  - F/u wound care eval

## 2021-10-07 NOTE — PROGRESS NOTE ADULT - ATTENDING COMMENTS
Pt is somewhat more alert today. History is still not detailed, but suspect pt is not yet back to baseline. Especially given UA and suggestion of opacity on CXR, will treat empirically with ceftriaxone and monitor clinical response over the next 24-48 hours. F/u Neurology recs.

## 2021-10-07 NOTE — PROGRESS NOTE ADULT - PROBLEM SELECTOR PLAN 2
- Poor PO intake at NH  - Pt continues to be hypernatremic (151 -> 153 -> 151) s/p 1.5L D5 1/2 NS - Poor PO intake at NH  - Pt continues to be hypernatremic (151 -> 153 -> 151 -> 149) s/p D5 1/2NS  - Pt currently NPO 2/2 mental status

## 2021-10-07 NOTE — PROGRESS NOTE ADULT - ASSESSMENT
71y M with PMH dementia (AAOx2-3 per cousin), colon cancer s/p hemicolectomy, and b/l LE ulcers who presented from his NH for worsening agitation, found to be hypernatremic with an elevated BUN/Cr, mild transaminitis, and elevated CK. Pt otherwise afebrile and hemodynamically stable, admitted for medical management. Pt minimally awake or alert.

## 2021-10-08 LAB
-  AMIKACIN: SIGNIFICANT CHANGE UP
-  AMOXICILLIN/CLAVULANIC ACID: SIGNIFICANT CHANGE UP
-  AMPICILLIN/SULBACTAM: SIGNIFICANT CHANGE UP
-  AMPICILLIN: SIGNIFICANT CHANGE UP
-  AZTREONAM: SIGNIFICANT CHANGE UP
-  CEFAZOLIN: SIGNIFICANT CHANGE UP
-  CEFEPIME: SIGNIFICANT CHANGE UP
-  CEFOXITIN: SIGNIFICANT CHANGE UP
-  CEFTRIAXONE: SIGNIFICANT CHANGE UP
-  CIPROFLOXACIN: SIGNIFICANT CHANGE UP
-  ERTAPENEM: SIGNIFICANT CHANGE UP
-  GENTAMICIN: SIGNIFICANT CHANGE UP
-  LEVOFLOXACIN: SIGNIFICANT CHANGE UP
-  MEROPENEM: SIGNIFICANT CHANGE UP
-  NITROFURANTOIN: SIGNIFICANT CHANGE UP
-  PIPERACILLIN/TAZOBACTAM: SIGNIFICANT CHANGE UP
-  TOBRAMYCIN: SIGNIFICANT CHANGE UP
-  TRIMETHOPRIM/SULFAMETHOXAZOLE: SIGNIFICANT CHANGE UP
CULTURE RESULTS: SIGNIFICANT CHANGE UP
METHOD TYPE: SIGNIFICANT CHANGE UP
ORGANISM # SPEC MICROSCOPIC CNT: SIGNIFICANT CHANGE UP
ORGANISM # SPEC MICROSCOPIC CNT: SIGNIFICANT CHANGE UP
SPECIMEN SOURCE: SIGNIFICANT CHANGE UP

## 2021-10-08 PROCEDURE — 99233 SBSQ HOSP IP/OBS HIGH 50: CPT | Mod: GC

## 2021-10-08 RX ORDER — SODIUM CHLORIDE 9 MG/ML
2000 INJECTION, SOLUTION INTRAVENOUS
Refills: 0 | Status: DISCONTINUED | OUTPATIENT
Start: 2021-10-08 | End: 2021-10-12

## 2021-10-08 RX ADMIN — TAMSULOSIN HYDROCHLORIDE 0.4 MILLIGRAM(S): 0.4 CAPSULE ORAL at 21:33

## 2021-10-08 RX ADMIN — ENOXAPARIN SODIUM 40 MILLIGRAM(S): 100 INJECTION SUBCUTANEOUS at 10:47

## 2021-10-08 RX ADMIN — CEFTRIAXONE 100 MILLIGRAM(S): 500 INJECTION, POWDER, FOR SOLUTION INTRAMUSCULAR; INTRAVENOUS at 10:48

## 2021-10-08 RX ADMIN — SODIUM CHLORIDE 100 MILLILITER(S): 9 INJECTION, SOLUTION INTRAVENOUS at 10:47

## 2021-10-08 NOTE — PROGRESS NOTE ADULT - PROBLEM SELECTOR PLAN 2
- Poor PO intake at NH  - Pt continues to be hypernatremic (151 -> 153 -> 151 -> 149) s/p D5 1/2NS  - Pt currently NPO 2/2 mental status - Poor PO intake at NH  - Pt continues to be hypernatremic (151 -> 153 -> 151 -> 149) s/p D5 1/2NS  - Pt currently NPO 2/2 mental status  - 2L D5 1/2 NS today over 20 hours

## 2021-10-08 NOTE — PROGRESS NOTE ADULT - PROBLEM SELECTOR PLAN 3
B/l LE wounds in with crusting and clear discharge but no significant erythema, warmth, or TTP noted  - Elevated ESR (79) and CRP (73.1)  - B/l ankle x-ray neg for acute infection  - Unclear history of PAD; home med plavix and aspirin on hold 2/2 pt NPO  - F/u wound care eval

## 2021-10-08 NOTE — PROGRESS NOTE ADULT - ASSESSMENT
71y M with PMH dementia (AAOx2-3 per cousin), colon cancer s/p hemicolectomy, and b/l LE ulcers who presented from his NH for worsening agitation, found to be hypernatremic with an elevated BUN/Cr, mild transaminitis, and elevated CK. Pt otherwise afebrile and hemodynamically stable, admitted for medical management. Pt minimally awake or alert. 71y M with PMH dementia (AAOx2-3 per cousin), colon cancer s/p hemicolectomy, and b/l LE ulcers who presented from his NH for worsening agitation, found to be hypernatremic with an elevated BUN/Cr, mild transaminitis, and elevated CK. Pt otherwise afebrile and hemodynamically stable, admitted for medical management. Pt minimally awake and difficult to interview.

## 2021-10-08 NOTE — PROGRESS NOTE ADULT - SUBJECTIVE AND OBJECTIVE BOX
PROGRESS NOTE:   Authored by Jose Barraza MD  Pager: [facility] [phone]; [facility] [phone]    Patient is a 71y old  Male who presents with a chief complaint of Agitation (07 Oct 2021 07:27)      SUBJECTIVE / OVERNIGHT EVENTS:  Overnight no acute events.     ADDITIONAL REVIEW OF SYSTEMS:    MEDICATIONS  (STANDING):  aspirin enteric coated 81 milliGRAM(s) Oral daily  cefTRIAXone   IVPB 1000 milliGRAM(s) IV Intermittent every 24 hours  cefTRIAXone   IVPB      clopidogrel Tablet 75 milliGRAM(s) Oral daily  dextrose 5% + sodium chloride 0.45%. 1000 milliLiter(s) (100 mL/Hr) IV Continuous <Continuous>  enoxaparin Injectable 40 milliGRAM(s) SubCutaneous daily  tamsulosin 0.4 milliGRAM(s) Oral at bedtime    MEDICATIONS  (PRN):  acetaminophen   Tablet .. 650 milliGRAM(s) Oral every 6 hours PRN Temp greater or equal to 38C (100.4F), Mild Pain (1 - 3)  aluminum hydroxide/magnesium hydroxide/simethicone Suspension 30 milliLiter(s) Oral every 4 hours PRN Dyspepsia  melatonin 3 milliGRAM(s) Oral at bedtime PRN Insomnia  OLANZapine 2.5 milliGRAM(s) Oral every 8 hours PRN Agitation  OLANZapine Injectable 2.5 milliGRAM(s) IntraMuscular every 8 hours PRN Agitation      CAPILLARY BLOOD GLUCOSE        I&O's Summary      PHYSICAL EXAM:  Vital Signs Last 24 Hrs  T(C): 36.9 (08 Oct 2021 05:21), Max: 36.9 (08 Oct 2021 05:21)  T(F): 98.5 (08 Oct 2021 05:21), Max: 98.5 (08 Oct 2021 05:21)  HR: 66 (08 Oct 2021 05:21) (60 - 66)  BP: 112/59 (08 Oct 2021 05:21) (112/59 - 149/74)  BP(mean): --  RR: 15 (08 Oct 2021 05:21) (15 - 17)  SpO2: 100% (08 Oct 2021 05:21) (100% - 100%)    GENERAL: No acute distress, well-developed  HEAD:  Atraumatic, Normocephalic  EYES: EOMI, PERRLA, conjunctiva and sclera clear  NECK: Supple, no lymphadenopathy, no JVD  CHEST/LUNG: CTAB; No wheezes, rales, or rhonchi  HEART: Regular rate and rhythm; No murmurs, rubs, or gallops  ABDOMEN: Soft, non-tender, non-distended; normal bowel sounds, no organomegaly  EXTREMITIES:  2+ peripheral pulses b/l, No clubbing, cyanosis, or edema  NEUROLOGY: A&O x 3, no focal deficits  SKIN: No rashes or lesions    LABS:                        12.6   6.85  )-----------( 205      ( 07 Oct 2021 12:11 )             38.2     10-07    149<H>  |  113<H>  |  13  ----------------------------<  145<H>  3.9   |  27  |  0.83    Ca    8.9      07 Oct 2021 12:11  Phos  2.5     10-07  Mg     2.20     10-07    TPro  7.7  /  Alb  3.5  /  TBili  0.9  /  DBili  x   /  AST  31  /  ALT  29  /  AlkPhos  57  10-07              Culture - Urine (collected 05 Oct 2021 14:20)  Source: Clean Catch Clean Catch (Midstream)  Final Report (08 Oct 2021 03:01):    50,000 - 99,000 CFU/mL Proteus mirabilis  Organism: Proteus mirabilis (08 Oct 2021 03:01)  Organism: Proteus mirabilis (08 Oct 2021 03:01)        RADIOLOGY & ADDITIONAL TESTS:  Results Reviewed:   Imaging Personally Reviewed:  Electrocardiogram Personally Reviewed:    COORDINATION OF CARE:  Care Discussed with Consultants/Other Providers [Y/N]:  Prior or Outpatient Records Reviewed [Y/N]:   PROGRESS NOTE:   Authored by Jose Barraza MD  Pager: [facility] [phone]; [facility] [phone]    Patient is a 71y old  Male who presents with a chief complaint of Agitation (07 Oct 2021 07:27)      SUBJECTIVE / OVERNIGHT EVENTS:  Overnight no acute events. Patient awake this morning but minimally responsive to questions. Responds yes when asked if he knows where he is but unable to state where.     ADDITIONAL REVIEW OF SYSTEMS: Unable to assess    MEDICATIONS  (STANDING):  aspirin enteric coated 81 milliGRAM(s) Oral daily  cefTRIAXone   IVPB 1000 milliGRAM(s) IV Intermittent every 24 hours  cefTRIAXone   IVPB      clopidogrel Tablet 75 milliGRAM(s) Oral daily  dextrose 5% + sodium chloride 0.45%. 1000 milliLiter(s) (100 mL/Hr) IV Continuous <Continuous>  enoxaparin Injectable 40 milliGRAM(s) SubCutaneous daily  tamsulosin 0.4 milliGRAM(s) Oral at bedtime    MEDICATIONS  (PRN):  acetaminophen   Tablet .. 650 milliGRAM(s) Oral every 6 hours PRN Temp greater or equal to 38C (100.4F), Mild Pain (1 - 3)  aluminum hydroxide/magnesium hydroxide/simethicone Suspension 30 milliLiter(s) Oral every 4 hours PRN Dyspepsia  melatonin 3 milliGRAM(s) Oral at bedtime PRN Insomnia  OLANZapine 2.5 milliGRAM(s) Oral every 8 hours PRN Agitation  OLANZapine Injectable 2.5 milliGRAM(s) IntraMuscular every 8 hours PRN Agitation      CAPILLARY BLOOD GLUCOSE        I&O's Summary      PHYSICAL EXAM:  Vital Signs Last 24 Hrs  T(C): 36.9 (08 Oct 2021 05:21), Max: 36.9 (08 Oct 2021 05:21)  T(F): 98.5 (08 Oct 2021 05:21), Max: 98.5 (08 Oct 2021 05:21)  HR: 66 (08 Oct 2021 05:21) (60 - 66)  BP: 112/59 (08 Oct 2021 05:21) (112/59 - 149/74)  BP(mean): --  RR: 15 (08 Oct 2021 05:21) (15 - 17)  SpO2: 100% (08 Oct 2021 05:21) (100% - 100%)    GENERAL: No acute distress, frail appearing  HEENT: Dry mucous membranes, poor dentition, significant mucous noted in oral cavity  HEAD:  Atraumatic, Normocephalic  NECK: Supple, no JVD  CHEST/LUNG: CTAB; No wheezes, rales, or rhonchi  HEART: Regular rate and rhythm; No murmurs, rubs, or gallops  ABDOMEN: Soft, non-tender, non-distended; normal bowel sounds  EXTREMITIES:  2+ peripheral pulses b/l, Ulcers noted on b/l lower extremities  NEUROLOGY: A&O x 1    LABS:                        12.6   6.85  )-----------( 205      ( 07 Oct 2021 12:11 )             38.2     10-07    149<H>  |  113<H>  |  13  ----------------------------<  145<H>  3.9   |  27  |  0.83    Ca    8.9      07 Oct 2021 12:11  Phos  2.5     10-07  Mg     2.20     10-07    TPro  7.7  /  Alb  3.5  /  TBili  0.9  /  DBili  x   /  AST  31  /  ALT  29  /  AlkPhos  57  10-07              Culture - Urine (collected 05 Oct 2021 14:20)  Source: Clean Catch Clean Catch (Midstream)  Final Report (08 Oct 2021 03:01):    50,000 - 99,000 CFU/mL Proteus mirabilis  Organism: Proteus mirabilis (08 Oct 2021 03:01)  Organism: Proteus mirabilis (08 Oct 2021 03:01)        RADIOLOGY & ADDITIONAL TESTS:  Results Reviewed:   Imaging Personally Reviewed:  Electrocardiogram Personally Reviewed:    COORDINATION OF CARE:  Care Discussed with Consultants/Other Providers [Y/N]:  Prior or Outpatient Records Reviewed [Y/N]:

## 2021-10-08 NOTE — PROGRESS NOTE ADULT - PROBLEM SELECTOR PLAN 1
- Patient with worsening agitation at his NH, unclear on cause, possibly 2/2 worsening dementia but as per cousin patient was in his USOH until recently and his current agitation is unusual for him  - CTH here nondiag, cousin reports that patient was recommended to have a MRI done as outpatient about 2 years ago (unclear on reason, cousin states it was for follow up of a prior CTH 6 months ago), has not been done yet, most recent CTH from April 2021 without any significant findings, currently sedated, neuro exam nonfocal but patient not fully cooperating with exam. Pt afebrile, BP stable, no leukocytosis, CXR showing no acute pathology  - Repeat CTH limited by motion artifact, no gross signs of hemorrhage  - TSH, free T4 levels wnl  - UA negative, urine culture 50-90k Proteus mirabilis  - Ankle X-ray neg for acute osteomyelitis  - Elevated ESR, CRP  - Mildly elevated Procal (0.11)  - Repeat CXR showed mid lung opacity, unchanged from ED CXR  - Given elevated inflammatory marker with CXR opacity and +urine cx, will treat empirically with ceftriaxone  - F/u B12, folate levels  - Neuro checks q4h  - Zyprexa prn for agitation  - If pt becomes febrile, will obtain blood cultures - Patient with worsening agitation at his NH, unclear on cause, possibly 2/2 worsening dementia but as per cousin patient was in his USOH until recently and his current agitation is unusual for him  - CTH here nondiag, cousin reports that patient was recommended to have a MRI done as outpatient about 2 years ago (unclear on reason, cousin states it was for follow up of a prior CTH 6 months ago), has not been done yet, most recent CTH from April 2021 without any significant findings, currently sedated, neuro exam nonfocal but patient not fully cooperating with exam. Pt afebrile, BP stable, no leukocytosis, CXR showing no acute pathology  - Repeat CTH limited by motion artifact, no gross signs of hemorrhage  - TSH, free T4 levels wnl  - UA negative, urine culture 50-90k Proteus mirabilis  - Ankle X-ray neg for acute osteomyelitis  - Elevated ESR, CRP  - Mildly elevated Procal (0.11)  - Repeat CXR showed mid lung opacity, unchanged from ED CXR  - Given elevated inflammatory marker with CXR opacity and +urine cx, will continue empiric CTX  - F/u B12, folate levels  - Neuro checks q4h  - Zyprexa prn for agitation  - If pt becomes febrile, will obtain blood cultures

## 2021-10-08 NOTE — PROGRESS NOTE ADULT - PROBLEM SELECTOR PLAN 5
DVT ppx - SCDs; currently holding Lovenox;  Diet - Currently NPO; dysphagia screen when more awake, patient on regular consistency and thin liquids as per NH documents  Activity - OOB to chair/with assistance  Fall and aspiration precautions

## 2021-10-08 NOTE — PROGRESS NOTE ADULT - ATTENDING COMMENTS
71M from nursing home with history of Dementia, Colon Cancer s/p hemicolectomy, and chronic b/l LE ulcers who presented to this hospital from nursing home with worsening agitation, hypernatremia and UTI.    # UTI/ Pneumonia  - U/A borderline positive with UCx growing Proteus. CXR shows LML airspace disease. Will continue with ceftriaxone for 5 day given his clinical improvement with ABX.   # Acute encephalopathy - improving with improvement with treatment on hypernatremia and UTI   # Hypernatremia - due to free water deficit, improving with IVF 71M from nursing home with history of Dementia, Colon Cancer s/p hemicolectomy, and chronic b/l LE ulcers who presented to this hospital from nursing home with worsening agitation, hypernatremia and UTI.    # UTI/ Pneumonia  - U/A borderline positive with UCx growing Proteus. CXR shows left sided airspace disease. Will continue with ceftriaxone for 5 day given his clinical improvement with ABX.   # Acute encephalopathy - improving with improvement with treatment on hypernatremia and UTI   # Hypernatremia - due to free water deficit, improving with IVF

## 2021-10-08 NOTE — PROGRESS NOTE ADULT - PROBLEM SELECTOR PLAN 4
- ED labs showed mild transaminitis, likely spurious 2/2 hemolysis  - Transaminitis improving (AST/ALT 49/41) Resolved  - ED labs showed mild transaminitis, likely spurious 2/2 hemolysis  - Transaminitis improving (AST/ALT 31/29)

## 2021-10-09 ENCOUNTER — TRANSCRIPTION ENCOUNTER (OUTPATIENT)
Age: 71
End: 2021-10-09

## 2021-10-09 LAB
ALBUMIN SERPL ELPH-MCNC: 3.4 G/DL — SIGNIFICANT CHANGE UP (ref 3.3–5)
ALP SERPL-CCNC: 62 U/L — SIGNIFICANT CHANGE UP (ref 40–120)
ALT FLD-CCNC: 33 U/L — SIGNIFICANT CHANGE UP (ref 4–41)
ANION GAP SERPL CALC-SCNC: 13 MMOL/L — SIGNIFICANT CHANGE UP (ref 7–14)
AST SERPL-CCNC: 26 U/L — SIGNIFICANT CHANGE UP (ref 4–40)
BILIRUB SERPL-MCNC: 0.8 MG/DL — SIGNIFICANT CHANGE UP (ref 0.2–1.2)
BUN SERPL-MCNC: 11 MG/DL — SIGNIFICANT CHANGE UP (ref 7–23)
CALCIUM SERPL-MCNC: 8.9 MG/DL — SIGNIFICANT CHANGE UP (ref 8.4–10.5)
CHLORIDE SERPL-SCNC: 108 MMOL/L — HIGH (ref 98–107)
CO2 SERPL-SCNC: 23 MMOL/L — SIGNIFICANT CHANGE UP (ref 22–31)
CREAT SERPL-MCNC: 0.79 MG/DL — SIGNIFICANT CHANGE UP (ref 0.5–1.3)
GLUCOSE SERPL-MCNC: 124 MG/DL — HIGH (ref 70–99)
HCT VFR BLD CALC: 38.9 % — LOW (ref 39–50)
HGB BLD-MCNC: 13.2 G/DL — SIGNIFICANT CHANGE UP (ref 13–17)
MAGNESIUM SERPL-MCNC: 2.3 MG/DL — SIGNIFICANT CHANGE UP (ref 1.6–2.6)
MCHC RBC-ENTMCNC: 30.1 PG — SIGNIFICANT CHANGE UP (ref 27–34)
MCHC RBC-ENTMCNC: 33.9 GM/DL — SIGNIFICANT CHANGE UP (ref 32–36)
MCV RBC AUTO: 88.8 FL — SIGNIFICANT CHANGE UP (ref 80–100)
NRBC # BLD: 0 /100 WBCS — SIGNIFICANT CHANGE UP
NRBC # FLD: 0 K/UL — SIGNIFICANT CHANGE UP
PHOSPHATE SERPL-MCNC: 2 MG/DL — LOW (ref 2.5–4.5)
PLATELET # BLD AUTO: 244 K/UL — SIGNIFICANT CHANGE UP (ref 150–400)
POTASSIUM SERPL-MCNC: 3.5 MMOL/L — SIGNIFICANT CHANGE UP (ref 3.5–5.3)
POTASSIUM SERPL-SCNC: 3.5 MMOL/L — SIGNIFICANT CHANGE UP (ref 3.5–5.3)
PROT SERPL-MCNC: 7.5 G/DL — SIGNIFICANT CHANGE UP (ref 6–8.3)
RBC # BLD: 4.38 M/UL — SIGNIFICANT CHANGE UP (ref 4.2–5.8)
RBC # FLD: 12.8 % — SIGNIFICANT CHANGE UP (ref 10.3–14.5)
SODIUM SERPL-SCNC: 144 MMOL/L — SIGNIFICANT CHANGE UP (ref 135–145)
WBC # BLD: 4.95 K/UL — SIGNIFICANT CHANGE UP (ref 3.8–10.5)
WBC # FLD AUTO: 4.95 K/UL — SIGNIFICANT CHANGE UP (ref 3.8–10.5)

## 2021-10-09 PROCEDURE — 99232 SBSQ HOSP IP/OBS MODERATE 35: CPT | Mod: GC

## 2021-10-09 RX ORDER — POTASSIUM PHOSPHATE, MONOBASIC POTASSIUM PHOSPHATE, DIBASIC 236; 224 MG/ML; MG/ML
30 INJECTION, SOLUTION INTRAVENOUS ONCE
Refills: 0 | Status: COMPLETED | OUTPATIENT
Start: 2021-10-09 | End: 2021-10-09

## 2021-10-09 RX ADMIN — Medication 81 MILLIGRAM(S): at 12:34

## 2021-10-09 RX ADMIN — Medication 3 MILLIGRAM(S): at 19:51

## 2021-10-09 RX ADMIN — ENOXAPARIN SODIUM 40 MILLIGRAM(S): 100 INJECTION SUBCUTANEOUS at 12:34

## 2021-10-09 RX ADMIN — CLOPIDOGREL BISULFATE 75 MILLIGRAM(S): 75 TABLET, FILM COATED ORAL at 12:34

## 2021-10-09 RX ADMIN — CEFTRIAXONE 100 MILLIGRAM(S): 500 INJECTION, POWDER, FOR SOLUTION INTRAMUSCULAR; INTRAVENOUS at 11:06

## 2021-10-09 RX ADMIN — POTASSIUM PHOSPHATE, MONOBASIC POTASSIUM PHOSPHATE, DIBASIC 83.33 MILLIMOLE(S): 236; 224 INJECTION, SOLUTION INTRAVENOUS at 11:38

## 2021-10-09 RX ADMIN — OLANZAPINE 2.5 MILLIGRAM(S): 15 TABLET, FILM COATED ORAL at 19:51

## 2021-10-09 RX ADMIN — TAMSULOSIN HYDROCHLORIDE 0.4 MILLIGRAM(S): 0.4 CAPSULE ORAL at 22:47

## 2021-10-09 NOTE — DISCHARGE NOTE PROVIDER - NSDCMRMEDTOKEN_GEN_ALL_CORE_FT
Aspir 81 oral delayed release tablet: 1 tab(s) orally once a day  Plavix 75 mg oral tablet: 1 tab(s) orally once a day  tamsulosin 0.4 mg capsule:    Aspir 81 oral delayed release tablet: 1 tab(s) orally once a day  OLANZapine 2.5 mg oral tablet: 1 tab(s) orally once (at bedtime), As Needed  Plavix 75 mg oral tablet: 1 tab(s) orally once a day  tamsulosin 0.4 mg capsule:

## 2021-10-09 NOTE — PROGRESS NOTE ADULT - SUBJECTIVE AND OBJECTIVE BOX
Patient is a 71y old  Male who presents with a chief complaint of Agitation (08 Oct 2021 07:54)      OVERNIGHT EVENTS: No overnight events. INCOMPLETE    REVIEW OF SYSTEMS:  CONSTITUTIONAL: No fever, weight loss, or fatigue  EYES: No eye pain, visual disturbances, or discharge  HEENT:  No difficulty hearing, tinnitus, vertigo; No sinus or throat pain  NECK: No pain or stiffness  BREASTS: No pain, masses, or nipple discharge  RESPIRATORY: No cough, wheezing, chills or hemoptysis; No shortness of breath  CARDIOVASCULAR: No chest pain, palpitations, dizziness, or leg swelling  GASTROINTESTINAL: No abdominal or epigastric pain. No nausea, vomiting, or hematemesis; No diarrhea or constipation. No melena or hematochezia.  GENITOURINARY: No dysuria, frequency, hematuria, or incontinence  NEUROLOGICAL: No headaches, memory loss, loss of strength, numbness, or tremors  SKIN: No itching, burning, rashes, or lesions   MUSCULOSKELETAL: No joint pain or swelling; No muscle, back, or extremity pain    ALLERGIES:  penicillin (Other)      FAMILY HISTORY:  FAMILY HISTORY:  No pertinent family history in first degree relatives        VITALS:  T(C): 37.2 (10-09-21 @ 05:35), Max: 37.2 (10-09-21 @ 05:35)  HR: 62 (10-09-21 @ 05:35) (58 - 92)  BP: 135/89 (10-09-21 @ 05:35) (135/80 - 142/76)  RR: 18 (10-09-21 @ 05:35) (17 - 18)  SpO2: 98% (10-09-21 @ 05:35) (98% - 100%)  Wt(kg): --      PHYSICAL EXAM:  GENERAL: NAD, well-groomed, well-developed  HEAD:  Atraumatic, Normocephalic  EYES: EOMI, PERRLA, conjunctiva and sclera clear  HEENT: No tonsillar erythema, exudates, or enlargement; Moist mucous membranes, Good dentition, No lesions  NECK: Supple, No JVD  NERVOUS SYSTEM:  Alert & Oriented X3, Motor Strength 5/5 B/L upper and lower extremities  CHEST/LUNG: Clear to percussion bilaterally; No rales, rhonchi, wheezing, or rubs  HEART: Regular rate and rhythm; No murmurs, rubs, or gallops  ABDOMEN: Soft, Nontender, Nondistended; Bowel sounds present  EXTREMITIES:  2+ Peripheral Pulses, No clubbing, cyanosis, or edema  SKIN: No rashes or lesions    MEDICATIONS  (STANDING):  aspirin enteric coated 81 milliGRAM(s) Oral daily  cefTRIAXone   IVPB 1000 milliGRAM(s) IV Intermittent every 24 hours  cefTRIAXone   IVPB      clopidogrel Tablet 75 milliGRAM(s) Oral daily  dextrose 5% + sodium chloride 0.45%. 2000 milliLiter(s) (100 mL/Hr) IV Continuous <Continuous>  enoxaparin Injectable 40 milliGRAM(s) SubCutaneous daily  tamsulosin 0.4 milliGRAM(s) Oral at bedtime    MEDICATIONS  (PRN):  acetaminophen   Tablet .. 650 milliGRAM(s) Oral every 6 hours PRN Temp greater or equal to 38C (100.4F), Mild Pain (1 - 3)  aluminum hydroxide/magnesium hydroxide/simethicone Suspension 30 milliLiter(s) Oral every 4 hours PRN Dyspepsia  melatonin 3 milliGRAM(s) Oral at bedtime PRN Insomnia  OLANZapine 2.5 milliGRAM(s) Oral every 8 hours PRN Agitation  OLANZapine Injectable 2.5 milliGRAM(s) IntraMuscular every 8 hours PRN Agitation      LAB/STUDIES:  CAPILLARY BLOOD GLUCOSE                              12.6   6.85  )-----------( 205      ( 07 Oct 2021 12:11 )             38.2     10-07    149<H>  |  113<H>  |  13  ----------------------------<  145<H>  3.9   |  27  |  0.83    Ca    8.9      07 Oct 2021 12:11  Phos  2.5     10-07  Mg     2.20     10-07    TPro  7.7  /  Alb  3.5  /  TBili  0.9  /  DBili  x   /  AST  31  /  ALT  29  /  AlkPhos  57  10-07               7.7  | 0.9  | 31       ------------------[57      ( 07 Oct 2021 12:11 )  3.5  | x    | 29          Lipase:x      Amylase:x                      IMAGING:   Patient is a 71y old  Male who presents with a chief complaint of Agitation (08 Oct 2021 07:54)      OVERNIGHT EVENTS: No overnight events. Pt awake in bed, attempts to respond to questioning; Denies any physical complaints.     REVIEW OF SYSTEMS:  CONSTITUTIONAL: No fever, fatigue  HEENT: No sinus or throat pain  RESPIRATORY: No SOB  CARDIOVASCULAR: No chest pain, palpitations  GASTROINTESTINAL: No abdominal pain. No nausea, vomiting; No diarrhea or constipation.  GENITOURINARY: No dysuria  NEUROLOGICAL: No headaches, weakness  SKIN: No rashes  MUSCULOSKELETAL: No joint pain or swelling    ALLERGIES:  penicillin (Other)      FAMILY HISTORY:  FAMILY HISTORY:  No pertinent family history in first degree relatives        VITALS:  T(C): 37.2 (10-09-21 @ 05:35), Max: 37.2 (10-09-21 @ 05:35)  HR: 62 (10-09-21 @ 05:35) (58 - 92)  BP: 135/89 (10-09-21 @ 05:35) (135/80 - 142/76)  RR: 18 (10-09-21 @ 05:35) (17 - 18)  SpO2: 98% (10-09-21 @ 05:35) (98% - 100%)  Wt(kg): --      PHYSICAL EXAM:  GENERAL: NAD, frail  HEAD:  Atraumatic, Normocephalic  EYES: conjunctiva and sclera clear  HEENT: Poor dentition, excessive mucous in oral cavity, no lesions  NECK: Supple, No JVD  NERVOUS SYSTEM:  Alert & Oriented X1, Limited attention and concentration; moves all 4 extremities freely  CHEST/LUNG: Clear to percussion bilaterally; No rales, rhonchi, wheezing, or rubs  HEART: Regular rate and rhythm; No murmurs, rubs, or gallops  ABDOMEN: Soft, Nontender, Nondistended; Bowel sounds present  EXTREMITIES:  2+ radial and PT pulses, No clubbing, cyanosis, or edema  SKIN: b/l LE ulcers near medial malleolar region, with cream over ulcers, no discharge, erythema or edema    MEDICATIONS  (STANDING):  aspirin enteric coated 81 milliGRAM(s) Oral daily  cefTRIAXone   IVPB 1000 milliGRAM(s) IV Intermittent every 24 hours  cefTRIAXone   IVPB      clopidogrel Tablet 75 milliGRAM(s) Oral daily  dextrose 5% + sodium chloride 0.45%. 2000 milliLiter(s) (100 mL/Hr) IV Continuous <Continuous>  enoxaparin Injectable 40 milliGRAM(s) SubCutaneous daily  tamsulosin 0.4 milliGRAM(s) Oral at bedtime    MEDICATIONS  (PRN):  acetaminophen   Tablet .. 650 milliGRAM(s) Oral every 6 hours PRN Temp greater or equal to 38C (100.4F), Mild Pain (1 - 3)  aluminum hydroxide/magnesium hydroxide/simethicone Suspension 30 milliLiter(s) Oral every 4 hours PRN Dyspepsia  melatonin 3 milliGRAM(s) Oral at bedtime PRN Insomnia  OLANZapine 2.5 milliGRAM(s) Oral every 8 hours PRN Agitation  OLANZapine Injectable 2.5 milliGRAM(s) IntraMuscular every 8 hours PRN Agitation      LAB/STUDIES:  CAPILLARY BLOOD GLUCOSE                              12.6   6.85  )-----------( 205      ( 07 Oct 2021 12:11 )             38.2     10-07    149<H>  |  113<H>  |  13  ----------------------------<  145<H>  3.9   |  27  |  0.83    Ca    8.9      07 Oct 2021 12:11  Phos  2.5     10-07  Mg     2.20     10-07    TPro  7.7  /  Alb  3.5  /  TBili  0.9  /  DBili  x   /  AST  31  /  ALT  29  /  AlkPhos  57  10-07               7.7  | 0.9  | 31       ------------------[57      ( 07 Oct 2021 12:11 )  3.5  | x    | 29          Lipase:x      Amylase:x                      IMAGING:

## 2021-10-09 NOTE — PROGRESS NOTE ADULT - PROBLEM SELECTOR PLAN 3
B/l LE wounds in with crusting and clear discharge but no significant erythema, warmth, or TTP noted  - Elevated ESR (79) and CRP (73.1)  - B/l ankle x-ray neg for acute infection  - Unclear history of PAD; home med plavix and aspirin on hold 2/2 NPO; pt no longer NPO  - Wound care eval - NTD, ulcers healed B/l LE wounds in with crusting and clear discharge but no significant erythema, warmth, or TTP noted  - Elevated ESR (79) and CRP (73.1)  - B/l ankle x-ray neg for acute infection  - Unclear history of PAD; home med plavix and aspirin on hold 2/2 NPO; pt no longer NPO  - Appreciate wound care recs

## 2021-10-09 NOTE — PROGRESS NOTE ADULT - ATTENDING COMMENTS
71M from nursing home with history of Dementia, Colon Cancer s/p hemicolectomy, and chronic b/l LE ulcers who presented to this hospital from nursing home with worsening agitation, hypernatremia and UTI.    # UTI/ Pneumonia  - U/A borderline positive with UCx growing Proteus. CXR shows left sided airspace disease. Will continue with ceftriaxone for 5 day given his clinical improvement with ABX.   # Acute encephalopathy - improving with improvement with treatment on hypernatremia and UTI   # Hypernatremia - due to free water deficit, Na now normal, continue with IVF, encourage PO intake, repeat SLP eval tomorrow.

## 2021-10-09 NOTE — PROGRESS NOTE ADULT - PROBLEM SELECTOR PLAN 2
- Poor PO intake at NH  - Limited PO intake 2/2 mental status; pt on honey thick puree diet  - Pt continues to be hypernatremic (151 -> 153 -> 151 -> 149 -> 149) s/p D5 1/2NS - Poor PO intake at NH  - Limited PO intake 2/2 mental status; pt on honey thick puree diet  - Hypernatremia downtrending, today 144 s/p D5 1/2NS - Poor PO intake at NH  - Limited PO intake 2/2 mental status; pt on honey thick puree diet  - Hypernatremia downtrending, today 144 s/p D5 1/2NS  - Encourage PO intake today  - Have pt re-evaluated by S&S tomorrow or Monday

## 2021-10-09 NOTE — DISCHARGE NOTE PROVIDER - PROVIDER TOKENS
PROVIDER:[TOKEN:[[id number]],SCHEDULEDAPPT:[10/18/2021],SCHEDULEDAPPTTIME:[01:30 PM],ESTABLISHEDPATIENT:[T]]

## 2021-10-09 NOTE — DISCHARGE NOTE PROVIDER - CARE PROVIDER_API CALL
Pepe Quiroz)  [facility]  [address] [place] [address], [place] [address]  Phone: [phone]  Fax: [phone]  Established Patient  Scheduled Appointment: 10/18/2021 01:30 PM

## 2021-10-09 NOTE — PROGRESS NOTE ADULT - ASSESSMENT
71y M with PMH dementia (AAOx2-3 per cousin), colon cancer s/p hemicolectomy, and b/l LE ulcers who presented from his NH for worsening agitation, found to be hypernatremic with an elevated BUN/Cr, mild transaminitis, and elevated CK. Pt otherwise afebrile and hemodynamically stable, admitted for medical management. Pt minimally awake and difficult to interview. 71y M with PMH dementia (AAOx2-3 per cousin), colon cancer s/p hemicolectomy, and b/l LE ulcers who presented from his NH for worsening agitation, found to be hypernatremic with an elevated BUN/Cr, mild transaminitis, and elevated CK. Pt otherwise afebrile and hemodynamically stable, admitted for medical management. Slowly improving mental status.

## 2021-10-09 NOTE — PROGRESS NOTE ADULT - PROBLEM SELECTOR PLAN 1
- Patient with worsening agitation at his NH, unclear on cause, possibly 2/2 worsening dementia but as per cousin patient was in his USOH until recently and his current agitation is unusual for him  - CTH here nondiag, cousin reports that patient was recommended to have a MRI done as outpatient about 2 years ago (unclear on reason, cousin states it was for follow up of a prior CTH 6 months ago), has not been done yet, most recent CTH from April 2021 without any significant findings, currently sedated, neuro exam nonfocal but patient not fully cooperating with exam. Pt afebrile, BP stable, no leukocytosis, CXR showing no acute pathology  - Repeat CTH limited by motion artifact, no gross signs of hemorrhage  - TSH, free T4 levels wnl  - F/u B12, folate levels  - Elevated ESR, CRP, mildly elevated Procal (0.11)  - Ankle X-ray neg for acute osteomyelitis  - UA negative, urine culture 50-90k Proteus mirabilis  - Repeat CXR showed mid lung opacity, unchanged from ED CXR  - Given elevated inflammatory marker with CXR opacity and +urine cx, will continue empiric CTX  - Neuro checks q4h  - Zyprexa prn for agitation  - If pt becomes febrile, will obtain blood cultures

## 2021-10-09 NOTE — DISCHARGE NOTE PROVIDER - NSDCCPTREATMENT_GEN_ALL_CORE_FT
PRINCIPAL PROCEDURE  Procedure: XR chest, 1 view  Findings and Treatment: EXAM:  XR CHEST PORTABLE IMMED 1V    PROCEDURE DATE:  Oct  6 2021   INTERPRETATION:  CLINICAL INFORMATION: Altered mental status.  TECHNIQUE: Single portable view of the chest.  COMPARISON: Chest x-ray from 10/4/2021.  FINDINGS:  Left lung shows hazy peripheral opacity image midportion also present on the study of 2 days ago.  There is no pneumothorax or large pleural effusion.  Heart size cannot be accurately assessed in this projection.  No acute osseous abnormality.  IMPRESSION: Left midlung peripheral opacity may represent mild covid 19 pneumonia.        SECONDARY PROCEDURE  Procedure: CT head wo con  Findings and Treatment: EXAM:  CT BRAIN    PROCEDURE DATE:  Oct  6 2021   INTERPRETATION:  Clinical indication: Change in mental status.  Multiple axial sections were performed from base to vertex contrast enhancement. Incidental short interval follow  This exam is compared to prior noncontrast head CT performed on October 4, 2021.  This exam is again somewhat limited by motion  Parenchymal volume loss is seen in  Grossly, no acute hemorrhage with mass effect is seen. Better  CT is recommended when patient can tolerate it or sedation be given.  Evaluation of the osseous structures with the appropriate window appears unremarkable.  The visualized paranasal sinuses mastoid and middle ear effusions appear clear.  IMPRESSION: This exam is somewhat limited by motion though grossly unremarkable. Better  CT is recommended when patient can tolerate it or sedation be given.

## 2021-10-09 NOTE — PROGRESS NOTE ADULT - PROBLEM SELECTOR PLAN 4
Resolved  - ED labs showed mild transaminitis, likely spurious 2/2 hemolysis  - Transaminitis improving (AST/ALT 31/29)

## 2021-10-09 NOTE — DISCHARGE NOTE PROVIDER - NSDCCPCAREPLAN_GEN_ALL_CORE_FT
PRINCIPAL DISCHARGE DIAGNOSIS  Diagnosis: Failure to thrive in adult  Assessment and Plan of Treatment: You were admitted to the hospital because your family and nursing home facility noticed a change in your behavior and that you were not eating or taking your medications. In the hospital, you were agitated and required medications to calm you. On exam, you did not have a fever and you did not have any signs that suggested an infection. We obtained bloodwork and imaging from you that showed that you may have had a urinary tract infection or a pneumonia, which is an infection of your lungs. You also had a high sodium level and signs of dehydration. Because you have a history of ulcers on your ankles, we also had your ankles imaged and examined, which did not show any signs of infection. We treated you with fluids and antibiotics and you became more awake and responsive. You began eating and drinking again and no longer needed extra fluids. You should see your doctor as soon as possible if your behavior changes again and you become increasingly confused. you stop eating or drinking normally as you may have an infection that requires treatment.       PRINCIPAL DISCHARGE DIAGNOSIS  Diagnosis: Failure to thrive in adult  Assessment and Plan of Treatment: You were admitted to the hospital because your family and nursing home facility noticed a change in your behavior and that you were not eating or taking your medications. In the hospital, you were agitated and required medications to calm you. On exam, you did not have a fever and you did not have any signs that suggested an infection. We obtained bloodwork and imaging from you that showed that you may have had a urinary tract infection or a pneumonia, which is an infection of your lungs. We treated you with a short course of antibiotics. You also had a high sodium level and signs of dehydration. Because you have a history of ulcers on your ankles, we also had your ankles imaged and examined, which did not show any signs of infection. We treated you with fluids and antibiotics and you became more awake and responsive. You began eating and drinking again and no longer needed extra fluids. You should see your doctor as soon as possible if your behavior changes again and you become increasingly confused. you stop eating or drinking normally as you may have an infection that requires treatment.

## 2021-10-09 NOTE — DISCHARGE NOTE PROVIDER - HOSPITAL COURSE
71M with history of Dementia (per NH, baseline is A&Ox2), Colon Cancer s/p hemicolectomy and b/l LE ulcers (?PAD) who presents to the hospital from his NH with worsening agitation. As per NH, patient has been refusing his medications, wound treatments, and has had a poor appetite for the past few days. The patient was agitated and combative in the ED, requiring zyprexa 10mg IM x2 and versed 1mg x1. Afterwards, he was too sedated to participate in the interview. His nursing home and cousin were contacted for collateral. His cousin said that the patient's current behavior is abnormal from his baseline. He denied any known acute complaints for the patient that could have cause a change in his behavior.     On arrival to the ED, his vitals were T 98.7, P 80, /82, RR 16, O2 sat 99% RA. His lab work showed hypernatremia, elevated BUN/Cr ratio, mild transaminitis, and mild elevated CK. His trops were stable at 19, his serum tox was negative, and his chest xray showed clear lungs. His CTH was nondiagnostic. He was started on IVF D5/half NS in the ED. He was admitted to medicine.    71M with history of Dementia (per NH, baseline is A&Ox2), Colon Cancer s/p hemicolectomy and b/l LE ulcers (?PAD) who presents to the hospital from his NH with worsening agitation. As per NH, patient has been refusing his medications, wound treatments, and has had a poor appetite for the past few days. The patient was agitated and combative in the ED, requiring zyprexa 10mg IM x2 and versed 1mg x1. Afterwards, he was too sedated to participate in the interview. His nursing home and cousin were contacted for collateral. His cousin said that the patient's current behavior is abnormal from his baseline. He denied any known acute complaints for the patient that could have cause a change in his behavior.     On arrival to the ED, his vitals were T 98.7, P 80, /82, RR 16, O2 sat 99% RA. His lab work showed hypernatremia, elevated BUN/Cr ratio, mild transaminitis, and mild elevated CK. His trops were stable at 19, his serum tox was negative, and his chest xray showed clear lungs. His CTH was nondiagnostic. He was started on IVF D5/half NS in the ED. He was admitted to medicine.     While on the floor, he remained hemodynamically stable however continued to have altered mental status. He was treated with IV D5 1/2 NS with improvements in his THOMAS and hypernatremia. Due to worsening responsiveness on 10/7/21, repeat labs and imaging were obtained. CTH was limited due to motion artifact but did not show signs of gross hemorrhage. CXR showed an unchanged left midperipheral lung opacity. Repeat labs showed no leukocytosis or signs of infection however urine cultures grew Proteus mirabilis. Due to the change in mental status, the patient was treated empirically with ceftriaxone x5 days for a presumed UTI/pneumonia. The patient showed improvements in mental status with improved PO intake. His hypernatremia resolved and his sodium remained normal while off fluids. Speech and swallow re-evaluated the patient as the patient's mental status improved. The patient was then deemed medically stabilized for discharge.

## 2021-10-09 NOTE — PROGRESS NOTE ADULT - PROBLEM SELECTOR PLAN 5
DVT ppx - SCDs; currently holding Lovenox;  Diet - honey puree thickened diet; can repeat S&S when mental status improves; patient on regular consistency and thin liquids as per NH documents  Activity - OOB to chair/with assistance  Fall and aspiration precautions DVT ppx - Lovenox  Diet - honey puree thickened diet; can repeat S&S when mental status improves; patient on regular consistency and thin liquids as per NH documents  Activity - OOB to chair/with assistance  Fall and aspiration precautions

## 2021-10-10 LAB
ALBUMIN SERPL ELPH-MCNC: 3.5 G/DL — SIGNIFICANT CHANGE UP (ref 3.3–5)
ALP SERPL-CCNC: 68 U/L — SIGNIFICANT CHANGE UP (ref 40–120)
ALT FLD-CCNC: 33 U/L — SIGNIFICANT CHANGE UP (ref 4–41)
ANION GAP SERPL CALC-SCNC: 10 MMOL/L — SIGNIFICANT CHANGE UP (ref 7–14)
AST SERPL-CCNC: 46 U/L — HIGH (ref 4–40)
BILIRUB SERPL-MCNC: 0.7 MG/DL — SIGNIFICANT CHANGE UP (ref 0.2–1.2)
BUN SERPL-MCNC: 9 MG/DL — SIGNIFICANT CHANGE UP (ref 7–23)
CALCIUM SERPL-MCNC: 9.3 MG/DL — SIGNIFICANT CHANGE UP (ref 8.4–10.5)
CHLORIDE SERPL-SCNC: 105 MMOL/L — SIGNIFICANT CHANGE UP (ref 98–107)
CO2 SERPL-SCNC: 22 MMOL/L — SIGNIFICANT CHANGE UP (ref 22–31)
CREAT SERPL-MCNC: 0.78 MG/DL — SIGNIFICANT CHANGE UP (ref 0.5–1.3)
GLUCOSE SERPL-MCNC: 88 MG/DL — SIGNIFICANT CHANGE UP (ref 70–99)
HCT VFR BLD CALC: 44.9 % — SIGNIFICANT CHANGE UP (ref 39–50)
HGB BLD-MCNC: 15.3 G/DL — SIGNIFICANT CHANGE UP (ref 13–17)
MAGNESIUM SERPL-MCNC: 2.1 MG/DL — SIGNIFICANT CHANGE UP (ref 1.6–2.6)
MCHC RBC-ENTMCNC: 30.2 PG — SIGNIFICANT CHANGE UP (ref 27–34)
MCHC RBC-ENTMCNC: 34.1 GM/DL — SIGNIFICANT CHANGE UP (ref 32–36)
MCV RBC AUTO: 88.6 FL — SIGNIFICANT CHANGE UP (ref 80–100)
NRBC # BLD: 0 /100 WBCS — SIGNIFICANT CHANGE UP
NRBC # FLD: 0 K/UL — SIGNIFICANT CHANGE UP
PHOSPHATE SERPL-MCNC: 2.8 MG/DL — SIGNIFICANT CHANGE UP (ref 2.5–4.5)
PLATELET # BLD AUTO: 248 K/UL — SIGNIFICANT CHANGE UP (ref 150–400)
POTASSIUM SERPL-MCNC: SIGNIFICANT CHANGE UP MMOL/L (ref 3.5–5.3)
POTASSIUM SERPL-SCNC: SIGNIFICANT CHANGE UP MMOL/L (ref 3.5–5.3)
PROT SERPL-MCNC: 8.6 G/DL — HIGH (ref 6–8.3)
RBC # BLD: 5.07 M/UL — SIGNIFICANT CHANGE UP (ref 4.2–5.8)
RBC # FLD: 13.2 % — SIGNIFICANT CHANGE UP (ref 10.3–14.5)
SODIUM SERPL-SCNC: 137 MMOL/L — SIGNIFICANT CHANGE UP (ref 135–145)
WBC # BLD: 4.63 K/UL — SIGNIFICANT CHANGE UP (ref 3.8–10.5)
WBC # FLD AUTO: 4.63 K/UL — SIGNIFICANT CHANGE UP (ref 3.8–10.5)

## 2021-10-10 PROCEDURE — 99232 SBSQ HOSP IP/OBS MODERATE 35: CPT | Mod: GC

## 2021-10-10 RX ADMIN — CEFTRIAXONE 100 MILLIGRAM(S): 500 INJECTION, POWDER, FOR SOLUTION INTRAMUSCULAR; INTRAVENOUS at 10:59

## 2021-10-10 RX ADMIN — CLOPIDOGREL BISULFATE 75 MILLIGRAM(S): 75 TABLET, FILM COATED ORAL at 13:08

## 2021-10-10 RX ADMIN — ENOXAPARIN SODIUM 40 MILLIGRAM(S): 100 INJECTION SUBCUTANEOUS at 13:08

## 2021-10-10 RX ADMIN — TAMSULOSIN HYDROCHLORIDE 0.4 MILLIGRAM(S): 0.4 CAPSULE ORAL at 21:49

## 2021-10-10 RX ADMIN — OLANZAPINE 2.5 MILLIGRAM(S): 15 TABLET, FILM COATED ORAL at 19:46

## 2021-10-10 RX ADMIN — Medication 3 MILLIGRAM(S): at 19:46

## 2021-10-10 RX ADMIN — Medication 81 MILLIGRAM(S): at 13:08

## 2021-10-10 NOTE — PROGRESS NOTE ADULT - SUBJECTIVE AND OBJECTIVE BOX
Mady Nicole MD  PGY 2 Department of Internal Medicine  Pager: [phone] NS, [id number] ZEKE      Patient is a 71y old  Male who presents with a chief complaint of Agitation (09 Oct 2021 12:10)      SUBJECTIVE / OVERNIGHT EVENTS: Pt seen and examined. No acute overnight events.   Denies fevers, chills, CP/palpitations, SOB/cough, abdominal pain, N/V, constipation, or diarrhea.        MEDICATIONS  (STANDING):  aspirin enteric coated 81 milliGRAM(s) Oral daily  cefTRIAXone   IVPB 1000 milliGRAM(s) IV Intermittent every 24 hours  cefTRIAXone   IVPB      clopidogrel Tablet 75 milliGRAM(s) Oral daily  dextrose 5% + sodium chloride 0.45%. 2000 milliLiter(s) (100 mL/Hr) IV Continuous <Continuous>  enoxaparin Injectable 40 milliGRAM(s) SubCutaneous daily  tamsulosin 0.4 milliGRAM(s) Oral at bedtime    MEDICATIONS  (PRN):  acetaminophen   Tablet .. 650 milliGRAM(s) Oral every 6 hours PRN Temp greater or equal to 38C (100.4F), Mild Pain (1 - 3)  aluminum hydroxide/magnesium hydroxide/simethicone Suspension 30 milliLiter(s) Oral every 4 hours PRN Dyspepsia  melatonin 3 milliGRAM(s) Oral at bedtime PRN Insomnia  OLANZapine 2.5 milliGRAM(s) Oral every 8 hours PRN Agitation  OLANZapine Injectable 2.5 milliGRAM(s) IntraMuscular every 8 hours PRN Agitation      I&O's Summary      Vital Signs Last 24 Hrs  T(C): 36.4 (09 Oct 2021 21:06), Max: 36.4 (09 Oct 2021 21:06)  T(F): 97.5 (09 Oct 2021 21:06), Max: 97.5 (09 Oct 2021 21:06)  HR: 60 (09 Oct 2021 21:06) (60 - 60)  BP: 140/73 (09 Oct 2021 21:06) (140/73 - 165/79)  BP(mean): --  RR: 18 (09 Oct 2021 21:06) (18 - 18)  SpO2: 98% (09 Oct 2021 21:06) (98% - 98%)    CAPILLARY BLOOD GLUCOSE          PHYSICAL EXAM:  GENERAL: NAD,   HEAD:  Atraumatic, Normocephalic  EYES: EOMI, PERRL, conjunctiva and sclera clear  NECK: No JVD  CHEST/LUNG: Clear to auscultation bilaterally; No wheeze  HEART: Regular rate and rhythm; No murmurs, rubs, or gallops  ABDOMEN: Soft, Nontender, Nondistended; Bowel sounds present  EXTREMITIES:  2+ Peripheral Pulses, No clubbing, cyanosis, or edema  PSYCH: AAOx3  NEUROLOGY: non-focal  SKIN: No rashes or lesions       LABS:                        13.2   4.95  )-----------( 244      ( 09 Oct 2021 07:26 )             38.9     Auto Eosinophil # x     / Auto Eosinophil % x     / Auto Neutrophil # x     / Auto Neutrophil % x     / BANDS % x        10-09    144  |  108<H>  |  11  ----------------------------<  124<H>  3.5   |  23  |  0.79    Ca    8.9      09 Oct 2021 07:26  Mg     2.30     10-09  Phos  2.0     10-09  TPro  7.5  /  Alb  3.4  /  TBili  0.8  /  DBili  x   /  AST  26  /  ALT  33  /  AlkPhos  62  10-09               Mady Nicole MD  PGY 2 Department of Internal Medicine  Pager: [phone] NS, [id number] ZEKE      Patient is a 71y old  Male who presents with a chief complaint of Agitation (09 Oct 2021 12:10)      SUBJECTIVE / OVERNIGHT EVENTS: Pt seen and examined. No acute overnight events. This morning the pt did not permit me to examine him, as he was sleeping. Unable to elicit ROS.        MEDICATIONS  (STANDING):  aspirin enteric coated 81 milliGRAM(s) Oral daily  cefTRIAXone   IVPB 1000 milliGRAM(s) IV Intermittent every 24 hours  cefTRIAXone   IVPB      clopidogrel Tablet 75 milliGRAM(s) Oral daily  dextrose 5% + sodium chloride 0.45%. 2000 milliLiter(s) (100 mL/Hr) IV Continuous <Continuous>  enoxaparin Injectable 40 milliGRAM(s) SubCutaneous daily  tamsulosin 0.4 milliGRAM(s) Oral at bedtime    MEDICATIONS  (PRN):  acetaminophen   Tablet .. 650 milliGRAM(s) Oral every 6 hours PRN Temp greater or equal to 38C (100.4F), Mild Pain (1 - 3)  aluminum hydroxide/magnesium hydroxide/simethicone Suspension 30 milliLiter(s) Oral every 4 hours PRN Dyspepsia  melatonin 3 milliGRAM(s) Oral at bedtime PRN Insomnia  OLANZapine 2.5 milliGRAM(s) Oral every 8 hours PRN Agitation  OLANZapine Injectable 2.5 milliGRAM(s) IntraMuscular every 8 hours PRN Agitation      I&O's Summary      Vital Signs Last 24 Hrs  T(C): 36.4 (09 Oct 2021 21:06), Max: 36.4 (09 Oct 2021 21:06)  T(F): 97.5 (09 Oct 2021 21:06), Max: 97.5 (09 Oct 2021 21:06)  HR: 60 (09 Oct 2021 21:06) (60 - 60)  BP: 140/73 (09 Oct 2021 21:06) (140/73 - 165/79)  BP(mean): --  RR: 18 (09 Oct 2021 21:06) (18 - 18)  SpO2: 98% (09 Oct 2021 21:06) (98% - 98%)        PHYSICAL EXAM:  Pt did not permit me to exam them as pt was sleeping.    LABS:                        13.2   4.95  )-----------( 244      ( 09 Oct 2021 07:26 )             38.9     Auto Eosinophil # x     / Auto Eosinophil % x     / Auto Neutrophil # x     / Auto Neutrophil % x     / BANDS % x        10-09    144  |  108<H>  |  11  ----------------------------<  124<H>  3.5   |  23  |  0.79    Ca    8.9      09 Oct 2021 07:26  Mg     2.30     10-09  Phos  2.0     10-09  TPro  7.5  /  Alb  3.4  /  TBili  0.8  /  DBili  x   /  AST  26  /  ALT  33  /  AlkPhos  62  10-09

## 2021-10-10 NOTE — PROGRESS NOTE ADULT - ASSESSMENT
71y M with PMH dementia (AAOx2-3 per cousin), colon cancer s/p hemicolectomy, and b/l LE ulcers who presented from his NH for worsening agitation, found to be hypernatremic with an elevated BUN/Cr, mild transaminitis, and elevated CK. Pt otherwise afebrile and hemodynamically stable, admitted for medical management. Pt minimally awake and difficult to interview.

## 2021-10-10 NOTE — PROGRESS NOTE ADULT - PROBLEM SELECTOR PLAN 1
- Patient with worsening agitation at his NH, unclear on cause, possibly 2/2 worsening dementia but as per cousin patient was in his [facility] until recently and his current agitation is unusual for him  - CTH here nondiag, cousin reports that patient was recommended to have a MRI done as outpatient about 2 years ago (unclear on reason, cousin states it was for follow up of a prior CTH 6 months ago), has not been done yet, most recent CTH from April 2021 without any significant findings, currently sedated, neuro exam nonfocal but patient not fully cooperating with exam. Pt afebrile, BP stable, no leukocytosis, CXR showing no acute pathology  - Repeat CTH limited by motion artifact, no gross signs of hemorrhage  - TSH, free T4 levels wnl  - UA negative, urine culture 50-90k Proteus mirabilis  - Ankle X-ray neg for acute osteomyelitis  - Elevated ESR, CRP  - Mildly elevated Procal (0.11)  - Repeat CXR showed mid lung opacity, unchanged from ED CXR  - Given elevated inflammatory marker with CXR opacity and +urine cx, will continue empiric CTX  - F/u B12, folate levels  - Neuro checks q4h  - Zyprexa prn for agitation  - If pt becomes febrile, will obtain blood cultures - Patient with worsening agitation at his NH, unclear on cause, possibly 2/2 worsening dementia but as per cousin patient was in his [facility] until recently and his current agitation is unusual for him  - CTH here nondiag, cousin reports that patient was recommended to have a MRI done as outpatient about 2 years ago (unclear on reason, cousin states it was for follow up of a prior CTH 6 months ago), has not been done yet, most recent CTH from April 2021 without any significant findings, currently sedated, neuro exam nonfocal but patient not fully cooperating with exam. Pt afebrile, BP stable, no leukocytosis, CXR showing no acute pathology  - Repeat CTH limited by motion artifact, no gross signs of hemorrhage  - TSH, free T4 levels wnl  - UA negative, urine culture 50-90k Proteus mirabilis  - Ankle X-ray neg for acute osteomyelitis  - Elevated ESR, CRP  - Mildly elevated Procal (0.11)  - Repeat CXR showed mid lung opacity, unchanged from ED CXR  - Given elevated inflammatory marker with CXR opacity and +urine cx, will continue empiric CTX  - F/u B12, folate levels  - Neuro checks q8  - Zyprexa prn for agitation  - If pt becomes febrile, will obtain blood cultures

## 2021-10-10 NOTE — PROGRESS NOTE ADULT - PROBLEM SELECTOR PLAN 5
DVT ppx - SCDs; currently holding Lovenox;  Diet - Currently NPO; dysphagia screen when more awake, patient on regular consistency and thin liquids as per NH documents  Activity - OOB to chair/with assistance  Fall and aspiration precautions DVT ppx - SCDs; currently holding Lovenox;  Diet - Regular consistency and thin liquids, S&S re-evaluation pending  Activity - OOB to chair/with assistance  Fall and aspiration precautions DVT ppx - Lovenox  Diet - Regular consistency and thin liquids, S&S re-evaluation pending  Activity - OOB to chair/with assistance  Fall and aspiration precautions

## 2021-10-10 NOTE — PROGRESS NOTE ADULT - ATTENDING COMMENTS
71M from nursing home with history of Dementia, Colon Cancer s/p hemicolectomy, and chronic b/l LE ulcers who presented to this hospital from nursing home with worsening agitation, hypernatremia and UTI.    # UTI/ Pneumonia  - U/A borderline positive with UCx growing Proteus. CXR shows left sided airspace disease. Will continue with ceftriaxone for 5 days given his clinical improvement with ABX. Last day is today.   # Acute encephalopathy - improving with improvement with treatment on hypernatremia and UTI. Will need more collateral from NH regarding his previous baseline.   # Hypernatremia - resolved with IVF, encourage PO intake, repeat SLP eval today

## 2021-10-11 LAB
ANION GAP SERPL CALC-SCNC: 9 MMOL/L — SIGNIFICANT CHANGE UP (ref 7–14)
BUN SERPL-MCNC: 12 MG/DL — SIGNIFICANT CHANGE UP (ref 7–23)
CALCIUM SERPL-MCNC: 9.5 MG/DL — SIGNIFICANT CHANGE UP (ref 8.4–10.5)
CHLORIDE SERPL-SCNC: 106 MMOL/L — SIGNIFICANT CHANGE UP (ref 98–107)
CO2 SERPL-SCNC: 24 MMOL/L — SIGNIFICANT CHANGE UP (ref 22–31)
CREAT SERPL-MCNC: 0.87 MG/DL — SIGNIFICANT CHANGE UP (ref 0.5–1.3)
GLUCOSE SERPL-MCNC: 102 MG/DL — HIGH (ref 70–99)
MAGNESIUM SERPL-MCNC: 2 MG/DL — SIGNIFICANT CHANGE UP (ref 1.6–2.6)
PHOSPHATE SERPL-MCNC: 2.2 MG/DL — LOW (ref 2.5–4.5)
POTASSIUM SERPL-MCNC: 3.5 MMOL/L — SIGNIFICANT CHANGE UP (ref 3.5–5.3)
POTASSIUM SERPL-SCNC: 3.5 MMOL/L — SIGNIFICANT CHANGE UP (ref 3.5–5.3)
SARS-COV-2 RNA SPEC QL NAA+PROBE: SIGNIFICANT CHANGE UP
SODIUM SERPL-SCNC: 139 MMOL/L — SIGNIFICANT CHANGE UP (ref 135–145)

## 2021-10-11 PROCEDURE — 99232 SBSQ HOSP IP/OBS MODERATE 35: CPT | Mod: GC

## 2021-10-11 RX ORDER — POTASSIUM PHOSPHATE, MONOBASIC POTASSIUM PHOSPHATE, DIBASIC 236; 224 MG/ML; MG/ML
30 INJECTION, SOLUTION INTRAVENOUS ONCE
Refills: 0 | Status: COMPLETED | OUTPATIENT
Start: 2021-10-11 | End: 2021-10-11

## 2021-10-11 RX ORDER — OLANZAPINE 15 MG/1
1 TABLET, FILM COATED ORAL
Qty: 0 | Refills: 0 | DISCHARGE
Start: 2021-10-11

## 2021-10-11 RX ADMIN — CLOPIDOGREL BISULFATE 75 MILLIGRAM(S): 75 TABLET, FILM COATED ORAL at 12:03

## 2021-10-11 RX ADMIN — POTASSIUM PHOSPHATE, MONOBASIC POTASSIUM PHOSPHATE, DIBASIC 83.33 MILLIMOLE(S): 236; 224 INJECTION, SOLUTION INTRAVENOUS at 11:22

## 2021-10-11 RX ADMIN — Medication 3 MILLIGRAM(S): at 22:20

## 2021-10-11 RX ADMIN — Medication 81 MILLIGRAM(S): at 12:03

## 2021-10-11 RX ADMIN — ENOXAPARIN SODIUM 40 MILLIGRAM(S): 100 INJECTION SUBCUTANEOUS at 12:03

## 2021-10-11 NOTE — PROGRESS NOTE ADULT - SUBJECTIVE AND OBJECTIVE BOX
Patient is a 71y old  Male who presents with a chief complaint of Agitation (10 Oct 2021 07:16)      OVERNIGHT EVENTS: No overnight events. Pt taking all PO meds. INCOMPLETE    REVIEW OF SYSTEMS:  CONSTITUTIONAL: No fever, weight loss, or fatigue  EYES: No eye pain, visual disturbances, or discharge  HEENT:  No difficulty hearing, tinnitus, vertigo; No sinus or throat pain  RESPIRATORY: No cough, wheezing, chills or hemoptysis; No shortness of breath  CARDIOVASCULAR: No chest pain, palpitations, dizziness, or leg swelling  GASTROINTESTINAL: No abdominal or epigastric pain. No nausea, vomiting, or hematemesis; No diarrhea or constipation. No melena or hematochezia.  GENITOURINARY: No dysuria, frequency, hematuria, or incontinence  NEUROLOGICAL: No headaches, memory loss, loss of strength, numbness, or tremors  SKIN: No itching, burning, rashes, or lesions   MUSCULOSKELETAL: No joint pain or swelling; No muscle, back, or extremity pain    ALLERGIES:  penicillin (Other)      FAMILY HISTORY:  FAMILY HISTORY:  No pertinent family history in first degree relatives        VITALS:  T(C): 36.3 (10-11-21 @ 04:43), Max: 36.4 (10-10-21 @ 14:11)  HR: 67 (10-11-21 @ 04:43) (67 - 84)  BP: 122/60 (10-11-21 @ 04:43) (122/60 - 129/66)  RR: 18 (10-11-21 @ 04:43) (18 - 18)  SpO2: 100% (10-11-21 @ 04:43) (100% - 100%)  Wt(kg): --      PHYSICAL EXAM:  GENERAL: NAD, well-groomed, well-developed  HEAD:  Atraumatic, Normocephalic  EYES: EOMI, PERRLA, conjunctiva and sclera clear  HEENT: No tonsillar erythema, exudates, or enlargement; Moist mucous membranes, Good dentition, No lesions  NECK: Supple, No JVD  NERVOUS SYSTEM:  Alert & Oriented X3, Motor Strength 5/5 B/L upper and lower extremities  CHEST/LUNG: Clear to percussion bilaterally; No rales, rhonchi, wheezing, or rubs  HEART: Regular rate and rhythm; No murmurs, rubs, or gallops  ABDOMEN: Soft, Nontender, Nondistended; Bowel sounds present  EXTREMITIES:  2+ Peripheral Pulses, No clubbing, cyanosis, or edema  SKIN: No rashes or lesions    MEDICATIONS  (STANDING):  aspirin enteric coated 81 milliGRAM(s) Oral daily  clopidogrel Tablet 75 milliGRAM(s) Oral daily  dextrose 5% + sodium chloride 0.45%. 2000 milliLiter(s) (100 mL/Hr) IV Continuous <Continuous>  enoxaparin Injectable 40 milliGRAM(s) SubCutaneous daily  tamsulosin 0.4 milliGRAM(s) Oral at bedtime    MEDICATIONS  (PRN):  acetaminophen   Tablet .. 650 milliGRAM(s) Oral every 6 hours PRN Temp greater or equal to 38C (100.4F), Mild Pain (1 - 3)  aluminum hydroxide/magnesium hydroxide/simethicone Suspension 30 milliLiter(s) Oral every 4 hours PRN Dyspepsia  melatonin 3 milliGRAM(s) Oral at bedtime PRN Insomnia  OLANZapine 2.5 milliGRAM(s) Oral every 8 hours PRN Agitation  OLANZapine Injectable 2.5 milliGRAM(s) IntraMuscular every 8 hours PRN Agitation      LAB/STUDIES:  CAPILLARY BLOOD GLUCOSE                              15.3   4.63  )-----------( 248      ( 10 Oct 2021 13:34 )             44.9     10-10    137  |  105  |  9   ----------------------------<  88  TNP   |  22  |  0.78    Ca    9.3      10 Oct 2021 13:34  Phos  2.8     10-10  Mg     2.10     10-10    TPro  8.6<H>  /  Alb  3.5  /  TBili  0.7  /  DBili  x   /  AST  46<H>  /  ALT  33  /  AlkPhos  68  10-10               8.6  | 0.7  | 46       ------------------[68      ( 10 Oct 2021 13:34 )  3.5  | x    | 33          Lipase:x      Amylase:x                      IMAGING:   Patient is a 71y old  Male who presents with a chief complaint of Agitation (10 Oct 2021 07:16)      OVERNIGHT EVENTS: No overnight events. Pt taking all PO meds. Pt examined at bedside eating breakfast. Pt awake and alert, but limited responses to questioning despite redirecting.    REVIEW OF SYSTEMS:  CONSTITUTIONAL: No fever  HEENT:  No throat pain  RESPIRATORY: No shortness of breath  CARDIOVASCULAR: No chest pain or leg pain  GASTROINTESTINAL: No abdominal pain. No nausea, vomiting  GENITOURINARY: No dysuria  NEUROLOGICAL: No headaches, loss of strength  SKIN: No rashes  MUSCULOSKELETAL: No joint pain    ALLERGIES:  penicillin (Other)      FAMILY HISTORY:  FAMILY HISTORY:  No pertinent family history in first degree relatives        VITALS:  T(C): 36.3 (10-11-21 @ 04:43), Max: 36.4 (10-10-21 @ 14:11)  HR: 67 (10-11-21 @ 04:43) (67 - 84)  BP: 122/60 (10-11-21 @ 04:43) (122/60 - 129/66)  RR: 18 (10-11-21 @ 04:43) (18 - 18)  SpO2: 100% (10-11-21 @ 04:43) (100% - 100%)  Wt(kg): --      PHYSICAL EXAM:  GENERAL: NAD, frail  HEAD:  Atraumatic, Normocephalic  EYES: conjunctiva and sclera clear; left eye ptosis  HEENT: Moist mucous membranes, reduced oral secretions; poor dentition, No lesions  NERVOUS SYSTEM:  Alert & Oriented X1 (name but not ), moves all 4 extremities freely  CHEST/LUNG: Clear to percussion bilaterally; No rales, rhonchi, wheezing, or rubs  HEART: Regular rate and rhythm; No murmurs, rubs, or gallops  ABDOMEN: Soft, Nontender, Nondistended; Bowel sounds present  EXTREMITIES:  2+ Peripheral Pulses, No clubbing, cyanosis, or edema; b/l lower extremity ulcerations; SCDs in place  SKIN: No rashes or lesions; b/l LE ulcers with no erythema, discharge, or edema    MEDICATIONS  (STANDING):  aspirin enteric coated 81 milliGRAM(s) Oral daily  clopidogrel Tablet 75 milliGRAM(s) Oral daily  dextrose 5% + sodium chloride 0.45%. 2000 milliLiter(s) (100 mL/Hr) IV Continuous <Continuous>  enoxaparin Injectable 40 milliGRAM(s) SubCutaneous daily  tamsulosin 0.4 milliGRAM(s) Oral at bedtime    MEDICATIONS  (PRN):  acetaminophen   Tablet .. 650 milliGRAM(s) Oral every 6 hours PRN Temp greater or equal to 38C (100.4F), Mild Pain (1 - 3)  aluminum hydroxide/magnesium hydroxide/simethicone Suspension 30 milliLiter(s) Oral every 4 hours PRN Dyspepsia  melatonin 3 milliGRAM(s) Oral at bedtime PRN Insomnia  OLANZapine 2.5 milliGRAM(s) Oral every 8 hours PRN Agitation  OLANZapine Injectable 2.5 milliGRAM(s) IntraMuscular every 8 hours PRN Agitation      LAB/STUDIES:  CAPILLARY BLOOD GLUCOSE                              15.3   4.63  )-----------( 248      ( 10 Oct 2021 13:34 )             44.9     10-10    137  |  105  |  9   ----------------------------<  88  TNP   |  22  |  0.78    Ca    9.3      10 Oct 2021 13:34  Phos  2.8     10-10  Mg     2.10     10-10    TPro  8.6<H>  /  Alb  3.5  /  TBili  0.7  /  DBili  x   /  AST  46<H>  /  ALT  33  /  AlkPhos  68  10-10               8.6  | 0.7  | 46       ------------------[68      ( 10 Oct 2021 13:34 )  3.5  | x    | 33          Lipase:x      Amylase:x                      IMAGING:

## 2021-10-11 NOTE — PROGRESS NOTE ADULT - PROBLEM SELECTOR PLAN 5
DVT ppx - Lovenox  Diet - Regular consistency and thin liquids, S&S re-evaluation pending  Activity - OOB to chair/with assistance  Fall and aspiration precautions DVT ppx - Lovenox  Diet - Puree nectar consistency diet  Activity - OOB to chair/with assistance  Fall and aspiration precautions DVT ppx - Lovenox  Diet - Puree nectar consistency diet, S&S re-eval  Activity - OOB to chair/with assistance  Fall and aspiration precautions DVT ppx - Lovenox  Diet - Puree nectar consistency diet, S&S re-eval  Activity - OOB to chair/with assistance  Fall and aspiration precautions  Dispo - [facility]

## 2021-10-11 NOTE — SWALLOW BEDSIDE ASSESSMENT ADULT - SWALLOW EVAL: DIAGNOSIS
1) Moderate oral dysphagia for puree marked by reduced utensil stripping, prolonged manipulation and coordination, and prolonged bolus hold. Pt with delayed A/P transit and transfer. Adequate oral clearance observed. 2) Mild to moderate oral dysphagia for honey thick fluids and nectar thick fluids marked by reduced bolus retrieval, slow manipulation, and coordination. Mildly delayed A/P oral transit and transfer noted. 3) Functional pharyngeal stage of swallow for puree, honey thick fluids, and nectar thick fluids marked by initiation of pharyngeal swallow trigger and hyolaryngeal excursion noted upon digital palpation. No overt signs/symptoms of penetration/aspiration noted.
1) Moderate oral dysphagia for soft solids marked by prolonged manipulation/mastication, and prolonged bolus hold (holding bolus in oral cavity ~15 sec). Pt with delayed A/P transit and transfer. Lingual stasis noted which was cleared with liquid wash. 2) Mild to moderate oral dysphagia for puree, honey thick fluids, nectar thick fluids, and thin fluids marked by reduced utensil stripping, prolonged manipulation, and coordination. Mildly delayed A/P oral transit and transfer noted. Adequate oral clearance observed. 3) Functional pharyngeal stage of swallow for soft solids, puree, honey thick fluids, nectar thick fluids, and thin fluids marked by initiation of pharyngeal swallow trigger and hyolaryngeal excursion noted upon digital palpation. No overt signs/symptoms of penetration/aspiration noted.

## 2021-10-11 NOTE — SWALLOW BEDSIDE ASSESSMENT ADULT - ASR SWALLOW RECOMMEND DIAG
Cinesophagram NOT warranted given functional pharyngeal stage of swallow.
Cinesophagram NOT warranted given aforementioned findings.

## 2021-10-11 NOTE — PROGRESS NOTE ADULT - PROBLEM SELECTOR PLAN 3
B/l LE wounds in with crusting and clear discharge but no significant erythema, warmth, or TTP noted  - Elevated ESR (79) and CRP (73.1)  - B/l ankle x-ray neg for acute infection  - Unclear history of PAD; home med plavix and aspirin on hold 2/2 pt NPO  - F/u wound care eval B/l LE wounds in with crusting and clear discharge but no significant erythema, warmth, or TTP noted  - Elevated ESR (79) and CRP (73.1)  - B/l ankle x-ray neg for acute infection  - C/w plavix and aspirin  - F/u wound care eval

## 2021-10-11 NOTE — SWALLOW BEDSIDE ASSESSMENT ADULT - COMMENTS
Internal Medicine note 10/11 indicates pt is a “71y M with PMH dementia (AAOx2-3 per cousin), colon cancer s/p hemicolectomy, and b/l LE ulcers who presented from his NH for worsening agitation, found to be hypernatremic with an elevated BUN/Cr, mild transaminitis, and elevated CK. Pt otherwise afebrile and hemodynamically stable, admitted for medical management. Improved mental status and PO intake s/p empiric ceftriaxone treatment.”    CXR 10/6 revealed “Left midlung peripheral opacity may represent mild covid 19 pneumonia.”    Pt received awake/alert, during PO mealtime, at time of clinical swallow re-evaluation this AM. Pt did not follow directions or answer questions, however pt verbalized “I want orange”. Pt was cooperative and accepted all PO trials. Pt is known to this department with recent clinical swallow assessment completed on 10/8/21 with recommendations for Dysphagia 1 Puree with Nectar thick liquids (see note for details). As per RN, pt was not observed with signs/symptoms of penetration/aspiration during mealtimes.
Pt seen at bedside, sleeping however arousable given max SLP verbal/tactile (i.e., sternal rub) prompting, during clinical swallow evaluation this AM. Pt in nonverbal state with limited attempts to verbally/gesturally communicate. As per RN and PCA, pt is combative and agitated. With SLP verbal encouragement and prompting, pt accepted all PO trials and was cooperative during the assessment. Pt did not follow directions despite max cues.    As per charting, pt is a "71M with history of Dementia (AAOx2-3 as per cousin, to self, usually to place, sometimes takes a while to remember time), Colon Cancer s/p hemicolectomy (as per cousin they were told he does not need any further therapy), and b/l LE ulcers (?PAD) who presents to the hospital from his NH with worsening agitation. As per NH, patient has been refusing his medications, wound treatments, and has had a poor appetite... Lab work showed hypernatremia, elevated BUN/Cr ratio, mild transaminitis, and mild elevated CK. His trops were stable at 19, his serum tox was negative, and his chest xray showed clear lungs. His CTH was nondiagnostic. In the ED he was noted to be very agitation, combative, and had to be sedated with zyprexa 10mg IM x2 and versed 1mg IVP x1."

## 2021-10-11 NOTE — PROGRESS NOTE ADULT - ASSESSMENT
71y M with PMH dementia (AAOx2-3 per cousin), colon cancer s/p hemicolectomy, and b/l LE ulcers who presented from his NH for worsening agitation, found to be hypernatremic with an elevated BUN/Cr, mild transaminitis, and elevated CK. Pt otherwise afebrile and hemodynamically stable, admitted for medical management. Pt minimally awake and difficult to interview. 71y M with PMH dementia (AAOx2-3 per cousin), colon cancer s/p hemicolectomy, and b/l LE ulcers who presented from his NH for worsening agitation, found to be hypernatremic with an elevated BUN/Cr, mild transaminitis, and elevated CK. Pt otherwise afebrile and hemodynamically stable, admitted for medical management. Improved mental status and PO intake s/p empiric ceftriaxone treatment. 71y M with PMH dementia (AAOx2-3 per cousin), colon cancer s/p hemicolectomy, and b/l LE ulcers who presented from his NH for worsening agitation, found to be hypernatremic with an elevated BUN/Cr, mild transaminitis, and elevated CK. Pt otherwise afebrile and hemodynamically stable, admitted for medical management. Improved mental status and PO intake s/p empiric ceftriaxone treatment for possible UTI/pneumonia.

## 2021-10-11 NOTE — PROGRESS NOTE ADULT - PROBLEM SELECTOR PLAN 1
- Patient with worsening agitation at his NH, unclear on cause, possibly 2/2 worsening dementia but as per cousin patient was in his US[facility] until recently and his current agitation is unusual for him  - CTH here nondiag, cousin reports that patient was recommended to have a MRI done as outpatient about 2 years ago (unclear on reason, cousin states it was for follow up of a prior CTH 6 months ago), has not been done yet, most recent CTH from April 2021 without any significant findings, currently sedated, neuro exam nonfocal but patient not fully cooperating with exam. Pt afebrile, BP stable, no leukocytosis, CXR showing no acute pathology  - Repeat CTH limited by motion artifact, no gross signs of hemorrhage  - TSH, free T4 levels wnl  - UA negative, urine culture 50-90k Proteus mirabilis  - Ankle X-ray neg for acute osteomyelitis  - Elevated ESR, CRP  - Mildly elevated Procal (0.11)  - Repeat CXR showed mid lung opacity, unchanged from ED CXR  - Given elevated inflammatory marker with CXR opacity and +urine cx, will continue empiric CTX  - F/u B12, folate levels  - Neuro checks q8  - Zyprexa prn for agitation  - If pt becomes febrile, will obtain blood cultures - Patient with worsening agitation at his NH, unclear on cause, possibly 2/2 worsening dementia but as per cousin patient was in his US[facility] until recently and his current agitation is unusual for him  - ED and repeat CTH nondiag but showed no gross signs of hemorrhage  - TSH, free T4 levels wnl  - Ankle X-ray neg for acute osteomyelitis  - Elevated ESR, CRP and mildly elevated Procal (0.11)  - Repeat CXR showed mid lung opacity, unchanged from ED CXR  - UA negative, urine culture 50-90k Proteus mirabilis  - Pt s/p empiric ceftriaxone for treatment of UTI/PNA  - COVID swab pending dispo to NH - Patient with worsening agitation at his NH, unclear on cause, possibly 2/2 worsening dementia but as per cousin patient was in his [facility] until recently and his current agitation is unusual for him  - ED and repeat CTH nondiag but showed no gross signs of hemorrhage  - TSH, free T4 levels wnl  - Ankle X-ray neg for acute osteomyelitis  - Elevated ESR, CRP and mildly elevated Procal (0.11)  - Repeat CXR showed mid lung opacity, unchanged from ED CXR  - UA negative, urine culture 50-90k Proteus mirabilis  - Pt s/p empiric ceftriaxone for treatment of UTI/PNA

## 2021-10-11 NOTE — PROGRESS NOTE ADULT - ATTENDING COMMENTS
71M from nursing home with history of Dementia, Colon Cancer s/p hemicolectomy, and chronic b/l LE ulcers who presented to this hospital from nursing home with worsening agitation, hypernatremia and UTI.  # UTI/ Pneumonia  - U/A borderline positive with UCx growing Proteus. CXR shows left sided airspace disease. Completed 5 day course of abx.   # Acute encephalopathy - improving with improvement with treatment on hypernatremia and UTI  # Hypernatremia - resolved with IVF, now stable off IVF.   Medically stable for DC back to NH, awaiting auth.

## 2021-10-11 NOTE — SWALLOW BEDSIDE ASSESSMENT ADULT - SWALLOW EVAL: FEEDING ASSISTANCE
Requires 1:1 feeding assistance/dependent
Requires 1:1 feeding assistance given cognitive state./dependent

## 2021-10-11 NOTE — SWALLOW BEDSIDE ASSESSMENT ADULT - SWALLOW EVAL: RECOMMENDED FEEDING/EATING TECHNIQUES
allow for swallow between intakes/maintain upright posture during/after eating for 30 mins/oral hygiene/position upright (90 degrees)/small sips/bites
allow for swallow between intakes/maintain upright posture during/after eating for 30 mins/oral hygiene/position upright (90 degrees)/small sips/bites

## 2021-10-11 NOTE — PROGRESS NOTE ADULT - PROBLEM SELECTOR PLAN 2
- Poor PO intake at NH  - Pt continues to be hypernatremic (151 -> 153 -> 151 -> 149) s/p D5 1/2NS  - Tolerated bedside swallow, pending S&S evaluation  - C/w IVF - Resolved hypernatremia s/p D5 1/2NS  - Improved PO intake - Resolved hypernatremia s/p D5 1/2NS  - Improved PO intake  - S&S re-eval - Resolved hypernatremia s/p D5 1/2NS  - Improved PO intake  - S&S re-eval, puree with thin liquids

## 2021-10-12 ENCOUNTER — TRANSCRIPTION ENCOUNTER (OUTPATIENT)
Age: 71
End: 2021-10-12

## 2021-10-12 VITALS
OXYGEN SATURATION: 100 % | SYSTOLIC BLOOD PRESSURE: 145 MMHG | HEART RATE: 66 BPM | TEMPERATURE: 98 F | RESPIRATION RATE: 18 BRPM | DIASTOLIC BLOOD PRESSURE: 88 MMHG

## 2021-10-12 PROCEDURE — 99239 HOSP IP/OBS DSCHRG MGMT >30: CPT

## 2021-10-12 RX ADMIN — CLOPIDOGREL BISULFATE 75 MILLIGRAM(S): 75 TABLET, FILM COATED ORAL at 12:55

## 2021-10-12 RX ADMIN — Medication 81 MILLIGRAM(S): at 12:55

## 2021-10-12 RX ADMIN — ENOXAPARIN SODIUM 40 MILLIGRAM(S): 100 INJECTION SUBCUTANEOUS at 12:55

## 2021-10-12 NOTE — PROGRESS NOTE ADULT - PROBLEM SELECTOR PLAN 1
- Patient with worsening agitation at his NH, unclear on cause, possibly 2/2 worsening dementia but as per cousin patient was in his US[facility] until recently and his current agitation is unusual for him  - ED and repeat CTH nondiag but showed no gross signs of hemorrhage  - TSH, free T4 levels wnl  - Ankle X-ray neg for acute osteomyelitis  - Elevated ESR, CRP and mildly elevated Procal (0.11)  - Repeat CXR showed mid lung opacity, unchanged from ED CXR  - UA negative, urine culture 50-90k Proteus mirabilis  - Pt s/p empiric ceftriaxone for treatment of UTI/PNA

## 2021-10-12 NOTE — PROGRESS NOTE ADULT - ATTENDING COMMENTS
71M from nursing home with history of Dementia, Colon Cancer s/p hemicolectomy, and chronic b/l LE ulcers who presented to this hospital from nursing home with worsening agitation, hypernatremia and UTI.  # UTI/ Pneumonia  - U/A borderline positive with UCx growing Proteus. CXR shows left sided airspace disease. Completed 5 day course of abx.   # Acute encephalopathy - improving with improvement with treatment on hypernatremia and UTI  # Hypernatremia - resolved with IVF, now stable off IVF.   Medically stable for DC back to NH today  35 min spent on DC planning

## 2021-10-12 NOTE — PROGRESS NOTE ADULT - PROBLEM SELECTOR PROBLEM 1
Agitation due to dementia

## 2021-10-12 NOTE — PROGRESS NOTE ADULT - PROBLEM SELECTOR PLAN 5
DVT ppx - Lovenox  Diet - Puree nectar consistency diet, S&S re-eval  Activity - OOB to chair/with assistance  Fall and aspiration precautions  Dispo - [facility]

## 2021-10-12 NOTE — PHYSICAL THERAPY INITIAL EVALUATION ADULT - ADDITIONAL COMMENTS
As per EMR, pt admitted from short term rehab facility.     Pt left in bed with head of bed elevated, NAD. +call bell. +bed alarm.

## 2021-10-12 NOTE — DISCHARGE NOTE NURSING/CASE MANAGEMENT/SOCIAL WORK - NSDCVIVACCINE_GEN_ALL_CORE_FT
COVID-19 vaccine, vector-nr, rS-Ad26, PF, 0.5 mL (Sonu); 05-May-2021 16:30; Felicity Regan (RN); [employer]; [id number] (Exp. Date: 05-May-2021); IntraMuscular; Deltoid Right.; 0.5 milliLiter(s);   influenza, injectable, quadrivalent, preservative free; 29-Oct-2018 12:19; Kinga Nguyen); [employer]; [id number] (Exp. Date: 30-Jun-2019); IntraMuscular; Deltoid Right.; 0.5 milliLiter(s); VIS (VIS Published: 07-Aug-2015, VIS Presented: 29-Oct-2018);

## 2021-10-12 NOTE — PROGRESS NOTE ADULT - PROBLEM SELECTOR PLAN 2
- Resolved hypernatremia s/p D5 1/2NS  - Improved PO intake  - S&S re-eval, puree with thin liquids - Resolved  s/p D5 1/2NS and improved PO intake

## 2021-10-12 NOTE — PROGRESS NOTE ADULT - PROBLEM SELECTOR PROBLEM 3
Nonhealing skin ulcer

## 2021-10-12 NOTE — PROGRESS NOTE ADULT - ASSESSMENT
71y M with PMH dementia (AAOx2-3 per cousin), colon cancer s/p hemicolectomy, and b/l LE ulcers who presented from his NH for worsening agitation, found to be hypernatremic with an elevated BUN/Cr, mild transaminitis, and elevated CK. Pt otherwise afebrile and hemodynamically stable, admitted for medical management. Improved mental status and PO intake s/p empiric ceftriaxone treatment for possible UTI/pneumonia.

## 2021-10-12 NOTE — PROGRESS NOTE ADULT - PROBLEM SELECTOR PROBLEM 4
Transaminitis

## 2021-10-12 NOTE — PHYSICAL THERAPY INITIAL EVALUATION ADULT - RANGE OF MOTION EXAMINATION, REHAB EVAL
difficulty assessing secondary to pt resisting. full ROM knee extension observed passively on right LE, unable to assess left LE secondary to resistance. Pt also resisting BUE ROM.

## 2021-10-12 NOTE — PHYSICAL THERAPY INITIAL EVALUATION ADULT - DISCHARGE DISPOSITION, PT EVAL
No Skilled PT needs. Pt unable to follow commands or participate in skilled therapeutic interventions. Pt to be discharged from program at this time. Please re-order only if change in pt status.

## 2021-10-12 NOTE — PROGRESS NOTE ADULT - SUBJECTIVE AND OBJECTIVE BOX
Patient is a 71y old  Male who presents with a chief complaint of Agitation (11 Oct 2021 07:07)      OVERNIGHT EVENTS: No acute events overnight. INCOMPLETE    REVIEW OF SYSTEMS:  CONSTITUTIONAL: No fever, weight loss, or fatigue  EYES: No eye pain, visual disturbances, or discharge  HEENT:  No difficulty hearing, tinnitus, vertigo; No sinus or throat pain  NECK: No pain or stiffness  BREASTS: No pain, masses, or nipple discharge  RESPIRATORY: No cough, wheezing, chills or hemoptysis; No shortness of breath  CARDIOVASCULAR: No chest pain, palpitations, dizziness, or leg swelling  GASTROINTESTINAL: No abdominal or epigastric pain. No nausea, vomiting, or hematemesis; No diarrhea or constipation. No melena or hematochezia.  GENITOURINARY: No dysuria, frequency, hematuria, or incontinence  NEUROLOGICAL: No headaches, memory loss, loss of strength, numbness, or tremors  SKIN: No itching, burning, rashes, or lesions   MUSCULOSKELETAL: No joint pain or swelling; No muscle, back, or extremity pain    ALLERGIES:  penicillin (Other)      FAMILY HISTORY:  FAMILY HISTORY:  No pertinent family history in first degree relatives        VITALS:  T(C): 36.7 (10-11-21 @ 21:51), Max: 36.7 (10-11-21 @ 21:51)  HR: 88 (10-11-21 @ 21:51) (70 - 88)  BP: 119/63 (10-11-21 @ 21:51) (119/63 - 132/71)  RR: 18 (10-11-21 @ 21:51) (18 - 18)  SpO2: 96% (10-11-21 @ 21:51) (96% - 99%)  Wt(kg): --      PHYSICAL EXAM:  GENERAL: NAD, well-groomed, well-developed  HEAD:  Atraumatic, Normocephalic  EYES: EOMI, PERRLA, conjunctiva and sclera clear  HEENT: No tonsillar erythema, exudates, or enlargement; Moist mucous membranes, Good dentition, No lesions  NECK: Supple, No JVD  NERVOUS SYSTEM:  Alert & Oriented X3, Motor Strength 5/5 B/L upper and lower extremities  CHEST/LUNG: Clear to percussion bilaterally; No rales, rhonchi, wheezing, or rubs  HEART: Regular rate and rhythm; No murmurs, rubs, or gallops  ABDOMEN: Soft, Nontender, Nondistended; Bowel sounds present  EXTREMITIES:  2+ Peripheral Pulses, No clubbing, cyanosis, or edema  SKIN: No rashes or lesions    MEDICATIONS  (STANDING):  aspirin enteric coated 81 milliGRAM(s) Oral daily  clopidogrel Tablet 75 milliGRAM(s) Oral daily  dextrose 5% + sodium chloride 0.45%. 2000 milliLiter(s) (100 mL/Hr) IV Continuous <Continuous>  enoxaparin Injectable 40 milliGRAM(s) SubCutaneous daily  tamsulosin 0.4 milliGRAM(s) Oral at bedtime    MEDICATIONS  (PRN):  acetaminophen   Tablet .. 650 milliGRAM(s) Oral every 6 hours PRN Temp greater or equal to 38C (100.4F), Mild Pain (1 - 3)  aluminum hydroxide/magnesium hydroxide/simethicone Suspension 30 milliLiter(s) Oral every 4 hours PRN Dyspepsia  melatonin 3 milliGRAM(s) Oral at bedtime PRN Insomnia  OLANZapine 2.5 milliGRAM(s) Oral every 8 hours PRN Agitation  OLANZapine Injectable 2.5 milliGRAM(s) IntraMuscular every 8 hours PRN Agitation      LAB/STUDIES:  CAPILLARY BLOOD GLUCOSE                              15.3   4.63  )-----------( 248      ( 10 Oct 2021 13:34 )             44.9     10-11    139  |  106  |  12  ----------------------------<  102<H>  3.5   |  24  |  0.87    Ca    9.5      11 Oct 2021 07:56  Phos  2.2     10-11  Mg     2.00     10-11    TPro  8.6<H>  /  Alb  3.5  /  TBili  0.7  /  DBili  x   /  AST  46<H>  /  ALT  33  /  AlkPhos  68  10-10               8.6  | 0.7  | 46       ------------------[68      ( 10 Oct 2021 13:34 )  3.5  | x    | 33          Lipase:x      Amylase:x                      IMAGING:   Patient is a 71y old  Male who presents with a chief complaint of Agitation (11 Oct 2021 07:07)      OVERNIGHT EVENTS: No acute events overnight. Pt seen and examined at bedside. Pt sleeping in bed with covers over head. Pt responds to name and gentle tactile stimuli. Denies physical concerns at this time.    REVIEW OF SYSTEMS:  CONSTITUTIONAL: No fever  RESPIRATORY: No cough, wheezing, chills or hemoptysis; No shortness of breath  CARDIOVASCULAR: No chest pain, palpitations, dizziness, or leg swelling  GASTROINTESTINAL: No abdominal or epigastric pain. No nausea, vomiting, or hematemesis; No diarrhea or constipation. No melena or hematochezia.  GENITOURINARY: No dysuria, frequency, hematuria, or incontinence  SKIN: No itching, burning, rashes, or lesions     ALLERGIES:  penicillin (Other)      FAMILY HISTORY:  FAMILY HISTORY:  No pertinent family history in first degree relatives        VITALS:  T(C): 36.7 (10-11-21 @ 21:51), Max: 36.7 (10-11-21 @ 21:51)  HR: 88 (10-11-21 @ 21:51) (70 - 88)  BP: 119/63 (10-11-21 @ 21:51) (119/63 - 132/71)  RR: 18 (10-11-21 @ 21:51) (18 - 18)  SpO2: 96% (10-11-21 @ 21:51) (96% - 99%)  Wt(kg): --      PHYSICAL EXAM:  GENERAL: NAD, lying in bed, sheets over face, frail  HEAD:  Atraumatic, Normocephalic  EYES: unable to assess  HEENT: unable to assess  NERVOUS SYSTEM:  Alert & Oriented X1, responsive to verbal stimuli  CHEST/LUNG: Clear to percussion bilaterally; No rales, rhonchi, wheezing, or rubs  HEART: Regular rate and rhythm; No murmurs, rubs, or gallops  ABDOMEN: Soft, Nontender, Nondistended; Bowel sounds present  EXTREMITIES: 2+ peripheral pulses in UE; palpable pulses in b/l LE; SCDs in place, no edema or pain on palpation  SKIN: bilateral ulcers in the medial malleolar region with no erythema, warmth, or discharge    MEDICATIONS  (STANDING):  aspirin enteric coated 81 milliGRAM(s) Oral daily  clopidogrel Tablet 75 milliGRAM(s) Oral daily  dextrose 5% + sodium chloride 0.45%. 2000 milliLiter(s) (100 mL/Hr) IV Continuous <Continuous>  enoxaparin Injectable 40 milliGRAM(s) SubCutaneous daily  tamsulosin 0.4 milliGRAM(s) Oral at bedtime    MEDICATIONS  (PRN):  acetaminophen   Tablet .. 650 milliGRAM(s) Oral every 6 hours PRN Temp greater or equal to 38C (100.4F), Mild Pain (1 - 3)  aluminum hydroxide/magnesium hydroxide/simethicone Suspension 30 milliLiter(s) Oral every 4 hours PRN Dyspepsia  melatonin 3 milliGRAM(s) Oral at bedtime PRN Insomnia  OLANZapine 2.5 milliGRAM(s) Oral every 8 hours PRN Agitation  OLANZapine Injectable 2.5 milliGRAM(s) IntraMuscular every 8 hours PRN Agitation      LAB/STUDIES:                          15.3   4.63  )-----------( 248      ( 10 Oct 2021 13:34 )             44.9     10-11    139  |  106  |  12  ----------------------------<  102<H>  3.5   |  24  |  0.87    Ca    9.5      11 Oct 2021 07:56  Phos  2.2     10-11  Mg     2.00     10-11    TPro  8.6<H>  /  Alb  3.5  /  TBili  0.7  /  DBili  x   /  AST  46<H>  /  ALT  33  /  AlkPhos  68  10-10               8.6  | 0.7  | 46       ------------------[68      ( 10 Oct 2021 13:34 )  3.5  | x    | 33          Lipase:x      Amylase:x                      IMAGING: No new imaging   Patient is a 71y old  Male who presents with a chief complaint of Agitation (11 Oct 2021 07:07)      OVERNIGHT EVENTS: No acute events overnight. No PRN antipsychotics used only melatonin PRN x1. Pt seen and examined at bedside. Pt sleeping in bed with covers over head. Pt responds to name and gentle tactile stimuli. Denies physical concerns at this time.    REVIEW OF SYSTEMS:  CONSTITUTIONAL: No fever  RESPIRATORY: No cough, wheezing, chills or hemoptysis; No shortness of breath  CARDIOVASCULAR: No chest pain, palpitations, dizziness, or leg swelling  GASTROINTESTINAL: No abdominal or epigastric pain. No nausea, vomiting, or hematemesis; No diarrhea or constipation. No melena or hematochezia.  GENITOURINARY: No dysuria, frequency, hematuria, or incontinence  SKIN: No itching, burning, rashes, or lesions     ALLERGIES:  penicillin (Other)      FAMILY HISTORY:  FAMILY HISTORY:  No pertinent family history in first degree relatives        VITALS:  T(C): 36.7 (10-11-21 @ 21:51), Max: 36.7 (10-11-21 @ 21:51)  HR: 88 (10-11-21 @ 21:51) (70 - 88)  BP: 119/63 (10-11-21 @ 21:51) (119/63 - 132/71)  RR: 18 (10-11-21 @ 21:51) (18 - 18)  SpO2: 96% (10-11-21 @ 21:51) (96% - 99%)  Wt(kg): --      PHYSICAL EXAM:  GENERAL: NAD, lying in bed, sheets over face, frail  HEAD:  Atraumatic, Normocephalic  EYES: unable to assess  HEENT: unable to assess  NERVOUS SYSTEM:  Alert & Oriented X1, responsive to verbal stimuli  CHEST/LUNG: Clear to percussion bilaterally; No rales, rhonchi, wheezing, or rubs  HEART: Regular rate and rhythm; No murmurs, rubs, or gallops  ABDOMEN: Soft, Nontender, Nondistended; Bowel sounds present  EXTREMITIES: 2+ peripheral pulses in UE; palpable pulses in b/l LE; SCDs in place, no edema or pain on palpation  SKIN: bilateral ulcers in the medial malleolar region with no erythema, warmth, or discharge    MEDICATIONS  (STANDING):  aspirin enteric coated 81 milliGRAM(s) Oral daily  clopidogrel Tablet 75 milliGRAM(s) Oral daily  dextrose 5% + sodium chloride 0.45%. 2000 milliLiter(s) (100 mL/Hr) IV Continuous <Continuous>  enoxaparin Injectable 40 milliGRAM(s) SubCutaneous daily  tamsulosin 0.4 milliGRAM(s) Oral at bedtime    MEDICATIONS  (PRN):  acetaminophen   Tablet .. 650 milliGRAM(s) Oral every 6 hours PRN Temp greater or equal to 38C (100.4F), Mild Pain (1 - 3)  aluminum hydroxide/magnesium hydroxide/simethicone Suspension 30 milliLiter(s) Oral every 4 hours PRN Dyspepsia  melatonin 3 milliGRAM(s) Oral at bedtime PRN Insomnia  OLANZapine 2.5 milliGRAM(s) Oral every 8 hours PRN Agitation  OLANZapine Injectable 2.5 milliGRAM(s) IntraMuscular every 8 hours PRN Agitation      LAB/STUDIES:                          15.3   4.63  )-----------( 248      ( 10 Oct 2021 13:34 )             44.9     10-11    139  |  106  |  12  ----------------------------<  102<H>  3.5   |  24  |  0.87    Ca    9.5      11 Oct 2021 07:56  Phos  2.2     10-11  Mg     2.00     10-11    TPro  8.6<H>  /  Alb  3.5  /  TBili  0.7  /  DBili  x   /  AST  46<H>  /  ALT  33  /  AlkPhos  68  10-10               8.6  | 0.7  | 46       ------------------[68      ( 10 Oct 2021 13:34 )  3.5  | x    | 33          Lipase:x      Amylase:x                      IMAGING: No new imaging

## 2021-10-12 NOTE — PHYSICAL THERAPY INITIAL EVALUATION ADULT - PERTINENT HX OF CURRENT PROBLEM, REHAB EVAL
71 year old Ma;e with PMH dementia (AAOx2-3 per cousin), colon cancer s/p hemicolectomy, and BLE ulcers who presented from his NH for worsening agitation, found to be hypernatremic with an elevated BUN/Cr, mild transaminitis, and elevated CK. Pt otherwise afebrile and hemodynamically stable, admitted for medical management. Improved mental status and PO intake s/p empiric ceftriaxone treatment for possible UTI/pneumonia.

## 2021-10-12 NOTE — DISCHARGE NOTE NURSING/CASE MANAGEMENT/SOCIAL WORK - PATIENT PORTAL LINK FT
You can access the FollowMyHealth Patient Portal offered by [facility] by registering at the following website: [email or web address]. By joining [employer]’s FollowMyHealth portal, you will also be able to view your health information using other applications (apps) compatible with our system.

## 2021-10-12 NOTE — PROGRESS NOTE ADULT - PROBLEM SELECTOR PLAN 3
B/l LE wounds in with crusting and clear discharge but no significant erythema, warmth, or TTP noted  - Elevated ESR (79) and CRP (73.1)  - B/l ankle x-ray neg for acute infection  - C/w plavix and aspirin  - F/u wound care eval B/l LE wounds in with crusting and clear discharge but no significant erythema, warmth, or TTP noted  - Elevated ESR (79) and CRP (73.1)  - B/l ankle x-ray neg for acute infection  - C/w plavix and aspirin  - Per wound care recs

## 2021-10-26 NOTE — BH CONSULTATION LIAISON ASSESSMENT NOTE - FAMILY HISTORY OF PSYCHIATRIC ILLNESS / SUICIDALITY
Metronidazole Pregnancy And Lactation Text: This medication is Pregnancy Category B and considered safe during pregnancy.  It is also excreted in breast milk. None known

## 2022-02-07 NOTE — BH CONSULTATION LIAISON ASSESSMENT NOTE - NSBHCHARTREVIEWVS_PSY_A_CORE FT
oriented to person, place and time Vital Signs Last 24 Hrs  T(C): 36.6 (30 Apr 2021 14:25), Max: 36.9 (30 Apr 2021 04:40)  T(F): 97.8 (30 Apr 2021 14:25), Max: 98.4 (30 Apr 2021 04:40)  HR: 72 (30 Apr 2021 14:25) (60 - 74)  BP: 152/70 (30 Apr 2021 14:25) (129/57 - 152/70)  BP(mean): --  RR: 17 (30 Apr 2021 14:25) (16 - 18)  SpO2: 99% (30 Apr 2021 14:25) (99% - 100%)

## 2022-02-26 NOTE — BH CONSULTATION LIAISON ASSESSMENT NOTE - NSSUICPROTFACT_PSY_ALL_CORE
4 Responsibility to children, family, or others/Identifies reasons for living/Supportive social network of family or friends/Cultural, spiritual and/or moral attitudes against suicide/Caodaism beliefs

## 2022-05-12 NOTE — ED ADULT TRIAGE NOTE - NS ED NURSE BANDS TYPE
Flight Risk Additional Notes: Patient consent was obtained to proceed with the visit and recommended plan of care after discussion of all risks and benefits, including the risks of COVID-19 exposure. Detail Level: Simple

## 2022-07-13 NOTE — ED PROVIDER NOTE - PROGRESS NOTE DETAILS
Spoke to Dr. Quiroz, pt with a nonhealing Lt ankle ulcer, also Rt colon mass which requires colectomy.  Pt with significant dementia Skyrizi Counseling: I discussed with the patient the risks of risankizumab-rzaa including but not limited to immunosuppression, and serious infections.  The patient understands that monitoring is required including a PPD at baseline and must alert us or the primary physician if symptoms of infection or other concerning signs are noted.

## 2022-11-30 NOTE — H&P ADULT - CLICK TO LAUNCH ORM
----- Message from Eugenia Cervantes RN sent at 11/22/2022  2:50 PM CST -----  Regarding: STAT LABS 11/28  Please watch for STAT labs on 11/28. Please obtain update from patient and forward to provider when results are available.     .

## 2023-01-23 NOTE — BEHAVIORAL HEALTH ASSESSMENT NOTE - NS ED [facility] MED ROS ENT MOUTH
After Your Colonoscopy Instructions    DIET:  Resume your usual diet as able  Nausea may be expected for the first 24 to 48 hours.  Start eating a bland diet (toast, gelatin, 7-up, hot cereal, crackers, sherbet, broth and soup).  Drink plenty of fluids (6-8 glasses of water a day).  Avoid greasy or spicy foods for 24 hours.    ACTIVITY:  Rest quietly at home for the remainder of the day. You may resume normal activities tomorrow.  Do not do anything that requires coordination the day of the procedure, such as climbing ladders, using a sharp knife, operating machinery, driving.   May resume driving and/or operating machinery in 24 hours  May Shower tomorrow     WHAT TO EXPECT:  Mild abdominal discomfort, bloating and gas pains.  A scant amount of rectal bleeding following a biopsy, polypectomy or if you have hemorrhoids is normal.  Return of your usual bowel movements in 1-2 days.  A tired feeling after the procedure due to the medications given to help you relax.       PROBLEMS TO WATCH FOR - NOTIFY YOUR SURGEON IF YOU HAVE ANY OF THESE SYMPTOMS:  Severe abdominal pain or bloating.   Signs of infection including chills or temperature over 101 degrees.  Large amounts of bright red rectal bleeding, blood clots or black colored stool.  Vomiting blood or black colored liquid.    FOLLOW -UP:  One specimen was taken, please allow at least 7-10  business days for pathology results.  Someone will either call or send a letter with your pathology results.      If you have not received a letter or phone call, or have any questions or concerns please call Dr. Simon [phone]   
No complaints

## 2023-03-19 NOTE — ED PROVIDER NOTE - CROS ED ENDOCRINE ALL NEG
[de-identified] : 
negative...

## 2023-03-31 NOTE — PROGRESS NOTE ADULT - PROBLEM SELECTOR PROBLEM 1
No new care gaps identified.  [facility] Embedded Care Gaps. Reference number: [id number]. 3/31/2023   5:33:54 AM MANPREETT   Dementia, Alzheimer's, with behavior disturbance

## 2024-04-25 NOTE — PROGRESS NOTE ADULT - PROBLEM SELECTOR PLAN 2
Called and left VM, will continue to try to reach pt.    HUB- please put patient straight through to triage    Sonja Hernandez, RN  Triage RN  04/25/24 09:34 EDT     - Poor PO intake at NH  - Pt continues to be hypernatremic (151 -> 153 -> 151 -> 149) s/p D5 1/2NS  - Pt currently NPO 2/2 mental status  - 2L D5 1/2 NS today over 20 hours - Poor PO intake at NH  - Pt continues to be hypernatremic (151 -> 153 -> 151 -> 149) s/p D5 1/2NS  - Tolerated bedside swallow, pending S&S evaluation  - C/w IVF

## 2024-06-30 NOTE — PROGRESS NOTE ADULT - PROBLEM SELECTOR PLAN 1
1- Wounds Cultures.  2- Follow Blood culture to final report.  3- Start and Continue Vancomycin 1 gm IVPB q 12 hours and follow trough closely.  4- Discontinue IV Clindamycin and start Meropenem 1 gm IVPB q 8 hours and observe for any allergy.  5- Fluid and electrolytes management.  6- CBC and BMP follow up.   7- MRI both ankles to rule out osteomyelitis. Ama Dickson MS, RDN  Spectra [phone] or via Teams

## 2025-03-09 NOTE — DISCHARGE NOTE NURSING/CASE MANAGEMENT/SOCIAL WORK - PATIENT PORTAL LINK FT
You can access the FollowMyHealth Patient Portal offered by [facility] by registering at the following website: [email or web address]. By joining [employer]’s FollowMyHealth portal, you will also be able to view your health information using other applications (apps) compatible with our system. 95